# Patient Record
Sex: FEMALE | Race: WHITE | NOT HISPANIC OR LATINO | Employment: OTHER | URBAN - NONMETROPOLITAN AREA
[De-identification: names, ages, dates, MRNs, and addresses within clinical notes are randomized per-mention and may not be internally consistent; named-entity substitution may affect disease eponyms.]

---

## 2018-09-17 ENCOUNTER — APPOINTMENT (OUTPATIENT)
Dept: OCCUPATIONAL MEDICINE | Facility: OTHER | Age: 33
End: 2018-09-17

## 2018-09-17 PROCEDURE — 92552 PURE TONE AUDIOMETRY AIR: CPT

## 2018-09-17 PROCEDURE — 92499 UNLISTED OPH SVC/PROCEDURE: CPT

## 2018-09-17 PROCEDURE — 99499 UNLISTED E&M SERVICE: CPT

## 2018-09-17 PROCEDURE — 99000 SPECIMEN HANDLING OFFICE-LAB: CPT

## 2018-10-29 ENCOUNTER — APPOINTMENT (OUTPATIENT)
Dept: CT IMAGING | Facility: HOSPITAL | Age: 33
End: 2018-10-29
Attending: PHYSICIAN ASSISTANT

## 2018-10-29 ENCOUNTER — HOSPITAL ENCOUNTER (EMERGENCY)
Facility: HOSPITAL | Age: 33
Discharge: HOME | End: 2018-10-29
Attending: PHYSICIAN ASSISTANT | Admitting: PHYSICIAN ASSISTANT

## 2018-10-29 VITALS
SYSTOLIC BLOOD PRESSURE: 135 MMHG | HEART RATE: 92 BPM | WEIGHT: 113 LBS | TEMPERATURE: 97.6 F | DIASTOLIC BLOOD PRESSURE: 101 MMHG | RESPIRATION RATE: 16 BRPM | OXYGEN SATURATION: 100 %

## 2018-10-29 DIAGNOSIS — M54.50 ACUTE BILATERAL LOW BACK PAIN WITHOUT SCIATICA: ICD-10-CM

## 2018-10-29 DIAGNOSIS — G44.209 ACUTE NON INTRACTABLE TENSION-TYPE HEADACHE: ICD-10-CM

## 2018-10-29 DIAGNOSIS — R31.29 MICROSCOPIC HEMATURIA: ICD-10-CM

## 2018-10-29 LAB
ALBUMIN SERPL-MCNC: 4.3 G/DL (ref 3.4–5)
ALBUMIN UR-MCNC: NEGATIVE MG/DL
ALP SERPL-CCNC: 69 U/L (ref 40–150)
ALT SERPL W P-5'-P-CCNC: 25 U/L (ref 0–50)
ANION GAP SERPL CALCULATED.3IONS-SCNC: 7 MMOL/L (ref 3–14)
APPEARANCE UR: ABNORMAL
AST SERPL W P-5'-P-CCNC: 17 U/L (ref 0–45)
BACTERIA #/AREA URNS HPF: ABNORMAL /HPF
BASOPHILS # BLD AUTO: 0.1 10E9/L (ref 0–0.2)
BASOPHILS NFR BLD AUTO: 0.7 %
BILIRUB SERPL-MCNC: 0.2 MG/DL (ref 0.2–1.3)
BILIRUB UR QL STRIP: NEGATIVE
BUN SERPL-MCNC: 13 MG/DL (ref 7–30)
CALCIUM SERPL-MCNC: 8.7 MG/DL (ref 8.5–10.1)
CAOX CRY #/AREA URNS HPF: ABNORMAL /HPF
CHLORIDE SERPL-SCNC: 113 MMOL/L (ref 94–109)
CO2 SERPL-SCNC: 21 MMOL/L (ref 20–32)
COLOR UR AUTO: ABNORMAL
CREAT SERPL-MCNC: 1.01 MG/DL (ref 0.52–1.04)
CRP SERPL-MCNC: <2.9 MG/L (ref 0–8)
DIFFERENTIAL METHOD BLD: NORMAL
EOSINOPHIL # BLD AUTO: 0.3 10E9/L (ref 0–0.7)
EOSINOPHIL NFR BLD AUTO: 3.4 %
ERYTHROCYTE [DISTWIDTH] IN BLOOD BY AUTOMATED COUNT: 12.6 % (ref 10–15)
GFR SERPL CREATININE-BSD FRML MDRD: 63 ML/MIN/1.7M2
GLUCOSE SERPL-MCNC: 85 MG/DL (ref 70–99)
GLUCOSE UR STRIP-MCNC: NEGATIVE MG/DL
HCG UR QL: NEGATIVE
HCT VFR BLD AUTO: 42.5 % (ref 35–47)
HGB BLD-MCNC: 14.5 G/DL (ref 11.7–15.7)
HGB UR QL STRIP: ABNORMAL
IMM GRANULOCYTES # BLD: 0 10E9/L (ref 0–0.4)
IMM GRANULOCYTES NFR BLD: 0.2 %
KETONES UR STRIP-MCNC: NEGATIVE MG/DL
LEUKOCYTE ESTERASE UR QL STRIP: NEGATIVE
LYMPHOCYTES # BLD AUTO: 2 10E9/L (ref 0.8–5.3)
LYMPHOCYTES NFR BLD AUTO: 22.2 %
MCH RBC QN AUTO: 32.8 PG (ref 26.5–33)
MCHC RBC AUTO-ENTMCNC: 34.1 G/DL (ref 31.5–36.5)
MCV RBC AUTO: 96 FL (ref 78–100)
MONOCYTES # BLD AUTO: 0.5 10E9/L (ref 0–1.3)
MONOCYTES NFR BLD AUTO: 5.7 %
MUCOUS THREADS #/AREA URNS LPF: PRESENT /LPF
NEUTROPHILS # BLD AUTO: 6 10E9/L (ref 1.6–8.3)
NEUTROPHILS NFR BLD AUTO: 67.8 %
NITRATE UR QL: NEGATIVE
NRBC # BLD AUTO: 0 10*3/UL
NRBC BLD AUTO-RTO: 0 /100
PH UR STRIP: 5 PH (ref 4.7–8)
PLATELET # BLD AUTO: 362 10E9/L (ref 150–450)
POTASSIUM SERPL-SCNC: 3.9 MMOL/L (ref 3.4–5.3)
PROT SERPL-MCNC: 8.1 G/DL (ref 6.8–8.8)
RBC # BLD AUTO: 4.42 10E12/L (ref 3.8–5.2)
RBC #/AREA URNS AUTO: >182 /HPF (ref 0–2)
SODIUM SERPL-SCNC: 141 MMOL/L (ref 133–144)
SOURCE: ABNORMAL
SP GR UR STRIP: 1.01 (ref 1–1.03)
SQUAMOUS #/AREA URNS AUTO: 2 /HPF (ref 0–1)
URATE CRY #/AREA URNS HPF: ABNORMAL /HPF
UROBILINOGEN UR STRIP-MCNC: NORMAL MG/DL (ref 0–2)
WBC # BLD AUTO: 8.8 10E9/L (ref 4–11)
WBC #/AREA URNS AUTO: 2 /HPF (ref 0–5)
WBC CLUMPS #/AREA URNS HPF: PRESENT /HPF

## 2018-10-29 PROCEDURE — 99284 EMERGENCY DEPT VISIT MOD MDM: CPT | Mod: 25

## 2018-10-29 PROCEDURE — 81001 URINALYSIS AUTO W/SCOPE: CPT | Performed by: PHYSICIAN ASSISTANT

## 2018-10-29 PROCEDURE — 80053 COMPREHEN METABOLIC PANEL: CPT | Performed by: PHYSICIAN ASSISTANT

## 2018-10-29 PROCEDURE — 99284 EMERGENCY DEPT VISIT MOD MDM: CPT | Performed by: PHYSICIAN ASSISTANT

## 2018-10-29 PROCEDURE — 36415 COLL VENOUS BLD VENIPUNCTURE: CPT | Performed by: PHYSICIAN ASSISTANT

## 2018-10-29 PROCEDURE — 86140 C-REACTIVE PROTEIN: CPT | Performed by: PHYSICIAN ASSISTANT

## 2018-10-29 PROCEDURE — 81025 URINE PREGNANCY TEST: CPT | Performed by: PHYSICIAN ASSISTANT

## 2018-10-29 PROCEDURE — 85025 COMPLETE CBC W/AUTO DIFF WBC: CPT | Performed by: PHYSICIAN ASSISTANT

## 2018-10-29 PROCEDURE — 70450 CT HEAD/BRAIN W/O DYE: CPT | Mod: TC

## 2018-10-29 RX ORDER — METOCLOPRAMIDE HYDROCHLORIDE 5 MG/ML
5 INJECTION INTRAMUSCULAR; INTRAVENOUS ONCE
Status: DISCONTINUED | OUTPATIENT
Start: 2018-10-29 | End: 2018-10-29 | Stop reason: HOSPADM

## 2018-10-29 RX ORDER — DIPHENHYDRAMINE HYDROCHLORIDE 50 MG/ML
25 INJECTION INTRAMUSCULAR; INTRAVENOUS ONCE
Status: DISCONTINUED | OUTPATIENT
Start: 2018-10-29 | End: 2018-10-29 | Stop reason: HOSPADM

## 2018-10-29 RX ORDER — KETOROLAC TROMETHAMINE 30 MG/ML
30 INJECTION, SOLUTION INTRAMUSCULAR; INTRAVENOUS ONCE
Status: DISCONTINUED | OUTPATIENT
Start: 2018-10-29 | End: 2018-10-29 | Stop reason: HOSPADM

## 2018-10-29 NOTE — ED PROVIDER NOTES
"  History     Chief Complaint   Patient presents with     Back Pain     bilateral low back pain x 1 radiating to low abdomen, nausea and vomiting     Dizziness     lightheaded x 1 week     HPI  Sarah Ugalde is a 33 year old female who presents with bilateral low back pain with radiation to her lower abdomen, nausea, dizziness and headaches x 1 week. She has h/o TTP with plasmapheresis performed 2 years ago. Denies dysuria, hematuria, or vaginal symptoms. No fevers/chills. States family h/o brain tumors so wants imaging to rule this out. Also, concerned maybe her creatinine is elevated due to the TTP as she had back pain with this in the past.  LMP was 2 weeks ago.      Problem List:    There are no active problems to display for this patient.       Past Medical History:    History reviewed. No pertinent past medical history.    Past Surgical History:    History reviewed. No pertinent surgical history.    Family History:    Family History   Problem Relation Age of Onset     Cancer - colorectal Paternal Grandmother      Breast Cancer Other      3 great aunts on moms side     Allergies Maternal Grandmother      dust pollen     Allergies Maternal Aunt      \"       Social History:  Marital Status:  Single [1]  Social History   Substance Use Topics     Smoking status: Never Smoker     Smokeless tobacco: Never Used     Alcohol use No        Medications:      No current outpatient prescriptions on file.      Review of Systems   All other systems reviewed and are negative.      Physical Exam   BP: (!) 135/101  Pulse: 92  Temp: 97.6  F (36.4  C)  Resp: 16  Weight: 51.3 kg (113 lb)  SpO2: 100 %      Physical Exam   Constitutional: She is oriented to person, place, and time. Vital signs are normal. She appears well-developed and well-nourished.  Non-toxic appearance. She does not have a sickly appearance. She does not appear ill. No distress.   HENT:   Head: Normocephalic and atraumatic.   Right Ear: Hearing, tympanic " membrane, external ear and ear canal normal.   Left Ear: Hearing, tympanic membrane, external ear and ear canal normal.   Nose: Nose normal. Right sinus exhibits no maxillary sinus tenderness and no frontal sinus tenderness. Left sinus exhibits no maxillary sinus tenderness and no frontal sinus tenderness.   Mouth/Throat: Uvula is midline, oropharynx is clear and moist and mucous membranes are normal. No oropharyngeal exudate.   Eyes: Conjunctivae and EOM are normal. Pupils are equal, round, and reactive to light. Right eye exhibits no discharge. Left eye exhibits no discharge. No scleral icterus.   Fundoscopic exam:       The right eye shows no papilledema.        The left eye shows no papilledema.   Neck: Trachea normal, normal range of motion and full passive range of motion without pain. Neck supple. No Brudzinski's sign and no Kernig's sign noted. No thyroid mass and no thyromegaly present.   Cardiovascular: Normal rate, regular rhythm, normal heart sounds and intact distal pulses.  Exam reveals no gallop and no friction rub.    No murmur heard.  Pulmonary/Chest: Effort normal and breath sounds normal. No respiratory distress. She has no wheezes. She has no rales. She exhibits no tenderness.   Abdominal: Soft. Bowel sounds are normal. She exhibits no distension and no mass. There is no tenderness. There is CVA tenderness. There is no rebound and no guarding.   Musculoskeletal: Normal range of motion. She exhibits no edema.   Lymphadenopathy:     She has no cervical adenopathy.   Neurological: She is alert and oriented to person, place, and time. She has normal strength and normal reflexes. She displays no atrophy and no tremor. No cranial nerve deficit or sensory deficit. She exhibits normal muscle tone. She displays a negative Romberg sign. She displays no seizure activity. Coordination and gait normal. GCS eye subscore is 4. GCS verbal subscore is 5. GCS motor subscore is 6.   Skin: Skin is warm and dry. No  rash noted. She is not diaphoretic. No erythema. No pallor.   Psychiatric: She has a normal mood and affect. Her behavior is normal. Judgment and thought content normal.   Nursing note and vitals reviewed.      ED Course     ED Course     Procedures              Results for orders placed or performed during the hospital encounter of 10/29/18 (from the past 24 hour(s))   UA reflex to Microscopic and Culture   Result Value Ref Range    Color Urine Light Yellow     Appearance Urine Slightly Cloudy     Glucose Urine Negative NEG^Negative mg/dL    Bilirubin Urine Negative NEG^Negative    Ketones Urine Negative NEG^Negative mg/dL    Specific Gravity Urine 1.010 1.003 - 1.035    Blood Urine Large (A) NEG^Negative    pH Urine 5.0 4.7 - 8.0 pH    Protein Albumin Urine Negative NEG^Negative mg/dL    Urobilinogen mg/dL Normal 0.0 - 2.0 mg/dL    Nitrite Urine Negative NEG^Negative    Leukocyte Esterase Urine Negative NEG^Negative    Source Midstream Urine     RBC Urine >182 (H) 0 - 2 /HPF    WBC Urine 2 0 - 5 /HPF    WBC Clumps Present (A) NEG^Negative /HPF    Bacteria Urine None (A) NEG^Negative /HPF    Squamous Epithelial /HPF Urine 2 (H) 0 - 1 /HPF    Mucous Urine Present (A) NEG^Negative /LPF    Calcium Oxalate Many (A) NEG^Negative /HPF    Uric Acid Crystals Moderate (A) NEG^Negative /HPF   HCG qualitative urine   Result Value Ref Range    HCG Qual Urine Negative NEG^Negative   CBC with platelets differential   Result Value Ref Range    WBC 8.8 4.0 - 11.0 10e9/L    RBC Count 4.42 3.8 - 5.2 10e12/L    Hemoglobin 14.5 11.7 - 15.7 g/dL    Hematocrit 42.5 35.0 - 47.0 %    MCV 96 78 - 100 fl    MCH 32.8 26.5 - 33.0 pg    MCHC 34.1 31.5 - 36.5 g/dL    RDW 12.6 10.0 - 15.0 %    Platelet Count 362 150 - 450 10e9/L    Diff Method Automated Method     % Neutrophils 67.8 %    % Lymphocytes 22.2 %    % Monocytes 5.7 %    % Eosinophils 3.4 %    % Basophils 0.7 %    % Immature Granulocytes 0.2 %    Nucleated RBCs 0 0 /100    Absolute  Neutrophil 6.0 1.6 - 8.3 10e9/L    Absolute Lymphocytes 2.0 0.8 - 5.3 10e9/L    Absolute Monocytes 0.5 0.0 - 1.3 10e9/L    Absolute Eosinophils 0.3 0.0 - 0.7 10e9/L    Absolute Basophils 0.1 0.0 - 0.2 10e9/L    Abs Immature Granulocytes 0.0 0 - 0.4 10e9/L    Absolute Nucleated RBC 0.0    Comprehensive metabolic panel   Result Value Ref Range    Sodium 141 133 - 144 mmol/L    Potassium 3.9 3.4 - 5.3 mmol/L    Chloride 113 (H) 94 - 109 mmol/L    Carbon Dioxide 21 20 - 32 mmol/L    Anion Gap 7 3 - 14 mmol/L    Glucose 85 70 - 99 mg/dL    Urea Nitrogen 13 7 - 30 mg/dL    Creatinine 1.01 0.52 - 1.04 mg/dL    GFR Estimate 63 >60 mL/min/1.7m2    GFR Estimate If Black 76 >60 mL/min/1.7m2    Calcium 8.7 8.5 - 10.1 mg/dL    Bilirubin Total 0.2 0.2 - 1.3 mg/dL    Albumin 4.3 3.4 - 5.0 g/dL    Protein Total 8.1 6.8 - 8.8 g/dL    Alkaline Phosphatase 69 40 - 150 U/L    ALT 25 0 - 50 U/L    AST 17 0 - 45 U/L   CRP inflammation   Result Value Ref Range    CRP Inflammation <2.9 0.0 - 8.0 mg/L   CT Head w/o Contrast    Narrative    PROCEDURE: CT HEAD W/O CONTRAST     HISTORY: new onset migraine, dizziness, unsteady gait; .    COMPARISON: None.    TECHNIQUE:  Helical images of the head from the foramen magnum to the  vertex were obtained without contrast.    FINDINGS: The ventricles and sulci are normal in volume. No acute  intracranial hemorrhage, mass effect, midline shift, hydrocephalus or  basilar cystern effacement are present.    The grey-white matter interface is preserved.    The calvarium is intact. The mastoid air cells are clear.  The  visualized paranasal sinuses are clear.      Impression    IMPRESSION: Normal brain      ELSA VELÁZQUEZ MD       Medications   0.9% sodium chloride BOLUS (not administered)   ketorolac (TORADOL) injection 30 mg (not administered)   diphenhydrAMINE (BENADRYL) injection 25 mg (not administered)   metoclopramide (REGLAN) injection 5 mg (not administered)       Assessments & Plan (with Medical  Decision Making)   Sarah is in NAD and VS are WNL. On exam, she has mild right sided CVA tenderness. Normal neuro exam. Head CT was normal which reassured pt. Her CBC, CMP and CRP were WNL. UA is positive for > 182 RBC's, no gross hematuria, no protein. HCG negative. This was a clean catch and she is not due for her menstrual cycle for another 2 weeks. She denies any severe flank/abdominal/groin pain and therefore she doesn't believe she has a ureteral stone. She has refused an IV, fluids, and headache treatment because she told the nurse she doesn't believe she has a migraine. At this point, etiology is unclear. I recommended she establish care with a local provider and also consult with her hematologist in [place] which she has the ability to perform tele-med visits with and I included a copy of her labs. She is to return here sooner with any new or worsening symptoms or if she changes her mind about accepting further treatment.     Plan: Follow up with your hematologist in regards to your symptoms. Your labs were normal, see below, though your urine had microscopic hematuria. Return here if you develop any new or worsening symptoms. Establish care with a local primary care provider ASAP for close follow up.     I have reviewed the nursing notes.    I have reviewed the findings, diagnosis, plan and need for follow up with the patient.    There are no discharge medications for this patient.      Final diagnoses:   Acute bilateral low back pain without sciatica   Microscopic hematuria   Acute non intractable tension-type headache       10/29/2018   [facility]     Beth Don PA-C  10/29/18 [id number]

## 2018-10-29 NOTE — ED AVS SNAPSHOT
[facility]    [address] [place] [id number]    Phone:  [phone]                                       Sarah Ugalde   MRN: [id number]    Department:  [facility]   Date of Visit:  10/29/2018           Patient Information     Date Of Birth          1985        Your diagnoses for this visit were:     Acute bilateral low back pain without sciatica     Microscopic hematuria     Acute non intractable tension-type headache        You were seen by Beth Don PA-C.      Follow-up Information     Follow up with Tesha Babcock MD In 3 days.    Specialty:  Family Practice    Contact information:    [address] [facility] [address] [phone]          Follow up with [facility].    Specialty:  EMERGENCY MEDICINE    Why:  If symptoms worsen    Contact information:    [address] [address] [phone]    Additional information:    From [place]: Take [address]. Turn left at [address]. Turn left at the first stoplight on [address]. At the first stop sign, take a right onto [address]. Take a left into the parking lot and continue through until you reach the North enterance of the building.       From [place]: Take [address]. Take the [address] ramp towards [place]. Turn left onto [address]. Take a slight right onto [address]. Turn left at the first stoplight on [address]. At the first stop sign, take a right onto [address]. Take a left into the parking lot and continue through until you reach the North enterance of the building.       From [place]: Take [address]. Take a right at [address]. At the first stop sign, take a right onto [address]. Take a left into the parking lot and continue through until you reach the North enterance of the building.         Discharge Instructions       Follow up with your hematologist  in regards to your symptoms. Your labs were normal, see below, though your urine had microscopic hematuria. Return here if you develop any new or worsening symptoms. Establish care with a local primary care provider ASAP for close follow up.       Results for IBRAHIMA SILVER (MRN [id number]) as of 10/29/2018 16:48       Ref. Range 10/29/2018 14:44 10/29/2018 15:13   Sodium Latest Ref Range: 133 - 144 mmol/L  141   Potassium Latest Ref Range: 3.4 - 5.3 mmol/L  3.9   Chloride Latest Ref Range: 94 - 109 mmol/L  113 (H)   Carbon Dioxide Latest Ref Range: 20 - 32 mmol/L  21   Urea Nitrogen Latest Ref Range: 7 - 30 mg/dL  13   Creatinine Latest Ref Range: 0.52 - 1.04 mg/dL  1.01   GFR Estimate Latest Ref Range: >60 mL/min/1.7m2  63   GFR Estimate If Black Latest Ref Range: >60 mL/min/1.7m2  76   Calcium Latest Ref Range: 8.5 - 10.1 mg/dL  8.7   Anion Gap Latest Ref Range: 3 - 14 mmol/L  7   Albumin Latest Ref Range: 3.4 - 5.0 g/dL  4.3   Protein Total Latest Ref Range: 6.8 - 8.8 g/dL  8.1   Bilirubin Total Latest Ref Range: 0.2 - 1.3 mg/dL  0.2   Alkaline Phosphatase Latest Ref Range: 40 - 150 U/L  69   ALT Latest Ref Range: 0 - 50 U/L  25   AST Latest Ref Range: 0 - 45 U/L  17   CRP Inflammation Latest Ref Range: 0.0 - 8.0 mg/L  <2.9   HCG Qual Urine Latest Ref Range: NEG^Negative  Negative    Glucose Latest Ref Range: 70 - 99 mg/dL  85   WBC Latest Ref Range: 4.0 - 11.0 10e9/L  8.8   Hemoglobin Latest Ref Range: 11.7 - 15.7 g/dL  14.5   Hematocrit Latest Ref Range: 35.0 - 47.0 %  42.5   Platelet Count Latest Ref Range: 150 - 450 10e9/L  362   RBC Count Latest Ref Range: 3.8 - 5.2 10e12/L  4.42   MCV Latest Ref Range: 78 - 100 fl  96   MCH Latest Ref Range: 26.5 - 33.0 pg  32.8   MCHC Latest Ref Range: 31.5 - 36.5 g/dL  34.1   RDW Latest Ref Range: 10.0 - 15.0 %  12.6   Diff Method Unknown  Automated Method   % Neutrophils Latest Units: %  67.8   % Lymphocytes Latest Units: %  22.2   % Monocytes Latest Units: %   5.7   % Eosinophils Latest Units: %  3.4   % Basophils Latest Units: %  0.7   % Immature Granulocytes Latest Units: %  0.2   Nucleated RBCs Latest Ref Range: 0 /100  0   Absolute Neutrophil Latest Ref Range: 1.6 - 8.3 10e9/L  6.0   Absolute Lymphocytes Latest Ref Range: 0.8 - 5.3 10e9/L  2.0   Absolute Monocytes Latest Ref Range: 0.0 - 1.3 10e9/L  0.5   Absolute Eosinophils Latest Ref Range: 0.0 - 0.7 10e9/L  0.3   Absolute Basophils Latest Ref Range: 0.0 - 0.2 10e9/L  0.1   Abs Immature Granulocytes Latest Ref Range: 0 - 0.4 10e9/L  0.0   Absolute Nucleated RBC Unknown  0.0   Color Urine Unknown Light Yellow    Appearance Urine Unknown Slightly Cloudy    Glucose Urine Latest Ref Range: NEG^Negative mg/dL Negative    Bilirubin Urine Latest Ref Range: NEG^Negative  Negative    Ketones Urine Latest Ref Range: NEG^Negative mg/dL Negative    Specific Gravity Urine Latest Ref Range: 1.003 - 1.035  1.010    pH Urine Latest Ref Range: 4.7 - 8.0 pH 5.0    Protein Albumin Urine Latest Ref Range: NEG^Negative mg/dL Negative    Urobilinogen mg/dL Latest Ref Range: 0.0 - 2.0 mg/dL Normal    Nitrite Urine Latest Ref Range: NEG^Negative  Negative    Blood Urine Latest Ref Range: NEG^Negative  Large (A)    Leukocyte Esterase Urine Latest Ref Range: NEG^Negative  Negative    Source Unknown Midstream Urine    WBC Urine Latest Ref Range: 0 - 5 /HPF 2    RBC Urine Latest Ref Range: 0 - 2 /HPF >182 (H)    Bacteria Urine Latest Ref Range: NEG^Negative /HPF None (A)    WBC Clumps Latest Ref Range: NEG^Negative /HPF Present (A)    Squamous Epithelial /HPF Urine Latest Ref Range: 0 - 1 /HPF 2 (H)    Mucous Urine Latest Ref Range: NEG^Negative /LPF Present (A)    Uric Acid Crystals Latest Ref Range: NEG^Negative /HPF Moderate (A)    Calcium Oxalate Latest Ref Range: NEG^Negative /HPF Many (A)          General Neck and Back Pain    Both neck and back pain are usually caused by injury to the muscles or ligaments of the spine. Sometimes the  disks that separate each bone of the spine may cause pain by pressing on a nearby nerve. Back and neck pain may appear after a sudden twisting or bending force (such as in a car accident), or sometimes after a simple awkward movement. In either case, muscle spasm is often present and adds to the pain.  Acute neck and back pain usually gets better in 1 to 2 weeks. Pain related to disk disease, arthritis in the spinal joints or spinal stenosis (narrowing of the spinal canal) can become chronic and last for months or years.  Back and neck pain are common problems. Most people feel better in 1 or 2 weeks, and most of the rest in 1 to 2 months. Most people can remain active.  People have and describe pain differently.    Pain can be sharp, stabbing, shooting, aching, cramping, or burning    Movement, standing, bending, lifting, sitting, or walking may worsen the pain    Pain can be localized to one spot or area, or it can be more generalized    Pain can spread or radiate upwards, downwards, to the front, or go down your arms    Muscle spasm may occur.  Most of the time mechanical problems with the muscles or spine cause the pain. it is usually caused by an injury, whether known or not, to the muscles or ligaments. While illnesses can cause back pain, it is usually not caused by a serious illness. Pain is usually related to physical activity, whether sports, exercise, work, or normal activity. Sometimes it can occur without an identifiable cause. This can happen simply by stretching or moving wrong, without noting pain at the time. Other causes include:    Overexertion, lifting, pushing, pulling incorrectly or too aggressively.    Sudden twisting, bending or stretching from an accident (car or fall), or accidental movement.    Poor posture    Poor conditioning, lack of regular exercise    Spinal disc disease or arthritis    Stress    Pregnancy, or illness like appendicitis, bladder or kidney infection, pelvic  infections   Home care    For neck pain: Use a comfortable pillow that supports the head and keeps the spine in a neutral position. The position of the head should not be tilted forward or backward.    When in bed, try to find a position of comfort. A firm mattress is best. Try lying flat on your back with pillows under your knees. You can also try lying on your side with your knees bent up towards your chest and a pillow between your knees.    At first, do not try to stretch out the sore spots. If there is a strain, it is not like the good soreness you get after exercising without an injury. In this case, stretching may make it worse.    Don't sit for long periods, as in long car rides or other travel. This puts more stress on the lower back than standing or walking.    During the first 24 to 72 hours after an injury, apply an ice pack to the painful area for 20 minutes and then remove it for 20 minutes over a period of 60 to 90 minutes or several times a day.     You can alternate ice and heat therapies. Talk with your healthcare provider about the best treatment for your back or neck pain. As a safety precaution, do not use a heating pad at bedtime. Sleeping with a heating pad can lead to skin burns or tissue damage.    Therapeutic massage can help relax the back and neck muscles without stretching them.    Be aware of safe lifting methods and do not lift anything over 15 pounds until all the pain is gone.  Medicines  Talk to your healthcare provider before using medicine, especially if you have other medical problems or are taking other medicines.    You may use over-the-counter medicine to control pain, unless another pain medicine was prescribed. If you have chronic conditions like diabetes, liver or kidney disease, stomach ulcers,  gastrointestinal bleeding, or are taking blood thinner medicines.    Be careful if you are given pain medicines, narcotics, or medicine for muscle spasm. They can cause drowsiness,  and can affect your coordination, reflexes, and judgment. Do not drive or operate heavy machinery.  Follow-up care  Follow up with your healthcare provider, or as advised. Physical therapy or further tests may be needed.  If X-rays were taken, you will be notified of any new findings that may affect your care.  Call 911  Call 911 if any of the following occur:    Trouble breathing    Confusion    Very drowsy or trouble awakening    Fainting or loss of consciousness    Rapid or very slow heart rate    Loss of bowel or bladder control  When to seek medical advice  Call your healthcare provider right away if any of these occur:    Pain becomes worse or spreads into your arms or legs    Weakness, numbness or pain in one or both arms or legs    Numbness in the groin area    Difficulty walking    Fever of 100.4 F (38 C) or higher, or as directed by your healthcare provider  Date Last Reviewed: 7/1/2016 2000-2017 [employer]. [address] [place] [address]. All rights reserved. This information is not intended as a substitute for professional medical care. Always follow your healthcare professional's instructions.        Blood in the Urine    Blood in the urine (hematuria) has many possible causes. If it occurs after an injury (such as a car accident or fall), it is most often a sign of bruising to the kidney or bladder. Common causes of blood in the urine include urinary tract infections, kidney stones, inflammation, tumors, or certain other diseases of the kidney or bladder. Menstruation can cause blood to appear in the urine sample, although it is not coming from the urinary tract.  If only a trace amount of blood is present, it will show up on the urine test, even though the urine may be yellow and not pink or red. This may occur with any of the above conditions, as well as heavy exercise or high fever. In this case, your doctor may want to repeat the urine test on another day. This will  show if the blood is still present. If it is, then other tests can be done to find out the cause.  Home care  Follow these home care guidelines:    If your urine does not appear bloody (pink, brown or red) then you do not need to restrict your activity in any way.    If you can see blood in your urine, rest and avoid heavy exertion until your next exam. Do not use aspirin, blood thinners, or anti-platelet or anti-inflammatory medicines. These include ibuprofen and naproxen. These thin the blood and may increase bleeding.  Follow-up care  Follow up with your healthcare provider, or as advised. If you were injured and had blood in your urine, you should have a repeat urine test in 1 to 2 days. Contact your doctor for this test.  A radiologist will review any X-rays that were taken. You will be told of any new findings that may affect your care.  When to seek medical advice  Call your healthcare provider right away if any of these occur:    Bright red blood or blood clots in the urine (if you did not have this before)    Weakness, dizziness or fainting    New groin, abdominal, or back pain    Fever of 100.4 F (38 C) or higher, or as directed by your healthcare provider    Repeated vomiting    Bleeding from the nose or gums or easy bruising  Date Last Reviewed: 9/1/2016 2000-2017 [employer]. [address] [place] [address], [place] [address]. All rights reserved. This information is not intended as a substitute for professional medical care. Always follow your healthcare professional's instructions.               Review of your medicines      Notice     You have not been prescribed any medications.            Procedures and tests performed during your visit     CBC with platelets differential    CRP inflammation    CT Head w/o Contrast    Comprehensive metabolic panel    HCG qualitative urine    UA reflex to Microscopic and Culture      Orders Needing Specimen Collection     None      Pending Results     No  "orders found from 10/27/2018 to 10/30/2018.            Pending Culture Results     No orders found from 10/27/2018 to 10/30/2018.            Thank you for choosing [facility]       Thank you for choosing [facility] for your care. Our goal is always to provide you with excellent care. Hearing back from our patients is one way we can continue to improve our services. Please take a few minutes to complete the written survey that you may receive in the mail after you visit with us. Thank you!        [facility] Information     [employer] lets you send messages to your doctor, view your test results, renew your prescriptions, schedule appointments and more. To sign up, go to [email or web address] . Click on \"Log in\" on the left side of the screen, which will take you to the Welcome page. Then click on \"Sign up Now\" on the right side of the page.     You will be asked to enter the access code listed below, as well as some personal information. Please follow the directions to create your username and password.     Your access code is: [email or web address]  Expires: 2019  5:08 PM     Your access code will  in 90 days. If you need help or a new code, please call your [facility] clinic or [phone].        Care EveryWhere ID     This is your Care EveryWhere ID. This could be used by other organizations to access your [facility] medical records  [id number]        Equal Access to Services     KEN HARRIS AH: Hadii malia luz Sorussell, waaxda luqadaha, qaybta kaalmada sunny, deepa don . So [facility] [phone].    ATENCIÓN: Si habla español, tiene a cruz disposición servicios gratuitos de asistencia lingüística. Llame al [phone].    We comply with applicable federal civil rights laws and [place] laws. We do not discriminate on the basis of race, color, national origin, age, disability, sex, sexual orientation, or gender identity.            After Visit Summary       This is your record. Keep " this with you and show to your community pharmacist(s) and doctor(s) at your next visit.

## 2018-10-29 NOTE — DISCHARGE INSTRUCTIONS
Follow up with your hematologist in regards to your symptoms. Your labs were normal, see below, though your urine had microscopic hematuria. Return here if you develop any new or worsening symptoms. Establish care with a local primary care provider ASAP for close follow up.       Results for IBRAHIMA SILVER (MRN [id number]) as of 10/29/2018 16:48       Ref. Range 10/29/2018 14:44 10/29/2018 15:13   Sodium Latest Ref Range: 133 - 144 mmol/L  141   Potassium Latest Ref Range: 3.4 - 5.3 mmol/L  3.9   Chloride Latest Ref Range: 94 - 109 mmol/L  113 (H)   Carbon Dioxide Latest Ref Range: 20 - 32 mmol/L  21   Urea Nitrogen Latest Ref Range: 7 - 30 mg/dL  13   Creatinine Latest Ref Range: 0.52 - 1.04 mg/dL  1.01   GFR Estimate Latest Ref Range: >60 mL/min/1.7m2  63   GFR Estimate If Black Latest Ref Range: >60 mL/min/1.7m2  76   Calcium Latest Ref Range: 8.5 - 10.1 mg/dL  8.7   Anion Gap Latest Ref Range: 3 - 14 mmol/L  7   Albumin Latest Ref Range: 3.4 - 5.0 g/dL  4.3   Protein Total Latest Ref Range: 6.8 - 8.8 g/dL  8.1   Bilirubin Total Latest Ref Range: 0.2 - 1.3 mg/dL  0.2   Alkaline Phosphatase Latest Ref Range: 40 - 150 U/L  69   ALT Latest Ref Range: 0 - 50 U/L  25   AST Latest Ref Range: 0 - 45 U/L  17   CRP Inflammation Latest Ref Range: 0.0 - 8.0 mg/L  <2.9   HCG Qual Urine Latest Ref Range: NEG^Negative  Negative    Glucose Latest Ref Range: 70 - 99 mg/dL  85   WBC Latest Ref Range: 4.0 - 11.0 10e9/L  8.8   Hemoglobin Latest Ref Range: 11.7 - 15.7 g/dL  14.5   Hematocrit Latest Ref Range: 35.0 - 47.0 %  42.5   Platelet Count Latest Ref Range: 150 - 450 10e9/L  362   RBC Count Latest Ref Range: 3.8 - 5.2 10e12/L  4.42   MCV Latest Ref Range: 78 - 100 fl  96   MCH Latest Ref Range: 26.5 - 33.0 pg  32.8   MCHC Latest Ref Range: 31.5 - 36.5 g/dL  34.1   RDW Latest Ref Range: 10.0 - 15.0 %  12.6   Diff Method Unknown  Automated Method   % Neutrophils Latest Units: %  67.8   % Lymphocytes Latest Units: %  22.2    % Monocytes Latest Units: %  5.7   % Eosinophils Latest Units: %  3.4   % Basophils Latest Units: %  0.7   % Immature Granulocytes Latest Units: %  0.2   Nucleated RBCs Latest Ref Range: 0 /100  0   Absolute Neutrophil Latest Ref Range: 1.6 - 8.3 10e9/L  6.0   Absolute Lymphocytes Latest Ref Range: 0.8 - 5.3 10e9/L  2.0   Absolute Monocytes Latest Ref Range: 0.0 - 1.3 10e9/L  0.5   Absolute Eosinophils Latest Ref Range: 0.0 - 0.7 10e9/L  0.3   Absolute Basophils Latest Ref Range: 0.0 - 0.2 10e9/L  0.1   Abs Immature Granulocytes Latest Ref Range: 0 - 0.4 10e9/L  0.0   Absolute Nucleated RBC Unknown  0.0   Color Urine Unknown Light Yellow    Appearance Urine Unknown Slightly Cloudy    Glucose Urine Latest Ref Range: NEG^Negative mg/dL Negative    Bilirubin Urine Latest Ref Range: NEG^Negative  Negative    Ketones Urine Latest Ref Range: NEG^Negative mg/dL Negative    Specific Gravity Urine Latest Ref Range: 1.003 - 1.035  1.010    pH Urine Latest Ref Range: 4.7 - 8.0 pH 5.0    Protein Albumin Urine Latest Ref Range: NEG^Negative mg/dL Negative    Urobilinogen mg/dL Latest Ref Range: 0.0 - 2.0 mg/dL Normal    Nitrite Urine Latest Ref Range: NEG^Negative  Negative    Blood Urine Latest Ref Range: NEG^Negative  Large (A)    Leukocyte Esterase Urine Latest Ref Range: NEG^Negative  Negative    Source Unknown Midstream Urine    WBC Urine Latest Ref Range: 0 - 5 /HPF 2    RBC Urine Latest Ref Range: 0 - 2 /HPF >182 (H)    Bacteria Urine Latest Ref Range: NEG^Negative /HPF None (A)    WBC Clumps Latest Ref Range: NEG^Negative /HPF Present (A)    Squamous Epithelial /HPF Urine Latest Ref Range: 0 - 1 /HPF 2 (H)    Mucous Urine Latest Ref Range: NEG^Negative /LPF Present (A)    Uric Acid Crystals Latest Ref Range: NEG^Negative /HPF Moderate (A)    Calcium Oxalate Latest Ref Range: NEG^Negative /HPF Many (A)          General Neck and Back Pain    Both neck and back pain are usually caused by injury to the muscles or ligaments  of the spine. Sometimes the disks that separate each bone of the spine may cause pain by pressing on a nearby nerve. Back and neck pain may appear after a sudden twisting or bending force (such as in a car accident), or sometimes after a simple awkward movement. In either case, muscle spasm is often present and adds to the pain.  Acute neck and back pain usually gets better in 1 to 2 weeks. Pain related to disk disease, arthritis in the spinal joints or spinal stenosis (narrowing of the spinal canal) can become chronic and last for months or years.  Back and neck pain are common problems. Most people feel better in 1 or 2 weeks, and most of the rest in 1 to 2 months. Most people can remain active.  People have and describe pain differently.    Pain can be sharp, stabbing, shooting, aching, cramping, or burning    Movement, standing, bending, lifting, sitting, or walking may worsen the pain    Pain can be localized to one spot or area, or it can be more generalized    Pain can spread or radiate upwards, downwards, to the front, or go down your arms    Muscle spasm may occur.  Most of the time mechanical problems with the muscles or spine cause the pain. it is usually caused by an injury, whether known or not, to the muscles or ligaments. While illnesses can cause back pain, it is usually not caused by a serious illness. Pain is usually related to physical activity, whether sports, exercise, work, or normal activity. Sometimes it can occur without an identifiable cause. This can happen simply by stretching or moving wrong, without noting pain at the time. Other causes include:    Overexertion, lifting, pushing, pulling incorrectly or too aggressively.    Sudden twisting, bending or stretching from an accident (car or fall), or accidental movement.    Poor posture    Poor conditioning, lack of regular exercise    Spinal disc disease or arthritis    Stress    Pregnancy, or illness like appendicitis, bladder or kidney  infection, pelvic infections   Home care    For neck pain: Use a comfortable pillow that supports the head and keeps the spine in a neutral position. The position of the head should not be tilted forward or backward.    When in bed, try to find a position of comfort. A firm mattress is best. Try lying flat on your back with pillows under your knees. You can also try lying on your side with your knees bent up towards your chest and a pillow between your knees.    At first, do not try to stretch out the sore spots. If there is a strain, it is not like the good soreness you get after exercising without an injury. In this case, stretching may make it worse.    Don't sit for long periods, as in long car rides or other travel. This puts more stress on the lower back than standing or walking.    During the first 24 to 72 hours after an injury, apply an ice pack to the painful area for 20 minutes and then remove it for 20 minutes over a period of 60 to 90 minutes or several times a day.     You can alternate ice and heat therapies. Talk with your healthcare provider about the best treatment for your back or neck pain. As a safety precaution, do not use a heating pad at bedtime. Sleeping with a heating pad can lead to skin burns or tissue damage.    Therapeutic massage can help relax the back and neck muscles without stretching them.    Be aware of safe lifting methods and do not lift anything over 15 pounds until all the pain is gone.  Medicines  Talk to your healthcare provider before using medicine, especially if you have other medical problems or are taking other medicines.    You may use over-the-counter medicine to control pain, unless another pain medicine was prescribed. If you have chronic conditions like diabetes, liver or kidney disease, stomach ulcers,  gastrointestinal bleeding, or are taking blood thinner medicines.    Be careful if you are given pain medicines, narcotics, or medicine for muscle spasm. They can  cause drowsiness, and can affect your coordination, reflexes, and judgment. Do not drive or operate heavy machinery.  Follow-up care  Follow up with your healthcare provider, or as advised. Physical therapy or further tests may be needed.  If X-rays were taken, you will be notified of any new findings that may affect your care.  Call 911  Call 911 if any of the following occur:    Trouble breathing    Confusion    Very drowsy or trouble awakening    Fainting or loss of consciousness    Rapid or very slow heart rate    Loss of bowel or bladder control  When to seek medical advice  Call your healthcare provider right away if any of these occur:    Pain becomes worse or spreads into your arms or legs    Weakness, numbness or pain in one or both arms or legs    Numbness in the groin area    Difficulty walking    Fever of 100.4 F (38 C) or higher, or as directed by your healthcare provider  Date Last Reviewed: 7/1/2016 2000-2017 [email or web address]. [address] [place] [address]. All rights reserved. This information is not intended as a substitute for professional medical care. Always follow your healthcare professional's instructions.        Blood in the Urine    Blood in the urine (hematuria) has many possible causes. If it occurs after an injury (such as a car accident or fall), it is most often a sign of bruising to the kidney or bladder. Common causes of blood in the urine include urinary tract infections, kidney stones, inflammation, tumors, or certain other diseases of the kidney or bladder. Menstruation can cause blood to appear in the urine sample, although it is not coming from the urinary tract.  If only a trace amount of blood is present, it will show up on the urine test, even though the urine may be yellow and not pink or red. This may occur with any of the above conditions, as well as heavy exercise or high fever. In this case, your doctor may want to repeat the urine test on another  day. This will show if the blood is still present. If it is, then other tests can be done to find out the cause.  Home care  Follow these home care guidelines:    If your urine does not appear bloody (pink, brown or red) then you do not need to restrict your activity in any way.    If you can see blood in your urine, rest and avoid heavy exertion until your next exam. Do not use aspirin, blood thinners, or anti-platelet or anti-inflammatory medicines. These include ibuprofen and naproxen. These thin the blood and may increase bleeding.  Follow-up care  Follow up with your healthcare provider, or as advised. If you were injured and had blood in your urine, you should have a repeat urine test in 1 to 2 days. Contact your doctor for this test.  A radiologist will review any X-rays that were taken. You will be told of any new findings that may affect your care.  When to seek medical advice  Call your healthcare provider right away if any of these occur:    Bright red blood or blood clots in the urine (if you did not have this before)    Weakness, dizziness or fainting    New groin, abdominal, or back pain    Fever of 100.4 F (38 C) or higher, or as directed by your healthcare provider    Repeated vomiting    Bleeding from the nose or gums or easy bruising  Date Last Reviewed: 9/1/2016 2000-2017 [employer]. [address] [place] [address], [place] [address]. All rights reserved. This information is not intended as a substitute for professional medical care. Always follow your healthcare professional's instructions.

## 2018-10-29 NOTE — ED NOTES
"Pt refuses iv meds and iv fluids, declines getting iv placed, states \" I have to drive and I just want to get my scan because I think something else is going on besides a migraine and I have to  [place]\" Provider updated.  "

## 2018-10-29 NOTE — ED NOTES
Pt left before final vital sign assessment.  Pt states she needs to leave to  her son.  Pt refused medication treatments also.

## 2018-10-29 NOTE — LETTER
October 29, 2018      To Whom It May Concern:      Sarah Ugalde was seen in our Emergency Department today, 10/29/18. No work today or tomorrow. '    Sincerely,        Beth Don PA-C

## 2018-10-29 NOTE — ED AVS SNAPSHOT
[facility]    [address]    [place] [address]    Phone:  [phone]                                       Sarah Ugalde   MRN: [id number]    Department:  [facility]   Date of Visit:  10/29/2018           After Visit Summary Signature Page     I have received my discharge instructions, and my questions have been answered. I have discussed any challenges I see with this plan with the nurse or doctor.    ..........................................................................................................................................  Patient/Patient Representative Signature      ..........................................................................................................................................  Patient Representative Print Name and Relationship to Patient    ..................................................               ................................................  Date                                   Time    ..........................................................................................................................................  Reviewed by Signature/Title    ...................................................              ..............................................  Date                                               Time          22EPIC Rev 08/18

## 2018-10-29 NOTE — ED NOTES
SERGIO GARCIA notified that patient is refusing IV and medications at the current time and about her pain status.

## 2021-07-25 ENCOUNTER — HEALTH MAINTENANCE LETTER (OUTPATIENT)
Age: 36
End: 2021-07-25

## 2021-09-19 ENCOUNTER — HEALTH MAINTENANCE LETTER (OUTPATIENT)
Age: 36
End: 2021-09-19

## 2022-08-13 ENCOUNTER — APPOINTMENT (OUTPATIENT)
Dept: GENERAL RADIOLOGY | Facility: HOSPITAL | Age: 37
End: 2022-08-13
Attending: STUDENT IN AN ORGANIZED HEALTH CARE EDUCATION/TRAINING PROGRAM

## 2022-08-13 ENCOUNTER — APPOINTMENT (OUTPATIENT)
Dept: CT IMAGING | Facility: HOSPITAL | Age: 37
End: 2022-08-13
Attending: STUDENT IN AN ORGANIZED HEALTH CARE EDUCATION/TRAINING PROGRAM

## 2022-08-13 ENCOUNTER — HOSPITAL ENCOUNTER (EMERGENCY)
Facility: HOSPITAL | Age: 37
Discharge: HOME | End: 2022-08-13
Attending: STUDENT IN AN ORGANIZED HEALTH CARE EDUCATION/TRAINING PROGRAM | Admitting: STUDENT IN AN ORGANIZED HEALTH CARE EDUCATION/TRAINING PROGRAM

## 2022-08-13 VITALS
HEART RATE: 104 BPM | SYSTOLIC BLOOD PRESSURE: 160 MMHG | RESPIRATION RATE: 16 BRPM | WEIGHT: 125.22 LBS | OXYGEN SATURATION: 99 % | DIASTOLIC BLOOD PRESSURE: 103 MMHG | TEMPERATURE: 98.6 F
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
## 2022-08-13 PROCEDURE — 250N000013 HC RX MED GY IP 250 OP 250 PS 637: Performed by: STUDENT IN AN ORGANIZED HEALTH CARE EDUCATION/TRAINING PROGRAM

## 2022-08-13 PROCEDURE — 99284 EMERGENCY DEPT VISIT MOD MDM: CPT | Performed by: STUDENT IN AN ORGANIZED HEALTH CARE EDUCATION/TRAINING PROGRAM

## 2022-08-13 PROCEDURE — 99284 EMERGENCY DEPT VISIT MOD MDM: CPT | Mod: 25

## 2022-08-13 PROCEDURE — 73130 X-RAY EXAM OF HAND: CPT | Mod: RT

## 2022-08-13 PROCEDURE — 70450 CT HEAD/BRAIN W/O DYE: CPT

## 2022-08-13 PROCEDURE — 73110 X-RAY EXAM OF WRIST: CPT | Mod: RT

## 2022-08-13 RX ORDER — ACETAMINOPHEN 325 MG/1
975 TABLET ORAL ONCE
Status: COMPLETED | OUTPATIENT
Start: 2022-08-13 | End: 2022-08-13

## 2022-08-13 RX ADMIN — ACETAMINOPHEN 325MG 975 MG: 325 TABLET ORAL at 15:09

## 2022-08-13 ASSESSMENT — ACTIVITIES OF DAILY LIVING (ADL): ADLS_ACUITY_SCORE: 33

## 2022-08-13 NOTE — DISCHARGE INSTRUCTIONS
Return to the ER if you have worsening symptoms or new concerning symptoms use ice ibuprofen Tylenol as needed for ongoing discomfort from the bruising.  If you need any further help from the emergency department we are always here and available for you
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
## 2022-08-13 NOTE — ED NOTES
Patient discharged home at this time. Patient given written and verbal discharge instructions regarding home care, f/u and medications. Patient verbalized understanding of all discharge instructions.  Pt declined discharge vitals        
Pt presents with a headache and a small hematoma on the posterior left aspect of her head.  Pt also has bruising under the left eye.  She has a couple of bruises to the left upper arm.  Pt complains of pain in the right hand and wrist and left shoulder.  Patient has full ROM.  
Endocrinology
Pulmonology
Palliative Care
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
## 2022-08-21 ENCOUNTER — HEALTH MAINTENANCE LETTER (OUTPATIENT)
Age: 37
End: 2022-08-21

## 2022-11-21 ENCOUNTER — HEALTH MAINTENANCE LETTER (OUTPATIENT)
Age: 37
End: 2022-11-21

## 2022-12-05 ENCOUNTER — TELEPHONE (OUTPATIENT)
Dept: OBGYN | Facility: OTHER | Age: 37
End: 2022-12-05

## 2022-12-05 ENCOUNTER — LAB (OUTPATIENT)
Dept: LAB | Facility: OTHER | Age: 37
End: 2022-12-05

## 2022-12-05 DIAGNOSIS — Z32.01 PREGNANCY TEST POSITIVE: Primary | ICD-10-CM

## 2022-12-05 DIAGNOSIS — Z32.01 PREGNANCY TEST POSITIVE: ICD-10-CM

## 2022-12-05 LAB — HCG UR QL: NEGATIVE

## 2022-12-05 PROCEDURE — 81025 URINE PREGNANCY TEST: CPT

## 2022-12-05 NOTE — TELEPHONE ENCOUNTER
Patient called this morning wanting a urine pregnancy test due to having two positive tests and one negative. Set up for lab only and that one was negative. She states that she is not due to get her period for another two days. Instructed her that if she did miss her period to take another test. She then went and bought two different brand tests and states that both of those are positive. She is wondering what she should do from here? She does know that I won't be able to give her a response until tomorrow.

## 2022-12-06 ENCOUNTER — LAB (OUTPATIENT)
Dept: LAB | Facility: OTHER | Age: 37
End: 2022-12-06

## 2022-12-06 DIAGNOSIS — Z32.01 PREGNANCY TEST POSITIVE: Primary | ICD-10-CM

## 2022-12-06 DIAGNOSIS — Z32.01 PREGNANCY TEST POSITIVE: ICD-10-CM

## 2022-12-06 LAB — HCG INTACT+B SERPL-ACNC: 206 MIU/ML

## 2022-12-06 PROCEDURE — 84702 CHORIONIC GONADOTROPIN TEST: CPT

## 2022-12-06 PROCEDURE — 36415 COLL VENOUS BLD VENIPUNCTURE: CPT

## 2022-12-19 LAB
ABO/RH(D): NORMAL
ANTIBODY SCREEN: NEGATIVE
SPECIMEN EXPIRATION DATE: NORMAL

## 2022-12-20 ENCOUNTER — TELEPHONE (OUTPATIENT)
Dept: OBGYN | Facility: OTHER | Age: 37
End: 2022-12-20

## 2022-12-20 ENCOUNTER — PRENATAL OFFICE VISIT (OUTPATIENT)
Dept: OBGYN | Facility: OTHER | Age: 37
End: 2022-12-20
Attending: OBSTETRICS & GYNECOLOGY

## 2022-12-20 VITALS
SYSTOLIC BLOOD PRESSURE: 117 MMHG | OXYGEN SATURATION: 98 % | WEIGHT: 121.8 LBS | HEART RATE: 70 BPM | HEIGHT: 59 IN | DIASTOLIC BLOOD PRESSURE: 64 MMHG | BODY MASS INDEX: 24.56 KG/M2

## 2022-12-20 DIAGNOSIS — I45.6 WPW (WOLFF-PARKINSON-WHITE SYNDROME): ICD-10-CM

## 2022-12-20 DIAGNOSIS — G35 MS (MULTIPLE SCLEROSIS) (H): ICD-10-CM

## 2022-12-20 DIAGNOSIS — Z34.90 PREGNANCY WITH UNCERTAIN DATES, ANTEPARTUM: ICD-10-CM

## 2022-12-20 DIAGNOSIS — O09.521 MULTIGRAVIDA OF ADVANCED MATERNAL AGE IN FIRST TRIMESTER: Primary | ICD-10-CM

## 2022-12-20 LAB
ALBUMIN MFR UR ELPH: <6 MG/DL
ALBUMIN UR-MCNC: NEGATIVE MG/DL
AMPHETAMINES UR QL: NOT DETECTED
ANION GAP SERPL CALCULATED.3IONS-SCNC: 13 MMOL/L (ref 7–15)
APPEARANCE UR: CLEAR
BARBITURATES UR QL SCN: NOT DETECTED
BENZODIAZ UR QL SCN: NOT DETECTED
BILIRUB UR QL STRIP: NEGATIVE
BUN SERPL-MCNC: 11.9 MG/DL (ref 6–20)
BUPRENORPHINE UR QL: NOT DETECTED
CALCIUM SERPL-MCNC: 9.3 MG/DL (ref 8.6–10)
CANNABINOIDS UR QL: NOT DETECTED
CHLORIDE SERPL-SCNC: 101 MMOL/L (ref 98–107)
COCAINE UR QL SCN: NOT DETECTED
COLOR UR AUTO: ABNORMAL
CREAT SERPL-MCNC: 0.9 MG/DL (ref 0.51–0.95)
CREAT UR-MCNC: 75.4 MG/DL
D-METHAMPHET UR QL: NOT DETECTED
DEPRECATED HCO3 PLAS-SCNC: 22 MMOL/L (ref 22–29)
ERYTHROCYTE [DISTWIDTH] IN BLOOD BY AUTOMATED COUNT: 13.2 % (ref 10–15)
GFR SERPL CREATININE-BSD FRML MDRD: 84 ML/MIN/1.73M2
GLUCOSE SERPL-MCNC: 97 MG/DL (ref 70–99)
GLUCOSE UR STRIP-MCNC: NEGATIVE MG/DL
HCT VFR BLD AUTO: 42.5 % (ref 35–47)
HGB BLD-MCNC: 14.4 G/DL (ref 11.7–15.7)
HGB UR QL STRIP: NEGATIVE
KETONES UR STRIP-MCNC: ABNORMAL MG/DL
LEUKOCYTE ESTERASE UR QL STRIP: NEGATIVE
MCH RBC QN AUTO: 32.9 PG (ref 26.5–33)
MCHC RBC AUTO-ENTMCNC: 33.9 G/DL (ref 31.5–36.5)
MCV RBC AUTO: 97 FL (ref 78–100)
METHADONE UR QL SCN: NOT DETECTED
NITRATE UR QL: NEGATIVE
OPIATES UR QL SCN: NOT DETECTED
OXYCODONE UR QL SCN: NOT DETECTED
PCP UR QL SCN: NOT DETECTED
PH UR STRIP: 7 [PH] (ref 4.7–8)
PLATELET # BLD AUTO: 310 10E3/UL (ref 150–450)
POTASSIUM SERPL-SCNC: 4 MMOL/L (ref 3.4–5.3)
PROPOXYPH UR QL: NOT DETECTED
PROT/CREAT 24H UR: NORMAL MG/G{CREAT}
RBC # BLD AUTO: 4.38 10E6/UL (ref 3.8–5.2)
SODIUM SERPL-SCNC: 136 MMOL/L (ref 136–145)
SP GR UR STRIP: 1.02 (ref 1–1.03)
TRICYCLICS UR QL SCN: NOT DETECTED
UROBILINOGEN UR STRIP-MCNC: NORMAL MG/DL
WBC # BLD AUTO: 5.9 10E3/UL (ref 4–11)

## 2022-12-20 PROCEDURE — 86762 RUBELLA ANTIBODY: CPT | Performed by: OBSTETRICS & GYNECOLOGY

## 2022-12-20 PROCEDURE — 99207 PR FIRST OB VISIT: CPT | Performed by: OBSTETRICS & GYNECOLOGY

## 2022-12-20 PROCEDURE — 86900 BLOOD TYPING SEROLOGIC ABO: CPT | Performed by: OBSTETRICS & GYNECOLOGY

## 2022-12-20 PROCEDURE — 85027 COMPLETE CBC AUTOMATED: CPT | Performed by: OBSTETRICS & GYNECOLOGY

## 2022-12-20 PROCEDURE — 81003 URINALYSIS AUTO W/O SCOPE: CPT | Mod: 59 | Performed by: OBSTETRICS & GYNECOLOGY

## 2022-12-20 PROCEDURE — 86901 BLOOD TYPING SEROLOGIC RH(D): CPT | Performed by: OBSTETRICS & GYNECOLOGY

## 2022-12-20 PROCEDURE — 80306 DRUG TEST PRSMV INSTRMNT: CPT | Performed by: OBSTETRICS & GYNECOLOGY

## 2022-12-20 PROCEDURE — 87389 HIV-1 AG W/HIV-1&-2 AB AG IA: CPT | Performed by: OBSTETRICS & GYNECOLOGY

## 2022-12-20 PROCEDURE — 76817 TRANSVAGINAL US OBSTETRIC: CPT | Performed by: OBSTETRICS & GYNECOLOGY

## 2022-12-20 PROCEDURE — 86803 HEPATITIS C AB TEST: CPT | Performed by: OBSTETRICS & GYNECOLOGY

## 2022-12-20 PROCEDURE — 87340 HEPATITIS B SURFACE AG IA: CPT | Performed by: OBSTETRICS & GYNECOLOGY

## 2022-12-20 PROCEDURE — 36415 COLL VENOUS BLD VENIPUNCTURE: CPT | Performed by: OBSTETRICS & GYNECOLOGY

## 2022-12-20 PROCEDURE — 84156 ASSAY OF PROTEIN URINE: CPT | Performed by: OBSTETRICS & GYNECOLOGY

## 2022-12-20 PROCEDURE — 87086 URINE CULTURE/COLONY COUNT: CPT | Performed by: OBSTETRICS & GYNECOLOGY

## 2022-12-20 PROCEDURE — 86850 RBC ANTIBODY SCREEN: CPT | Performed by: OBSTETRICS & GYNECOLOGY

## 2022-12-20 PROCEDURE — 86780 TREPONEMA PALLIDUM: CPT | Performed by: OBSTETRICS & GYNECOLOGY

## 2022-12-20 PROCEDURE — 80048 BASIC METABOLIC PNL TOTAL CA: CPT | Performed by: OBSTETRICS & GYNECOLOGY

## 2022-12-20 ASSESSMENT — PAIN SCALES - GENERAL: PAINLEVEL: NO PAIN (0)

## 2022-12-20 NOTE — TELEPHONE ENCOUNTER
Spoke to [facility] in [place], [place]. They need the Dr name to give the fax number for us to send ORTEGA.  I left Sarah a message to get the Dr name.

## 2022-12-21 PROBLEM — G35 MS (MULTIPLE SCLEROSIS) (H): Status: ACTIVE | Noted: 2022-12-21

## 2022-12-21 PROBLEM — I45.6 WPW (WOLFF-PARKINSON-WHITE SYNDROME): Status: ACTIVE | Noted: 2022-12-21

## 2022-12-21 LAB
BACTERIA UR CULT: NORMAL
HBV SURFACE AG SERPL QL IA: NONREACTIVE
HCV AB SERPL QL IA: NONREACTIVE
HIV 1+2 AB+HIV1 P24 AG SERPL QL IA: NONREACTIVE
RUBV IGG SERPL QL IA: 1.36 INDEX
RUBV IGG SERPL QL IA: POSITIVE
T PALLIDUM AB SER QL: NONREACTIVE

## 2022-12-21 NOTE — PROGRESS NOTES
"  HPI:  Sarah Ugalde is a 37 year old female  No LMP recorded. Patient is pregnant. at Unknown, Estimated Date of Delivery: Data Unavailable.  She denies vaginal bleeding, nausea , vomiting and abdominal pain.   No other c/o.    No past medical history on file.  MS (no meds).  WPW (no meds or ablation)    No past surgical history on file.  CS    Allergies: Patient has no known allergies.   Obstetric Hx.  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     ROS:   Denies fever, wt loss   Neg /GI other than per HPI      EXAM:  Blood pressure 117/64, pulse 70, height 1.499 m (4' 11\"), weight 55.2 kg (121 lb 12.8 oz), SpO2 98 %.   BMI= Body mass index is 24.6 kg/m .  General - pleasant female in no acute distress.  Abdomen - soft, nontender, nondistended, no hepatosplenomegaly.  Pelvic - EG: normal adult female, BUS: within normal limits,Rectovaginal - deferred.    US:    Transvaginal:Yes  Yolk sac present:Yes  CRL:  2.4mm  FCA present:No  EGA 5w 5d  CLARITA:23          ASSESSMENT/PLAN:  (O09.521) Multigravida of advanced maternal age in first trimester  (primary encounter diagnosis)  Comment:Pregnancy with uncertain dates  Plan: UA reflex to Microscopic and Culture, Urine         Culture Aerobic Bacterial, Urine Drugs of Abuse        Screen (Tox13), CBC with platelets, ABO/Rh type        and screen, HIV Antigen Antibody Combo, Rubella        Antibody IgG, Hepatitis C antibody, Hepatitis B        surface antigen, Treponema Abs w Reflex to RPR         and Titer, Basic metabolic panel  (Ca, Cl, CO2,        Creat, Gluc, K, Na, BUN), Protein  random         urine, EKG 12-lead complete w/read - (Clinic         Performed)    Plan repeat US in 2-3 weeks for viability/dates.          Prior CD  H/o WPW.  Baseline EKG  H/o TTP with HUS/ARF.  BMP, baseline renal function and urine protein.   Records from [place] requested.  MFM " consultation  1st Trimester precautions and testing discussed.  New OB Labs ordered and exam scheduled.  Aneuploidy testing options discussed.  Patient has my card and phone number to call prn if problems in interim.      Scout Grimaldo MD

## 2022-12-22 NOTE — PROGRESS NOTES
Have you had or do you currently have:    - Diabetes? n  - Hypertension? n  - Heart disease, mitral valve prolapse, or rheumatic fever?  n  - An autoimmune disease such as lupus or rheumatoid arthritis?  Yes MS  - Kidney disease, urinary tract infection?  n  - Epilepsy, seizures, or spells?  n  - Migraine headaches?  y  - Any other neurological problems?  n  - Have you ever been treated for depression?  n  - Are you having problems with crying spells or loss of self-esteem?  n  - Have you ever required psychiatric care?  n  - Have you ever had hepatitis, liver disease, or jaundice?  n  - Have you ever been treated for blood clots in your veins, deep vein thrombosis, inflammation in the veins, thrombosis, phelbitis, pulmonary embolism or varicosities?  n  - Have you had excessive bleeding after surgery or dental work?  n  - Do you bleed more than other women after a cut or scratch?  n  - Do you have a history or anemia?  n  - Have you ever had thyroid problems or take thyroid medication?  n  - Do you have any endocrine problems?  n  - Have you every been in a major accident or suffered serious trauma?  y  - Within the last year, has anyone hit, slapped, kicked, or otherwise hurt you?  y  - In the last year, has anyone forced you to have sex when you didn't want to?  n  - Have you every received a blood transfusion?  y after C/S  - Would you refuse a blood transfusion if a doctor judged it to be medically necessary?  n  - Does anyone in your home smoke? y  - Do you use tobacco products?  n  - Do you drink beer, wine, or hard liquor?  y prior tO appt  - Do you use any of the following: marijuana, speed, cocaine, heroin, hallucinogens, or other drugs?  n  - Is your blood type RH negative?  y  - Have you ever had asthma?  Y, no inhaler   - Have you ever had tuberculosis?  n  - Do you have any allergies to drugs or over-the-counter medications?  n  - Allergies: dust mites, aspartame, ethanol, venlafaxine hydrochloride,  sertraline?  n   - Have you had any breast problems?  N  - Have you ever breast-fed?  N  - Have you had any gynecological surgical procedures such as cervical conization, LEEP, laser treatment, cryosurgery of the cervix, or a dilatation and curettage, etc?  N  - Have you ever had any other surgical procedures?  N  - Have you ever had any anesthetic complications?  N  - Have you ever had an abnormal pap smear?  N  - Do you have a history of abnormalities of the uterus? N  - Did your mother take RACHEL or any other hormones when she was pregnant with you?  N  - Did it take you more than one year to become pregnant?  N  - Have you ever been evaluated or treated for infertility?  N  - Is there a history of medical problems in your family, which you feel might adversely affect your health or pregnancy?  N  - Do you have any other problems we have not asked about which you feel may be important to this pregnancy?  N    Symptoms since last menstrual period  - Do you currently have any of the following symptoms: abdominal pain, blood in the stool or urine, chest pain, shortness of breath, coughing or vomiting up blood, you heart is racing or skipping beats, nausea and vomiting, pain on urination, or vaginal discharge or bleeding?  Not answered    Genetic screening  Has the patient, baby's father, or anyone in either family had:  - Thalassemia (Italian, Greek, Mediterranean, or  background only) and an MCV result less than 80?  n  - Neural tube defect such as meningomyelocele, spina bifida, or anencephaly?  n  - Congential heart defect?  n  - Down's syndrome?  n  - Tristan-Sachs disease ( Orthodox, Cajun, Italian-Norfolk)?  n  - Sickle cell disease or trait () ?  n  - Hemophilia or other inherited problems of blood?  n  - Muscular dystrophy?  N  - Cystic fibrosis?  N  - Naz's chorea?  n  - Mental retardation/autism?  n   If yes, was the person tested for Fragile X?  N/A  - Any other inherited genetic or chromosomal  disorder?  N  - Maternal metabolic disorder (e.g. insulin- dependent diabetes, PKU)?  N  - A child with birth defects not listed above?  n  - Recurrent pregnancy loss, or a stillbirth?  N  - Has the patient had any medications/street drugs/alcohol since her last menstrual period?  n  - Does the patient or baby's father have any other genetic risk?  n    Infection history  - Have you ever been treated for tuberculosis?  n  - Have you every had a positive skin test for tuberculosis?  n  - Do you live with someone who has tuberculosis?  n  - Have you ever been exposed to tuberculosis?  n  - Do you have genital herpes?  n  - Does your partner have genital herpes?  n  - Have you had a rash or viral illness since your last period?  n  - Have you ever had gonorrhea, chlamydia, syphilis, venereal warts, trichomoniasis, pelvic inflammatory disease, or any other sexually transmitted disease?  n  - Have you had chicken pox?  n  - Have you been vaccinated against chicken pox?  y  - Have you had any other infectious diseases?  n

## 2023-01-03 ENCOUNTER — TELEPHONE (OUTPATIENT)
Dept: OBGYN | Facility: OTHER | Age: 38
End: 2023-01-03

## 2023-01-03 NOTE — TELEPHONE ENCOUNTER
OK don't schedule I guess if she declines appt.  I will see her at f/u appt and determine if still needed.

## 2023-01-10 ENCOUNTER — PRENATAL OFFICE VISIT (OUTPATIENT)
Dept: OBGYN | Facility: OTHER | Age: 38
End: 2023-01-10
Attending: OBSTETRICS & GYNECOLOGY

## 2023-01-10 VITALS
HEIGHT: 59 IN | HEART RATE: 86 BPM | SYSTOLIC BLOOD PRESSURE: 108 MMHG | BODY MASS INDEX: 24.19 KG/M2 | WEIGHT: 120 LBS | DIASTOLIC BLOOD PRESSURE: 66 MMHG | OXYGEN SATURATION: 100 % | RESPIRATION RATE: 14 BRPM

## 2023-01-10 DIAGNOSIS — O09.521 MULTIGRAVIDA OF ADVANCED MATERNAL AGE IN FIRST TRIMESTER: Primary | ICD-10-CM

## 2023-01-10 DIAGNOSIS — Z34.91 PREGNANCY WITH UNCERTAIN DATES IN FIRST TRIMESTER: ICD-10-CM

## 2023-01-10 PROCEDURE — 76817 TRANSVAGINAL US OBSTETRIC: CPT | Performed by: OBSTETRICS & GYNECOLOGY

## 2023-01-10 PROCEDURE — 99207 PR PRENATAL VISIT: CPT | Performed by: OBSTETRICS & GYNECOLOGY

## 2023-01-10 ASSESSMENT — PAIN SCALES - GENERAL: PAINLEVEL: NO PAIN (0)

## 2023-01-10 NOTE — PROGRESS NOTES
"  HPI:  Sarah Ugalde is a 37 year old female  No LMP recorded. Patient is pregnant. at Unknown, Estimated Date of Delivery: Data Unavailable.  She denies vaginal bleeding, nausea , vomiting and abdominal pain.   No other c/o.     Past Medical History   .     MS (no meds).  WPW (no meds or ablation)     Past Surgical History        CS     Allergies: Patient has no known allergies.   Obstetric Hx.  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     Allergies: Magnesium     ROS:   Denies fever, wt loss   Neg /GI other than per HPI      EXAM:  Blood pressure 108/66, pulse 86, resp. rate 14, height 1.499 m (4' 11\"), weight 54.4 kg (120 lb), SpO2 100 %.   BMI= Body mass index is 24.24 kg/m .  General - pleasant female in no acute distress.  Abdomen - soft, nontender, nondistended, no hepatosplenomegaly.  Pelvic - EG: normal adult female, BUS: within normal limits,Rectovaginal - deferred.    US:    Transvaginal:Yes  Yolk sac present:Yes  CRL:  25mm  FCA present:Yes  EGA 9w 2d  CLARITA:23        ASSESSMENT/PLAN:    Viable IUP with uncertain dates.  Use US CLARITA  AMA:  Aneuploidy risk/testing options discussed.  Handouts on NIPT given to pt.  Discussed baby asa 16 WKS    1st Trimester precautions and testing discussed.  New OB Labs ordered and exam scheduled.   Patient has my card and phone number to call prn if problems in interim.    Scout Grimaldo MD  "

## 2023-03-09 ENCOUNTER — PRENATAL OFFICE VISIT (OUTPATIENT)
Dept: OBGYN | Facility: OTHER | Age: 38
End: 2023-03-09
Attending: NURSE PRACTITIONER

## 2023-03-09 VITALS
WEIGHT: 119 LBS | OXYGEN SATURATION: 98 % | BODY MASS INDEX: 23.99 KG/M2 | DIASTOLIC BLOOD PRESSURE: 64 MMHG | HEART RATE: 76 BPM | SYSTOLIC BLOOD PRESSURE: 112 MMHG | HEIGHT: 59 IN

## 2023-03-09 DIAGNOSIS — O09.522 MULTIGRAVIDA OF ADVANCED MATERNAL AGE IN SECOND TRIMESTER: ICD-10-CM

## 2023-03-09 DIAGNOSIS — G35 MS (MULTIPLE SCLEROSIS) (H): ICD-10-CM

## 2023-03-09 DIAGNOSIS — O09.521 MULTIGRAVIDA OF ADVANCED MATERNAL AGE IN FIRST TRIMESTER: ICD-10-CM

## 2023-03-09 DIAGNOSIS — Z12.4 SCREENING FOR CERVICAL CANCER: ICD-10-CM

## 2023-03-09 DIAGNOSIS — I45.6 WPW (WOLFF-PARKINSON-WHITE SYNDROME): Primary | ICD-10-CM

## 2023-03-09 DIAGNOSIS — Z11.3 SCREEN FOR STD (SEXUALLY TRANSMITTED DISEASE): ICD-10-CM

## 2023-03-09 LAB
C TRACH DNA SPEC QL PROBE+SIG AMP: NEGATIVE
N GONORRHOEA DNA SPEC QL NAA+PROBE: NEGATIVE

## 2023-03-09 PROCEDURE — 87491 CHLMYD TRACH DNA AMP PROBE: CPT | Performed by: NURSE PRACTITIONER

## 2023-03-09 PROCEDURE — 99207 PR PRENATAL VISIT: CPT | Performed by: NURSE PRACTITIONER

## 2023-03-09 PROCEDURE — G0123 SCREEN CERV/VAG THIN LAYER: HCPCS | Performed by: NURSE PRACTITIONER

## 2023-03-09 PROCEDURE — 93000 ELECTROCARDIOGRAM COMPLETE: CPT | Mod: 77 | Performed by: INTERNAL MEDICINE

## 2023-03-09 PROCEDURE — 87624 HPV HI-RISK TYP POOLED RSLT: CPT | Performed by: NURSE PRACTITIONER

## 2023-03-09 PROCEDURE — 87591 N.GONORRHOEAE DNA AMP PROB: CPT | Performed by: NURSE PRACTITIONER

## 2023-03-09 ASSESSMENT — PAIN SCALES - GENERAL: PAINLEVEL: NO PAIN (0)

## 2023-03-13 PROBLEM — O09.521 MULTIGRAVIDA OF ADVANCED MATERNAL AGE IN FIRST TRIMESTER: Status: ACTIVE | Noted: 2022-12-21

## 2023-03-13 NOTE — PROGRESS NOTES
"  HPI:  Sarah Ugalde is a 37 year old female No LMP recorded. Patient is pregnant. at 18w1d, Estimated Date of Delivery: Aug 13, 2023.  She denies vaginal bleeding, vomiting and abdominal pain. Declines NIPT today. Discussed Level II US today with MFM.  Refuses travel farther than [place] due to work.  No other c/o.    No past medical history on file.    No past surgical history on file.    Allergies: Magnesium     EXAM:  Blood pressure 112/64, pulse 76, height 1.499 m (4' 11\"), weight 54 kg (119 lb), SpO2 98 %.   BMI= Body mass index is 24.04 kg/m .  General - pleasant female in no acute distress.  Neck - supple without lymphadenopathy or thyromegaly.  Lungs - clear to auscultation bilaterally.  Heart - regular rate and rhythm without murmur.  Abdomen - soft, nontender, nondistended, no hepatosplenomegaly.  Pelvic - EG: normal adult female, BUS: within normal limits, Vagina: well rugated, no discharge, Cervix: no lesions or CMT, closed/long Uterus: gravid, consistant with dates, mobile, Adnexae: no masses or tenderness.  Rectovaginal - deferred.  Musculoskeletal - no gross deformities.  Neurological - normal strength, sensation, and mental status.    Doptones were +    ASSESSMENT/PLAN:  (I45.6) WPW (Delaney-Parkinson-White syndrome)  (primary encounter diagnosis)  Comment:   Plan: EKG 12-lead complete w/read - (Clinic         Performed), [facility] US Comprehensive         Single            (G35) MS (multiple sclerosis) (H)  Comment:   Plan: EKG 12-lead complete w/read - (Clinic         Performed), [facility]         Single            (O09.522) Multigravida of advanced maternal age in second trimester  Comment:   Plan: EKG 12-lead complete w/read - (Clinic         Performed), [facility]         Single            (Z12.4) Screening for cervical cancer  Comment:   Plan: A pap thin layer screen with  HPV - recommended        age 30 - 65 years        "     (Z11.3) Screen for STD (sexually transmitted disease)  Comment:   Plan: GC/Chlamydia by PCR - [facility]            (O09.521) Multigravida of advanced maternal age in first trimester  Comment: new ob physical and teaching completed  Plan: return in 3 weeks.       Weight gain and exercise during pregnancy was discussed at today's visit.  The patient will return to clinic in 4 weeks for continued prenatal care.

## 2023-03-14 ASSESSMENT — ANXIETY QUESTIONNAIRES
6. BECOMING EASILY ANNOYED OR IRRITABLE: SEVERAL DAYS
7. FEELING AFRAID AS IF SOMETHING AWFUL MIGHT HAPPEN: NOT AT ALL
3. WORRYING TOO MUCH ABOUT DIFFERENT THINGS: NOT AT ALL
5. BEING SO RESTLESS THAT IT IS HARD TO SIT STILL: NOT AT ALL
2. NOT BEING ABLE TO STOP OR CONTROL WORRYING: NOT AT ALL
GAD7 TOTAL SCORE: 2
1. FEELING NERVOUS, ANXIOUS, OR ON EDGE: NOT AT ALL
GAD7 TOTAL SCORE: 2
IF YOU CHECKED OFF ANY PROBLEMS ON THIS QUESTIONNAIRE, HOW DIFFICULT HAVE THESE PROBLEMS MADE IT FOR YOU TO DO YOUR WORK, TAKE CARE OF THINGS AT HOME, OR GET ALONG WITH OTHER PEOPLE: NOT DIFFICULT AT ALL

## 2023-03-14 ASSESSMENT — PATIENT HEALTH QUESTIONNAIRE - PHQ9
5. POOR APPETITE OR OVEREATING: SEVERAL DAYS
SUM OF ALL RESPONSES TO PHQ QUESTIONS 1-9: 7

## 2023-03-15 LAB
BKR LAB AP GYN ADEQUACY: NORMAL
BKR LAB AP GYN INTERPRETATION: NORMAL
BKR LAB AP HPV REFLEX: NORMAL
BKR LAB AP PREVIOUS ABNORMAL: NORMAL
PATH REPORT.COMMENTS IMP SPEC: NORMAL
PATH REPORT.COMMENTS IMP SPEC: NORMAL
PATH REPORT.RELEVANT HX SPEC: NORMAL

## 2023-03-17 LAB
HUMAN PAPILLOMA VIRUS 16 DNA: NEGATIVE
HUMAN PAPILLOMA VIRUS 18 DNA: NEGATIVE
HUMAN PAPILLOMA VIRUS FINAL DIAGNOSIS: NORMAL
HUMAN PAPILLOMA VIRUS OTHER HR: NEGATIVE

## 2023-03-22 ENCOUNTER — HOSPITAL ENCOUNTER (OUTPATIENT)
Dept: ULTRASOUND IMAGING | Facility: CLINIC | Age: 38
Discharge: HOME | End: 2023-03-22
Attending: NURSE PRACTITIONER

## 2023-03-22 ENCOUNTER — HOSPITAL ENCOUNTER (OUTPATIENT)
Dept: ULTRASOUND IMAGING | Facility: HOSPITAL | Age: 38
Discharge: HOME | End: 2023-03-22
Attending: NURSE PRACTITIONER | Admitting: NURSE PRACTITIONER

## 2023-03-22 ENCOUNTER — OFFICE VISIT (OUTPATIENT)
Dept: MATERNAL FETAL MEDICINE | Facility: CLINIC | Age: 38
End: 2023-03-22
Attending: NURSE PRACTITIONER

## 2023-03-22 DIAGNOSIS — O09.522 MULTIGRAVIDA OF ADVANCED MATERNAL AGE IN SECOND TRIMESTER: ICD-10-CM

## 2023-03-22 DIAGNOSIS — I45.6 WPW (WOLFF-PARKINSON-WHITE SYNDROME): ICD-10-CM

## 2023-03-22 DIAGNOSIS — Z36.89 ENCOUNTER FOR FETAL ANATOMIC SURVEY: ICD-10-CM

## 2023-03-22 DIAGNOSIS — O09.522 MULTIGRAVIDA OF ADVANCED MATERNAL AGE IN SECOND TRIMESTER: Primary | ICD-10-CM

## 2023-03-22 DIAGNOSIS — G35 MS (MULTIPLE SCLEROSIS) (H): ICD-10-CM

## 2023-03-22 PROCEDURE — 76811 OB US DETAILED SNGL FETUS: CPT | Mod: 26 | Performed by: STUDENT IN AN ORGANIZED HEALTH CARE EDUCATION/TRAINING PROGRAM

## 2023-03-22 NOTE — PROGRESS NOTES
Type of service:    Telemedicine Office Visit for fetal ultrasound    Date of service:     Date: 03/22/23     Time service began:    3:00 PM  Time service ended:    4:00 PM    Reason:      .tel: Lack of available service in patient area    Description of basis or telemedicine appropriateness:     Consultation provided at the request of Dr. Rider for advice regarding the diagnosis and treatment of this patient's pregnancy.  The patient's condition can be safely assessed via telemedicine.    The Mode of Transmission:    Secure interactive audio and visual telecommunication system (Video Guidance)    Location of originating and distant sites:      Originating site:   [place], [place]    Distant site:    [place], [place]    For a detailed report of the ultrasound examination, please see the ultrasound report which can be found under the imaging tab.      Kia Harris MD

## 2023-03-30 ENCOUNTER — PRENATAL OFFICE VISIT (OUTPATIENT)
Dept: OBGYN | Facility: OTHER | Age: 38
End: 2023-03-30
Attending: NURSE PRACTITIONER

## 2023-03-30 VITALS
HEIGHT: 59 IN | SYSTOLIC BLOOD PRESSURE: 120 MMHG | WEIGHT: 121.6 LBS | RESPIRATION RATE: 14 BRPM | HEART RATE: 80 BPM | BODY MASS INDEX: 24.52 KG/M2 | OXYGEN SATURATION: 100 % | DIASTOLIC BLOOD PRESSURE: 70 MMHG

## 2023-03-30 DIAGNOSIS — R00.2 PALPITATIONS: ICD-10-CM

## 2023-03-30 DIAGNOSIS — I45.6 WPW (WOLFF-PARKINSON-WHITE SYNDROME): Primary | ICD-10-CM

## 2023-03-30 DIAGNOSIS — R55 SYNCOPE, UNSPECIFIED SYNCOPE TYPE: ICD-10-CM

## 2023-03-30 DIAGNOSIS — O34.219 PREVIOUS CESAREAN DELIVERY, ANTEPARTUM CONDITION OR COMPLICATION: ICD-10-CM

## 2023-03-30 DIAGNOSIS — O09.522 MULTIGRAVIDA OF ADVANCED MATERNAL AGE IN SECOND TRIMESTER: ICD-10-CM

## 2023-03-30 LAB
ANION GAP SERPL CALCULATED.3IONS-SCNC: 11 MMOL/L (ref 7–15)
BUN SERPL-MCNC: 6.1 MG/DL (ref 6–20)
CALCIUM SERPL-MCNC: 9.4 MG/DL (ref 8.6–10)
CHLORIDE SERPL-SCNC: 104 MMOL/L (ref 98–107)
CREAT SERPL-MCNC: 0.68 MG/DL (ref 0.51–0.95)
DEPRECATED HCO3 PLAS-SCNC: 22 MMOL/L (ref 22–29)
ERYTHROCYTE [DISTWIDTH] IN BLOOD BY AUTOMATED COUNT: 13.1 % (ref 10–15)
GFR SERPL CREATININE-BSD FRML MDRD: >90 ML/MIN/1.73M2
GLUCOSE SERPL-MCNC: 79 MG/DL (ref 70–99)
HCT VFR BLD AUTO: 36.2 % (ref 35–47)
HGB BLD-MCNC: 12.4 G/DL (ref 11.7–15.7)
MAGNESIUM SERPL-MCNC: 2 MG/DL (ref 1.7–2.3)
MCH RBC QN AUTO: 32.6 PG (ref 26.5–33)
MCHC RBC AUTO-ENTMCNC: 34.3 G/DL (ref 31.5–36.5)
MCV RBC AUTO: 95 FL (ref 78–100)
PLATELET # BLD AUTO: 272 10E3/UL (ref 150–450)
POTASSIUM SERPL-SCNC: 4.5 MMOL/L (ref 3.4–5.3)
RBC # BLD AUTO: 3.8 10E6/UL (ref 3.8–5.2)
SODIUM SERPL-SCNC: 137 MMOL/L (ref 136–145)
WBC # BLD AUTO: 7 10E3/UL (ref 4–11)

## 2023-03-30 PROCEDURE — 83735 ASSAY OF MAGNESIUM: CPT | Performed by: OBSTETRICS & GYNECOLOGY

## 2023-03-30 PROCEDURE — 99207 PR COMPLICATED OB VISIT: CPT | Performed by: OBSTETRICS & GYNECOLOGY

## 2023-03-30 PROCEDURE — 80048 BASIC METABOLIC PNL TOTAL CA: CPT | Performed by: OBSTETRICS & GYNECOLOGY

## 2023-03-30 PROCEDURE — 36415 COLL VENOUS BLD VENIPUNCTURE: CPT | Performed by: OBSTETRICS & GYNECOLOGY

## 2023-03-30 PROCEDURE — 82239 BILE ACIDS TOTAL: CPT | Mod: 90 | Performed by: OBSTETRICS & GYNECOLOGY

## 2023-03-30 PROCEDURE — 85027 COMPLETE CBC AUTOMATED: CPT | Performed by: OBSTETRICS & GYNECOLOGY

## 2023-03-30 RX ORDER — ASPIRIN 81 MG/1
81 TABLET, CHEWABLE ORAL DAILY
Status: ON HOLD | COMMUNITY
End: 2023-07-29

## 2023-03-30 ASSESSMENT — PAIN SCALES - GENERAL: PAINLEVEL: NO PAIN (0)

## 2023-03-30 NOTE — PROGRESS NOTES
C/o presyncopal episodes/dizzines and intermittent heart racing or pounding.  Currently no CP/SOB/N/V/ fever/palpitations.   Chest CTA>  CV RRR without murmer's.  H/o WPW so recommend Zio patch Holter monitor/Cardiology referral and baseline HG/electrolytes. Denies PTL sx, vb, lof  Reviewed recent US-no concerns with anatomical survey.  Return to clinic in 4 weeks    Scout Grimaldo MD  3/30/2023

## 2023-04-02 LAB — BILE AC SERPL-SCNC: 3 UMOL/L

## 2023-04-03 ENCOUNTER — HOSPITAL ENCOUNTER (OUTPATIENT)
Dept: CARDIOLOGY | Facility: HOSPITAL | Age: 38
Discharge: HOME | End: 2023-04-03
Attending: OBSTETRICS & GYNECOLOGY | Admitting: INTERNAL MEDICINE

## 2023-04-03 DIAGNOSIS — R00.2 PALPITATIONS: ICD-10-CM

## 2023-04-03 DIAGNOSIS — R55 SYNCOPE, UNSPECIFIED SYNCOPE TYPE: ICD-10-CM

## 2023-04-03 DIAGNOSIS — I45.6 WPW (WOLFF-PARKINSON-WHITE SYNDROME): ICD-10-CM

## 2023-04-03 PROCEDURE — 93246 EXT ECG>7D<15D RECORDING: CPT

## 2023-04-03 PROCEDURE — 93248 EXT ECG>7D<15D REV&INTERPJ: CPT | Performed by: INTERNAL MEDICINE

## 2023-04-03 NOTE — PROGRESS NOTES
Zio patch placed on patient in outpatient appointment today. Patient was instructed to wear patch for 14 days. Skin was prepped and patch was placed following [employer] guidelines .     Patient was instructed to push button when symptomatic and fill out diary. Patient was instructed not to submerse in water and no swimming, saunas, or hot tubs. Showers may be taken keeping back towards the water. If the area DOES become wet pat it dry with a cloth. If skin irritation occurs patient was instructed to remove patch and contact [employer].    Patient understands we do not receive results until they mail patch back inside the box. Staff reminded patient of outside billing notice which was explained to patient during the scheduling process of this monitor. Patient also instructed to contact [employer] with any questions [phone].    Patient had no further questions and agreed with plan. Patient was sent home with the box, information pamphlet and booklet to yanni any incidents in.

## 2023-04-27 ENCOUNTER — PRENATAL OFFICE VISIT (OUTPATIENT)
Dept: OBGYN | Facility: OTHER | Age: 38
End: 2023-04-27
Attending: OBSTETRICS & GYNECOLOGY

## 2023-04-27 VITALS
WEIGHT: 124 LBS | DIASTOLIC BLOOD PRESSURE: 64 MMHG | BODY MASS INDEX: 25 KG/M2 | HEIGHT: 59 IN | SYSTOLIC BLOOD PRESSURE: 110 MMHG

## 2023-04-27 DIAGNOSIS — O09.522 MULTIGRAVIDA OF ADVANCED MATERNAL AGE IN SECOND TRIMESTER: ICD-10-CM

## 2023-04-27 DIAGNOSIS — I45.6 WPW (WOLFF-PARKINSON-WHITE SYNDROME): Primary | ICD-10-CM

## 2023-04-27 DIAGNOSIS — O46.92 VAGINAL BLEEDING IN PREGNANCY, SECOND TRIMESTER: ICD-10-CM

## 2023-04-27 DIAGNOSIS — O34.219 PREVIOUS CESAREAN DELIVERY, ANTEPARTUM CONDITION OR COMPLICATION: ICD-10-CM

## 2023-04-27 DIAGNOSIS — Z30.2 ENCOUNTER FOR STERILIZATION: ICD-10-CM

## 2023-04-27 PROCEDURE — 99207 PR COMPLICATED OB VISIT: CPT | Performed by: OBSTETRICS & GYNECOLOGY

## 2023-04-27 ASSESSMENT — PAIN SCALES - GENERAL: PAINLEVEL: NO PAIN (0)

## 2023-04-27 NOTE — PROGRESS NOTES
Doing well.  No concerns today.  Denies PTL sx, vb, lof  She did have an episode of light bleeding, supapubic pain/RLQ 1 week ago which resolved.  F/u US ordered. Given [facility] number to call and bleeding precautions if recurs.   Recent Zio patch showed intermittent short episodes of tachycardia.  Cards consult scheduled.  Reminded of upcoming labs including 1 hour GTT, RHIG next visit  Reviewed recent US-no concerns with anatomical survey.  Return to clinic in 4 weeks  We reviewed the risks and benefits of tubal ligation/salpingectomy procedures including risks of bleeding, infection, damage to other organs. Risks of tubal failure, and possibility of ectopic pregnancy were also explained. We discussed alternative forms of birth control.  I  Specifically, we discussed abilateral salpingectomy at time of CS and pt wishes to proceed.  Consent forms reviewed and signed.        Scout Grimaldo MD  4/27/2023

## 2023-05-11 ENCOUNTER — OFFICE VISIT (OUTPATIENT)
Dept: CARDIOLOGY | Facility: OTHER | Age: 38
End: 2023-05-11
Attending: OBSTETRICS & GYNECOLOGY

## 2023-05-11 VITALS
DIASTOLIC BLOOD PRESSURE: 78 MMHG | WEIGHT: 129 LBS | HEIGHT: 59 IN | RESPIRATION RATE: 16 BRPM | OXYGEN SATURATION: 97 % | BODY MASS INDEX: 26 KG/M2 | HEART RATE: 68 BPM | SYSTOLIC BLOOD PRESSURE: 118 MMHG

## 2023-05-11 DIAGNOSIS — R55 SYNCOPE, UNSPECIFIED SYNCOPE TYPE: ICD-10-CM

## 2023-05-11 DIAGNOSIS — I45.6 WPW (WOLFF-PARKINSON-WHITE SYNDROME): ICD-10-CM

## 2023-05-11 DIAGNOSIS — R00.2 PALPITATIONS: ICD-10-CM

## 2023-05-11 PROCEDURE — 99204 OFFICE O/P NEW MOD 45 MIN: CPT | Performed by: NURSE PRACTITIONER

## 2023-05-11 ASSESSMENT — PAIN SCALES - GENERAL: PAINLEVEL: NO PAIN (0)

## 2023-05-11 NOTE — PROGRESS NOTES
[facility]     Sarah Ugalde   [id number]   [place] [address]      No Ref-Primary, Physician     Chief Complaint   Patient presents with     Consult     Heart Problem        HPI:   Sarah Ugalde is a pleasant 38-year old female who presents for cardiology consult regarding Delaney-Parkinson-White syndrome. She has no other significant cardiac history.  She has a non-cardiac history including prior pregnancy complicated by pre-eclampsia.     Ms Ugalde endorses symptoms of SVT since she was younger but symptoms always attributed to something else-- low blood sugar, heat stroke, etc. In her twenties, she was told she was having panic attacks. She had an episode of fainting while sitting one day at work. She was sent to the ED and arrhythmia noted so she was instructed to follow-up with cardiology. She was told she had Delaney-Parkinson-White Syndrome. She tells me she had a cardiac MR, EP study over 8 years ago in [place]. She was told that her WPW was not in an ideal location during EP study. She continues to get symptoms of racing, hard beating, pounding, sensation of not being able to catch her breath. Prior to pregnancy she did not pay attention to symptoms as it became normal. Since pregnancy she is getting symptoms multiple times per day. Symptoms last seconds usually but up to 2-5 minutes. Symptoms can present at rest or during activity. She has had three syncopal episodes while being pregnant and had subsequent zio patch. One episode she was standing and baking bread in the kitchen- she started to feel lightheaded and nauseated so went to the bathroom thinking she had to throw up. She took a few steps and went down. She estimates being out for only a few seconds. When she came to she felt \"out of sorts.\" Another episode occurred while standing. The third episode she was sitting in the recliner and felt a little lightheaded but no nausea and passed out.       She " tells me that her blood pressure has always been normal until she had her son who is soon to be 8 years old. She developed preeclampsia, TTP and was hospitalized for about 2 months post-partum. She thinks she was on Bystolic and another blood pressure medication. She has not been on anti-hypertensive for about 6 years.          Smoking History: Lifelong nonsmoker    Alcohol History: None    Substance Abuse History: None    Sleep History: History of insomnia. No snoring. No witnessed apnea.     Family History: Maternal aunt- pacemaker uncertain reason for pacemaker; mom- similar symptoms of racing, skipping, fluttering;           RELEVANT TESTING:  Leadless EKG monitor 4/2023  Conclusion  Zio XT patch report on [facility].  Ordered secondary to WPW.   Worn for 9 days and 6 hr's.  After removing artifact, total time was 9 days and 5 hr's. Placed on 4/3/23 at 12:28 pm and completed on 4/12/23 at 6:10 pm.   Underlying rhythm was sinus.   Hrt rate ranged from 52 bpm, maximum heart rate of 190 bmp, averaging 75 bmp.   No significant bradycardia, pauses, Mobitz type II or 3rd degree heart block.   No atrial fibrillation on this study.   x21 triggered events and x0 diary entries.  These corresponded to SVT, NSR, sinus tachycardia, SVEs and VEs.   x0 runs of VT.     x48 runs of SVT longest lasting 12.3 sec's with a maximum heart rate of 190 bmp.   Rare, <1% of PAC's, atrial couplets, atrial triplets.   Occasional PVCs at 1.3% (01139)   + episodes of ventricular bigeminy lasting up to 4.8 sec's.   + episodes of ventricular trigeminy lasting up to 1 m 56 sec's.      Anoop Kim,           PAST MEDICAL HISTORY:   No past medical history on file.       FAMILY HISTORY:   Family History   Problem Relation Age of Onset     Cancer - colorectal Paternal Grandmother      Breast Cancer Other         3 great aunts on moms side     Allergies Maternal Grandmother         dust pollen     Allergies Maternal Aunt          "\"          PAST SURGICAL HISTORY:   No past surgical history on file.       SOCIAL HISTORY:   Social History     Socioeconomic History     Marital status: Single     Number of children: 0     Years of education: 16   Occupational History     Occupation:      Occupation: retail sales     Employer: [employer]   Tobacco Use     Smoking status: Never     Smokeless tobacco: Never   Substance and Sexual Activity     Alcohol use: Yes     Comment: antonio     Drug use: No          CURRENT MEDICATIONS:   Current Outpatient Medications   Medication     aspirin (ASA) 81 MG chewable tablet     PRENATAL VIT-DOCUSATE-IRON-FA PO     No current facility-administered medications for this visit.            ALLERGIES:   Allergies   Allergen Reactions     Magnesium Other (See Comments)     Altered mental status after IV dosing          ROS:   CONSTITUTIONAL: No reported fever or chills. No changes in weight.  ENT: No visual disturbance, ear ache, epistaxis or sore throat.   CARDIOVASCULAR: + Palpitations (see HPI ).  No chest pain, chest pressure or chest discomfort. No lower extremity edema.   RESPIRATORY: No shortness of breath, dyspnea upon exertion, cough, wheezing or hemoptysis.   GI: No abdominal pain. No nausea, vomiting or diarrhea. No melena or hematochezia. No changes in bowel habits.   : No hematuria or dysuria. No hesitancy or incontinence.   NEUROLOGICAL: + Syncope (see HPI)  HEMATOLOGIC:  No bleeding or excessive bruising. No history of blood clots.   MUSCULOSKELETAL: No joint pain or swelling, no muscle pain.  ENDOCRINOLOGIC: No temperature intolerance. No hair or skin changes.  SKIN: No abnormal rashes or sores, no unusual itching.  PSYCHIATRIC: No changes in mood, feeling down or anxious. No changes in sleep.      PHYSICAL EXAM:     Vitals: /78 (BP Location: Right arm, Patient Position: Sitting, Cuff Size: Adult Regular)   Pulse 68   Resp 16   Ht 1.499 m (4' 11\")   Wt 58.5 kg (129 lb)   SpO2 97%   BMI " 26.05 kg/m    BMI= Body mass index is 26.05 kg/m .    GENERAL: The patient is a well-developed, well-nourished, in no apparent distress.  HEENT: Head is normocephalic and atraumatic. Eyes are symmetrical with normal visual tracking. No icterus, no xanthelasmas  NECK: Supple. No adenopathy, asymmetry or masses. Carotid upstroke are normal bilaterally, no cervical bruits, JVP not visible.   CHEST/ LUNGS: Lungs clear to auscultation, no rales, rhonchi or wheezes, no use of accessory muscles, no retractions, respirations unlabored and normal respiratory rate.   CARDIO: Regular rate and rhythm normal with S1 and S2, no S3 or S4 and no murmur, click or rub. Precordium quiet with normal PMI.    ABD: Abdomen is soft and nontender, nondistended.   EXTREMITIES: No clubbing, cyanosis or edema present. Peripheral pulses normal and equal bilaterally.  VASC: Radial, dorsalis pedis and posterior tibialis pulses normal and symmetric  MUSCULOSKELETAL: No joint swelling.   NEUROLOGIC: Alert and oriented X3. Normal speech, gait and affect. No focal neurologic deficits.   SKIN: No jaundice. No rashes or visible skin lesions present. No ecchymosis.           LAB RESULTS:   Orders placed or performed in visit on 03/30/23     aspirin (ASA) 81 MG chewable tablet          ASSESSMENT:   Sarah Ugalde is a pleasant 38-year old female who presents for cardiology consult regarding Delaney-Parkinson-White syndrome. She has no other significant cardiac history.  She has a non-cardiac history including prior pregnancy complicated by pre-eclampsia.     Ms. Ugalde has had longstanding history of syncopal episodes.  These would happen maybe a couple times a year prior to pregnancy.  In the last 3 months she has had 3 visits.  Given her history of Delaney-Parkinson-White and prior episodes of syncope with this she wore a cardiac monitor.  This showed 48 runs of SVT with the longest lasting only 12.3 seconds.  She did not have any syncopal  episodes while wearing the heart monitor.  She does have several near syncopal episodes per day.  This is increased since she became pregnant.      PLAN:   1. Delaney-Parkinson-White syndrome    She signed release of medical records today and will let us know which facility she had a cardiac MRI and EP study done.    Plan for her to see Dr. Vargas for telemedicine consult regarding Delaney-Parkinson-White syndrome and recent syncopal episodes    With recent increase in syncopal episodes while pregnant and her history she reports regarding complications with prior pregnancy we will obtain transthoracic echocardiogram.      Thank you for allowing me to participate in the care of your patient. Please do not hesitate to contact me if you have any questions.     Nadine Plaza, CNP

## 2023-05-11 NOTE — PATIENT INSTRUCTIONS
Thank you for allowing Nadine Plaza CNP and our  team to participate in your care. Please call our office at [phone] with scheduling questions or if you need to cancel or change your appointment. With any other questions or concerns you may call cardiology nurse at [phone] or [phone].       If you experience chest pain, chest pressure, chest tightness, shortness of breath, fainting, lightheadedness, nausea, vomiting, or other concerning symptoms, please report to the Emergency Department or call 911. These symptoms may be emergent, and best treated in the Emergency Department.

## 2023-05-12 ENCOUNTER — HOSPITAL ENCOUNTER (OUTPATIENT)
Dept: ULTRASOUND IMAGING | Facility: HOSPITAL | Age: 38
Discharge: HOME | End: 2023-05-12
Attending: OBSTETRICS & GYNECOLOGY | Admitting: OBSTETRICS & GYNECOLOGY

## 2023-05-12 DIAGNOSIS — O46.92 VAGINAL BLEEDING IN PREGNANCY, SECOND TRIMESTER: ICD-10-CM

## 2023-05-12 DIAGNOSIS — O09.522 MULTIGRAVIDA OF ADVANCED MATERNAL AGE IN SECOND TRIMESTER: ICD-10-CM

## 2023-05-12 PROCEDURE — 76816 OB US FOLLOW-UP PER FETUS: CPT

## 2023-05-17 ENCOUNTER — HOSPITAL ENCOUNTER (OUTPATIENT)
Facility: HOSPITAL | Age: 38
Discharge: HOME | End: 2023-05-17
Attending: OBSTETRICS & GYNECOLOGY | Admitting: OBSTETRICS & GYNECOLOGY

## 2023-05-17 ENCOUNTER — NURSE TRIAGE (OUTPATIENT)
Dept: OBGYN | Facility: OTHER | Age: 38
End: 2023-05-17

## 2023-05-17 VITALS
DIASTOLIC BLOOD PRESSURE: 88 MMHG | HEIGHT: 59 IN | SYSTOLIC BLOOD PRESSURE: 134 MMHG | BODY MASS INDEX: 26 KG/M2 | TEMPERATURE: 97.9 F | OXYGEN SATURATION: 100 % | RESPIRATION RATE: 16 BRPM | WEIGHT: 129 LBS | HEART RATE: 72 BPM

## 2023-05-17 PROBLEM — Z36.89 ENCOUNTER FOR TRIAGE IN PREGNANT PATIENT: Status: ACTIVE | Noted: 2023-05-17

## 2023-05-17 LAB
ALBUMIN SERPL BCG-MCNC: 3.6 G/DL (ref 3.5–5.2)
ALBUMIN UR-MCNC: NEGATIVE MG/DL
ALP SERPL-CCNC: 88 U/L (ref 35–104)
ALT SERPL W P-5'-P-CCNC: 36 U/L (ref 10–35)
AMORPH CRY #/AREA URNS HPF: ABNORMAL /HPF
ANION GAP SERPL CALCULATED.3IONS-SCNC: 10 MMOL/L (ref 7–15)
APPEARANCE UR: CLEAR
AST SERPL W P-5'-P-CCNC: 24 U/L (ref 10–35)
BACTERIA #/AREA URNS HPF: ABNORMAL /HPF
BASOPHILS # BLD AUTO: 0.1 10E3/UL (ref 0–0.2)
BASOPHILS NFR BLD AUTO: 1 %
BILIRUB SERPL-MCNC: <0.2 MG/DL
BILIRUB UR QL STRIP: NEGATIVE
BUN SERPL-MCNC: 7.3 MG/DL (ref 6–20)
CALCIUM SERPL-MCNC: 9.1 MG/DL (ref 8.6–10)
CHLORIDE SERPL-SCNC: 105 MMOL/L (ref 98–107)
COLOR UR AUTO: ABNORMAL
CREAT SERPL-MCNC: 0.6 MG/DL (ref 0.51–0.95)
DEPRECATED HCO3 PLAS-SCNC: 23 MMOL/L (ref 22–29)
EOSINOPHIL # BLD AUTO: 0.2 10E3/UL (ref 0–0.7)
EOSINOPHIL NFR BLD AUTO: 3 %
ERYTHROCYTE [DISTWIDTH] IN BLOOD BY AUTOMATED COUNT: 13.4 % (ref 10–15)
GFR SERPL CREATININE-BSD FRML MDRD: >90 ML/MIN/1.73M2
GLUCOSE SERPL-MCNC: 82 MG/DL (ref 70–99)
GLUCOSE UR STRIP-MCNC: NEGATIVE MG/DL
HCT VFR BLD AUTO: 33 % (ref 35–47)
HGB BLD-MCNC: 11.3 G/DL (ref 11.7–15.7)
HGB UR QL STRIP: NEGATIVE
HOLD SPECIMEN: NORMAL
IMM GRANULOCYTES # BLD: 0.1 10E3/UL
IMM GRANULOCYTES NFR BLD: 1 %
KETONES UR STRIP-MCNC: NEGATIVE MG/DL
LEUKOCYTE ESTERASE UR QL STRIP: NEGATIVE
LYMPHOCYTES # BLD AUTO: 1.5 10E3/UL (ref 0.8–5.3)
LYMPHOCYTES NFR BLD AUTO: 19 %
MCH RBC QN AUTO: 32.8 PG (ref 26.5–33)
MCHC RBC AUTO-ENTMCNC: 34.2 G/DL (ref 31.5–36.5)
MCV RBC AUTO: 96 FL (ref 78–100)
MONOCYTES # BLD AUTO: 0.5 10E3/UL (ref 0–1.3)
MONOCYTES NFR BLD AUTO: 7 %
NEUTROPHILS # BLD AUTO: 5.6 10E3/UL (ref 1.6–8.3)
NEUTROPHILS NFR BLD AUTO: 69 %
NITRATE UR QL: NEGATIVE
NRBC # BLD AUTO: 0 10E3/UL
NRBC BLD AUTO-RTO: 0 /100
PH UR STRIP: 7 [PH] (ref 4.7–8)
PLATELET # BLD AUTO: 254 10E3/UL (ref 150–450)
POTASSIUM SERPL-SCNC: 4 MMOL/L (ref 3.4–5.3)
PROT SERPL-MCNC: 6.5 G/DL (ref 6.4–8.3)
RBC # BLD AUTO: 3.45 10E6/UL (ref 3.8–5.2)
RBC URINE: 2 /HPF
SODIUM SERPL-SCNC: 138 MMOL/L (ref 136–145)
SP GR UR STRIP: 1.01 (ref 1–1.03)
SQUAMOUS EPITHELIAL: 10 /HPF
UROBILINOGEN UR STRIP-MCNC: NORMAL MG/DL
WBC # BLD AUTO: 8 10E3/UL (ref 4–11)
WBC CLUMPS #/AREA URNS HPF: PRESENT /HPF
WBC URINE: 3 /HPF

## 2023-05-17 PROCEDURE — 80053 COMPREHEN METABOLIC PANEL: CPT | Performed by: OBSTETRICS & GYNECOLOGY

## 2023-05-17 PROCEDURE — 36415 COLL VENOUS BLD VENIPUNCTURE: CPT | Performed by: OBSTETRICS & GYNECOLOGY

## 2023-05-17 PROCEDURE — 85025 COMPLETE CBC W/AUTO DIFF WBC: CPT | Performed by: OBSTETRICS & GYNECOLOGY

## 2023-05-17 PROCEDURE — 81003 URINALYSIS AUTO W/O SCOPE: CPT | Performed by: OBSTETRICS & GYNECOLOGY

## 2023-05-17 PROCEDURE — G0463 HOSPITAL OUTPT CLINIC VISIT: HCPCS

## 2023-05-17 RX ORDER — LIDOCAINE 40 MG/G
CREAM TOPICAL
Status: DISCONTINUED | OUTPATIENT
Start: 2023-05-17 | End: 2023-05-17 | Stop reason: HOSPADM

## 2023-05-17 NOTE — TELEPHONE ENCOUNTER
5/17/2023 10:58 AM  HX of preeclampsia. Pt reports moderate BLE edema knee down, swelling started last night. Pt reports elevating legs. Pitting edema present. No changes or improvement with edema.  Pt reports mild edema in face/belowcheeks. Per protocol send to L&D now. Pt agrees to plan. Report given to Zita.     Reason for Disposition    [1] Pregnant 23 or more weeks AND [2] baby is moving less today (e.g., kick count < 5 in 1 hour or < 10 in 2 hours)    Additional Information    Negative: SEVERE leg swelling (e.g., swelling extends above knee, entire leg is swollen)    Negative: Chest pain    Negative: [1] Difficulty breathing AND [2] new-onset or worsening    Negative: [1] Pregnant 20 or more weeks AND [2] new blurred vision or vision changes    Negative: [1] Pregnant 20 or more weeks AND [2] severe headache AND [3] not relieved with acetaminophen (e.g., Tylenol)    Negative: [1] Pregnant 20 or more weeks AND [2] upper abdominal pain lasts > 1 hour    Protocols used: PREGNANCY - LEG SWELLING AND EDEMA-A-AH

## 2023-05-17 NOTE — DISCHARGE INSTRUCTIONS
Discharge Instruction for Undelivered Patients      You were seen for:  rule out preeclampsia  We Consulted: Dr. Cruz    Diet:   Regular Diet     Activity:  Call your doctor or nurse midwife if your baby is moving less than usual.     Call your provider if you notice:  Swelling in your face or increased swelling in your hands or legs.  Headaches that are not relieved by Tylenol (acetaminophen).  Changes in your vision (blurring: seeing spots or stars.)  Nausea (sick to your stomach) and vomiting (throwing up).   Weight gain of 5 pounds or more per week.  Heartburn that doesn't go away.  Signs of bladder infection: pain when you urinate (use the toilet), need to go more often and more urgently.  The bag of donis (rupture of membranes) breaks, or you notice leaking in your underwear.  Bright red blood in your underwear.  Abdominal (lower belly) or stomach pain.  For first baby: Contractions (tightening) less than 5 minutes apart for one hour or more.  Second (plus) baby: Contractions (tightening) less than 10 minutes apart and getting stronger.  *If less than 34 weeks: Contractions (tightening) more than 6 times in one hour.  Increase or change in vaginal discharge (note the color and amount)  Other:     Follow-up:  As scheduled in the clinic

## 2023-05-17 NOTE — PLAN OF CARE
Data: Patient assessed in the [facility] for epigastric pain, visual changes and swelling. Cervical exam deferred. Membranes intact. Contractions are not present. See flowsheets for fetal assessment documentation.     Action: Presumed adequate fetal oxygenation documented. Discharge instructions reviewed. Patient instructed to report change in fetal movement, vaginal leaking of fluid or bleeding, abdominal pain, or any concerns related to the pregnancy to provider/clinic.      Response: Orders to discharge home per Dr. Cruz. Patient verbalized understanding of education and agreement with plan. Discharged to home at 1253.

## 2023-05-17 NOTE — PLAN OF CARE
Data: Patient presented to [facility]: 2023 11:26 AM.  Reason for maternal/fetal assessment is epigstric pain and visual changes yesterday with swelling of feet and cheeks this am. Patient denies uterine contractions, leaking of vaginal fluid/rupture of membranes, vaginal bleeding, nausea, vomiting, headache, visual disturbances, epigastric or RUQ pain. Patient reports fetal movement is normal. Patient is a 27w3d .  Prenatal record reviewed. Pregnancy has been complicated by MS, WPW syndorme, and hx of HELLP/preeclampsia/TTP with HUS/ARF.    Vital signs wnl. Support person is not present.     Action: Verbal consent for EFM. Triage assessment completed.     Response: Patient verbalized agreement with plan. Will contact Dr. Cruz with update and further orders.

## 2023-05-17 NOTE — PROVIDER NOTIFICATION
05/17/23 1208   Provider Notification   Provider Name/Title Dr. Cruz   Method of Notification Phone   Request Evaluate - Remote   Notification Reason Patient Arrived;Other (Comment)  (VSS, UA, complaints and Hx)     MD updated on pt arrival,, VS, history, complaints and UA results. Reported to MD that pt denies leaking of fluid, contractions or vaginal bleeding. Also informed of reassuring fetal strip. Order for CBC and CMP given. Will call MD with results.

## 2023-05-18 DIAGNOSIS — Z87.59 HISTORY OF PRE-ECLAMPSIA: Primary | ICD-10-CM

## 2023-05-18 DIAGNOSIS — O09.522 MULTIGRAVIDA OF ADVANCED MATERNAL AGE IN SECOND TRIMESTER: ICD-10-CM

## 2023-05-21 ENCOUNTER — HOSPITAL ENCOUNTER (OUTPATIENT)
Facility: HOSPITAL | Age: 38
Discharge: HOME | End: 2023-05-21
Attending: OBSTETRICS & GYNECOLOGY | Admitting: OBSTETRICS & GYNECOLOGY

## 2023-05-21 ENCOUNTER — HOSPITAL ENCOUNTER (OUTPATIENT)
Facility: HOSPITAL | Age: 38
End: 2023-05-21
Admitting: OBSTETRICS & GYNECOLOGY

## 2023-05-21 VITALS
SYSTOLIC BLOOD PRESSURE: 135 MMHG | OXYGEN SATURATION: 98 % | HEART RATE: 80 BPM | DIASTOLIC BLOOD PRESSURE: 74 MMHG | TEMPERATURE: 97.9 F | RESPIRATION RATE: 16 BRPM

## 2023-05-21 PROBLEM — Z98.891 HISTORY OF CESAREAN DELIVERY: Status: ACTIVE | Noted: 2023-05-21

## 2023-05-21 LAB
ALBUMIN MFR UR ELPH: <4 MG/DL (ref 1–14)
ALBUMIN SERPL BCG-MCNC: 3.7 G/DL (ref 3.5–5.2)
ALBUMIN UR-MCNC: NEGATIVE MG/DL
ALP SERPL-CCNC: 92 U/L (ref 35–104)
ALT SERPL W P-5'-P-CCNC: 33 U/L (ref 10–35)
ANION GAP SERPL CALCULATED.3IONS-SCNC: 13 MMOL/L (ref 7–15)
APPEARANCE UR: CLEAR
AST SERPL W P-5'-P-CCNC: 27 U/L (ref 10–35)
BILIRUB SERPL-MCNC: 0.2 MG/DL
BILIRUB UR QL STRIP: NEGATIVE
BUN SERPL-MCNC: 7.4 MG/DL (ref 6–20)
CALCIUM SERPL-MCNC: 9.3 MG/DL (ref 8.6–10)
CHLORIDE SERPL-SCNC: 102 MMOL/L (ref 98–107)
COLOR UR AUTO: ABNORMAL
CREAT SERPL-MCNC: 0.64 MG/DL (ref 0.51–0.95)
CREAT UR-MCNC: 25.7 MG/DL
DEPRECATED HCO3 PLAS-SCNC: 19 MMOL/L (ref 22–29)
ERYTHROCYTE [DISTWIDTH] IN BLOOD BY AUTOMATED COUNT: 13.2 % (ref 10–15)
GFR SERPL CREATININE-BSD FRML MDRD: >90 ML/MIN/1.73M2
GLUCOSE SERPL-MCNC: 76 MG/DL (ref 70–99)
GLUCOSE UR STRIP-MCNC: NEGATIVE MG/DL
HCT VFR BLD AUTO: 33.8 % (ref 35–47)
HGB BLD-MCNC: 11.6 G/DL (ref 11.7–15.7)
HGB UR QL STRIP: NEGATIVE
KETONES UR STRIP-MCNC: 20 MG/DL
LEUKOCYTE ESTERASE UR QL STRIP: NEGATIVE
MCH RBC QN AUTO: 33 PG (ref 26.5–33)
MCHC RBC AUTO-ENTMCNC: 34.3 G/DL (ref 31.5–36.5)
MCV RBC AUTO: 96 FL (ref 78–100)
NITRATE UR QL: NEGATIVE
PH UR STRIP: 5.5 [PH] (ref 4.7–8)
PLATELET # BLD AUTO: 244 10E3/UL (ref 150–450)
POTASSIUM SERPL-SCNC: 4 MMOL/L (ref 3.4–5.3)
PROT SERPL-MCNC: 6.7 G/DL (ref 6.4–8.3)
PROT/CREAT 24H UR: NORMAL MG/G{CREAT}
RBC # BLD AUTO: 3.51 10E6/UL (ref 3.8–5.2)
RBC URINE: <1 /HPF
SODIUM SERPL-SCNC: 134 MMOL/L (ref 136–145)
SP GR UR STRIP: 1 (ref 1–1.03)
SQUAMOUS EPITHELIAL: 2 /HPF
UROBILINOGEN UR STRIP-MCNC: NORMAL MG/DL
WBC # BLD AUTO: 10.4 10E3/UL (ref 4–11)
WBC URINE: <1 /HPF

## 2023-05-21 PROCEDURE — G0463 HOSPITAL OUTPT CLINIC VISIT: HCPCS | Mod: 25

## 2023-05-21 PROCEDURE — 36415 COLL VENOUS BLD VENIPUNCTURE: CPT | Performed by: OBSTETRICS & GYNECOLOGY

## 2023-05-21 PROCEDURE — 81001 URINALYSIS AUTO W/SCOPE: CPT | Performed by: OBSTETRICS & GYNECOLOGY

## 2023-05-21 PROCEDURE — 80053 COMPREHEN METABOLIC PANEL: CPT | Performed by: OBSTETRICS & GYNECOLOGY

## 2023-05-21 PROCEDURE — 99214 OFFICE O/P EST MOD 30 MIN: CPT | Performed by: OBSTETRICS & GYNECOLOGY

## 2023-05-21 PROCEDURE — 85027 COMPLETE CBC AUTOMATED: CPT | Performed by: OBSTETRICS & GYNECOLOGY

## 2023-05-21 PROCEDURE — 84156 ASSAY OF PROTEIN URINE: CPT | Performed by: OBSTETRICS & GYNECOLOGY

## 2023-05-21 PROCEDURE — 59025 FETAL NON-STRESS TEST: CPT

## 2023-05-21 PROCEDURE — 59025 FETAL NON-STRESS TEST: CPT | Mod: 26 | Performed by: OBSTETRICS & GYNECOLOGY

## 2023-05-21 RX ORDER — LIDOCAINE 40 MG/G
CREAM TOPICAL
Status: DISCONTINUED | OUTPATIENT
Start: 2023-05-21 | End: 2023-05-21 | Stop reason: HOSPADM

## 2023-05-21 ASSESSMENT — ACTIVITIES OF DAILY LIVING (ADL): ADLS_ACUITY_SCORE: 31

## 2023-05-21 NOTE — DISCHARGE INSTRUCTIONS

## 2023-05-21 NOTE — PLAN OF CARE
OB Triage Note  Sarah Ugalde  MRN: [id number]  Gestational Age: 28w0d      Sarah Ugalde presents for reports of vomiting that started today between  every 15 minutes. History of Pr-eclampsia with 1st child, upper gastric pain, change in vision, and a head ache that has been on and off for many days.     No reports of contraction, LOF, or bleeding. Denies pain. Reflexes within normal range, clonus 2 beats on left foot.    Dr. Cruz notified of arrival and condition.  Oriented patient to surroundings. Call light within reach.     FHT: 130  NST Start Time: 1541  NST Stop Time:1601  NST: Reactive Moderate variability, appropriate for gestational age.      Plan:  -Initial NST, then fetal/uterine monitoring per MD/patient plan.  -Monitoring blood pressures every 15 minutes  -Nursing education on Pre-eclampsia symptoms provided.      Verified with second RN: Nicolasa HALL RN     Comments:  Discussed case with Dr. Cruz. Monitored Blood pressure and Fetal monitoring for 2 hours. Patient symptoms improved, Blood pressure maintained in normal range, and Dr. Cruz gave permission for discharge. Patient discharged to home in stable condition.       Sharla Perea RN      
denies pain/discomfort

## 2023-05-21 NOTE — PROGRESS NOTES
Labor and Delivery OB Triage Progress Note    Name: Sarah Ugalde  Age: 38 year old  YOB: 1985   Medical Record #: [id number]     Date of Admission:  2023  Admitting Service:  Obstetrics and Gynecology  Primary Provider:   Joanne Ref-Primary, Physician    DATE: 2023         Identification:   Sarah Ugalde is a 38 year old  female at 28w0d with Estimated Date of Delivery: Aug 13, 2023         Chief Complaint:   She presents to Labor and Delivery unit for evaluation of concern for preeclampsia.         History of Present Illness:   The OB prenatal record was reviewed with the patient. 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    She complains of feeling worse with visual disturbances, light headedness, headaches, epigstric pain and heartburn. She also noticed worsening swelling of feet and cheeks.    Fetal Movement: Present and active.  Leakage of Fluid: Absent.  Vaginal Bleeding: Absent.  Contractions: Absent.  Preeclampsia Signs / Symptoms: As above.    She denies abnormal vaginal discharge, itching or irritation. No difficulty urinating, dysuria, hematuria, or flank pain. Denies nausea or vomiting. No fever or chills. She denies chest pain, or shortness of breath.  She denies depression symptoms.         Review of Systems:   As per the HPI. Other systems are reviewed and negative.         Allergies:     Allergies   Allergen Reactions     Magnesium Other (See Comments)     Altered mental status after IV dosing            Medication Prior to Admission:     Medications Prior to Admission    Medication Sig Dispense Refill Last Dose     aspirin (ASA) 81 MG chewable tablet Take 81 mg by mouth daily   2023     PRENATAL VIT-DOCUSATE-IRON-FA PO    2023            Active Problem List:     Patient Active Problem List   Diagnosis     WPW (Delaney-Parkinson-White syndrome)     MS (multiple sclerosis) (H)     Multigravida of advanced maternal age in first trimester     Encounter for triage in pregnant patient     TTP (thrombotic thrombocytopenic purpura) (H)     HUS (hemolytic uremic syndrome) (H)     HELLP (hemolytic anemia/elev liver enzymes/low platelets in pregnancy)     History of  delivery     Detached retina, bilateral          Past Medical History:     Past Medical History:   Diagnosis Date     Detached retina, bilateral 2015    secondary to severe preeclampsia with HELLP syndroma and acute renal failure     HELLP (hemolytic anemia/elev liver enzymes/low platelets in pregnancy) 2015    Severe preeclampsia with HELLP syndrome and acute renal failure     HUS (hemolytic uremic syndrome) (H) 2015    Postpartum Thrombotic thrombocytopenic purpura (TTP) and 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     MS (multiple sclerosis) (H) 2015     TTP (thrombotic thrombocytopenic purpura) (H) 2015    Postpartum Thrombotic thrombocytopenic purpura (TTP) and 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     WPW (Delaney-Parkinson-White syndrome) 2015    She has been evaluated for ablation, however, based on location this was not deemed safe by cardiology at the time          Past Surgical History:     Past Surgical History:   Procedure Laterality Date      SECTION  2015    Severe preeclampsia with HELLP syndrome and acute renal failure then developed postpartum Thrombotic thrombocytopenic purpura (TTP) and Hemolytic uremic syndrome (HUS)            Obstetric History:   EDC: Estimated Date of Delivery: Aug 13, 2023  OB History     Para Term  AB Living   3 1 0 1 1 1   SAB IAB Ectopic Multiple Live Births   1 0 0 0 1      # Outcome Date GA Lbr Brad/2nd Weight Sex Delivery Anes PTL Lv   3 Current            2 SAB            1  06/23/15 36w0d  2.325 kg (5 lb 2 oz) M CS-LTranv  Y EVE      Birth Comments: Severe preeclampsia with HELLP synbdrome and acute renal failure then developed postpartum Thrombotic thrombocytopenic purpura and Hemolytic uremic syndrome      Complications: Preeclampsia, severe, HELLP syndrome      Obstetric Comments   2015 36 wk- Severe preeclampsia with HELLP synbdrome and acute renal failure then developed postpartum Thrombotic thrombocytopenic purpura (TTP) and Hemolytic uremic syndrome (HUS). Patient also developed bilateral detached retinas.           Family History:   Unchanged from prenatal record.         Social History:     Social History     Tobacco Use     Smoking status: Never     Smokeless tobacco: Never   Vaping Use     Vaping status: Not on file   Substance Use Topics     Alcohol use: Yes     Comment: occa     History   Sexual Activity     Sexual activity: Not on file          Physical Exam:   Vital signs:  /74   Pulse 80   Temp 97.9  F (36.6  C) (Oral)   Resp 16   SpO2 98%    130/82, 142/98, 134/88, 138/71, 128/82, 137/79, 135/74  General: Alert and oriented x 3. Well developed and well nourished gravid female.  Psychiatric: mentation appears normal and affect normal.  Cardiovascular: Regular rate and rhythm. No significant murmurs, rubs, or gallops detected. Peripheral pulses normal bilaterally in upper and lower extremities.  Lungs: Clear to auscultation bilaterally, no adventitious sounds, good air movement throughout.  Abdomen gravid, soft, non tender, positive bowel sounds.  Cervix: Deferred   Back: No CVA tenderness bilaterally.  Extremities: Non-tender, no erythema; trace edema, DTR 2+.    Fetal Heart Rate Tracing: reactive and reassuring  Tocometer: no  contractions        Diagnostics:   PRENATAL LABS:  Lab Results   Component Value Date    AS Negative 2022    HEPBANG Nonreactive 2022    TREPT Nonreactive 2022    RUQIGG Positive 2022    HGB 11.6 (L) 2023    HCT 33.8 (L) 2023     2023     Pre-eclampsia labs  Recent Labs   Lab Test 23  1629 23  1219 23  1232 22  1006 10/29/18  1513 01/10/16  2315   WBC 10.4 8.0 7.0 5.9 8.8 10.5   HGB 11.6* 11.3* 12.4 14.4 14.5 13.9   HCT 33.8* 33.0* 36.2 42.5 42.5 41.8    254 272 310 362 392   AST 27 24  --   --  17 19   ALT 33 36*  --   --  25 21   CR 0.64 0.60 0.68 0.90 1.01 1.19*   MAG  --   --  2.0  --   --   --      2023 Urine protein: 0  2023 Urine protein/ creatine ratio: pending          Assessment:   1. 38 year old  female at 28w0d with reassuring fetal assessment  2. Advanced maternal age  3. Prior  section   4. History of severe preeclampsia/ HELLP syndrome/ acute renal failure 2016  5. 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 History of Leslie Parkinson White syndrome  7. History of multiple sclerosis  8. Desire sterilization with repeat  section.        Plan:   1. Her hospital course was uneventful and she was evaluated for complications of pregnancy.   2. The fetal well being is reassuring as the NST is reactive and reassuring. Category  I.  3. Her Tocometer shows no contractions.  4. Her labs show normal PIH labs and no proteinurea  5. Her vital signs are stable and her serial BP are normal.  6. Her symptoms improved while on our unit. She is now asymptomatic.  7. I recommend the patient keep her scheduled cardiac echo and cardiology consult.  8. Discharge home undelivered in stable condition with follow up with her primary OB provider.  9. I reviewed routine obstetrical precautions, recommendations and  instructions with the patient including fetal movement and kick count instructions.  10. I reviewed pre-eclampsia precautions with the patient with instructions for the patient to come in for persistent headache unrelieved with Tylenol, blurry vision, right upper quadrant pain, shortness of breath, or significant persistent edema.  11. Follow up in OB Clinic on 05/25/2023 as scheduled.      Naseem Cruz MD  Obstetrics and Gynecology

## 2023-05-25 ENCOUNTER — LAB (OUTPATIENT)
Dept: LAB | Facility: OTHER | Age: 38
End: 2023-05-25
Attending: OBSTETRICS & GYNECOLOGY

## 2023-05-25 ENCOUNTER — PRENATAL OFFICE VISIT (OUTPATIENT)
Dept: OBGYN | Facility: OTHER | Age: 38
End: 2023-05-25
Attending: OBSTETRICS & GYNECOLOGY

## 2023-05-25 VITALS — BODY MASS INDEX: 26.05 KG/M2 | DIASTOLIC BLOOD PRESSURE: 72 MMHG | SYSTOLIC BLOOD PRESSURE: 102 MMHG | WEIGHT: 129 LBS

## 2023-05-25 DIAGNOSIS — Z67.91 RH NEGATIVE STATE IN ANTEPARTUM PERIOD: Primary | ICD-10-CM

## 2023-05-25 DIAGNOSIS — R00.2 PALPITATIONS: ICD-10-CM

## 2023-05-25 DIAGNOSIS — I45.6 WPW (WOLFF-PARKINSON-WHITE SYNDROME): ICD-10-CM

## 2023-05-25 DIAGNOSIS — O09.523 MULTIGRAVIDA OF ADVANCED MATERNAL AGE IN THIRD TRIMESTER: ICD-10-CM

## 2023-05-25 DIAGNOSIS — R55 SYNCOPE, UNSPECIFIED SYNCOPE TYPE: ICD-10-CM

## 2023-05-25 DIAGNOSIS — O26.899 RH NEGATIVE STATE IN ANTEPARTUM PERIOD: Primary | ICD-10-CM

## 2023-05-25 DIAGNOSIS — O09.522 MULTIGRAVIDA OF ADVANCED MATERNAL AGE IN SECOND TRIMESTER: ICD-10-CM

## 2023-05-25 DIAGNOSIS — Z87.59 HISTORY OF PRE-ECLAMPSIA: ICD-10-CM

## 2023-05-25 LAB
ALT SERPL W P-5'-P-CCNC: 22 U/L (ref 10–35)
ANTIBODY SCREEN: NEGATIVE
AST SERPL W P-5'-P-CCNC: 21 U/L (ref 10–35)
CREAT UR-MCNC: 31.8 MG/DL
ERYTHROCYTE [DISTWIDTH] IN BLOOD BY AUTOMATED COUNT: 13.2 % (ref 10–15)
GLUCOSE 1H P 50 G GLC PO SERPL-MCNC: 132 MG/DL (ref 70–129)
HCT VFR BLD AUTO: 36.5 % (ref 35–47)
HGB BLD-MCNC: 12.1 G/DL (ref 11.7–15.7)
MCH RBC QN AUTO: 32.5 PG (ref 26.5–33)
MCHC RBC AUTO-ENTMCNC: 33.2 G/DL (ref 31.5–36.5)
MCV RBC AUTO: 98 FL (ref 78–100)
MICROALBUMIN UR-MCNC: <12 MG/L
MICROALBUMIN/CREAT UR: NORMAL MG/G{CREAT}
PLATELET # BLD AUTO: 254 10E3/UL (ref 150–450)
RBC # BLD AUTO: 3.72 10E6/UL (ref 3.8–5.2)
SPECIMEN EXPIRATION DATE: NORMAL
WBC # BLD AUTO: 7.9 10E3/UL (ref 4–11)

## 2023-05-25 PROCEDURE — 82950 GLUCOSE TEST: CPT

## 2023-05-25 PROCEDURE — 99207 PR PRENATAL VISIT: CPT | Performed by: OBSTETRICS & GYNECOLOGY

## 2023-05-25 PROCEDURE — 85027 COMPLETE CBC AUTOMATED: CPT

## 2023-05-25 PROCEDURE — 86850 RBC ANTIBODY SCREEN: CPT

## 2023-05-25 PROCEDURE — 84460 ALANINE AMINO (ALT) (SGPT): CPT

## 2023-05-25 PROCEDURE — 36415 COLL VENOUS BLD VENIPUNCTURE: CPT

## 2023-05-25 PROCEDURE — 82043 UR ALBUMIN QUANTITATIVE: CPT

## 2023-05-25 PROCEDURE — 86780 TREPONEMA PALLIDUM: CPT

## 2023-05-25 PROCEDURE — 82570 ASSAY OF URINE CREATININE: CPT

## 2023-05-25 PROCEDURE — 84450 TRANSFERASE (AST) (SGOT): CPT

## 2023-05-25 PROCEDURE — 96372 THER/PROPH/DIAG INJ SC/IM: CPT | Performed by: OBSTETRICS & GYNECOLOGY

## 2023-05-25 ASSESSMENT — PAIN SCALES - GENERAL: PAINLEVEL: NO PAIN (0)

## 2023-05-25 NOTE — PROGRESS NOTES
Doing well.  No concerns today.  See prior eval on L and D.  N/v, edema, blurred vision all resolved.  Echo/cards eval scheduled next week.   1 hour GTT done today along with other necessary labs.  Prenatal flowsheet information is reviewed.  Discussed kick counts and fetal movement.  RTC in 2 weeks    Denies PTL sx, vb, lof  Scout Grimaldo MD  5/25/2023

## 2023-05-26 LAB — T PALLIDUM AB SER QL: NONREACTIVE

## 2023-05-30 NOTE — PROGRESS NOTES
Name: Sarah Ugalde  Age: 38 year old  YOB: 1985   Medical Record #: [id number]     Fetal Non-Stress Test Results    NST Ordered By: Naseem Cruz MD  NST Medical Indication: rule out pre-eclampsia  Gestational Age: 28w0d       NST Start & Stop Times  NST Start Time: 1541  NST Stop Time: 1601    NST Results  Fetus A   Baseline Rate: 130  Variability: Present  Accelerations: Present  Decelerations: None  Interpretation: reactive and reassuring   Category:1            Naseem Cruz MD  Obstetrics and Gynecology

## 2023-05-31 ENCOUNTER — HOSPITAL ENCOUNTER (OUTPATIENT)
Dept: CARDIOLOGY | Facility: HOSPITAL | Age: 38
Discharge: HOME | End: 2023-05-31
Attending: NURSE PRACTITIONER | Admitting: INTERNAL MEDICINE

## 2023-05-31 LAB — LVEF ECHO: NORMAL

## 2023-05-31 PROCEDURE — 93306 TTE W/DOPPLER COMPLETE: CPT

## 2023-05-31 PROCEDURE — 93306 TTE W/DOPPLER COMPLETE: CPT | Mod: 26 | Performed by: INTERNAL MEDICINE

## 2023-05-31 NOTE — PROGRESS NOTES
ELECTROPHYSIOLOGY TELEMEDICINE CLINIC VISIT    Assessment/Recommendations   Assessment/Plan:    Ms. Ugaled is a 38 year old female who has a medical history significant for WPW s/p EPS and preeclampsia.     She reports a history of WPW and had an EPS done about 8 years ago in [place]. We will work to obtain these records as they are not immediately available to us. Unclear if any ablation was performed. ECG today shows short SD but is without preexcitation. She has some symptoms of palpitations and has short runs of PSVT on zio patch monitor. However, most symptoms were in SR/ST. She also had a syncopal event that sounded vasovagal/orthostatic in nature. We encouraged her to increase hydration, liberalize salt/electrolyte intake, change positions carefully, and use of counter pressure maneuvers to help abort episodes. We will defer beta blockers at this time for the palpitations given she is pregnant and having some orthostatism. We provided reassurance that her echo showed structurally normal heart and her zio patch was benign.     Follow up with EP CHELE in 6 months.        History of Present Illness/Subjective    Ms. Sarah Ugalde is a 38 year old female who comes in today for EP consultation of WPW.    Ms. Ugalde is a 38 year old female who has a medical history significant for WPW s/p EPS and preeclampsia.     She reports having SVT like symptoms since she was young, but these were always attributed to something else (blood sugar, heat stroke, etc). Then in her 20s, she was told she was likely having panic attacks. She had an episode of syncope while sitting at work and was sent to ER. ECG there showed WPW. She had a CMR and EPS in [place] about 8 years ago for this. She continues to have symptoms of racing, hard heart beats, pounding, and inability to catch her breath. Since pregnancy she is getting symptoms multiple times per day. Symptoms last seconds usually but up to 2-5 minutes.  "Symptoms can present at rest or during activity. She has had three syncopal episodes while being pregnant and had subsequent zio patch. One episode she was standing and baking bread in the kitchen- she started to feel lightheaded and nauseated so went to the bathroom thinking she had to throw up. She took a few steps and went down. She estimates being out for only a few seconds. When she came to she felt \"out of sorts.\" Another episode occurred while standing. The third episode she was sitting in the recliner and felt a little lightheaded but no nausea and passed out. A zio patch monitor from 4/2023 showed 48 PSVT up to 12.3 seconds, 1.3% PVCs, and 21 symptom activations showed SR/ST and sometimes PSVT or isolated ectopy. She was referred to EP for further evaluations/management.     She reports feeling at baseline. She notes symptoms continued after her EPS with increase in symptoms post partum. She is currently 30 weeks pregnant. She reports having a syncopal event at 22 weeks when she was standing in her kitchen, felt funny feeling, sat down, then stood up again and fainted. She had LOC for a couple seconds. She denies chest discomfort, palpitations, abdominal fullness/bloating or peripheral edema, shortness of breath, paroxysmal nocturnal dyspnea, orthopnea, lightheadedness, dizziness, pre-syncope, or syncope. A recent echo showed normal cardiac structure and function. She reports no family history of SCD. Her maternal aunt did have MI at age 25, now is age 60 with PPM also. Presenting 12 lead ECG shows SR with short VA Vent Rate 69 bpm,  ms, QRS 76 ms, QTc 417 ms. Current cardiac medications include: ASA.         I have reviewed and updated the patient's Past Medical History, Social History, Family History and Medication List.     Cardiographics (Personally Reviewed) :   5/31/23 Echo:   Interpretation Summary  Global and regional left ventricular function is normal with an EF of 60-65%.  Right ventricular " function, chamber size, wall motion, and thickness are  normal.  Pulmonary artery systolic pressure is normal.  The inferior vena cava is normal.  No pericardial effusion is present.  There is no prior study for direct comparison.         Physical Examination    /72   Pulse 73   SpO2 100%  Wt Readings from Last 3 Encounters:   05/25/23 58.5 kg (129 lb)   05/17/23 58.5 kg (129 lb)   05/11/23 58.5 kg (129 lb)     The rest of a comprehensive physical examination is deferred due to nature of video visit restrictions.  CONSITUTIONAL: no acute distress  HEENT: no icterus, no redness or discharge, neck supple  CV: no visible edema of visualized extremities. No JVD.   RESPIRATORY: respirations nonlabored, no cough  NEURO: AA&Ox3, speech fluent/appropriate, motor grossly nonfocal  PSYCH: cooperative, affect appropriate  DERM: no rashes on visualized face/neck/upper extremities         Medications  Allergies   Current Outpatient Medications   Medication Sig Dispense Refill     aspirin (ASA) 81 MG chewable tablet Take 81 mg by mouth daily       PRENATAL VIT-DOCUSATE-IRON-FA PO       Allergies   Allergen Reactions     Magnesium Other (See Comments)     Altered mental status after IV dosing         Lab Results (Personally Reviewed)    Chemistry/lipid CBC Cardiac Enzymes/BNP/TSH/INR   Lab Results   Component Value Date    BUN 7.4 05/21/2023     (L) 05/21/2023    CO2 19 (L) 05/21/2023     Creatinine   Date Value Ref Range Status   05/21/2023 0.64 0.51 - 0.95 mg/dL Final   10/29/2018 1.01 0.52 - 1.04 mg/dL Final       No results found for: CHOL, HDL, LDL, CHOLHDL   Lab Results   Component Value Date    WBC 7.9 05/25/2023    HGB 12.1 05/25/2023    HCT 36.5 05/25/2023    MCV 98 05/25/2023     05/25/2023    Lab Results   Component Value Date    INR 0.97 01/10/2016        The patient states understanding and is agreeable with the plan.   Nav Leigh MD [facility]  Cardiology - Electrophysiology    Total time spent  on patient visit, reviewing notes, imaging, labs, orders, and completing necessary documentation: 45 minutes.

## 2023-06-01 ENCOUNTER — APPOINTMENT (OUTPATIENT)
Dept: LAB | Facility: OTHER | Age: 38
End: 2023-06-01

## 2023-06-01 DIAGNOSIS — O99.810 PREGNANCY-INDUCED GLUCOSE INTOLERANCE: ICD-10-CM

## 2023-06-01 DIAGNOSIS — O24.419 GDM (GESTATIONAL DIABETES MELLITUS): Primary | ICD-10-CM

## 2023-06-01 DIAGNOSIS — O09.523 MULTIGRAVIDA OF ADVANCED MATERNAL AGE IN THIRD TRIMESTER: Primary | ICD-10-CM

## 2023-06-01 LAB
GESTATIONAL GTT 1 HR POST DOSE: 145 MG/DL (ref 60–179)
GESTATIONAL GTT 2 HR POST DOSE: 125 MG/DL (ref 60–154)
GESTATIONAL GTT 3 HR POST DOSE: 118 MG/DL (ref 60–139)
GLUCOSE P FAST SERPL-MCNC: 124 MG/DL (ref 60–94)

## 2023-06-01 PROCEDURE — 36415 COLL VENOUS BLD VENIPUNCTURE: CPT | Performed by: OBSTETRICS & GYNECOLOGY

## 2023-06-01 PROCEDURE — 82952 GTT-ADDED SAMPLES: CPT | Performed by: OBSTETRICS & GYNECOLOGY

## 2023-06-01 PROCEDURE — 82951 GLUCOSE TOLERANCE TEST (GTT): CPT | Performed by: OBSTETRICS & GYNECOLOGY

## 2023-06-02 ENCOUNTER — VIRTUAL VISIT (OUTPATIENT)
Dept: CARDIOLOGY | Facility: CLINIC | Age: 38
End: 2023-06-02
Attending: INTERNAL MEDICINE

## 2023-06-02 ENCOUNTER — VIRTUAL VISIT (OUTPATIENT)
Dept: CARDIOLOGY | Facility: OTHER | Age: 38
End: 2023-06-02
Attending: INTERNAL MEDICINE

## 2023-06-02 VITALS — DIASTOLIC BLOOD PRESSURE: 72 MMHG | HEART RATE: 73 BPM | OXYGEN SATURATION: 100 % | SYSTOLIC BLOOD PRESSURE: 124 MMHG

## 2023-06-02 DIAGNOSIS — R00.2 PALPITATIONS: Primary | ICD-10-CM

## 2023-06-02 DIAGNOSIS — Z33.1 PREGNANT STATE, INCIDENTAL: ICD-10-CM

## 2023-06-02 DIAGNOSIS — R55 SYNCOPE, UNSPECIFIED SYNCOPE TYPE: Primary | ICD-10-CM

## 2023-06-02 DIAGNOSIS — I45.6 WPW (WOLFF-PARKINSON-WHITE SYNDROME): ICD-10-CM

## 2023-06-02 DIAGNOSIS — R55 SYNCOPE, UNSPECIFIED SYNCOPE TYPE: ICD-10-CM

## 2023-06-02 PROCEDURE — 99204 OFFICE O/P NEW MOD 45 MIN: CPT | Mod: GT | Performed by: INTERNAL MEDICINE

## 2023-06-02 NOTE — PATIENT INSTRUCTIONS
Thank you for allowing our team to participate in your care. Please call our office at [phone] with scheduling questions or if you need to cancel or change your appointment. With any other questions or concerns you may call [facility] cardiology nurse at [phone].       If you experience chest pain, chest pressure, chest tightness, shortness of breath, fainting, lightheadedness, nausea, vomiting, or other concerning symptoms, please report to the Emergency Department or call 911. These symptoms may be emergent, and best treated in the Emergency Department.    Increase hydration and salt intake. No medication therapy at this time.     Follow up with Samira Liriano NP in 6 months.

## 2023-06-02 NOTE — LETTER
6/2/2023      RE: Sarah Ugalde  [address]  [place] [address]       Dear Colleague,    Thank you for the opportunity to participate in the care of your patient, Sarah Ugalde, at the [facility] [place] [facility]. Please see a copy of my visit note below.        ELECTROPHYSIOLOGY TELEMEDICINE CLINIC VISIT    Assessment/Recommendations   Assessment/Plan:    Ms. Ugalde is a 38 year old female who has a medical history significant for WPW s/p EPS and preeclampsia.     She reports a history of WPW and had an EPS done about 8 years ago in [place]. We will work to obtain these records as they are not immediately available to us. Unclear if any ablation was performed. ECG today shows short DE but is without preexcitation. She has some symptoms of palpitations and has short runs of PSVT on zio patch monitor. However, most symptoms were in SR/ST. She also had a syncopal event that sounded vasovagal/orthostatic in nature. We encouraged her to increase hydration, liberalize salt/electrolyte intake, change positions carefully, and use of counter pressure maneuvers to help abort episodes. We will defer beta blockers at this time for the palpitations given she is pregnant and having some orthostatism. We provided reassurance that her echo showed structurally normal heart and her zio patch was benign.     Follow up with EP CHELE in 6 months.        History of Present Illness/Subjective    Ms. Sarah Ugalde is a 38 year old female who comes in today for EP consultation of WPW.    Ms. Ugalde is a 38 year old female who has a medical history significant for WPW s/p EPS and preeclampsia.     She reports having SVT like symptoms since she was young, but these were always attributed to something else (blood sugar, heat stroke, etc). Then in her 20s, she was told she was likely having panic attacks. She had an episode of syncope while  "sitting at work and was sent to ER. ECG there showed WPW. She had a CMR and EPS in [place] about 8 years ago for this. She continues to have symptoms of racing, hard heart beats, pounding, and inability to catch her breath. Since pregnancy she is getting symptoms multiple times per day. Symptoms last seconds usually but up to 2-5 minutes. Symptoms can present at rest or during activity. She has had three syncopal episodes while being pregnant and had subsequent zio patch. One episode she was standing and baking bread in the kitchen- she started to feel lightheaded and nauseated so went to the bathroom thinking she had to throw up. She took a few steps and went down. She estimates being out for only a few seconds. When she came to she felt \"out of sorts.\" Another episode occurred while standing. The third episode she was sitting in the recliner and felt a little lightheaded but no nausea and passed out. A zio patch monitor from 4/2023 showed 48 PSVT up to 12.3 seconds, 1.3% PVCs, and 21 symptom activations showed SR/ST and sometimes PSVT or isolated ectopy. She was referred to EP for further evaluations/management.     She reports feeling at baseline. She notes symptoms continued after her EPS with increase in symptoms post partum. She is currently 30 weeks pregnant. She reports having a syncopal event at 22 weeks when she was standing in her kitchen, felt funny feeling, sat down, then stood up again and fainted. She had LOC for a couple seconds. She denies chest discomfort, palpitations, abdominal fullness/bloating or peripheral edema, shortness of breath, paroxysmal nocturnal dyspnea, orthopnea, lightheadedness, dizziness, pre-syncope, or syncope. A recent echo showed normal cardiac structure and function. She reports no family history of SCD. Her maternal aunt did have MI at age 25, now is age 60 with PPM also. Presenting 12 lead ECG shows SR with short DC Vent Rate 69 bpm,  ms, QRS 76 ms, QTc 417 ms. " Current cardiac medications include: ASA.         I have reviewed and updated the patient's Past Medical History, Social History, Family History and Medication List.     Cardiographics (Personally Reviewed) :   5/31/23 Echo:   Interpretation Summary  Global and regional left ventricular function is normal with an EF of 60-65%.  Right ventricular function, chamber size, wall motion, and thickness are  normal.  Pulmonary artery systolic pressure is normal.  The inferior vena cava is normal.  No pericardial effusion is present.  There is no prior study for direct comparison.         Physical Examination    /72   Pulse 73   SpO2 100%  Wt Readings from Last 3 Encounters:   05/25/23 58.5 kg (129 lb)   05/17/23 58.5 kg (129 lb)   05/11/23 58.5 kg (129 lb)     The rest of a comprehensive physical examination is deferred due to nature of video visit restrictions.  CONSITUTIONAL: no acute distress  HEENT: no icterus, no redness or discharge, neck supple  CV: no visible edema of visualized extremities. No JVD.   RESPIRATORY: respirations nonlabored, no cough  NEURO: AA&Ox3, speech fluent/appropriate, motor grossly nonfocal  PSYCH: cooperative, affect appropriate  DERM: no rashes on visualized face/neck/upper extremities         Medications  Allergies   Current Outpatient Medications   Medication Sig Dispense Refill     aspirin (ASA) 81 MG chewable tablet Take 81 mg by mouth daily       PRENATAL VIT-DOCUSATE-IRON-FA PO       Allergies   Allergen Reactions     Magnesium Other (See Comments)     Altered mental status after IV dosing         Lab Results (Personally Reviewed)    Chemistry/lipid CBC Cardiac Enzymes/BNP/TSH/INR   Lab Results   Component Value Date    BUN 7.4 05/21/2023     (L) 05/21/2023    CO2 19 (L) 05/21/2023     Creatinine   Date Value Ref Range Status   05/21/2023 0.64 0.51 - 0.95 mg/dL Final   10/29/2018 1.01 0.52 - 1.04 mg/dL Final       No results found for: CHOL, HDL, LDL, CHOLHDL   Lab Results    Component Value Date    WBC 7.9 05/25/2023    HGB 12.1 05/25/2023    HCT 36.5 05/25/2023    MCV 98 05/25/2023     05/25/2023    Lab Results   Component Value Date    INR 0.97 01/10/2016        The patient states understanding and is agreeable with the plan.   Nav Leigh MD [facility]  Cardiology - Electrophysiology    Total time spent on patient visit, reviewing notes, imaging, labs, orders, and completing necessary documentation: 45 minutes.                      Please do not hesitate to contact me if you have any questions/concerns.     Sincerely,     Nav Leigh MD

## 2023-06-06 ENCOUNTER — PRENATAL OFFICE VISIT (OUTPATIENT)
Dept: OBGYN | Facility: OTHER | Age: 38
End: 2023-06-06
Attending: OBSTETRICS & GYNECOLOGY

## 2023-06-06 VITALS
DIASTOLIC BLOOD PRESSURE: 80 MMHG | HEART RATE: 74 BPM | SYSTOLIC BLOOD PRESSURE: 100 MMHG | OXYGEN SATURATION: 99 % | BODY MASS INDEX: 26.42 KG/M2 | WEIGHT: 130.8 LBS

## 2023-06-06 DIAGNOSIS — O09.523 MULTIGRAVIDA OF ADVANCED MATERNAL AGE IN THIRD TRIMESTER: Primary | ICD-10-CM

## 2023-06-06 PROCEDURE — 99207 PR PRENATAL VISIT: CPT | Performed by: OBSTETRICS & GYNECOLOGY

## 2023-06-06 ASSESSMENT — PAIN SCALES - GENERAL: PAINLEVEL: NO PAIN (0)

## 2023-06-06 NOTE — PROGRESS NOTES
Doing well.  No concerns today.  Had Echo/Cards eval.  Reassuring. See notes.   Neg JULI roldan.  Diabetes center referal/evalpending  US scheduled 1 week for growth.   Discussed kick counts and fetal movement.  RTC in 2 weeks  Denies PTL jamar, michael, lomaksim Grimaldo MD  6/6/2023

## 2023-06-07 ENCOUNTER — TELEPHONE (OUTPATIENT)
Dept: EDUCATION SERVICES | Facility: HOSPITAL | Age: 38
End: 2023-06-07

## 2023-06-07 NOTE — TELEPHONE ENCOUNTER
This patient has a referral to see you for GDM, she just called back stating she does not want to schedule his appt if she does not absolutely have to.    She states she is already 30 weeks along, and is scheduled to deliver in 8 weeks. She states that if this is just going to be her needing to change her diet she would like if someone could just call her over the phone to discuss this. She says starting tomorrow she is going to have 2 young kids with her at all times and already has a lot of appointments that she needs to attend so she doesn't want to add another one if she doesn't have to.     Please advise what you would like to do, or if you would like to call her, she can be reached at [phone]

## 2023-06-08 ENCOUNTER — HOSPITAL ENCOUNTER (OUTPATIENT)
Dept: EDUCATION SERVICES | Facility: HOSPITAL | Age: 38
Discharge: HOME | End: 2023-06-08
Attending: DIETITIAN, REGISTERED | Admitting: OBSTETRICS & GYNECOLOGY

## 2023-06-08 VITALS
DIASTOLIC BLOOD PRESSURE: 80 MMHG | SYSTOLIC BLOOD PRESSURE: 126 MMHG | BODY MASS INDEX: 26 KG/M2 | OXYGEN SATURATION: 99 % | WEIGHT: 129 LBS | HEIGHT: 59 IN | HEART RATE: 86 BPM

## 2023-06-08 DIAGNOSIS — O24.410 DIET CONTROLLED GESTATIONAL DIABETES MELLITUS (GDM) IN THIRD TRIMESTER: Primary | ICD-10-CM

## 2023-06-08 PROCEDURE — 97802 MEDICAL NUTRITION INDIV IN: CPT | Performed by: DIETITIAN, REGISTERED

## 2023-06-08 ASSESSMENT — PAIN SCALES - GENERAL: PAINLEVEL: NO PAIN (0)

## 2023-06-08 NOTE — PATIENT INSTRUCTIONS
-Follow healthy, low carbohydrate meal plan provided - 45 grams/meal, 15-30 grams/snack.  -Be as active as able/allowed as long as you have no restrictions.   -Test glucose 4x/day - fasting and 1 hour after meals.  -Target levels are fasting <95 and 1 hour after meals < 140  -Record all glucose levels on log sheet.    -Call if any three levels in one column are above target.   -SONIA Herr, [facility] [phone].

## 2023-06-08 NOTE — PROGRESS NOTES
"Diabetes Self-Management Education & Support    Type of Service: In Person Visit    SUBJECTIVE/OBJECTIVE:  Presents for education related to gestational diabetes.  Pt is 30w 4d gestation with her second pregnancy.  She did not have GDM with first pregnancy but baby born at 36 weeks via emergency .  Pt's mom had GDM but does not have DM.      /80   Pulse 86   Ht 1.499 m (4' 11\")   Wt 58.5 kg (129 lb)   SpO2 99%   BMI 26.05 kg/m     Pt reports prepregnancy weight of 120#.     Cultural Influences/Ethnic Background:  Not  or     Estimated Date of Delivery: Aug 13, 2023    1 hour OGTT  Lab Results   Component Value Date    GLU1 132 (H) 2023         3 hour OGTT    Fasting  Lab Results   Component Value Date    GTTGF 124 (H) 2023       1 hour  Lab Results   Component Value Date    GTTG1 145 2023       2 hour  Lab Results   Component Value Date    GTTG2 125 2023       3 hour  Lab Results   Component Value Date    GTTG3 118 2023       Lifestyle and Health Behaviors:  Days per week of moderate to strenuous exercise (like a brisk walk): (P) 6  On average, minutes per day of exercise at this level: (P) 30  How intense was your typical exercise? : (P) Moderate (like brisk walking)  Exercise Minutes per Week: (P) 180  Barrier to exercise: Time, Physical limitation  Cultural/Alevism diet restrictions?: No  Meal planning/habits: Low carb, Heart healthy, Smaller portions, Frequent snacking  How many times a week on average do you eat food made away from home (restaurant/take-out)?: 0  Meals include: Breakfast, Lunch, Dinner, Afternoon Snack  Beverages: Water, Tea, Diet soda, Other  Verbal diet recall:  Breakfast-fruit or greek yogurt or mini Eyad Bar  Lunch-salad with fruit or sandwich/veggies  Supper-spaghetti or tuna steak tacos or chicken or bison burgers  Snacks-string cheese, triscuits, small amount ice cream    Healthy Coping:  Informal Support system:: " Family     ASSESSMENT:  Pt listened and participated well during session.      INTERVENTION:  Patient is self pay currently so used sample meter to instruct patient on glucose monitoring.  Pt will purchase over the counter meter and supplies at [facility] or alternate as will be more cost effective.      Educational topics covered today:  GDM diagnosis, pathophysiology, Risks and Complications of GDM, Means of controlling GDM, Using a Blood Glucose Monitor, Blood Glucose Goals, Logging and Interpreting Glucose Results, When to Call a Diabetes Educator or OB Provider, Healthy Eating During Pregnancy, Counting Carbohydrates, Meal Planning for GDM, and Physical Activity    Educational materials provided today:   Taking Care of Gestational Diabetes  My Food Plan for Gestational Diabetes  Glucose log sheet     Pt verbalized understanding of concepts discussed and recommendations provided today.     PLAN:  Check glucose 4 times daily, before breakfast and 1 hour after each meal.     Physical activity recommended: As able as long as no restrictions.    Meal plan: 45 carbs at breakfast, 45 carbs at lunch, 45 carbs at supper, 15-30 carbs at 3 snacks a day.  Follow consistent CHO meal plan, eat CHO and protein/fat at all meals/snacks.    Call/e-mail/[employer] message diabetes educator or provider if 3 or more blood sugars are above the goal.    Time Spent: 20 minutes  Encounter Type: Individual    Any diabetes medication dose changes were made via the CDE Protocol and Collaborative Practice Agreement with the patient's OB/GYN provider. A copy of this encounter was shared with the provider.

## 2023-06-08 NOTE — LETTER
"    2023        RE: Sarah ATKINSON [facility]  [address] [place] [facility] [address]        Diabetes Self-Management Education & Support    Type of Service: In Person Visit    SUBJECTIVE/OBJECTIVE:  Presents for education related to gestational diabetes.  Pt is 30w 4d gestation with her second pregnancy.  She did not have GDM with first pregnancy but baby born at 36 weeks via emergency .  Pt's mom had GDM but does not have DM.      /80   Pulse 86   Ht 1.499 m (4' 11\")   Wt 58.5 kg (129 lb)   SpO2 99%   BMI 26.05 kg/m     Pt reports prepregnancy weight of 120#.     Cultural Influences/Ethnic Background:  Not  or     Estimated Date of Delivery: Aug 13, 2023    1 hour OGTT  Lab Results   Component Value Date    GLU1 132 (H) 2023         3 hour OGTT    Fasting  Lab Results   Component Value Date    GTTGF 124 (H) 2023       1 hour  Lab Results   Component Value Date    GTTG1 145 2023       2 hour  Lab Results   Component Value Date    GTTG2 125 2023       3 hour  Lab Results   Component Value Date    GTTG3 118 2023       Lifestyle and Health Behaviors:  Days per week of moderate to strenuous exercise (like a brisk walk): (P) 6  On average, minutes per day of exercise at this level: (P) 30  How intense was your typical exercise? : (P) Moderate (like brisk walking)  Exercise Minutes per Week: (P) 180  Barrier to exercise: Time, Physical limitation  Cultural/Jainism diet restrictions?: No  Meal planning/habits: Low carb, Heart healthy, Smaller portions, Frequent snacking  How many times a week on average do you eat food made away from home (restaurant/take-out)?: 0  Meals include: Breakfast, Lunch, Dinner, Afternoon Snack  Beverages: Water, Tea, Diet soda, Other  Verbal diet recall:  Breakfast-fruit or greek yogurt or mini Eyad Bar  Lunch-salad with fruit or sandwich/veggies  Supper-spaghetti or tuna steak tacos or chicken or bison burgers  Snacks-string " cheese, triscuits, small amount ice cream    Healthy Coping:  Informal Support system:: Family     ASSESSMENT:  Pt listened and participated well during session.      INTERVENTION:  Patient is self pay currently so used sample meter to instruct patient on glucose monitoring.  Pt will purchase over the counter meter and supplies at [facility] or alternate as will be more cost effective.      Educational topics covered today:  GDM diagnosis, pathophysiology, Risks and Complications of GDM, Means of controlling GDM, Using a Blood Glucose Monitor, Blood Glucose Goals, Logging and Interpreting Glucose Results, When to Call a Diabetes Educator or OB Provider, Healthy Eating During Pregnancy, Counting Carbohydrates, Meal Planning for GDM, and Physical Activity    Educational materials provided today:   Taking Care of Gestational Diabetes  My Food Plan for Gestational Diabetes  Glucose log sheet     Pt verbalized understanding of concepts discussed and recommendations provided today.     PLAN:  Check glucose 4 times daily, before breakfast and 1 hour after each meal.     Physical activity recommended: As able as long as no restrictions.    Meal plan: 45 carbs at breakfast, 45 carbs at lunch, 45 carbs at supper, 15-30 carbs at 3 snacks a day.  Follow consistent CHO meal plan, eat CHO and protein/fat at all meals/snacks.    Call/e-mail/MyChart message diabetes educator or provider if 3 or more blood sugars are above the goal.    Time Spent: 20 minutes  Encounter Type: Individual    Any diabetes medication dose changes were made via the CDE Protocol and Collaborative Practice Agreement with the patient's OB/GYN provider. A copy of this encounter was shared with the provider.      Sincerely,        Sol Garza RD

## 2023-06-13 ENCOUNTER — HOSPITAL ENCOUNTER (OUTPATIENT)
Dept: ULTRASOUND IMAGING | Facility: HOSPITAL | Age: 38
Discharge: HOME | End: 2023-06-13
Attending: OBSTETRICS & GYNECOLOGY | Admitting: OBSTETRICS & GYNECOLOGY

## 2023-06-13 DIAGNOSIS — O09.523 MULTIGRAVIDA OF ADVANCED MATERNAL AGE IN THIRD TRIMESTER: ICD-10-CM

## 2023-06-13 PROCEDURE — 76816 OB US FOLLOW-UP PER FETUS: CPT

## 2023-06-14 DIAGNOSIS — O09.523 MULTIGRAVIDA OF ADVANCED MATERNAL AGE IN THIRD TRIMESTER: Primary | ICD-10-CM

## 2023-06-20 ENCOUNTER — PRENATAL OFFICE VISIT (OUTPATIENT)
Dept: OBGYN | Facility: OTHER | Age: 38
End: 2023-06-20
Attending: OBSTETRICS & GYNECOLOGY

## 2023-06-20 VITALS
DIASTOLIC BLOOD PRESSURE: 81 MMHG | OXYGEN SATURATION: 99 % | SYSTOLIC BLOOD PRESSURE: 131 MMHG | HEART RATE: 90 BPM | BODY MASS INDEX: 26.81 KG/M2 | WEIGHT: 133 LBS | HEIGHT: 59 IN

## 2023-06-20 DIAGNOSIS — O09.523 MULTIGRAVIDA OF ADVANCED MATERNAL AGE IN THIRD TRIMESTER: ICD-10-CM

## 2023-06-20 DIAGNOSIS — Z87.59 HISTORY OF PRE-ECLAMPSIA: Primary | ICD-10-CM

## 2023-06-20 LAB
ALBUMIN UR-MCNC: NEGATIVE MG/DL
APPEARANCE UR: CLEAR
BILIRUB UR QL STRIP: NEGATIVE
COLOR UR AUTO: NORMAL
GLUCOSE UR STRIP-MCNC: NEGATIVE MG/DL
HGB UR QL STRIP: NEGATIVE
KETONES UR STRIP-MCNC: NEGATIVE MG/DL
LEUKOCYTE ESTERASE UR QL STRIP: NEGATIVE
NITRATE UR QL: NEGATIVE
PH UR STRIP: 7 [PH] (ref 4.7–8)
SP GR UR STRIP: 1.01 (ref 1–1.03)
UROBILINOGEN UR STRIP-MCNC: NORMAL MG/DL

## 2023-06-20 PROCEDURE — 99207 PR PRENATAL VISIT: CPT | Performed by: OBSTETRICS & GYNECOLOGY

## 2023-06-20 PROCEDURE — 81003 URINALYSIS AUTO W/O SCOPE: CPT | Performed by: OBSTETRICS & GYNECOLOGY

## 2023-06-20 ASSESSMENT — PAIN SCALES - GENERAL: PAINLEVEL: NO PAIN (0)

## 2023-06-20 NOTE — PROGRESS NOTES
Doing well.  No concerns today.  Some BH ctx with activity.  Discussed kick counts and fetal movement.  UA neg protein  F/up US growth scheduled  RTC in 2 weeks  Denies PTL sx, vb, lof  Scout Grimaldo MD  6/20/2023

## 2023-07-05 ENCOUNTER — PRENATAL OFFICE VISIT (OUTPATIENT)
Dept: OBGYN | Facility: OTHER | Age: 38
End: 2023-07-05
Attending: OBSTETRICS & GYNECOLOGY

## 2023-07-05 ENCOUNTER — HOSPITAL ENCOUNTER (OUTPATIENT)
Facility: HOSPITAL | Age: 38
Discharge: HOME | End: 2023-07-05
Attending: OBSTETRICS & GYNECOLOGY | Admitting: OBSTETRICS & GYNECOLOGY

## 2023-07-05 ENCOUNTER — HOSPITAL ENCOUNTER (OUTPATIENT)
Dept: ULTRASOUND IMAGING | Facility: HOSPITAL | Age: 38
Discharge: HOME | End: 2023-07-05
Attending: OBSTETRICS & GYNECOLOGY

## 2023-07-05 ENCOUNTER — LAB (OUTPATIENT)
Dept: LAB | Facility: OTHER | Age: 38
End: 2023-07-05
Attending: OBSTETRICS & GYNECOLOGY

## 2023-07-05 ENCOUNTER — APPOINTMENT (OUTPATIENT)
Dept: ULTRASOUND IMAGING | Facility: HOSPITAL | Age: 38
End: 2023-07-05
Attending: OBSTETRICS & GYNECOLOGY

## 2023-07-05 VITALS
SYSTOLIC BLOOD PRESSURE: 138 MMHG | RESPIRATION RATE: 16 BRPM | HEIGHT: 59 IN | DIASTOLIC BLOOD PRESSURE: 81 MMHG | OXYGEN SATURATION: 100 % | TEMPERATURE: 98.2 F | BODY MASS INDEX: 27.01 KG/M2 | WEIGHT: 134 LBS | HEART RATE: 91 BPM

## 2023-07-05 VITALS
HEART RATE: 70 BPM | OXYGEN SATURATION: 99 % | WEIGHT: 134.9 LBS | BODY MASS INDEX: 27.25 KG/M2 | DIASTOLIC BLOOD PRESSURE: 60 MMHG | SYSTOLIC BLOOD PRESSURE: 100 MMHG

## 2023-07-05 DIAGNOSIS — O09.523 MULTIGRAVIDA OF ADVANCED MATERNAL AGE IN THIRD TRIMESTER: ICD-10-CM

## 2023-07-05 DIAGNOSIS — Z98.891 HISTORY OF CESAREAN DELIVERY: Primary | ICD-10-CM

## 2023-07-05 DIAGNOSIS — O36.5990 IUGR (INTRAUTERINE GROWTH RESTRICTION) AFFECTING CARE OF MOTHER: Primary | ICD-10-CM

## 2023-07-05 DIAGNOSIS — Z87.59 HISTORY OF PRE-ECLAMPSIA: ICD-10-CM

## 2023-07-05 DIAGNOSIS — O13.3 GESTATIONAL HYPERTENSION, THIRD TRIMESTER: Primary | ICD-10-CM

## 2023-07-05 LAB
ALBUMIN MFR UR ELPH: 5.5 MG/DL
ALBUMIN SERPL BCG-MCNC: 3.4 G/DL (ref 3.5–5.2)
ALBUMIN UR-MCNC: NEGATIVE MG/DL
ALP SERPL-CCNC: 140 U/L (ref 35–104)
ALT SERPL W P-5'-P-CCNC: 17 U/L (ref 0–50)
ANION GAP SERPL CALCULATED.3IONS-SCNC: 13 MMOL/L (ref 7–15)
APPEARANCE UR: CLEAR
AST SERPL W P-5'-P-CCNC: 25 U/L (ref 0–45)
BILIRUB SERPL-MCNC: <0.2 MG/DL
BILIRUB UR QL STRIP: NEGATIVE
BUN SERPL-MCNC: 7.9 MG/DL (ref 6–20)
CALCIUM SERPL-MCNC: 9.5 MG/DL (ref 8.6–10)
CHLORIDE SERPL-SCNC: 102 MMOL/L (ref 98–107)
COLOR UR AUTO: NORMAL
CREAT SERPL-MCNC: 0.65 MG/DL (ref 0.51–0.95)
CREAT UR-MCNC: 43.4 MG/DL
DEPRECATED HCO3 PLAS-SCNC: 21 MMOL/L (ref 22–29)
ERYTHROCYTE [DISTWIDTH] IN BLOOD BY AUTOMATED COUNT: 12.7 % (ref 10–15)
GFR SERPL CREATININE-BSD FRML MDRD: >90 ML/MIN/1.73M2
GLUCOSE SERPL-MCNC: 129 MG/DL (ref 70–99)
GLUCOSE UR STRIP-MCNC: NEGATIVE MG/DL
HCT VFR BLD AUTO: 35.6 % (ref 35–47)
HGB BLD-MCNC: 11.8 G/DL (ref 11.7–15.7)
HGB UR QL STRIP: NEGATIVE
HOLD SPECIMEN: NORMAL
HOLD SPECIMEN: NORMAL
KETONES UR STRIP-MCNC: NEGATIVE MG/DL
LEUKOCYTE ESTERASE UR QL STRIP: NEGATIVE
MCH RBC QN AUTO: 31.6 PG (ref 26.5–33)
MCHC RBC AUTO-ENTMCNC: 33.1 G/DL (ref 31.5–36.5)
MCV RBC AUTO: 95 FL (ref 78–100)
NITRATE UR QL: NEGATIVE
PH UR STRIP: 7 [PH] (ref 4.7–8)
PLATELET # BLD AUTO: 212 10E3/UL (ref 150–450)
POTASSIUM SERPL-SCNC: 3.8 MMOL/L (ref 3.4–5.3)
PROT SERPL-MCNC: 6.5 G/DL (ref 6.4–8.3)
PROT/CREAT 24H UR: 0.13 MG/MG CR (ref 0–0.2)
RBC # BLD AUTO: 3.74 10E6/UL (ref 3.8–5.2)
SODIUM SERPL-SCNC: 136 MMOL/L (ref 136–145)
SP GR UR STRIP: 1.01 (ref 1–1.03)
URATE SERPL-MCNC: 3.9 MG/DL (ref 2.4–5.7)
UROBILINOGEN UR STRIP-MCNC: NORMAL MG/DL
WBC # BLD AUTO: 11 10E3/UL (ref 4–11)

## 2023-07-05 PROCEDURE — 81003 URINALYSIS AUTO W/O SCOPE: CPT

## 2023-07-05 PROCEDURE — 82374 ASSAY BLOOD CARBON DIOXIDE: CPT | Performed by: OBSTETRICS & GYNECOLOGY

## 2023-07-05 PROCEDURE — 99214 OFFICE O/P EST MOD 30 MIN: CPT | Performed by: OBSTETRICS & GYNECOLOGY

## 2023-07-05 PROCEDURE — 76820 UMBILICAL ARTERY ECHO: CPT

## 2023-07-05 PROCEDURE — 36415 COLL VENOUS BLD VENIPUNCTURE: CPT | Performed by: OBSTETRICS & GYNECOLOGY

## 2023-07-05 PROCEDURE — 59025 FETAL NON-STRESS TEST: CPT

## 2023-07-05 PROCEDURE — 99207 PR COMPLICATED OB VISIT: CPT | Performed by: OBSTETRICS & GYNECOLOGY

## 2023-07-05 PROCEDURE — 76816 OB US FOLLOW-UP PER FETUS: CPT

## 2023-07-05 PROCEDURE — 59025 FETAL NON-STRESS TEST: CPT | Mod: 26 | Performed by: OBSTETRICS & GYNECOLOGY

## 2023-07-05 PROCEDURE — 84550 ASSAY OF BLOOD/URIC ACID: CPT | Performed by: OBSTETRICS & GYNECOLOGY

## 2023-07-05 PROCEDURE — 84156 ASSAY OF PROTEIN URINE: CPT | Performed by: OBSTETRICS & GYNECOLOGY

## 2023-07-05 PROCEDURE — 76819 FETAL BIOPHYS PROFIL W/O NST: CPT

## 2023-07-05 PROCEDURE — 85018 HEMOGLOBIN: CPT | Performed by: OBSTETRICS & GYNECOLOGY

## 2023-07-05 ASSESSMENT — ACTIVITIES OF DAILY LIVING (ADL)
ADLS_ACUITY_SCORE: 31

## 2023-07-05 ASSESSMENT — PAIN SCALES - GENERAL: PAINLEVEL: NO PAIN (0)

## 2023-07-05 NOTE — PROGRESS NOTES
SUBJECTIVE  Sarah Ugalde is a 38 year old  with No LMP recorded. Patient is pregnant.. Estimated Date of Delivery: Aug 13, 2023. Gestational age is 34w3d. She presents for follow up OB visit.    She is doing well. She feels good fetal movement. She denies leaking of fluid or vaginal bleeding. No cramping, contractions, or pelvic pressure. She denies abnormal vaginal discharge, difficulty urinating, fever or chills. No headache, vision changes, lower extremity swelling, upper abdominal pain, chest pain, or shortness of breath.  She denies depression symptoms.    OBJECTIVE  /60   Pulse 70   Wt 61.2 kg (134 lb 14.4 oz)   SpO2 99%   BMI 27.25 kg/m      General:  Well-developed well-nourished gravid female in no apparent distress. Alert and oriented x3.  Abdomen: Soft, gravid, non tender, positive bowel sounds. Fundal height at 34 cm. Fetal heart tones auscultated at 134.  Presentation noted to be cephalic by Leopold. Pfannenstiel incision is intact and well healed.  Cervix: deferred  Extremities:  No clubbing, cyanosis, or edema. Nontender bilaterally.    DIAGNOSTICS  2023 OB ultrasound pending    ASSESSMENT / PLAN  1 Intrauterine pregnancy at 34w3d.  2 Advanced maternal age  3 History of prior  section  4 Desires elective sterilization  5 History of Delaney-Parkinson-White syndrome  6 History of severe preeclampsia, help syndrome, TTP, acute renal failure     - Problem list reviewed and updated.  - Pregnancy weight gain has been 5.942 kg (13 lb 1.6 oz) to date, which is adequate.  - Prenatal labs reviewed.  - OB ultrasound for fetal growth and umbilical artery Doppler pending today.  - I recommend weekly BPP starting at 36 weeks. Ordered  -The patient scheduled for repeat  section with salpingectomy on 2023.  - Continue aspirin 81 mg daily.  - Normal baseline labs.  - I reviewed routine obstetrical precautions, recommendations and instructions with the patient  including fetal movement and kick count instructions.  - I reviewed depression precautions.  - I reviewed pre-eclampsia precautions with the patient with instructions for the patient to come in for persistent headache unrelieved with Tylenol, blurry vision, right upper quadrant pain, shortness of breath, or significant persistent edema.  - I reviewed  labor precautions with the patient with instructions for the patient to come in for decreased fetal movement, suspected rupture of membranes, regular contraction pattern, vaginal bleeding or any other concerning symptoms.  - Follow up in 1 weeks.    Naseem Cruz MD  Obstetrics and Gynecology

## 2023-07-05 NOTE — PROGRESS NOTES
[facility] Labor and Delivery History and Physical    Sarah Ugalde MRN# [id number]   Age: 38 year old YOB: 1985     Date of Admission:  2023    Primary care provider: No Ref-Primary, Physician           Chief Complaint:   Sarah Ugalde is a 38 year old female who is 34w3d pregnant and being evaluated after fining IUGR (4%) on US today.  + FM.  Neg PIH sx.  BP's noted to be elevated on admit.   BS nml.  See prior clinic notes of Dr. Cruz today.    OB/Prenatal records otherwise reviewed and unchanged.        Pregnancy history:     OBSTETRIC HISTORY:    OB History    Para Term  AB Living   3 1 0 1 1 1   SAB IAB Ectopic Multiple Live Births   1 0 0 0 1      # Outcome Date GA Lbr Brad/2nd Weight Sex Delivery Anes PTL Lv   3 Current            2 2020           1  06/23/15 36w0d  2.325 kg (5 lb 2 oz) M CS-LTranv  Y EVE      Birth Comments: Severe preeclampsia with HELLP synbdrome and acute renal failure then developed postpartum Thrombotic thrombocytopenic purpura and Hemolytic uremic syndrome      Complications: Preeclampsia, severe, HELLP syndrome      Obstetric Comments   2015 36 wk- Severe preeclampsia with HELLP synbdrome and acute renal failure then developed postpartum Thrombotic thrombocytopenic purpura (TTP) and Hemolytic uremic syndrome (HUS). Patient also developed bilateral detached retinas.       EDC: Estimated Date of Delivery: Aug 13, 2023    Prenatal Labs:   Lab Results   Component Value Date    AS Negative 2023    HEPBANG Nonreactive 2022    HGB 11.8 2023       GBS Status:   No results found for: GBS    Active Problem List  Patient Active Problem List   Diagnosis     WPW (Delaney-Parkinson-White syndrome)     MS (multiple sclerosis) (H)     Multigravida of advanced maternal age in third trimester     Encounter for triage in pregnant patient     TTP (thrombotic thrombocytopenic purpura) (H)     HUS (hemolytic uremic  syndrome) (H)     HELLP (hemolytic anemia/elev liver enzymes/low platelets in pregnancy)     History of  delivery     Detached retina, bilateral       Medication Prior to Admission  Medications Prior to Admission   Medication Sig Dispense Refill Last Dose     aspirin (ASA) 81 MG chewable tablet Take 81 mg by mouth daily   2023 at 0700     PRENATAL VIT-DOCUSATE-IRON-FA PO    2023 at 0700   .        Maternal Past Medical History:     Past Medical History:   Diagnosis Date     Detached retina, bilateral 2015    secondary to severe preeclampsia with HELLP syndroma and acute renal failure     HELLP (hemolytic anemia/elev liver enzymes/low platelets in pregnancy) 2015    Severe preeclampsia with HELLP syndrome and acute renal failure     HUS (hemolytic uremic syndrome) (H) 2015    Postpartum Thrombotic thrombocytopenic purpura (TTP) and 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     MS (multiple sclerosis) (H) 2015     TTP (thrombotic thrombocytopenic purpura) (H) 2015    Postpartum Thrombotic thrombocytopenic purpura (TTP) and 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     WPW (Delaney-Parkinson-White syndrome) 2015    She has been evaluated for ablation, however, based on location this was not deemed safe by cardiology at the time                       Family History:   Unchanged from prenatal record            Social History:   Unchanged from prenatal record         Review of Systems:   Per HPI.  Other systems reviewed and negative         Physical Exam:     Vitals:    23 1145 23 1200 23 1215 23 1231   BP: 140/89 133/89 134/90 138/81   BP Location:       Patient Position:       Cuff Size:       Pulse:       Resp:       Temp:       TempSrc:       SpO2:       Weight:       Height:           General:  Alert and oriented  Abdomen soft, non-tender, gravid  Ext: neg    Presentation:Cephalic  Fetal Heart Rate Tracing:  reactive and reassuring, Tier 1 (normal)  Tocometer: external monitor and irregular mild ctx not felt by pt.    PIH labs Nml  BPP 8/8                    Assessment:   Sarah Ugalde is a 34w3d pregnant female with suspected IUGR, gestational HTN, GDM (Diet).  Reassuring fetal assessment.        Plan:   Discussed with CEM Haynes.  Recommendations:  Weekly appt/labs.   Home BP monitoring.  Weekly umbilical artery dopplers. F/u growth US 2-3 weeks.  Transfer/deliver if non-reassuring fetal testing, absent or reverse diastolic flow, evidence of PIH with severe features. Recommends not initiated steroids at this time.  Discharged to home with f/u scheduled, home BP monitoring/parameters.  FKC discussed.   Return Friday for NST, BP check, UA.  Has appt and BPP scheduled next Tuesday. PIH precautions discussed.     Scout Grimaldo MD

## 2023-07-05 NOTE — PLAN OF CARE
Order obtained for at home blood pressure machine. Appropriate paper completed and pt given blood pressure machine. Staff placed batteries in machine and patient educated on how to correctly take blood pressure at home. Pt able to perform return demonstration of obtaining blood pressure for staff. Required education attached to patients's AVS and reviewed with pt. Pt educated on  symptoms and blood pressure ranges for at home. Reviewed when to call provider for appointment and when it is a medical emergency and pt should call 9-1-1. Pt will [facility] on Friday, July 7th for an NST and serial BPs. Pt states understanding of education and denies any further questions or concerns.          
Sarah ATKINSON [facility]        NST:  reactive  Start: 1053  Stop: 1113    Physician: Dr. Cruz  Reason For Test: Confirmed Interuterine Growth Retardation  Estimated Date of Delivery: Aug 13, 2023   Gestational Age: 34w3d     Verified with second RN: MICHELLE Good and MICHELLE Baldwin    Pt BP is elevated, MD notified, lab orders received.      Sharla Jacobs RN        
yes

## 2023-07-05 NOTE — DISCHARGE INSTRUCTIONS
Please return to the [facility] unit on Friday, July 7th for a fetal non-stress test and BP monitoring.    Discharge Instructions for Undelivered Patients    Diet:  * Drink 8 to 12 glasses of liquids (milk, juice, water) every day  * You may eat meals and snacks.    Activity:  * Count fetal kicks every day.  * Call your doctor if your baby is moving less than usual.    Call your provider if you notice:  * Swelling in your face or increased swelling in your hands or legs.  * Headaches that are not relieved by Tylenol (acetaminophen).  * Changes in your vision (blurring; seeing spots or stars).  * Nausea (sick to your stomach) and vomiting (throwing up).  * Weight gain of 5 pounds per week.  * Heartburn that doesn't go away.  * Signs of bladder infection: Pain when you urinate (use the toilet), needing to go more often or more urgently.  * The bag of donis (membrane) breaks, or you notice leaking in your underwear.  * Bright red blood in your underwear.  * Abdominal (lower belly) or stomach pain.  * For first baby: Contractions (tightenings) less than 5 minutes apart for one hour or more.  * Second (plus) baby: Contractions (tightenings) less than 10 minutes apart and getting stronger.  * Increase or change in vaginal discharge (note the color and amount).      [facility]: [phone]

## 2023-07-07 ENCOUNTER — ALLIED HEALTH/NURSE VISIT (OUTPATIENT)
Dept: OBGYN | Facility: OTHER | Age: 38
End: 2023-07-07
Attending: OBSTETRICS & GYNECOLOGY

## 2023-07-07 VITALS — DIASTOLIC BLOOD PRESSURE: 95 MMHG | SYSTOLIC BLOOD PRESSURE: 130 MMHG | OXYGEN SATURATION: 99 % | HEART RATE: 93 BPM

## 2023-07-07 DIAGNOSIS — O36.5931 POOR FETAL GROWTH AFFECTING MANAGEMENT OF MOTHER IN THIRD TRIMESTER, FETUS 1 OF MULTIPLE GESTATION: Primary | ICD-10-CM

## 2023-07-07 LAB
ALBUMIN UR-MCNC: 10 MG/DL
APPEARANCE UR: CLEAR
BILIRUB UR QL STRIP: NEGATIVE
COLOR UR AUTO: ABNORMAL
GLUCOSE UR STRIP-MCNC: 200 MG/DL
HGB UR QL STRIP: NEGATIVE
KETONES UR STRIP-MCNC: NEGATIVE MG/DL
LEUKOCYTE ESTERASE UR QL STRIP: NEGATIVE
MUCOUS THREADS #/AREA URNS LPF: PRESENT /LPF
NITRATE UR QL: NEGATIVE
PH UR STRIP: 7 [PH] (ref 4.7–8)
RBC URINE: 1 /HPF
SP GR UR STRIP: 1.03 (ref 1–1.03)
SQUAMOUS EPITHELIAL: 3 /HPF
UROBILINOGEN UR STRIP-MCNC: NORMAL MG/DL
WBC URINE: 1 /HPF

## 2023-07-07 PROCEDURE — 81001 URINALYSIS AUTO W/SCOPE: CPT

## 2023-07-07 ASSESSMENT — PAIN SCALES - GENERAL: PAINLEVEL: NO PAIN (0)

## 2023-07-07 NOTE — PROGRESS NOTES
Sarah ATKINSON [facility]        NST:  reactive  Start: 1326  Stop: 1435    Physician: Kristin Rider  Reason For Test: IUGR  Estimated Date of Delivery: Aug 13, 2023   Gestational Age: 34w5d     Verified with Dr. Cruz     Comments:  Patient here for NST, B/P check, UA. Patient does report a slight headache but no swelling, vision changes, epigastric pain.  Patient first B/P was 149/85.  Second B/P 160/90, third B/P 130/95.      Per Dr. Cruz patient is okay to discharge home.      Molly Ovalle RN

## 2023-07-11 ENCOUNTER — HOSPITAL ENCOUNTER (OUTPATIENT)
Dept: ULTRASOUND IMAGING | Facility: HOSPITAL | Age: 38
Discharge: HOME | End: 2023-07-11
Attending: OBSTETRICS & GYNECOLOGY

## 2023-07-11 ENCOUNTER — LAB (OUTPATIENT)
Dept: LAB | Facility: OTHER | Age: 38
End: 2023-07-11

## 2023-07-11 ENCOUNTER — PRENATAL OFFICE VISIT (OUTPATIENT)
Dept: OBGYN | Facility: OTHER | Age: 38
End: 2023-07-11
Attending: OBSTETRICS & GYNECOLOGY

## 2023-07-11 VITALS
SYSTOLIC BLOOD PRESSURE: 142 MMHG | OXYGEN SATURATION: 97 % | HEART RATE: 92 BPM | HEIGHT: 59 IN | DIASTOLIC BLOOD PRESSURE: 98 MMHG | BODY MASS INDEX: 27.42 KG/M2 | WEIGHT: 136 LBS

## 2023-07-11 DIAGNOSIS — O36.5990 IUGR (INTRAUTERINE GROWTH RESTRICTION) AFFECTING CARE OF MOTHER: ICD-10-CM

## 2023-07-11 DIAGNOSIS — O09.523 MULTIGRAVIDA OF ADVANCED MATERNAL AGE IN THIRD TRIMESTER: ICD-10-CM

## 2023-07-11 DIAGNOSIS — Z87.59 HISTORY OF PRE-ECLAMPSIA: ICD-10-CM

## 2023-07-11 DIAGNOSIS — O13.3 GESTATIONAL HYPERTENSION, THIRD TRIMESTER: ICD-10-CM

## 2023-07-11 DIAGNOSIS — O36.5931 POOR FETAL GROWTH AFFECTING MANAGEMENT OF MOTHER IN THIRD TRIMESTER, FETUS 1 OF MULTIPLE GESTATION: Primary | ICD-10-CM

## 2023-07-11 LAB
ALBUMIN SERPL BCG-MCNC: 3.6 G/DL (ref 3.5–5.2)
ALBUMIN UR-MCNC: NEGATIVE MG/DL
ALP SERPL-CCNC: 154 U/L (ref 35–104)
ALT SERPL W P-5'-P-CCNC: 22 U/L (ref 0–50)
ANION GAP SERPL CALCULATED.3IONS-SCNC: 12 MMOL/L (ref 7–15)
APPEARANCE UR: CLEAR
AST SERPL W P-5'-P-CCNC: 31 U/L (ref 0–45)
BILIRUB SERPL-MCNC: <0.2 MG/DL
BILIRUB UR QL STRIP: NEGATIVE
BUN SERPL-MCNC: 7.7 MG/DL (ref 6–20)
CALCIUM SERPL-MCNC: 9.3 MG/DL (ref 8.6–10)
CHLORIDE SERPL-SCNC: 105 MMOL/L (ref 98–107)
COLOR UR AUTO: ABNORMAL
CREAT SERPL-MCNC: 0.67 MG/DL (ref 0.51–0.95)
DEPRECATED HCO3 PLAS-SCNC: 21 MMOL/L (ref 22–29)
ERYTHROCYTE [DISTWIDTH] IN BLOOD BY AUTOMATED COUNT: 12.7 % (ref 10–15)
GFR SERPL CREATININE-BSD FRML MDRD: >90 ML/MIN/1.73M2
GLUCOSE SERPL-MCNC: 90 MG/DL (ref 70–99)
GLUCOSE UR STRIP-MCNC: 70 MG/DL
HCT VFR BLD AUTO: 34.9 % (ref 35–47)
HGB BLD-MCNC: 11.4 G/DL (ref 11.7–15.7)
HGB UR QL STRIP: NEGATIVE
KETONES UR STRIP-MCNC: NEGATIVE MG/DL
LEUKOCYTE ESTERASE UR QL STRIP: NEGATIVE
MCH RBC QN AUTO: 31.3 PG (ref 26.5–33)
MCHC RBC AUTO-ENTMCNC: 32.7 G/DL (ref 31.5–36.5)
MCV RBC AUTO: 96 FL (ref 78–100)
NITRATE UR QL: NEGATIVE
PH UR STRIP: 7 [PH] (ref 4.7–8)
PLATELET # BLD AUTO: 218 10E3/UL (ref 150–450)
POTASSIUM SERPL-SCNC: 3.5 MMOL/L (ref 3.4–5.3)
PROT SERPL-MCNC: 6.8 G/DL (ref 6.4–8.3)
RBC # BLD AUTO: 3.64 10E6/UL (ref 3.8–5.2)
SODIUM SERPL-SCNC: 138 MMOL/L (ref 136–145)
SP GR UR STRIP: 1.02 (ref 1–1.03)
UROBILINOGEN UR STRIP-MCNC: NORMAL MG/DL
WBC # BLD AUTO: 10.6 10E3/UL (ref 4–11)

## 2023-07-11 PROCEDURE — 76820 UMBILICAL ARTERY ECHO: CPT

## 2023-07-11 PROCEDURE — 76819 FETAL BIOPHYS PROFIL W/O NST: CPT

## 2023-07-11 PROCEDURE — 99207 PR COMPLICATED OB VISIT: CPT | Performed by: OBSTETRICS & GYNECOLOGY

## 2023-07-11 PROCEDURE — 85027 COMPLETE CBC AUTOMATED: CPT | Performed by: OBSTETRICS & GYNECOLOGY

## 2023-07-11 PROCEDURE — 36415 COLL VENOUS BLD VENIPUNCTURE: CPT | Performed by: OBSTETRICS & GYNECOLOGY

## 2023-07-11 PROCEDURE — 81003 URINALYSIS AUTO W/O SCOPE: CPT

## 2023-07-11 PROCEDURE — 80053 COMPREHEN METABOLIC PANEL: CPT | Performed by: OBSTETRICS & GYNECOLOGY

## 2023-07-11 RX ORDER — ACETAMINOPHEN 325 MG/1
650 TABLET ORAL EVERY 4 HOURS PRN
Status: CANCELLED | OUTPATIENT
Start: 2023-07-11

## 2023-07-11 ASSESSMENT — PAIN SCALES - GENERAL: PAINLEVEL: MILD PAIN (2)

## 2023-07-11 NOTE — PROGRESS NOTES
Sarah Ugalde is a 38 year old  with No LMP recorded. Patient is pregnant.. Estimated Date of Delivery: Aug 13, 2023. Gestational age is 35w2d. She presents for follow up OB visit.     She is doing well. She feels good fetal movement. She denies leaking of fluid or vaginal bleeding. No cramping, contractions, or pelvic pressure.  Currently no headache, vision changes, minimal  lower extremity swelling,  chest pain, or shortness of breath.  + upper abdominal pain (right)  -150's/'s at home.  BPP 8/8 today with nml umbilical artery blood flow and fluid.   Reflexes. + 2, no clonus.  Labs:  UA neg protein.  CBC, CMP pending.  A/P) Gestational HTN, IUGR.  Continue with biweekly fetal monitoring, home BP monitoring, weekly PIH labs.  If severe features or non reassuring fetal testing move towards delivery.  Otherwise plan CS delivery at 37 + weeks.

## 2023-07-13 NOTE — PROGRESS NOTES
Name: Sarah Ugalde  Age: 38 year old  YOB: 1985   Medical Record #: [id number]     Fetal Non-Stress Test Results    NST Ordered By: Naseem Cruz MD  NST Medical Indication: Intrauterine growth restriction  Gestational Age: 34w3d       NST Start & Stop Times  NST Start Time: 1053  NST Stop Time: 1113    NST Results  Fetus A   Baseline Rate: 140  Variability: Present  Accelerations: Present  Decelerations: None  Interpretation: reactive and reassuring   Category:1            Naseem Cruz MD  Obstetrics and Gynecology

## 2023-07-14 ENCOUNTER — HOSPITAL ENCOUNTER (OUTPATIENT)
Facility: HOSPITAL | Age: 38
Discharge: HOME | End: 2023-07-14
Attending: OBSTETRICS & GYNECOLOGY | Admitting: OBSTETRICS & GYNECOLOGY

## 2023-07-14 DIAGNOSIS — O36.5931 POOR FETAL GROWTH AFFECTING MANAGEMENT OF MOTHER IN THIRD TRIMESTER, FETUS 1 OF MULTIPLE GESTATION: ICD-10-CM

## 2023-07-14 DIAGNOSIS — O13.3 GESTATIONAL HYPERTENSION, THIRD TRIMESTER: ICD-10-CM

## 2023-07-14 LAB
ALBUMIN MFR UR ELPH: 12.2 MG/DL
CREAT UR-MCNC: 76.1 MG/DL
PROT/CREAT 24H UR: 0.16 MG/MG CR (ref 0–0.2)

## 2023-07-14 PROCEDURE — 59025 FETAL NON-STRESS TEST: CPT

## 2023-07-14 PROCEDURE — 99213 OFFICE O/P EST LOW 20 MIN: CPT | Performed by: OBSTETRICS & GYNECOLOGY

## 2023-07-14 PROCEDURE — 84156 ASSAY OF PROTEIN URINE: CPT | Performed by: OBSTETRICS & GYNECOLOGY

## 2023-07-14 PROCEDURE — 59025 FETAL NON-STRESS TEST: CPT | Mod: 26 | Performed by: OBSTETRICS & GYNECOLOGY

## 2023-07-14 RX ORDER — ACETAMINOPHEN 325 MG/1
650 TABLET ORAL EVERY 4 HOURS PRN
Status: DISCONTINUED | OUTPATIENT
Start: 2023-07-14 | End: 2023-07-14 | Stop reason: HOSPADM

## 2023-07-14 NOTE — PROGRESS NOTES
Labor and Delivery OB Triage Progress Note    Name: Sarah Ugalde    Age: 38 year old    YOB: 1985   Medical Record #: [id number]     Date of Admission: 2023  Admitting Service: Obstetrics and Gynecology  Primary Provider: Joanne Ref-Primary, Physician    DATE: 2023    Sarah Ugalde is a 38 year old  female at 35w5d with Estimated Date of Delivery: Aug 13, 2023         Chief Complaint:   Gestational hypertension  IUGR  History of TTP/preeclampsia/HELLP/acute renal failure           History of Present Illness:   Sarah Ugalde is a 38 year old  female at 35w5d with Estimated Date of Delivery: Aug 13, 2023.     The OB prenatal record was reviewed with the patient. Briefly, this patient's pregnancy is complicated by:      h/o WPW    H/o TTP/preeclampsia/HELLP/acute renal failure    H/o HUS     H/o MS     She complains of nothing. Blood pressures have continued to vary with the established range.         Review of Systems:   As per the HPI. Other systems are reviewed and negative.         Allergies:     Allergies   Allergen Reactions     Magnesium Other (See Comments)     Altered mental status after IV dosing            Medication Prior to Admission:     No medications prior to admission.            Active Problem List:     Patient Active Problem List   Diagnosis     WPW (Delaney-Parkinson-White syndrome)     MS (multiple sclerosis) (H)     Multigravida of advanced maternal age in third trimester     Encounter for triage in pregnant patient     TTP (thrombotic thrombocytopenic purpura) (H)     HUS (hemolytic uremic syndrome) (H)     HELLP (hemolytic anemia/elev liver enzymes/low platelets in pregnancy)     History of  delivery     Detached retina, bilateral     Poor fetal growth affecting management of mother in third trimester, fetus 1 of multiple gestation          Past Medical History:     Past Medical History:   Diagnosis Date     Detached retina,  bilateral 2015    secondary to severe preeclampsia with HELLP syndroma and acute renal failure     HELLP (hemolytic anemia/elev liver enzymes/low platelets in pregnancy) 2015    Severe preeclampsia with HELLP syndrome and acute renal failure     HUS (hemolytic uremic syndrome) (H) 2015    Postpartum Thrombotic thrombocytopenic purpura (TTP) and 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     MS (multiple sclerosis) (H) 2015     TTP (thrombotic thrombocytopenic purpura) (H) 2015    Postpartum Thrombotic thrombocytopenic purpura (TTP) and 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     WPW (Delaney-Parkinson-White syndrome) 2015    She has been evaluated for ablation, however, based on location this was not deemed safe by cardiology at the time          Past Surgical History:     Past Surgical History:   Procedure Laterality Date      SECTION  2015    Severe preeclampsia with HELLP syndrome and acute renal failure then developed postpartum Thrombotic thrombocytopenic purpura (TTP) and Hemolytic uremic syndrome (HUS)            Obstetric History:   EDC: Estimated Date of Delivery: Aug 13, 2023  OB History    Para Term  AB Living   3 1 0 1 1 1   SAB IAB Ectopic Multiple Live Births   1 0 0 0 1      # Outcome Date GA Lbr Brad/2nd Weight Sex Delivery Anes PTL Lv   3 Current            2 SAB            1  06/23/15 36w0d  2.325 kg (5 lb 2 oz) M CS-LTranv  Y EVE      Birth Comments: Severe preeclampsia with HELLP synbdrome and acute renal failure then developed postpartum Thrombotic thrombocytopenic purpura and Hemolytic uremic syndrome      Complications: Preeclampsia, severe, HELLP syndrome      Obstetric Comments    36 wk- Severe preeclampsia with HELLP synbdrome and acute renal failure then developed postpartum Thrombotic thrombocytopenic purpura (TTP) and Hemolytic uremic syndrome (HUS). Patient also developed  bilateral detached retinas.           Family History:   Unchanged from prenatal record.         Social History:     Social History     Tobacco Use     Smoking status: Never     Smokeless tobacco: Never   Substance Use Topics     Alcohol use: Yes     Comment: occa     History   Sexual Activity     Sexual activity: Not on file            Physical Exam:   Vital signs:  /94   Pulse 101   Temp 98.1  F (36.7  C) (Oral)   Resp 16   SpO2 99%     General: Alert and oriented. Well developed and well nourished gravid female.    Fetal Heart Rate Tracing: baseline 120, moderate variability, + accels, no decels  Tocometer: quiet        Diagnostics:   LABS:  Prenatal labs reviewed    Recent Results (from the past 24 hour(s))   Protein  random urine    Collection Time: 23  1:12 PM   Result Value Ref Range    Total Protein Urine mg/dL 12.2   mg/dL    Total Protein UR MG/MG CR 0.16 0.00 - 0.20 mg/mg Cr    Creatinine Urine mg/dL 76.1 mg/dL             Assessment:   1. 38 year old  female at 35w5d with gestational hypertension, IUGR, history of HELLP/TTP/ARF with last pregnancy        Plan:   1. Discharge to home  2. She is coping well as she approaches the end of her pregnancy. She has a good understanding of her prior experience and her current risk status, and will continue to keep a close eye on her blood pressure and symptoms. Expressed support and encouraged her to present for evaluation rapidly and as needed.        Valencia Arguello MD  Obstetrics and Gynecology

## 2023-07-14 NOTE — PLAN OF CARE
Sarah ATKINSON [facility]        NST:  reactive  Start: 1320  Stop: 1355    Physician: Dr. Arguello for Dr. Grimaldo  Reason For Test: rule out preclampsia  Estimated Date of Delivery: Aug 13, 2023   Gestational Age: 35w5d     Verified with second RN: Margarita Duran    Comments:  Pt here for bi-weekly NST, serial BPs and urine Protein Creatinine Ratio. VSS. Urine obtained and sent to lab. Educated on test and placed on monitor. Denies HA, visual disturbance, epigastric pain or swelling. Reflexes +2 bilaterally, clonus absent and generalized trace edema noted. NST reactive. MD on unit - update about pt arrival, hx, VS, urine labs and reactive NST. MD in to see pt - discussed when to return to hospital. Order to recheck BP and then may discharge home given. BP rechecked. AVS reviewed and paperwork completed. States understanding and denies any question or concerns. Pt discharge home ind.      Zita Carlos RN

## 2023-07-16 ENCOUNTER — HOSPITAL ENCOUNTER (OUTPATIENT)
Facility: HOSPITAL | Age: 38
Discharge: HOME | End: 2023-07-16
Attending: OBSTETRICS & GYNECOLOGY | Admitting: OBSTETRICS & GYNECOLOGY

## 2023-07-16 ENCOUNTER — NURSE TRIAGE (OUTPATIENT)
Dept: NURSING | Facility: CLINIC | Age: 38
End: 2023-07-16

## 2023-07-16 VITALS
RESPIRATION RATE: 16 BRPM | TEMPERATURE: 98 F | BODY MASS INDEX: 27.42 KG/M2 | WEIGHT: 136 LBS | SYSTOLIC BLOOD PRESSURE: 135 MMHG | HEIGHT: 59 IN | OXYGEN SATURATION: 99 % | DIASTOLIC BLOOD PRESSURE: 96 MMHG

## 2023-07-16 VITALS
RESPIRATION RATE: 16 BRPM | TEMPERATURE: 98.1 F | HEART RATE: 101 BPM | SYSTOLIC BLOOD PRESSURE: 149 MMHG | DIASTOLIC BLOOD PRESSURE: 98 MMHG | OXYGEN SATURATION: 99 %

## 2023-07-16 LAB
ALBUMIN MFR UR ELPH: <4 MG/DL
ALBUMIN UR-MCNC: NEGATIVE MG/DL
ALT SERPL W P-5'-P-CCNC: 20 U/L (ref 0–50)
APPEARANCE UR: CLEAR
AST SERPL W P-5'-P-CCNC: 28 U/L (ref 0–45)
BASOPHILS # BLD AUTO: 0 10E3/UL (ref 0–0.2)
BASOPHILS NFR BLD AUTO: 0 %
BILIRUB UR QL STRIP: NEGATIVE
COLOR UR AUTO: NORMAL
CREAT SERPL-MCNC: 0.66 MG/DL (ref 0.51–0.95)
CREAT UR-MCNC: 34.6 MG/DL
EOSINOPHIL # BLD AUTO: 0.2 10E3/UL (ref 0–0.7)
EOSINOPHIL NFR BLD AUTO: 2 %
ERYTHROCYTE [DISTWIDTH] IN BLOOD BY AUTOMATED COUNT: 12.5 % (ref 10–15)
GFR SERPL CREATININE-BSD FRML MDRD: >90 ML/MIN/1.73M2
GLUCOSE UR STRIP-MCNC: NEGATIVE MG/DL
HCT VFR BLD AUTO: 34 % (ref 35–47)
HGB BLD-MCNC: 11.5 G/DL (ref 11.7–15.7)
HGB UR QL STRIP: NEGATIVE
IMM GRANULOCYTES # BLD: 0 10E3/UL
IMM GRANULOCYTES NFR BLD: 0 %
KETONES UR STRIP-MCNC: NEGATIVE MG/DL
LDH SERPL L TO P-CCNC: 199 U/L (ref 0–250)
LEUKOCYTE ESTERASE UR QL STRIP: NEGATIVE
LYMPHOCYTES # BLD AUTO: 2.1 10E3/UL (ref 0.8–5.3)
LYMPHOCYTES NFR BLD AUTO: 21 %
MCH RBC QN AUTO: 31.4 PG (ref 26.5–33)
MCHC RBC AUTO-ENTMCNC: 33.8 G/DL (ref 31.5–36.5)
MCV RBC AUTO: 93 FL (ref 78–100)
MONOCYTES # BLD AUTO: 0.7 10E3/UL (ref 0–1.3)
MONOCYTES NFR BLD AUTO: 7 %
NEUTROPHILS # BLD AUTO: 6.9 10E3/UL (ref 1.6–8.3)
NEUTROPHILS NFR BLD AUTO: 70 %
NITRATE UR QL: NEGATIVE
NRBC # BLD AUTO: 0 10E3/UL
NRBC BLD AUTO-RTO: 0 /100
PH UR STRIP: 6.5 [PH] (ref 4.7–8)
PLATELET # BLD AUTO: 217 10E3/UL (ref 150–450)
PROT/CREAT 24H UR: NORMAL MG/G{CREAT}
RBC # BLD AUTO: 3.66 10E6/UL (ref 3.8–5.2)
SP GR UR STRIP: 1.01 (ref 1–1.03)
UROBILINOGEN UR STRIP-MCNC: NORMAL MG/DL
WBC # BLD AUTO: 10 10E3/UL (ref 4–11)

## 2023-07-16 PROCEDURE — 59025 FETAL NON-STRESS TEST: CPT | Mod: 59

## 2023-07-16 PROCEDURE — 82565 ASSAY OF CREATININE: CPT | Performed by: OBSTETRICS & GYNECOLOGY

## 2023-07-16 PROCEDURE — 84450 TRANSFERASE (AST) (SGOT): CPT | Performed by: OBSTETRICS & GYNECOLOGY

## 2023-07-16 PROCEDURE — 59025 FETAL NON-STRESS TEST: CPT

## 2023-07-16 PROCEDURE — 83615 LACTATE (LD) (LDH) ENZYME: CPT | Performed by: OBSTETRICS & GYNECOLOGY

## 2023-07-16 PROCEDURE — 36415 COLL VENOUS BLD VENIPUNCTURE: CPT | Performed by: OBSTETRICS & GYNECOLOGY

## 2023-07-16 PROCEDURE — 81003 URINALYSIS AUTO W/O SCOPE: CPT | Performed by: OBSTETRICS & GYNECOLOGY

## 2023-07-16 PROCEDURE — 99214 OFFICE O/P EST MOD 30 MIN: CPT | Performed by: OBSTETRICS & GYNECOLOGY

## 2023-07-16 PROCEDURE — 84156 ASSAY OF PROTEIN URINE: CPT | Performed by: OBSTETRICS & GYNECOLOGY

## 2023-07-16 PROCEDURE — 84460 ALANINE AMINO (ALT) (SGPT): CPT | Performed by: OBSTETRICS & GYNECOLOGY

## 2023-07-16 PROCEDURE — 85025 COMPLETE CBC W/AUTO DIFF WBC: CPT | Performed by: OBSTETRICS & GYNECOLOGY

## 2023-07-16 RX ORDER — LIDOCAINE 40 MG/G
CREAM TOPICAL
Status: DISCONTINUED | OUTPATIENT
Start: 2023-07-16 | End: 2023-07-16 | Stop reason: HOSPADM

## 2023-07-16 ASSESSMENT — ACTIVITIES OF DAILY LIVING (ADL): ADLS_ACUITY_SCORE: 35

## 2023-07-17 NOTE — PROGRESS NOTES
"Sarah ATKINSON [facility] is a 38 year old  and 36w0d patient came in complaining of Rule Out Pre-eclampsia    Patient Active Problem List   Diagnosis     WPW (Delaney-Parkinson-White syndrome)     MS (multiple sclerosis) (H)     Multigravida of advanced maternal age in third trimester     Encounter for triage in pregnant patient     TTP (thrombotic thrombocytopenic purpura) (H)     HUS (hemolytic uremic syndrome) (H)     HELLP (hemolytic anemia/elev liver enzymes/low platelets in pregnancy)     History of  delivery     Detached retina, bilateral     Poor fetal growth affecting management of mother in third trimester, fetus 1 of multiple gestation       Pt discharged to home at 2138 and encouraged to rest and drink plenty of fluids.  Pt told to call/return if bleeding more than spotting, water breaks, contractions 5 minutes apart that she has to breath through.     Nursing education on discharge instructions provided. Self-management instructions reviewed. AVS given and signed. All questions answered and patient verbalizes understanding.    /93   Temp 98  F (36.7  C) (Oral)   Resp 16   Ht 1.499 m (4' 11\")   Wt 61.7 kg (136 lb)   SpO2 99%   BMI 27.47 kg/m      Cervical status: not examined  Fetal Assessment: Reactive    Discharge support:  Independent-Alone    OB History    Para Term  AB Living   3 1 0 1 1 1   SAB IAB Ectopic Multiple Live Births   1 0 0 0 1      # Outcome Date GA Lbr Brad/2nd Weight Sex Delivery Anes PTL Lv   3 Current            2 SAB            1  06/23/15 36w0d  2.325 kg (5 lb 2 oz) M CS-LTranv  Y EVE      Birth Comments: Severe preeclampsia with HELLP synbdrome and acute renal failure then developed postpartum Thrombotic thrombocytopenic purpura and Hemolytic uremic syndrome      Complications: Preeclampsia, severe, HELLP syndrome      Obstetric Comments   2015 36 wk- Severe preeclampsia with HELLP synbdrome and acute renal failure then developed " postpartum Thrombotic thrombocytopenic purpura (TTP) and Hemolytic uremic syndrome (HUS). Patient also developed bilateral detached retinas.       Osiris Ricardo RN

## 2023-07-17 NOTE — PROGRESS NOTES
OB Triage Note  Sarah Ugalde  MRN: [id number]  Gestational Age: 36w0d      Sarah Ugalde presents for elevated BPs.    Denies contractions.  Denies bleeding or LOF.    Dr. Arguello notified of arrival and condition.  Oriented patient to surroundings. Call light within reach.     FHT: 130  NST Start Time:   NST Stop Time:   NST: Reactive.  Uterine Assessment:Contractions: none    Plan:  -Initial NST, then fetal/uterine monitoring per MD/patient plan.  -Serial BPs, urine, and labs  -Nursing education on plan provided.      Verified with second RN: Yanira Ricardo RN

## 2023-07-17 NOTE — PROVIDER NOTIFICATION
Provider notified of patient status with elevated BPs, visual disturbance in right eye (floater present beginning today), trace edema to right foot with +3 reflex on right patellar, no edema anywhere else and reflex to left patellar +2.  Reactive NST/no contractions.    Telephone orders for protein/creatinine ratio in urine, CBC with diff, ALT, AST, LDH, and Creatinine.

## 2023-07-17 NOTE — PROGRESS NOTES
Labor and Delivery OB Triage Progress Note    Name: Sarah Ugalde    Age: 38 year old    YOB: 1985   Medical Record #: [id number]     Date of Admission: 2023  Admitting Service: Obstetrics and Gynecology  Primary Provider: No Ref-Primary, Physician    DATE: 2023    Sarah Ugalde is a 38 year old  female at 36w0d with Estimated Date of Delivery: Aug 13, 2023         Chief Complaint:   Elevated blood pressure at home, floater in her right eye (resolved), edema         History of Present Illness:   Sarah Ugalde is a 38 year old  female at 36w0d with Estimated Date of Delivery: Aug 13, 2023.     Sarah is known to me from a prior visit.         Review of Systems:   As per the HPI. Other systems are reviewed and negative.         Allergies:     Allergies   Allergen Reactions     Magnesium Other (See Comments)     Altered mental status after IV dosing            Medication Prior to Admission:     No medications prior to admission.            Active Problem List:     Patient Active Problem List   Diagnosis     WPW (Delaney-Parkinson-White syndrome)     MS (multiple sclerosis) (H)     Multigravida of advanced maternal age in third trimester     Encounter for triage in pregnant patient     TTP (thrombotic thrombocytopenic purpura) (H)     HUS (hemolytic uremic syndrome) (H)     HELLP (hemolytic anemia/elev liver enzymes/low platelets in pregnancy)     History of  delivery     Detached retina, bilateral     Poor fetal growth affecting management of mother in third trimester, fetus 1 of multiple gestation          Past Medical History:     Past Medical History:   Diagnosis Date     Detached retina, bilateral 2015    secondary to severe preeclampsia with HELLP syndroma and acute renal failure     HELLP (hemolytic anemia/elev liver enzymes/low platelets in pregnancy) 2015    Severe preeclampsia with HELLP syndrome and acute renal failure     HUS  (hemolytic uremic syndrome) (H) 2015    Postpartum Thrombotic thrombocytopenic purpura (TTP) and 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     MS (multiple sclerosis) (H) 2015     TTP (thrombotic thrombocytopenic purpura) (H) 2015    Postpartum Thrombotic thrombocytopenic purpura (TTP) and 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     WPW (Delaney-Parkinson-White syndrome) 2015    She has been evaluated for ablation, however, based on location this was not deemed safe by cardiology at the time          Past Surgical History:     Past Surgical History:   Procedure Laterality Date      SECTION  2015    Severe preeclampsia with HELLP syndrome and acute renal failure then developed postpartum Thrombotic thrombocytopenic purpura (TTP) and Hemolytic uremic syndrome (HUS)            Obstetric History:   EDC: Estimated Date of Delivery: Aug 13, 2023  OB History    Para Term  AB Living   3 1 0 1 1 1   SAB IAB Ectopic Multiple Live Births   1 0 0 0 1      # Outcome Date GA Lbr Brad/2nd Weight Sex Delivery Anes PTL Lv   3 Current            2 SAB            1  06/23/15 36w0d  2.325 kg (5 lb 2 oz) M CS-LTranv  Y EVE      Birth Comments: Severe preeclampsia with HELLP synbdrome and acute renal failure then developed postpartum Thrombotic thrombocytopenic purpura and Hemolytic uremic syndrome      Complications: Preeclampsia, severe, HELLP syndrome      Obstetric Comments   2015 36 wk- Severe preeclampsia with HELLP synbdrome and acute renal failure then developed postpartum Thrombotic thrombocytopenic purpura (TTP) and Hemolytic uremic syndrome (HUS). Patient also developed bilateral detached retinas.           Family History:   Unchanged from prenatal record.         Social History:     Social History     Tobacco Use     Smoking status: Never     Smokeless tobacco: Never   Substance Use Topics     Alcohol use: Yes     Comment: occa  "    History   Sexual Activity     Sexual activity: Not on file            Physical Exam:   Vital signs:  /96   Temp 98  F (36.7  C) (Oral)   Resp 16   Ht 1.499 m (4' 11\")   Wt 61.7 kg (136 lb)   SpO2 99%   BMI 27.47 kg/m      Fetal Heart Rate Tracing: baseline 130, moderate variability, + accels, no decels  Tocometer: quiet        Diagnostics:   LABS:  Prenatal labs reviewed    Recent Results (from the past 24 hour(s))   Urine Macroscopic with reflex to Microscopic    Collection Time: 07/16/23  7:46 PM   Result Value Ref Range    Color Urine Straw Colorless, Straw, Light Yellow, Yellow    Appearance Urine Clear Clear    Glucose Urine Negative Negative mg/dL    Bilirubin Urine Negative Negative    Ketones Urine Negative Negative mg/dL    Specific Gravity Urine 1.007 1.003 - 1.035    Blood Urine Negative Negative    pH Urine 6.5 4.7 - 8.0    Protein Albumin Urine Negative Negative mg/dL    Urobilinogen Urine Normal Normal, 2.0 mg/dL    Nitrite Urine Negative Negative    Leukocyte Esterase Urine Negative Negative   Protein  random urine    Collection Time: 07/16/23  7:46 PM   Result Value Ref Range    Total Protein Urine mg/dL <4.0   mg/dL    Total Protein UR MG/MG CR      Creatinine Urine mg/dL 34.6 mg/dL   AST    Collection Time: 07/16/23  8:29 PM   Result Value Ref Range    AST 28 0 - 45 U/L   ALT    Collection Time: 07/16/23  8:29 PM   Result Value Ref Range    ALT 20 0 - 50 U/L   Creatinine    Collection Time: 07/16/23  8:29 PM   Result Value Ref Range    Creatinine 0.66 0.51 - 0.95 mg/dL    GFR Estimate >90 >60 mL/min/1.73m2   Lactate Dehydrogenase    Collection Time: 07/16/23  8:29 PM   Result Value Ref Range    Lactate Dehydrogenase 199 0 - 250 U/L   CBC with platelets and differential    Collection Time: 07/16/23  8:29 PM   Result Value Ref Range    WBC Count 10.0 4.0 - 11.0 10e3/uL    RBC Count 3.66 (L) 3.80 - 5.20 10e6/uL    Hemoglobin 11.5 (L) 11.7 - 15.7 g/dL    Hematocrit 34.0 (L) 35.0 - 47.0 " %    MCV 93 78 - 100 fL    MCH 31.4 26.5 - 33.0 pg    MCHC 33.8 31.5 - 36.5 g/dL    RDW 12.5 10.0 - 15.0 %    Platelet Count 217 150 - 450 10e3/uL    % Neutrophils 70 %    % Lymphocytes 21 %    % Monocytes 7 %    % Eosinophils 2 %    % Basophils 0 %    % Immature Granulocytes 0 %    NRBCs per 100 WBC 0 <1 /100    Absolute Neutrophils 6.9 1.6 - 8.3 10e3/uL    Absolute Lymphocytes 2.1 0.8 - 5.3 10e3/uL    Absolute Monocytes 0.7 0.0 - 1.3 10e3/uL    Absolute Eosinophils 0.2 0.0 - 0.7 10e3/uL    Absolute Basophils 0.0 0.0 - 0.2 10e3/uL    Absolute Immature Granulocytes 0.0 <=0.4 10e3/uL    Absolute NRBCs 0.0 10e3/uL     Urine protein/creatinine unable to be calculated due to low protein per lab.          Assessment:   1. 38 year old  female at 36w0d with elevated blood pressures  2. Concerning history of TTP/preeclampsia/HELLP/acute renal failure        Plan:   1. Discharge to home  2. Blood pressures, labs, symptoms, fetal monitoring all reassuring.   3. She is aware of precautions, risks. Encouraged to present with any concerning symptoms or problems. CS scheduled in 1 week.        Valencia Arguello MD  Obstetrics and Gynecology

## 2023-07-17 NOTE — TELEPHONE ENCOUNTER
"OB Triage Call      Is patient's OB/Midwife with the [facility] or [facility]? [facility]- Proceed with triage     Reason for call: 35 w 6 d pregnant. Not feeling well. Nauseated the second half of the day.     Assessment: Swelling more than usual in her hands. BP \"irregular\" (up and down). 155/98, 130/101, 130-102 --last reading 10 min ago.     Plan: Go to L&D    Patient plans to deliver at [place]    Patient's primary OB Provider is DR Scout Grimaldo.      Per protocol recommendations Patient to be evaluated in L&D. Patient's primary OB is [facility].  Labor and delivery at [place] ([phone]) notified of patient's pending arrival.  Report given to Paula MARTINEZ .      Is patient's delivering hospital on divert? No      36w0d    Estimated Date of Delivery: Aug 13, 2023        OB History    Para Term  AB Living   3 1 0 1 1 1   SAB IAB Ectopic Multiple Live Births   1 0 0 0 1      # Outcome Date GA Lbr Brad/2nd Weight Sex Delivery Anes PTL Lv   3 Current            2 SAB            1  06/23/15 36w0d  2.325 kg (5 lb 2 oz) M CS-LTranv  Y EVE      Birth Comments: Severe preeclampsia with HELLP synbdrome and acute renal failure then developed postpartum Thrombotic thrombocytopenic purpura and Hemolytic uremic syndrome      Complications: Preeclampsia, severe, HELLP syndrome      Obstetric Comments    36 wk- Severe preeclampsia with HELLP synbdrome and acute renal failure then developed postpartum Thrombotic thrombocytopenic purpura (TTP) and Hemolytic uremic syndrome (HUS). Patient also developed bilateral detached retinas.       No results found for: GBS       Estrellita Zuniga RN 23 7:14 PM  [facility] Nurse Advisor  Reason for Disposition   [1] Pregnant > 20 weeks (or postpartum < 6 weeks) AND [2] new hand or face swelling   [1] Pregnant > 20 weeks AND [2] BP Systolic BP  >= 140 OR Diastolic >= 90    Additional Information   Negative: Difficult to awaken or acting " confused (e.g., disoriented, slurred speech)   Negative: Severe difficulty breathing (e.g., struggling for each breath, speaks in single words)   Negative: [1] Weakness of the face, arm or leg on one side of the body AND [2] new onset   Negative: [1] Numbness (i.e., loss of sensation) of the face, arm or leg on one side of the body AND [2] new onset   Negative: [1] Chest pain lasts > 5 minutes AND [2] history of heart disease  (i.e., heart attack, bypass surgery, angina, angioplasty, CHF)   Negative: [1] Chest pain AND [2] took nitrogylcerin AND [3] pain was not relieved   Negative: Sounds like a life-threatening emergency to the triager   Negative: Symptom is main concern  (e.g., headache, chest pain)   Negative: Low blood pressure is main concern   Negative: [1] Systolic BP  >= 160 OR Diastolic >= 100 AND [2] cardiac or neurologic symptoms (e.g., chest pain, difficulty breathing, unsteady gait, blurred vision)    Protocols used: High Blood Pressure-A-AH

## 2023-07-17 NOTE — DISCHARGE INSTRUCTIONS
Discharge Instructions for Undelivered Patients    Diet:  * Drink 8 to 12 glasses of liquids (milk, juice, water) every day  * You may eat meals and snacks.    Activity:  * Count fetal kicks every day.  * Call your doctor if your baby is moving less than usual.    Call your provider if you notice:  * Swelling in your face or increased swelling in your hands or legs.  * Headaches that are not relieved by Tylenol (acetaminophen).  * Changes in your vision (blurring; seeing spots or stars).  * Nausea (sick to your stomach) and vomiting (throwing up).  * Weight gain of 5 pounds per week.  * Heartburn that doesn't go away.  * Signs of bladder infection: Pain when you urinate (use the toilet), needing to go more often or more urgently.  * The bag of donis (membrane) breaks, or you notice leaking in your underwear.  * Bright red blood in your underwear.  * Abdominal (lower belly) or stomach pain.  * Second (plus) baby: Contractions (tightenings) less than 10 minutes apart and getting stronger.  * Increase or change in vaginal discharge (note the color and amount).      [facility]: [phone]

## 2023-07-18 ENCOUNTER — PRENATAL OFFICE VISIT (OUTPATIENT)
Dept: OBGYN | Facility: OTHER | Age: 38
End: 2023-07-18
Attending: OBSTETRICS & GYNECOLOGY

## 2023-07-18 ENCOUNTER — HOSPITAL ENCOUNTER (OUTPATIENT)
Dept: ULTRASOUND IMAGING | Facility: HOSPITAL | Age: 38
Discharge: HOME | End: 2023-07-18
Attending: OBSTETRICS & GYNECOLOGY

## 2023-07-18 ENCOUNTER — PREP FOR PROCEDURE (OUTPATIENT)
Dept: OBGYN | Facility: OTHER | Age: 38
End: 2023-07-18

## 2023-07-18 ENCOUNTER — LAB (OUTPATIENT)
Dept: LAB | Facility: OTHER | Age: 38
End: 2023-07-18

## 2023-07-18 VITALS
BODY MASS INDEX: 27.46 KG/M2 | HEIGHT: 59 IN | DIASTOLIC BLOOD PRESSURE: 70 MMHG | RESPIRATION RATE: 14 BRPM | OXYGEN SATURATION: 100 % | WEIGHT: 136.2 LBS | HEART RATE: 86 BPM | SYSTOLIC BLOOD PRESSURE: 122 MMHG

## 2023-07-18 DIAGNOSIS — O36.5910 POOR FETAL GROWTH AFFECTING MANAGEMENT OF MOTHER IN FIRST TRIMESTER, SINGLE OR UNSPECIFIED FETUS: ICD-10-CM

## 2023-07-18 DIAGNOSIS — O09.529 AMA (ADVANCED MATERNAL AGE) MULTIGRAVIDA 35+: Primary | ICD-10-CM

## 2023-07-18 DIAGNOSIS — Z98.891 HX OF CESAREAN SECTION: ICD-10-CM

## 2023-07-18 DIAGNOSIS — O36.5990 IUGR (INTRAUTERINE GROWTH RESTRICTION) AFFECTING CARE OF MOTHER: ICD-10-CM

## 2023-07-18 DIAGNOSIS — Z01.818 PREOP GENERAL PHYSICAL EXAM: ICD-10-CM

## 2023-07-18 DIAGNOSIS — O09.523 MULTIGRAVIDA OF ADVANCED MATERNAL AGE IN THIRD TRIMESTER: ICD-10-CM

## 2023-07-18 DIAGNOSIS — O09.523 MULTIGRAVIDA OF ADVANCED MATERNAL AGE IN THIRD TRIMESTER: Primary | ICD-10-CM

## 2023-07-18 DIAGNOSIS — O09.299 HX OF PRE-ECLAMPSIA IN PRIOR PREGNANCY, CURRENTLY PREGNANT: ICD-10-CM

## 2023-07-18 DIAGNOSIS — Z87.59 HISTORY OF PRE-ECLAMPSIA: ICD-10-CM

## 2023-07-18 DIAGNOSIS — O14.93 PREECLAMPSIA, THIRD TRIMESTER: ICD-10-CM

## 2023-07-18 DIAGNOSIS — Z30.2 ENCOUNTER FOR STERILIZATION: ICD-10-CM

## 2023-07-18 DIAGNOSIS — O14.23 HEMOLYSIS, ELEVATED LIVER ENZYMES, AND LOW PLATELET (HELLP) SYNDROME DURING PREGNANCY IN THIRD TRIMESTER: ICD-10-CM

## 2023-07-18 LAB
ALBUMIN MFR UR ELPH: 8 MG/DL
ALBUMIN SERPL BCG-MCNC: 3.3 G/DL (ref 3.5–5.2)
ALBUMIN UR-MCNC: NEGATIVE MG/DL
ALP SERPL-CCNC: 162 U/L (ref 35–104)
ALT SERPL W P-5'-P-CCNC: 18 U/L (ref 0–50)
ANION GAP SERPL CALCULATED.3IONS-SCNC: 12 MMOL/L (ref 7–15)
APPEARANCE UR: CLEAR
AST SERPL W P-5'-P-CCNC: 24 U/L (ref 0–45)
BILIRUB SERPL-MCNC: <0.2 MG/DL
BILIRUB UR QL STRIP: NEGATIVE
BUN SERPL-MCNC: 11.5 MG/DL (ref 6–20)
CALCIUM SERPL-MCNC: 9.2 MG/DL (ref 8.6–10)
CHLORIDE SERPL-SCNC: 103 MMOL/L (ref 98–107)
COLOR UR AUTO: NORMAL
CREAT SERPL-MCNC: 0.72 MG/DL (ref 0.51–0.95)
CREAT UR-MCNC: 54.5 MG/DL
DEPRECATED HCO3 PLAS-SCNC: 20 MMOL/L (ref 22–29)
ERYTHROCYTE [DISTWIDTH] IN BLOOD BY AUTOMATED COUNT: 12.8 % (ref 10–15)
GFR SERPL CREATININE-BSD FRML MDRD: >90 ML/MIN/1.73M2
GLUCOSE SERPL-MCNC: 92 MG/DL (ref 70–99)
GLUCOSE UR STRIP-MCNC: NEGATIVE MG/DL
HCT VFR BLD AUTO: 36.6 % (ref 35–47)
HGB BLD-MCNC: 11.9 G/DL (ref 11.7–15.7)
HGB UR QL STRIP: NEGATIVE
HOLD SPECIMEN: NORMAL
HOLD SPECIMEN: NORMAL
KETONES UR STRIP-MCNC: NEGATIVE MG/DL
LEUKOCYTE ESTERASE UR QL STRIP: NEGATIVE
MCH RBC QN AUTO: 30.6 PG (ref 26.5–33)
MCHC RBC AUTO-ENTMCNC: 32.5 G/DL (ref 31.5–36.5)
MCV RBC AUTO: 94 FL (ref 78–100)
NITRATE UR QL: NEGATIVE
PH UR STRIP: 6.5 [PH] (ref 4.7–8)
PLATELET # BLD AUTO: 240 10E3/UL (ref 150–450)
POTASSIUM SERPL-SCNC: 3.8 MMOL/L (ref 3.4–5.3)
PROT SERPL-MCNC: 6.4 G/DL (ref 6.4–8.3)
PROT/CREAT 24H UR: 0.15 MG/MG CR (ref 0–0.2)
RBC # BLD AUTO: 3.89 10E6/UL (ref 3.8–5.2)
SODIUM SERPL-SCNC: 135 MMOL/L (ref 136–145)
SP GR UR STRIP: 1.02 (ref 1–1.03)
UROBILINOGEN UR STRIP-MCNC: NORMAL MG/DL
WBC # BLD AUTO: 9.5 10E3/UL (ref 4–11)

## 2023-07-18 PROCEDURE — 76819 FETAL BIOPHYS PROFIL W/O NST: CPT

## 2023-07-18 PROCEDURE — 36415 COLL VENOUS BLD VENIPUNCTURE: CPT

## 2023-07-18 PROCEDURE — 84156 ASSAY OF PROTEIN URINE: CPT

## 2023-07-18 PROCEDURE — 81003 URINALYSIS AUTO W/O SCOPE: CPT

## 2023-07-18 PROCEDURE — 99207 PR COMPLICATED OB VISIT: CPT | Performed by: OBSTETRICS & GYNECOLOGY

## 2023-07-18 PROCEDURE — 76820 UMBILICAL ARTERY ECHO: CPT

## 2023-07-18 PROCEDURE — 80053 COMPREHEN METABOLIC PANEL: CPT

## 2023-07-18 PROCEDURE — 85027 COMPLETE CBC AUTOMATED: CPT

## 2023-07-18 RX ORDER — SODIUM CHLORIDE, SODIUM LACTATE, POTASSIUM CHLORIDE, CALCIUM CHLORIDE 600; 310; 30; 20 MG/100ML; MG/100ML; MG/100ML; MG/100ML
INJECTION, SOLUTION INTRAVENOUS CONTINUOUS
Status: CANCELLED | OUTPATIENT
Start: 2023-07-18

## 2023-07-18 RX ORDER — NALOXONE HYDROCHLORIDE 0.4 MG/ML
0.2 INJECTION, SOLUTION INTRAMUSCULAR; INTRAVENOUS; SUBCUTANEOUS
Status: CANCELLED | OUTPATIENT
Start: 2023-07-18

## 2023-07-18 RX ORDER — ONDANSETRON 2 MG/ML
4 INJECTION INTRAMUSCULAR; INTRAVENOUS EVERY 6 HOURS PRN
Status: CANCELLED | OUTPATIENT
Start: 2023-07-18

## 2023-07-18 RX ORDER — METHYLERGONOVINE MALEATE 0.2 MG/ML
200 INJECTION INTRAVENOUS
Status: CANCELLED | OUTPATIENT
Start: 2023-07-18

## 2023-07-18 RX ORDER — CARBOPROST TROMETHAMINE 250 UG/ML
250 INJECTION, SOLUTION INTRAMUSCULAR
Status: CANCELLED | OUTPATIENT
Start: 2023-07-18

## 2023-07-18 RX ORDER — PROCHLORPERAZINE MALEATE 10 MG
10 TABLET ORAL EVERY 6 HOURS PRN
Status: CANCELLED | OUTPATIENT
Start: 2023-07-18

## 2023-07-18 RX ORDER — LABETALOL 20 MG/4 ML (5 MG/ML) INTRAVENOUS SYRINGE
20-80 EVERY 10 MIN PRN
Status: CANCELLED | OUTPATIENT
Start: 2023-07-18

## 2023-07-18 RX ORDER — NALOXONE HYDROCHLORIDE 0.4 MG/ML
0.4 INJECTION, SOLUTION INTRAMUSCULAR; INTRAVENOUS; SUBCUTANEOUS
Status: CANCELLED | OUTPATIENT
Start: 2023-07-18

## 2023-07-18 RX ORDER — CEFAZOLIN SODIUM/WATER 2 G/20 ML
2 SYRINGE (ML) INTRAVENOUS SEE ADMIN INSTRUCTIONS
Status: CANCELLED | OUTPATIENT
Start: 2023-07-18

## 2023-07-18 RX ORDER — OXYTOCIN 10 [USP'U]/ML
10 INJECTION, SOLUTION INTRAMUSCULAR; INTRAVENOUS
Status: CANCELLED | OUTPATIENT
Start: 2023-07-18

## 2023-07-18 RX ORDER — OXYTOCIN/0.9 % SODIUM CHLORIDE 30/500 ML
100-340 PLASTIC BAG, INJECTION (ML) INTRAVENOUS CONTINUOUS PRN
Status: CANCELLED | OUTPATIENT
Start: 2023-07-18

## 2023-07-18 RX ORDER — CEFAZOLIN SODIUM/WATER 2 G/20 ML
2 SYRINGE (ML) INTRAVENOUS
Status: CANCELLED | OUTPATIENT
Start: 2023-07-18

## 2023-07-18 RX ORDER — METOCLOPRAMIDE HYDROCHLORIDE 5 MG/ML
10 INJECTION INTRAMUSCULAR; INTRAVENOUS EVERY 6 HOURS PRN
Status: CANCELLED | OUTPATIENT
Start: 2023-07-18

## 2023-07-18 RX ORDER — CITRIC ACID/SODIUM CITRATE 334-500MG
30 SOLUTION, ORAL ORAL
Status: CANCELLED | OUTPATIENT
Start: 2023-07-18

## 2023-07-18 RX ORDER — KETOROLAC TROMETHAMINE 30 MG/ML
30 INJECTION, SOLUTION INTRAMUSCULAR; INTRAVENOUS
Status: CANCELLED | OUTPATIENT
Start: 2023-07-18 | End: 2023-07-23

## 2023-07-18 RX ORDER — MISOPROSTOL 200 UG/1
400 TABLET ORAL
Status: CANCELLED | OUTPATIENT
Start: 2023-07-18

## 2023-07-18 RX ORDER — METOCLOPRAMIDE 10 MG/1
10 TABLET ORAL EVERY 6 HOURS PRN
Status: CANCELLED | OUTPATIENT
Start: 2023-07-18

## 2023-07-18 RX ORDER — IBUPROFEN 800 MG/1
800 TABLET, FILM COATED ORAL
Status: CANCELLED | OUTPATIENT
Start: 2023-07-18 | End: 2023-07-23

## 2023-07-18 RX ORDER — OXYTOCIN/0.9 % SODIUM CHLORIDE 30/500 ML
340 PLASTIC BAG, INJECTION (ML) INTRAVENOUS CONTINUOUS PRN
Status: CANCELLED | OUTPATIENT
Start: 2023-07-18

## 2023-07-18 RX ORDER — ONDANSETRON 4 MG/1
4 TABLET, ORALLY DISINTEGRATING ORAL EVERY 6 HOURS PRN
Status: CANCELLED | OUTPATIENT
Start: 2023-07-18

## 2023-07-18 RX ORDER — PROCHLORPERAZINE 25 MG
25 SUPPOSITORY, RECTAL RECTAL EVERY 12 HOURS PRN
Status: CANCELLED | OUTPATIENT
Start: 2023-07-18

## 2023-07-18 RX ORDER — TRANEXAMIC ACID 10 MG/ML
1 INJECTION, SOLUTION INTRAVENOUS EVERY 30 MIN PRN
Status: CANCELLED | OUTPATIENT
Start: 2023-07-18

## 2023-07-18 RX ORDER — LIDOCAINE 40 MG/G
CREAM TOPICAL
Status: CANCELLED | OUTPATIENT
Start: 2023-07-18

## 2023-07-18 RX ORDER — HYDRALAZINE HYDROCHLORIDE 20 MG/ML
10 INJECTION INTRAMUSCULAR; INTRAVENOUS
Status: CANCELLED | OUTPATIENT
Start: 2023-07-18

## 2023-07-18 RX ORDER — TRANEXAMIC ACID 10 MG/ML
1 INJECTION, SOLUTION INTRAVENOUS ONCE
Status: CANCELLED | OUTPATIENT
Start: 2023-07-18 | End: 2023-07-18

## 2023-07-18 RX ORDER — ACETAMINOPHEN 325 MG/1
975 TABLET ORAL ONCE
Status: CANCELLED | OUTPATIENT
Start: 2023-07-18 | End: 2023-07-18

## 2023-07-18 ASSESSMENT — PAIN SCALES - GENERAL: PAINLEVEL: NO PAIN (0)

## 2023-07-18 NOTE — PROGRESS NOTES
Doing well.  Came in to L and D  for elevated BP at home.  See eval.  BP's 130-150's/  PIH labs today done/nml  Please call if persistent HA, blurred vision, or swelling  She will report to OB if contractions every 5-10 minutes or if rupture of membranes.  Denies regular contractions, vaginal bleeding, LOF  US later today for BPP and UAD  Recommend delivery 37 weeks for IUGR/mild preeclampasia/getstational HTN.  Pt agrees with POC and scheduled for next Monday with Dr. Cruz.   Will return Friday for NST/BP check/UA.  Continue serial home BP.   Preop done.   H&P reviewed and unchanged from prenatal record.  Chest clear to auscultation.  CV regular rate and rhythm without murmurs.  Abdomen soft, nontender, gravid.  Extremities negative.  Reveiwed goals, risks, alternatives of a  section/salpingectomy procedures including bleeding, infection, and potential damage to other organs including bowel, bladder, baby, blood vessels and nerves. Hosptial stay and recovery period discussed.  All questions were answered.  Consent form was reviewed and signed.  Scout Grimaldo MD

## 2023-07-19 ENCOUNTER — ANESTHESIA EVENT (OUTPATIENT)
Dept: SURGERY | Facility: HOSPITAL | Age: 38
End: 2023-07-19

## 2023-07-19 RX ORDER — HYDRALAZINE HYDROCHLORIDE 20 MG/ML
2.5-5 INJECTION INTRAMUSCULAR; INTRAVENOUS EVERY 10 MIN PRN
Status: CANCELLED | OUTPATIENT
Start: 2023-07-19

## 2023-07-19 RX ORDER — SODIUM CHLORIDE, SODIUM LACTATE, POTASSIUM CHLORIDE, CALCIUM CHLORIDE 600; 310; 30; 20 MG/100ML; MG/100ML; MG/100ML; MG/100ML
INJECTION, SOLUTION INTRAVENOUS CONTINUOUS
Status: CANCELLED | OUTPATIENT
Start: 2023-07-19

## 2023-07-19 RX ORDER — ONDANSETRON 4 MG/1
4 TABLET, ORALLY DISINTEGRATING ORAL EVERY 30 MIN PRN
Status: CANCELLED | OUTPATIENT
Start: 2023-07-19

## 2023-07-19 RX ORDER — ALBUTEROL SULFATE 0.83 MG/ML
2.5 SOLUTION RESPIRATORY (INHALATION) EVERY 4 HOURS PRN
Status: CANCELLED | OUTPATIENT
Start: 2023-07-19

## 2023-07-19 RX ORDER — LIDOCAINE 40 MG/G
CREAM TOPICAL
Status: CANCELLED | OUTPATIENT
Start: 2023-07-19

## 2023-07-19 RX ORDER — FENTANYL CITRATE 50 UG/ML
25 INJECTION, SOLUTION INTRAMUSCULAR; INTRAVENOUS EVERY 5 MIN PRN
Status: CANCELLED | OUTPATIENT
Start: 2023-07-19

## 2023-07-19 RX ORDER — HYDROMORPHONE HCL IN WATER/PF 6 MG/30 ML
0.4 PATIENT CONTROLLED ANALGESIA SYRINGE INTRAVENOUS EVERY 5 MIN PRN
Status: CANCELLED | OUTPATIENT
Start: 2023-07-19

## 2023-07-19 RX ORDER — HYDROMORPHONE HCL IN WATER/PF 6 MG/30 ML
0.2 PATIENT CONTROLLED ANALGESIA SYRINGE INTRAVENOUS EVERY 5 MIN PRN
Status: CANCELLED | OUTPATIENT
Start: 2023-07-19

## 2023-07-19 RX ORDER — FENTANYL CITRATE 50 UG/ML
50 INJECTION, SOLUTION INTRAMUSCULAR; INTRAVENOUS EVERY 5 MIN PRN
Status: CANCELLED | OUTPATIENT
Start: 2023-07-19

## 2023-07-19 RX ORDER — MEPERIDINE HYDROCHLORIDE 25 MG/ML
12.5 INJECTION INTRAMUSCULAR; INTRAVENOUS; SUBCUTANEOUS EVERY 5 MIN PRN
Status: CANCELLED | OUTPATIENT
Start: 2023-07-19

## 2023-07-19 RX ORDER — ONDANSETRON 2 MG/ML
4 INJECTION INTRAMUSCULAR; INTRAVENOUS EVERY 30 MIN PRN
Status: CANCELLED | OUTPATIENT
Start: 2023-07-19

## 2023-07-19 RX ORDER — LABETALOL 20 MG/4 ML (5 MG/ML) INTRAVENOUS SYRINGE
10
Status: CANCELLED | OUTPATIENT
Start: 2023-07-19

## 2023-07-19 ASSESSMENT — ENCOUNTER SYMPTOMS: DYSRHYTHMIAS: 1

## 2023-07-19 NOTE — ANESTHESIA PREPROCEDURE EVALUATION
Anesthesia Pre-Procedure Evaluation    Patient: Sarah ATKINSON [facility]   MRN: [id number] : 1985        Procedure : Procedure(s):   SECTION, WITH BILATERAL SALPINGECTOMY          Past Medical History:   Diagnosis Date     Detached retina, bilateral 2015    secondary to severe preeclampsia with HELLP syndroma and acute renal failure     HELLP (hemolytic anemia/elev liver enzymes/low platelets in pregnancy) 2015    Severe preeclampsia with HELLP syndrome and acute renal failure     HUS (hemolytic uremic syndrome) (H) 2015    Postpartum Thrombotic thrombocytopenic purpura (TTP) and 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     MS (multiple sclerosis) (H) 2015     TTP (thrombotic thrombocytopenic purpura) (H) 2015    Postpartum Thrombotic thrombocytopenic purpura (TTP) and 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     WPW (Delaney-Parkinson-White syndrome) 2015    She has been evaluated for ablation, however, based on location this was not deemed safe by cardiology at the time      Past Surgical History:   Procedure Laterality Date      SECTION  2015    Severe preeclampsia with HELLP syndrome and acute renal failure then developed postpartum Thrombotic thrombocytopenic purpura (TTP) and Hemolytic uremic syndrome (HUS)      Allergies   Allergen Reactions     Magnesium Other (See Comments)     Altered mental status after IV dosing      Social History     Tobacco Use     Smoking status: Never     Smokeless tobacco: Never   Substance Use Topics     Alcohol use: Yes     Comment: occa      Wt Readings from Last 1 Encounters:   23 61.8 kg (136 lb 3.2 oz)        Anesthesia Evaluation   Pt has had prior anesthetic. Type: General (general for emergency  section in the past).        ROS/MED HX  ENT/Pulmonary:     (+) allergic rhinitis, Intermittent, asthma (exercise induced)     Neurologic:     (+) seizures, last seizure:  last over 1 year ago, not related to previous pregnancy , Multiple Sclerosis, limitations: last flare 11months ago .     Cardiovascular: Comment: HELLP during last pregnancy; gestational HTN for this one    WPW, electrophysiology study in [place], but no ablation completed    (+) hypertension--PIH and HELLP ---dysrhythmias, WPW, Irregular Heartbeat/Palpitations, Previous cardiac testing   Echo: Date: 5/11/23 Results:  Interpretation Summary  Global and regional left ventricular function is normal with an EF of 60-65%.  Right ventricular function, chamber size, wall motion, and thickness are  normal.  Pulmonary artery systolic pressure is normal.  The inferior vena cava is normal.  No pericardial effusion is present.  There is no prior study for direct comparison.  _______________________________________________  Left Ventricle  Global and regional left ventricular function is normal with an EF of 60-65%.  Left ventricular wall thickness is normal. Left ventricular size is normal.  Left ventricular diastolic function is normal. No regional wall motion  abnormalities are seen.  Stress Test: Date: Results:    ECG Reviewed: Date: Results:    Cath: Date: Results:      METS/Exercise Tolerance: >4 METS    Hematologic: Comments: Thrombotic thrombocytopenia purpura    (+) anemia,     Musculoskeletal:       GI/Hepatic:  - neg GI/hepatic ROS     Renal/Genitourinary: Comment: Hemolytic uremic syndrome    (+) renal disease,     Endo:  - neg endo ROS     Psychiatric/Substance Use:     (+) psychiatric history anxiety     Infectious Disease:  - neg infectious disease ROS     Malignancy:  - neg malignancy ROS     Other:      (+) Possibly pregnant, ,         Physical Exam    Airway        Mallampati: II   TM distance: > 3 FB   Neck ROM: full   Mouth opening: > 3 cm    Respiratory Devices and Support         Dental       (+) Minor Abnormalities - some fillings, tiny chips      Cardiovascular          Rhythm and rate: regular and  normal     Pulmonary           breath sounds clear to auscultation           OUTSIDE LABS:  CBC:   Lab Results   Component Value Date    WBC 9.5 07/18/2023    WBC 10.0 07/16/2023    HGB 11.9 07/18/2023    HGB 11.5 (L) 07/16/2023    HCT 36.6 07/18/2023    HCT 34.0 (L) 07/16/2023     07/18/2023     07/16/2023     BMP:   Lab Results   Component Value Date     (L) 07/18/2023     07/11/2023    POTASSIUM 3.8 07/18/2023    POTASSIUM 3.5 07/11/2023    CHLORIDE 103 07/18/2023    CHLORIDE 105 07/11/2023    CO2 20 (L) 07/18/2023    CO2 21 (L) 07/11/2023    BUN 11.5 07/18/2023    BUN 7.7 07/11/2023    CR 0.72 07/18/2023    CR 0.66 07/16/2023    GLC 92 07/18/2023    GLC 90 07/11/2023     COAGS:   Lab Results   Component Value Date    PTT 30 01/10/2016    INR 0.97 01/10/2016     POC:   Lab Results   Component Value Date    HCG Negative 12/05/2022     HEPATIC:   Lab Results   Component Value Date    ALBUMIN 3.3 (L) 07/18/2023    PROTTOTAL 6.4 07/18/2023    ALT 18 07/18/2023    AST 24 07/18/2023    ALKPHOS 162 (H) 07/18/2023    BILITOTAL <0.2 07/18/2023     OTHER:   Lab Results   Component Value Date    LACT 4.5 (H) 01/10/2016    MARVIN 9.2 07/18/2023    MAG 2.0 03/30/2023    CRP <2.9 10/29/2018       Anesthesia Plan    ASA Status:  3, emergent    NPO Status:  ELEVATED Aspiration Risk/Unknown (1000 am cereal with milk)    Anesthesia Type: Spinal.              Consents    Anesthesia Plan(s) and associated risks, benefits, and realistic alternatives discussed. Questions answered and patient/representative(s) expressed understanding.    - Discussed:     - Discussed with:  Patient      - Extended Intubation/Ventilatory Support Discussed: No.      - Patient is DNR/DNI Status: No    Use of blood products discussed: No .     Postoperative Care            Comments:    Other Comments: Heart and lungs updated by cristel on 7/18/23 unchanged per prenatal record    Discussed risks and benefits of spinal anesthetic with  patient including itching, sore back, infection, hematoma, spinal headache, CV complications, nerve damage, inability to place, conversion to general anesthesia, loss of airway, and death.  Spoke with patient regarding general vs spinal anesthetic in relation to her specific health conditions.  Increased risk of exacerbation of MS. Pt wishes to proceed with spinal and wishes to have anti-anxiety medicine.  Back-up plan of general anesthesia.              EVERARDO Jang CRNA

## 2023-07-20 NOTE — PROGRESS NOTES
Fetal Non-Stress Test Results    NST Ordered By: Dr. Grimaldo  NST Medical Indication: rule out pre-eclampsia    NST Start & Stop Times  NST Start Time: 1320  NST Stop Time: 1355                            NST Results  Fetus A   Baseline Rate: Normal  Accelerations: Present  Decelerations: None  Interpretation: reactive

## 2023-07-21 ENCOUNTER — HOSPITAL ENCOUNTER (OUTPATIENT)
Facility: HOSPITAL | Age: 38
Discharge: HOME | End: 2023-07-21
Attending: OBSTETRICS & GYNECOLOGY | Admitting: OBSTETRICS & GYNECOLOGY

## 2023-07-21 VITALS — SYSTOLIC BLOOD PRESSURE: 140 MMHG | TEMPERATURE: 97.9 F | DIASTOLIC BLOOD PRESSURE: 100 MMHG | RESPIRATION RATE: 18 BRPM

## 2023-07-21 DIAGNOSIS — Z98.891 HISTORY OF CESAREAN DELIVERY: ICD-10-CM

## 2023-07-21 DIAGNOSIS — O09.523 MULTIGRAVIDA OF ADVANCED MATERNAL AGE IN THIRD TRIMESTER: ICD-10-CM

## 2023-07-21 DIAGNOSIS — O36.5931 POOR FETAL GROWTH AFFECTING MANAGEMENT OF MOTHER IN THIRD TRIMESTER, FETUS 1 OF MULTIPLE GESTATION: Primary | ICD-10-CM

## 2023-07-21 LAB
ALBUMIN UR-MCNC: NEGATIVE MG/DL
APPEARANCE UR: CLEAR
BILIRUB UR QL STRIP: NEGATIVE
COLOR UR AUTO: NORMAL
GLUCOSE UR STRIP-MCNC: NEGATIVE MG/DL
HGB UR QL STRIP: NEGATIVE
KETONES UR STRIP-MCNC: NEGATIVE MG/DL
LEUKOCYTE ESTERASE UR QL STRIP: NEGATIVE
NITRATE UR QL: NEGATIVE
PH UR STRIP: 7 [PH] (ref 4.7–8)
SP GR UR STRIP: 1 (ref 1–1.03)
UROBILINOGEN UR STRIP-MCNC: NORMAL MG/DL

## 2023-07-21 PROCEDURE — 59025 FETAL NON-STRESS TEST: CPT

## 2023-07-21 PROCEDURE — G0463 HOSPITAL OUTPT CLINIC VISIT: HCPCS | Mod: 25

## 2023-07-21 PROCEDURE — 81003 URINALYSIS AUTO W/O SCOPE: CPT | Performed by: OBSTETRICS & GYNECOLOGY

## 2023-07-21 PROCEDURE — 59025 FETAL NON-STRESS TEST: CPT | Mod: 26 | Performed by: OBSTETRICS & GYNECOLOGY

## 2023-07-21 ASSESSMENT — ACTIVITIES OF DAILY LIVING (ADL): ADLS_ACUITY_SCORE: 24

## 2023-07-21 NOTE — PLAN OF CARE
Sarah ATKINSON [facility]        NST:  reactive  Start: 1245  Stop: 1305    Physician: Dr. Grimaldo  Reason For Test: Chronic Hypertension   Estimated Date of Delivery: Aug 13, 2023   Gestational Age: 36w5d     Verified with second RN: MICHELLE Good    Comments:  Patient discharged ambulatory to home.       Rupinder Heredia RN

## 2023-07-21 NOTE — DISCHARGE INSTRUCTIONS
Discharge Instructions for Undelivered Patients    Diet:  * Drink 8 to 12 glasses of liquids (milk, juice, water) every day  * You may eat meals and snacks.    Activity:  * Count fetal kicks every day.  * Call your doctor if your baby is moving less than usual.    Call your provider if you notice:  * Swelling in your face or increased swelling in your hands or legs.  * Headaches that are not relieved by Tylenol (acetaminophen).  * Changes in your vision (blurring; seeing spots or stars).  * Nausea (sick to your stomach) and vomiting (throwing up).  * Weight gain of 5 pounds per week.  * Heartburn that doesn't go away.  * Signs of bladder infection: Pain when you urinate (use the toilet), needing to go more often or more urgently.  * The bag of donis (membrane) breaks, or you notice leaking in your underwear.  * Bright red blood in your underwear.  * Abdominal (lower belly) or stomach pain.  * Second (plus) baby: Contractions (tightenings) less than 10 minutes apart and getting stronger.    [facility]: [phone]

## 2023-07-23 ENCOUNTER — HOSPITAL ENCOUNTER (INPATIENT)
Facility: HOSPITAL | Age: 38
LOS: 3 days | Discharge: HOME | End: 2023-07-26
Attending: OBSTETRICS & GYNECOLOGY | Admitting: OBSTETRICS & GYNECOLOGY

## 2023-07-23 ENCOUNTER — APPOINTMENT (OUTPATIENT)
Dept: ULTRASOUND IMAGING | Facility: HOSPITAL | Age: 38
End: 2023-07-23
Attending: NURSE ANESTHETIST, CERTIFIED REGISTERED

## 2023-07-23 DIAGNOSIS — Z98.891 HISTORY OF CESAREAN DELIVERY: ICD-10-CM

## 2023-07-23 DIAGNOSIS — O09.523 MULTIGRAVIDA OF ADVANCED MATERNAL AGE IN THIRD TRIMESTER: ICD-10-CM

## 2023-07-23 DIAGNOSIS — O36.5931 POOR FETAL GROWTH AFFECTING MANAGEMENT OF MOTHER IN THIRD TRIMESTER, FETUS 1 OF MULTIPLE GESTATION: ICD-10-CM

## 2023-07-23 PROBLEM — O09.529 AMA (ADVANCED MATERNAL AGE) MULTIGRAVIDA 35+: Status: ACTIVE | Noted: 2023-07-23

## 2023-07-23 PROBLEM — O09.93 PREGNANCY, SUPERVISION FOR, HIGH-RISK, THIRD TRIMESTER: Status: ACTIVE | Noted: 2023-07-23

## 2023-07-23 PROBLEM — O26.899 RH NEGATIVE STATE IN ANTEPARTUM PERIOD: Status: ACTIVE | Noted: 2023-07-23

## 2023-07-23 PROBLEM — O09.299 HX OF PRE-ECLAMPSIA IN PRIOR PREGNANCY, CURRENTLY PREGNANT: Status: ACTIVE | Noted: 2023-07-23

## 2023-07-23 PROBLEM — O36.5990 IUGR (INTRAUTERINE GROWTH RESTRICTION) AFFECTING CARE OF MOTHER: Status: ACTIVE | Noted: 2023-07-23

## 2023-07-23 PROBLEM — Z67.91 RH NEGATIVE STATE IN ANTEPARTUM PERIOD: Status: ACTIVE | Noted: 2023-07-23

## 2023-07-23 PROBLEM — Z30.2 ENCOUNTER FOR STERILIZATION: Status: ACTIVE | Noted: 2023-07-23

## 2023-07-23 LAB
ABO/RH(D): ABNORMAL
ALBUMIN MFR UR ELPH: <4 MG/DL
ALBUMIN SERPL BCG-MCNC: 3.6 G/DL (ref 3.5–5.2)
ALP SERPL-CCNC: 190 U/L (ref 35–104)
ALT SERPL W P-5'-P-CCNC: 20 U/L (ref 0–50)
ANION GAP SERPL CALCULATED.3IONS-SCNC: 17 MMOL/L (ref 7–15)
ANTIBODY ID: NORMAL
ANTIBODY SCREEN: POSITIVE
APTT PPP: 28 SECONDS (ref 22–38)
AST SERPL W P-5'-P-CCNC: 31 U/L (ref 0–45)
BILIRUB SERPL-MCNC: 0.3 MG/DL
BUN SERPL-MCNC: 6.7 MG/DL (ref 6–20)
CALCIUM SERPL-MCNC: 9.7 MG/DL (ref 8.6–10)
CHLORIDE SERPL-SCNC: 101 MMOL/L (ref 98–107)
CREAT SERPL-MCNC: 0.71 MG/DL (ref 0.51–0.95)
CREAT UR-MCNC: 19.5 MG/DL
DEPRECATED HCO3 PLAS-SCNC: 17 MMOL/L (ref 22–29)
ERYTHROCYTE [DISTWIDTH] IN BLOOD BY AUTOMATED COUNT: 12.8 % (ref 10–15)
GFR SERPL CREATININE-BSD FRML MDRD: >90 ML/MIN/1.73M2
GLUCOSE SERPL-MCNC: 77 MG/DL (ref 70–99)
HCT VFR BLD AUTO: 37 % (ref 35–47)
HGB BLD-MCNC: 12.3 G/DL (ref 11.7–15.7)
INR PPP: 0.89 (ref 0.85–1.15)
MCH RBC QN AUTO: 30.7 PG (ref 26.5–33)
MCHC RBC AUTO-ENTMCNC: 33.2 G/DL (ref 31.5–36.5)
MCV RBC AUTO: 92 FL (ref 78–100)
PLATELET # BLD AUTO: 230 10E3/UL (ref 150–450)
POTASSIUM SERPL-SCNC: 3.8 MMOL/L (ref 3.4–5.3)
PROT SERPL-MCNC: 6.9 G/DL (ref 6.4–8.3)
PROT/CREAT 24H UR: NORMAL MG/G{CREAT}
RBC # BLD AUTO: 4.01 10E6/UL (ref 3.8–5.2)
SODIUM SERPL-SCNC: 135 MMOL/L (ref 136–145)
SPECIMEN EXPIRATION DATE: ABNORMAL
SPECIMEN EXPIRATION DATE: NORMAL
URATE SERPL-MCNC: 4.8 MG/DL (ref 2.4–5.7)
WBC # BLD AUTO: 11.8 10E3/UL (ref 4–11)

## 2023-07-23 PROCEDURE — 86850 RBC ANTIBODY SCREEN: CPT | Performed by: OBSTETRICS & GYNECOLOGY

## 2023-07-23 PROCEDURE — 250N000011 HC RX IP 250 OP 636: Performed by: OBSTETRICS & GYNECOLOGY

## 2023-07-23 PROCEDURE — 84156 ASSAY OF PROTEIN URINE: CPT | Performed by: OBSTETRICS & GYNECOLOGY

## 2023-07-23 PROCEDURE — 99140 ANES COMP EMERGENCY COND: CPT | Performed by: NURSE ANESTHETIST, CERTIFIED REGISTERED

## 2023-07-23 PROCEDURE — 250N000011 HC RX IP 250 OP 636: Performed by: NURSE ANESTHETIST, CERTIFIED REGISTERED

## 2023-07-23 PROCEDURE — 710N000010 HC RECOVERY PHASE 1, LEVEL 2, PER MIN: Performed by: OBSTETRICS & GYNECOLOGY

## 2023-07-23 PROCEDURE — 360N000076 HC SURGERY LEVEL 3, PER MIN: Performed by: OBSTETRICS & GYNECOLOGY

## 2023-07-23 PROCEDURE — 80053 COMPREHEN METABOLIC PANEL: CPT | Performed by: OBSTETRICS & GYNECOLOGY

## 2023-07-23 PROCEDURE — 86870 RBC ANTIBODY IDENTIFICATION: CPT | Performed by: OBSTETRICS & GYNECOLOGY

## 2023-07-23 PROCEDURE — 58611 LIGATE OVIDUCT(S) ADD-ON: CPT | Performed by: OBSTETRICS & GYNECOLOGY

## 2023-07-23 PROCEDURE — 85610 PROTHROMBIN TIME: CPT | Performed by: OBSTETRICS & GYNECOLOGY

## 2023-07-23 PROCEDURE — 88302 TISSUE EXAM BY PATHOLOGIST: CPT | Mod: TC | Performed by: OBSTETRICS & GYNECOLOGY

## 2023-07-23 PROCEDURE — 120N000001 HC R&B MED SURG/OB

## 2023-07-23 PROCEDURE — 64488 TAP BLOCK BI INJECTION: CPT | Mod: XU | Performed by: NURSE ANESTHETIST, CERTIFIED REGISTERED

## 2023-07-23 PROCEDURE — 0UT70ZZ RESECTION OF BILATERAL FALLOPIAN TUBES, OPEN APPROACH: ICD-10-PCS | Performed by: OBSTETRICS & GYNECOLOGY

## 2023-07-23 PROCEDURE — 250N000013 HC RX MED GY IP 250 OP 250 PS 637: Performed by: OBSTETRICS & GYNECOLOGY

## 2023-07-23 PROCEDURE — 86780 TREPONEMA PALLIDUM: CPT | Performed by: OBSTETRICS & GYNECOLOGY

## 2023-07-23 PROCEDURE — 370N000017 HC ANESTHESIA TECHNICAL FEE, PER MIN: Performed by: OBSTETRICS & GYNECOLOGY

## 2023-07-23 PROCEDURE — 258N000003 HC RX IP 258 OP 636: Performed by: OBSTETRICS & GYNECOLOGY

## 2023-07-23 PROCEDURE — 36415 COLL VENOUS BLD VENIPUNCTURE: CPT | Performed by: OBSTETRICS & GYNECOLOGY

## 2023-07-23 PROCEDURE — 272N000001 HC OR GENERAL SUPPLY STERILE: Performed by: OBSTETRICS & GYNECOLOGY

## 2023-07-23 PROCEDURE — 85027 COMPLETE CBC AUTOMATED: CPT | Performed by: OBSTETRICS & GYNECOLOGY

## 2023-07-23 PROCEDURE — 250N000009 HC RX 250: Performed by: OBSTETRICS & GYNECOLOGY

## 2023-07-23 PROCEDURE — 59510 CESAREAN DELIVERY: CPT | Performed by: NURSE ANESTHETIST, CERTIFIED REGISTERED

## 2023-07-23 PROCEDURE — 85730 THROMBOPLASTIN TIME PARTIAL: CPT | Performed by: OBSTETRICS & GYNECOLOGY

## 2023-07-23 PROCEDURE — 84550 ASSAY OF BLOOD/URIC ACID: CPT | Performed by: OBSTETRICS & GYNECOLOGY

## 2023-07-23 PROCEDURE — 250N000009 HC RX 250: Performed by: NURSE ANESTHETIST, CERTIFIED REGISTERED

## 2023-07-23 PROCEDURE — 88302 TISSUE EXAM BY PATHOLOGIST: CPT | Mod: 26 | Performed by: PATHOLOGY

## 2023-07-23 PROCEDURE — 258N000003 HC RX IP 258 OP 636: Performed by: NURSE ANESTHETIST, CERTIFIED REGISTERED

## 2023-07-23 PROCEDURE — 59510 CESAREAN DELIVERY: CPT | Performed by: OBSTETRICS & GYNECOLOGY

## 2023-07-23 RX ORDER — OXYTOCIN 10 [USP'U]/ML
10 INJECTION, SOLUTION INTRAMUSCULAR; INTRAVENOUS
Status: DISCONTINUED | OUTPATIENT
Start: 2023-07-23 | End: 2023-07-23

## 2023-07-23 RX ORDER — NALOXONE HYDROCHLORIDE 0.4 MG/ML
0.4 INJECTION, SOLUTION INTRAMUSCULAR; INTRAVENOUS; SUBCUTANEOUS
Status: DISCONTINUED | OUTPATIENT
Start: 2023-07-23 | End: 2023-07-23

## 2023-07-23 RX ORDER — FENTANYL CITRATE 50 UG/ML
25 INJECTION, SOLUTION INTRAMUSCULAR; INTRAVENOUS EVERY 5 MIN PRN
Status: DISCONTINUED | OUTPATIENT
Start: 2023-07-23 | End: 2023-07-23 | Stop reason: HOSPADM

## 2023-07-23 RX ORDER — OXYTOCIN/0.9 % SODIUM CHLORIDE 30/500 ML
340 PLASTIC BAG, INJECTION (ML) INTRAVENOUS CONTINUOUS PRN
Status: DISCONTINUED | OUTPATIENT
Start: 2023-07-23 | End: 2023-07-26 | Stop reason: HOSPADM

## 2023-07-23 RX ORDER — OXYCODONE HYDROCHLORIDE 5 MG/1
5 TABLET ORAL EVERY 4 HOURS PRN
Status: DISCONTINUED | OUTPATIENT
Start: 2023-07-23 | End: 2023-07-25

## 2023-07-23 RX ORDER — NALOXONE HYDROCHLORIDE 0.4 MG/ML
0.4 INJECTION, SOLUTION INTRAMUSCULAR; INTRAVENOUS; SUBCUTANEOUS
Status: DISCONTINUED | OUTPATIENT
Start: 2023-07-23 | End: 2023-07-26 | Stop reason: HOSPADM

## 2023-07-23 RX ORDER — BUPIVACAINE HYDROCHLORIDE 2.5 MG/ML
INJECTION, SOLUTION EPIDURAL; INFILTRATION; INTRACAUDAL
Status: COMPLETED | OUTPATIENT
Start: 2023-07-23 | End: 2023-07-23

## 2023-07-23 RX ORDER — LIDOCAINE 40 MG/G
CREAM TOPICAL
Status: DISCONTINUED | OUTPATIENT
Start: 2023-07-23 | End: 2023-07-23 | Stop reason: HOSPADM

## 2023-07-23 RX ORDER — NALOXONE HYDROCHLORIDE 0.4 MG/ML
0.2 INJECTION, SOLUTION INTRAMUSCULAR; INTRAVENOUS; SUBCUTANEOUS
Status: DISCONTINUED | OUTPATIENT
Start: 2023-07-23 | End: 2023-07-23

## 2023-07-23 RX ORDER — ACETAMINOPHEN 325 MG/1
975 TABLET ORAL ONCE
Status: COMPLETED | OUTPATIENT
Start: 2023-07-23 | End: 2023-07-23

## 2023-07-23 RX ORDER — SODIUM CHLORIDE, SODIUM LACTATE, POTASSIUM CHLORIDE, CALCIUM CHLORIDE 600; 310; 30; 20 MG/100ML; MG/100ML; MG/100ML; MG/100ML
10-125 INJECTION, SOLUTION INTRAVENOUS CONTINUOUS
Status: DISCONTINUED | OUTPATIENT
Start: 2023-07-23 | End: 2023-07-26 | Stop reason: HOSPADM

## 2023-07-23 RX ORDER — ONDANSETRON 4 MG/1
4 TABLET, ORALLY DISINTEGRATING ORAL EVERY 30 MIN PRN
Status: DISCONTINUED | OUTPATIENT
Start: 2023-07-23 | End: 2023-07-26 | Stop reason: HOSPADM

## 2023-07-23 RX ORDER — METOCLOPRAMIDE HYDROCHLORIDE 5 MG/ML
10 INJECTION INTRAMUSCULAR; INTRAVENOUS EVERY 6 HOURS PRN
Status: DISCONTINUED | OUTPATIENT
Start: 2023-07-23 | End: 2023-07-23

## 2023-07-23 RX ORDER — OXYTOCIN/0.9 % SODIUM CHLORIDE 30/500 ML
340 PLASTIC BAG, INJECTION (ML) INTRAVENOUS CONTINUOUS PRN
Status: DISCONTINUED | OUTPATIENT
Start: 2023-07-23 | End: 2023-07-23

## 2023-07-23 RX ORDER — DEXAMETHASONE SODIUM PHOSPHATE 4 MG/ML
INJECTION, SOLUTION INTRA-ARTICULAR; INTRALESIONAL; INTRAMUSCULAR; INTRAVENOUS; SOFT TISSUE PRN
Status: DISCONTINUED | OUTPATIENT
Start: 2023-07-23 | End: 2023-07-23

## 2023-07-23 RX ORDER — ALBUTEROL SULFATE 0.83 MG/ML
2.5 SOLUTION RESPIRATORY (INHALATION) EVERY 4 HOURS PRN
Status: DISCONTINUED | OUTPATIENT
Start: 2023-07-23 | End: 2023-07-23 | Stop reason: HOSPADM

## 2023-07-23 RX ORDER — PROCHLORPERAZINE MALEATE 10 MG
10 TABLET ORAL EVERY 6 HOURS PRN
Status: DISCONTINUED | OUTPATIENT
Start: 2023-07-23 | End: 2023-07-23

## 2023-07-23 RX ORDER — LABETALOL 20 MG/4 ML (5 MG/ML) INTRAVENOUS SYRINGE
20-80 EVERY 10 MIN PRN
Status: DISCONTINUED | OUTPATIENT
Start: 2023-07-23 | End: 2023-07-23

## 2023-07-23 RX ORDER — ONDANSETRON 4 MG/1
4 TABLET, ORALLY DISINTEGRATING ORAL EVERY 6 HOURS PRN
Status: DISCONTINUED | OUTPATIENT
Start: 2023-07-23 | End: 2023-07-26 | Stop reason: HOSPADM

## 2023-07-23 RX ORDER — DIPHENHYDRAMINE HYDROCHLORIDE 50 MG/ML
25 INJECTION INTRAMUSCULAR; INTRAVENOUS EVERY 6 HOURS PRN
Status: DISCONTINUED | OUTPATIENT
Start: 2023-07-23 | End: 2023-07-26 | Stop reason: HOSPADM

## 2023-07-23 RX ORDER — TRANEXAMIC ACID 10 MG/ML
1 INJECTION, SOLUTION INTRAVENOUS EVERY 30 MIN PRN
Status: DISCONTINUED | OUTPATIENT
Start: 2023-07-23 | End: 2023-07-23

## 2023-07-23 RX ORDER — CARBOPROST TROMETHAMINE 250 UG/ML
250 INJECTION, SOLUTION INTRAMUSCULAR
Status: DISCONTINUED | OUTPATIENT
Start: 2023-07-23 | End: 2023-07-23

## 2023-07-23 RX ORDER — HYDRALAZINE HYDROCHLORIDE 20 MG/ML
10 INJECTION INTRAMUSCULAR; INTRAVENOUS
Status: DISCONTINUED | OUTPATIENT
Start: 2023-07-23 | End: 2023-07-26 | Stop reason: HOSPADM

## 2023-07-23 RX ORDER — SODIUM CHLORIDE, SODIUM LACTATE, POTASSIUM CHLORIDE, CALCIUM CHLORIDE 600; 310; 30; 20 MG/100ML; MG/100ML; MG/100ML; MG/100ML
INJECTION, SOLUTION INTRAVENOUS CONTINUOUS
Status: DISCONTINUED | OUTPATIENT
Start: 2023-07-23 | End: 2023-07-23

## 2023-07-23 RX ORDER — TRANEXAMIC ACID 10 MG/ML
1 INJECTION, SOLUTION INTRAVENOUS ONCE
Status: DISCONTINUED | OUTPATIENT
Start: 2023-07-23 | End: 2023-07-23

## 2023-07-23 RX ORDER — METOCLOPRAMIDE 10 MG/1
10 TABLET ORAL EVERY 6 HOURS PRN
Status: DISCONTINUED | OUTPATIENT
Start: 2023-07-23 | End: 2023-07-26 | Stop reason: HOSPADM

## 2023-07-23 RX ORDER — METHYLERGONOVINE MALEATE 0.2 MG/ML
200 INJECTION INTRAVENOUS
Status: DISCONTINUED | OUTPATIENT
Start: 2023-07-23 | End: 2023-07-26 | Stop reason: HOSPADM

## 2023-07-23 RX ORDER — CEFAZOLIN SODIUM/WATER 2 G/20 ML
2 SYRINGE (ML) INTRAVENOUS SEE ADMIN INSTRUCTIONS
Status: DISCONTINUED | OUTPATIENT
Start: 2023-07-23 | End: 2023-07-23

## 2023-07-23 RX ORDER — ONDANSETRON 2 MG/ML
4 INJECTION INTRAMUSCULAR; INTRAVENOUS EVERY 30 MIN PRN
Status: DISCONTINUED | OUTPATIENT
Start: 2023-07-23 | End: 2023-07-23 | Stop reason: HOSPADM

## 2023-07-23 RX ORDER — MISOPROSTOL 200 UG/1
400 TABLET ORAL
Status: DISCONTINUED | OUTPATIENT
Start: 2023-07-23 | End: 2023-07-23

## 2023-07-23 RX ORDER — ONDANSETRON 2 MG/ML
4 INJECTION INTRAMUSCULAR; INTRAVENOUS EVERY 6 HOURS PRN
Status: DISCONTINUED | OUTPATIENT
Start: 2023-07-23 | End: 2023-07-23

## 2023-07-23 RX ORDER — KETOROLAC TROMETHAMINE 30 MG/ML
INJECTION, SOLUTION INTRAMUSCULAR; INTRAVENOUS PRN
Status: DISCONTINUED | OUTPATIENT
Start: 2023-07-23 | End: 2023-07-23

## 2023-07-23 RX ORDER — KETOROLAC TROMETHAMINE 30 MG/ML
30 INJECTION, SOLUTION INTRAMUSCULAR; INTRAVENOUS EVERY 6 HOURS
Status: COMPLETED | OUTPATIENT
Start: 2023-07-24 | End: 2023-07-24

## 2023-07-23 RX ORDER — HYDROCORTISONE 25 MG/G
CREAM TOPICAL 3 TIMES DAILY PRN
Status: DISCONTINUED | OUTPATIENT
Start: 2023-07-23 | End: 2023-07-26 | Stop reason: HOSPADM

## 2023-07-23 RX ORDER — KETOROLAC TROMETHAMINE 30 MG/ML
30 INJECTION, SOLUTION INTRAMUSCULAR; INTRAVENOUS
Status: DISCONTINUED | OUTPATIENT
Start: 2023-07-23 | End: 2023-07-26 | Stop reason: HOSPADM

## 2023-07-23 RX ORDER — IBUPROFEN 800 MG/1
800 TABLET, FILM COATED ORAL
Status: DISCONTINUED | OUTPATIENT
Start: 2023-07-23 | End: 2023-07-26 | Stop reason: HOSPADM

## 2023-07-23 RX ORDER — HYDROXYZINE HYDROCHLORIDE 25 MG/1
50 TABLET, FILM COATED ORAL EVERY 6 HOURS PRN
Status: DISCONTINUED | OUTPATIENT
Start: 2023-07-23 | End: 2023-07-26 | Stop reason: HOSPADM

## 2023-07-23 RX ORDER — METHYLERGONOVINE MALEATE 0.2 MG/ML
INJECTION INTRAVENOUS PRN
Status: DISCONTINUED | OUTPATIENT
Start: 2023-07-23 | End: 2023-07-23

## 2023-07-23 RX ORDER — TRANEXAMIC ACID 10 MG/ML
1 INJECTION, SOLUTION INTRAVENOUS EVERY 30 MIN PRN
Status: DISCONTINUED | OUTPATIENT
Start: 2023-07-23 | End: 2023-07-26 | Stop reason: HOSPADM

## 2023-07-23 RX ORDER — METOCLOPRAMIDE 10 MG/1
10 TABLET ORAL EVERY 6 HOURS PRN
Status: DISCONTINUED | OUTPATIENT
Start: 2023-07-23 | End: 2023-07-23

## 2023-07-23 RX ORDER — LIDOCAINE 40 MG/G
CREAM TOPICAL
Status: DISCONTINUED | OUTPATIENT
Start: 2023-07-23 | End: 2023-07-23

## 2023-07-23 RX ORDER — HYDROMORPHONE HYDROCHLORIDE 1 MG/ML
0.4 INJECTION, SOLUTION INTRAMUSCULAR; INTRAVENOUS; SUBCUTANEOUS EVERY 5 MIN PRN
Status: DISCONTINUED | OUTPATIENT
Start: 2023-07-23 | End: 2023-07-23 | Stop reason: HOSPADM

## 2023-07-23 RX ORDER — CEFAZOLIN SODIUM/WATER 2 G/20 ML
2 SYRINGE (ML) INTRAVENOUS
Status: COMPLETED | OUTPATIENT
Start: 2023-07-23 | End: 2023-07-23

## 2023-07-23 RX ORDER — OXYTOCIN/0.9 % SODIUM CHLORIDE 30/500 ML
PLASTIC BAG, INJECTION (ML) INTRAVENOUS CONTINUOUS PRN
Status: DISCONTINUED | OUTPATIENT
Start: 2023-07-23 | End: 2023-07-23

## 2023-07-23 RX ORDER — OXYTOCIN/0.9 % SODIUM CHLORIDE 30/500 ML
100-340 PLASTIC BAG, INJECTION (ML) INTRAVENOUS CONTINUOUS PRN
Status: DISCONTINUED | OUTPATIENT
Start: 2023-07-23 | End: 2023-07-26 | Stop reason: HOSPADM

## 2023-07-23 RX ORDER — LIDOCAINE 40 MG/G
CREAM TOPICAL
Status: DISCONTINUED | OUTPATIENT
Start: 2023-07-23 | End: 2023-07-26 | Stop reason: HOSPADM

## 2023-07-23 RX ORDER — ONDANSETRON 4 MG/1
4 TABLET, ORALLY DISINTEGRATING ORAL EVERY 30 MIN PRN
Status: DISCONTINUED | OUTPATIENT
Start: 2023-07-23 | End: 2023-07-23 | Stop reason: HOSPADM

## 2023-07-23 RX ORDER — ONDANSETRON 2 MG/ML
4 INJECTION INTRAMUSCULAR; INTRAVENOUS EVERY 6 HOURS PRN
Status: DISCONTINUED | OUTPATIENT
Start: 2023-07-23 | End: 2023-07-26 | Stop reason: HOSPADM

## 2023-07-23 RX ORDER — CITRIC ACID/SODIUM CITRATE 334-500MG
30 SOLUTION, ORAL ORAL
Status: COMPLETED | OUTPATIENT
Start: 2023-07-23 | End: 2023-07-23

## 2023-07-23 RX ORDER — CITRIC ACID/SODIUM CITRATE 334-500MG
30 SOLUTION, ORAL ORAL
Status: DISCONTINUED | OUTPATIENT
Start: 2023-07-23 | End: 2023-07-23

## 2023-07-23 RX ORDER — HYDROMORPHONE HYDROCHLORIDE 1 MG/ML
0.2 INJECTION, SOLUTION INTRAMUSCULAR; INTRAVENOUS; SUBCUTANEOUS EVERY 5 MIN PRN
Status: DISCONTINUED | OUTPATIENT
Start: 2023-07-23 | End: 2023-07-23 | Stop reason: HOSPADM

## 2023-07-23 RX ORDER — AMOXICILLIN 250 MG
1 CAPSULE ORAL 2 TIMES DAILY
Status: DISCONTINUED | OUTPATIENT
Start: 2023-07-23 | End: 2023-07-26 | Stop reason: HOSPADM

## 2023-07-23 RX ORDER — ENOXAPARIN SODIUM 100 MG/ML
40 INJECTION SUBCUTANEOUS EVERY 24 HOURS
Status: DISCONTINUED | OUTPATIENT
Start: 2023-07-24 | End: 2023-07-26 | Stop reason: HOSPADM

## 2023-07-23 RX ORDER — SODIUM CHLORIDE, SODIUM LACTATE, POTASSIUM CHLORIDE, CALCIUM CHLORIDE 600; 310; 30; 20 MG/100ML; MG/100ML; MG/100ML; MG/100ML
INJECTION, SOLUTION INTRAVENOUS CONTINUOUS PRN
Status: DISCONTINUED | OUTPATIENT
Start: 2023-07-23 | End: 2023-07-23

## 2023-07-23 RX ORDER — NALOXONE HYDROCHLORIDE 0.4 MG/ML
0.2 INJECTION, SOLUTION INTRAMUSCULAR; INTRAVENOUS; SUBCUTANEOUS
Status: DISCONTINUED | OUTPATIENT
Start: 2023-07-23 | End: 2023-07-26 | Stop reason: HOSPADM

## 2023-07-23 RX ORDER — OXYTOCIN 10 [USP'U]/ML
10 INJECTION, SOLUTION INTRAMUSCULAR; INTRAVENOUS
Status: DISCONTINUED | OUTPATIENT
Start: 2023-07-23 | End: 2023-07-26 | Stop reason: HOSPADM

## 2023-07-23 RX ORDER — SODIUM CHLORIDE, SODIUM LACTATE, POTASSIUM CHLORIDE, CALCIUM CHLORIDE 600; 310; 30; 20 MG/100ML; MG/100ML; MG/100ML; MG/100ML
INJECTION, SOLUTION INTRAVENOUS CONTINUOUS
Status: DISCONTINUED | OUTPATIENT
Start: 2023-07-23 | End: 2023-07-23 | Stop reason: HOSPADM

## 2023-07-23 RX ORDER — HYDRALAZINE HYDROCHLORIDE 20 MG/ML
2.5-5 INJECTION INTRAMUSCULAR; INTRAVENOUS EVERY 10 MIN PRN
Status: DISCONTINUED | OUTPATIENT
Start: 2023-07-23 | End: 2023-07-23 | Stop reason: HOSPADM

## 2023-07-23 RX ORDER — EPHEDRINE SULFATE 50 MG/ML
INJECTION, SOLUTION INTRAMUSCULAR; INTRAVENOUS; SUBCUTANEOUS PRN
Status: DISCONTINUED | OUTPATIENT
Start: 2023-07-23 | End: 2023-07-23

## 2023-07-23 RX ORDER — FENTANYL CITRATE 50 UG/ML
50 INJECTION, SOLUTION INTRAMUSCULAR; INTRAVENOUS EVERY 5 MIN PRN
Status: DISCONTINUED | OUTPATIENT
Start: 2023-07-23 | End: 2023-07-23 | Stop reason: HOSPADM

## 2023-07-23 RX ORDER — MEPERIDINE HYDROCHLORIDE 25 MG/ML
12.5 INJECTION INTRAMUSCULAR; INTRAVENOUS; SUBCUTANEOUS EVERY 5 MIN PRN
Status: DISCONTINUED | OUTPATIENT
Start: 2023-07-23 | End: 2023-07-23 | Stop reason: HOSPADM

## 2023-07-23 RX ORDER — IBUPROFEN 800 MG/1
800 TABLET, FILM COATED ORAL EVERY 6 HOURS
Status: DISCONTINUED | OUTPATIENT
Start: 2023-07-24 | End: 2023-07-26 | Stop reason: HOSPADM

## 2023-07-23 RX ORDER — LABETALOL 20 MG/4 ML (5 MG/ML) INTRAVENOUS SYRINGE
20-80 EVERY 10 MIN PRN
Status: DISCONTINUED | OUTPATIENT
Start: 2023-07-23 | End: 2023-07-26 | Stop reason: HOSPADM

## 2023-07-23 RX ORDER — PROCHLORPERAZINE MALEATE 10 MG
10 TABLET ORAL EVERY 6 HOURS PRN
Status: DISCONTINUED | OUTPATIENT
Start: 2023-07-23 | End: 2023-07-26 | Stop reason: HOSPADM

## 2023-07-23 RX ORDER — DIPHENHYDRAMINE HCL 25 MG
25 CAPSULE ORAL EVERY 6 HOURS PRN
Status: DISCONTINUED | OUTPATIENT
Start: 2023-07-23 | End: 2023-07-26 | Stop reason: HOSPADM

## 2023-07-23 RX ORDER — CARBOPROST TROMETHAMINE 250 UG/ML
250 INJECTION, SOLUTION INTRAMUSCULAR
Status: DISCONTINUED | OUTPATIENT
Start: 2023-07-23 | End: 2023-07-26 | Stop reason: HOSPADM

## 2023-07-23 RX ORDER — PROCHLORPERAZINE 25 MG
25 SUPPOSITORY, RECTAL RECTAL EVERY 12 HOURS PRN
Status: DISCONTINUED | OUTPATIENT
Start: 2023-07-23 | End: 2023-07-23

## 2023-07-23 RX ORDER — METHYLERGONOVINE MALEATE 0.2 MG/ML
200 INJECTION INTRAVENOUS
Status: DISCONTINUED | OUTPATIENT
Start: 2023-07-23 | End: 2023-07-23

## 2023-07-23 RX ORDER — PROCHLORPERAZINE 25 MG
25 SUPPOSITORY, RECTAL RECTAL EVERY 12 HOURS PRN
Status: DISCONTINUED | OUTPATIENT
Start: 2023-07-23 | End: 2023-07-26 | Stop reason: HOSPADM

## 2023-07-23 RX ORDER — HYDRALAZINE HYDROCHLORIDE 20 MG/ML
10 INJECTION INTRAMUSCULAR; INTRAVENOUS
Status: DISCONTINUED | OUTPATIENT
Start: 2023-07-23 | End: 2023-07-23

## 2023-07-23 RX ORDER — MISOPROSTOL 200 UG/1
400 TABLET ORAL
Status: DISCONTINUED | OUTPATIENT
Start: 2023-07-23 | End: 2023-07-26 | Stop reason: HOSPADM

## 2023-07-23 RX ORDER — AMOXICILLIN 250 MG
2 CAPSULE ORAL 2 TIMES DAILY
Status: DISCONTINUED | OUTPATIENT
Start: 2023-07-23 | End: 2023-07-26 | Stop reason: HOSPADM

## 2023-07-23 RX ORDER — ONDANSETRON 4 MG/1
4 TABLET, ORALLY DISINTEGRATING ORAL EVERY 6 HOURS PRN
Status: DISCONTINUED | OUTPATIENT
Start: 2023-07-23 | End: 2023-07-23

## 2023-07-23 RX ORDER — BUPIVACAINE HYDROCHLORIDE 7.5 MG/ML
INJECTION, SOLUTION INTRASPINAL PRN
Status: DISCONTINUED | OUTPATIENT
Start: 2023-07-23 | End: 2023-07-23

## 2023-07-23 RX ORDER — SIMETHICONE 80 MG
80 TABLET,CHEWABLE ORAL 4 TIMES DAILY PRN
Status: DISCONTINUED | OUTPATIENT
Start: 2023-07-23 | End: 2023-07-26 | Stop reason: HOSPADM

## 2023-07-23 RX ORDER — METOCLOPRAMIDE HYDROCHLORIDE 5 MG/ML
10 INJECTION INTRAMUSCULAR; INTRAVENOUS EVERY 6 HOURS PRN
Status: DISCONTINUED | OUTPATIENT
Start: 2023-07-23 | End: 2023-07-26 | Stop reason: HOSPADM

## 2023-07-23 RX ORDER — BISACODYL 10 MG
10 SUPPOSITORY, RECTAL RECTAL DAILY PRN
Status: DISCONTINUED | OUTPATIENT
Start: 2023-07-25 | End: 2023-07-26 | Stop reason: HOSPADM

## 2023-07-23 RX ORDER — LABETALOL 20 MG/4 ML (5 MG/ML) INTRAVENOUS SYRINGE
10
Status: DISCONTINUED | OUTPATIENT
Start: 2023-07-23 | End: 2023-07-23 | Stop reason: HOSPADM

## 2023-07-23 RX ORDER — MODIFIED LANOLIN
OINTMENT (GRAM) TOPICAL
Status: DISCONTINUED | OUTPATIENT
Start: 2023-07-23 | End: 2023-07-26 | Stop reason: HOSPADM

## 2023-07-23 RX ORDER — ONDANSETRON 2 MG/ML
4 INJECTION INTRAMUSCULAR; INTRAVENOUS EVERY 30 MIN PRN
Status: DISCONTINUED | OUTPATIENT
Start: 2023-07-23 | End: 2023-07-26 | Stop reason: HOSPADM

## 2023-07-23 RX ORDER — ONDANSETRON 2 MG/ML
INJECTION INTRAMUSCULAR; INTRAVENOUS PRN
Status: DISCONTINUED | OUTPATIENT
Start: 2023-07-23 | End: 2023-07-23

## 2023-07-23 RX ADMIN — SODIUM CHLORIDE, POTASSIUM CHLORIDE, SODIUM LACTATE AND CALCIUM CHLORIDE: 600; 310; 30; 20 INJECTION, SOLUTION INTRAVENOUS at 18:20

## 2023-07-23 RX ADMIN — PHENYLEPHRINE HYDROCHLORIDE 100 MCG: 10 INJECTION INTRAVENOUS at 18:34

## 2023-07-23 RX ADMIN — TRANEXAMIC ACID 1 G: 1 INJECTION, SOLUTION INTRAVENOUS at 18:40

## 2023-07-23 RX ADMIN — BUPIVACAINE HYDROCHLORIDE 30 ML: 2.5 INJECTION, SOLUTION EPIDURAL; INFILTRATION; INTRACAUDAL at 19:32

## 2023-07-23 RX ADMIN — PHENYLEPHRINE HYDROCHLORIDE 100 MCG: 10 INJECTION INTRAVENOUS at 18:40

## 2023-07-23 RX ADMIN — METHYLERGONOVINE MALEATE 200 MCG: 0.2 INJECTION INTRAVENOUS at 18:54

## 2023-07-23 RX ADMIN — MIDAZOLAM 1 MG: 1 INJECTION INTRAMUSCULAR; INTRAVENOUS at 19:06

## 2023-07-23 RX ADMIN — SODIUM CHLORIDE, POTASSIUM CHLORIDE, SODIUM LACTATE AND CALCIUM CHLORIDE 500 ML: 600; 310; 30; 20 INJECTION, SOLUTION INTRAVENOUS at 17:44

## 2023-07-23 RX ADMIN — PHENYLEPHRINE HYDROCHLORIDE 50 MCG: 10 INJECTION INTRAVENOUS at 18:49

## 2023-07-23 RX ADMIN — Medication 5 MG: at 18:40

## 2023-07-23 RX ADMIN — SODIUM CHLORIDE, POTASSIUM CHLORIDE, SODIUM LACTATE AND CALCIUM CHLORIDE 50 ML/HR: 600; 310; 30; 20 INJECTION, SOLUTION INTRAVENOUS at 21:10

## 2023-07-23 RX ADMIN — SENNOSIDES AND DOCUSATE SODIUM 1 TABLET: 50; 8.6 TABLET ORAL at 22:25

## 2023-07-23 RX ADMIN — KETOROLAC TROMETHAMINE 30 MG: 30 INJECTION, SOLUTION INTRAMUSCULAR at 19:17

## 2023-07-23 RX ADMIN — DEXAMETHASONE SODIUM PHOSPHATE 10 MG: 4 INJECTION, SOLUTION INTRA-ARTICULAR; INTRALESIONAL; INTRAMUSCULAR; INTRAVENOUS; SOFT TISSUE at 18:39

## 2023-07-23 RX ADMIN — SODIUM CITRATE AND CITRIC ACID MONOHYDRATE 30 ML: 500; 334 SOLUTION ORAL at 17:46

## 2023-07-23 RX ADMIN — Medication 2 G: at 17:57

## 2023-07-23 RX ADMIN — Medication 600 ML/HR: at 18:52

## 2023-07-23 RX ADMIN — BUPIVACAINE HYDROCHLORIDE IN DEXTROSE 1.5 ML: 7.5 INJECTION, SOLUTION SUBARACHNOID at 18:33

## 2023-07-23 RX ADMIN — MIDAZOLAM 1 MG: 1 INJECTION INTRAMUSCULAR; INTRAVENOUS at 18:52

## 2023-07-23 RX ADMIN — ONDANSETRON 4 MG: 2 INJECTION INTRAMUSCULAR; INTRAVENOUS at 18:25

## 2023-07-23 RX ADMIN — Medication 10 MG: at 18:49

## 2023-07-23 RX ADMIN — Medication 10 MG: at 18:36

## 2023-07-23 RX ADMIN — OXYCODONE HYDROCHLORIDE 5 MG: 5 TABLET ORAL at 22:23

## 2023-07-23 RX ADMIN — Medication 100 ML/HR: at 21:09

## 2023-07-23 ASSESSMENT — ACTIVITIES OF DAILY LIVING (ADL)
CONCENTRATING,_REMEMBERING_OR_MAKING_DECISIONS_DIFFICULTY: NO
DIFFICULTY_EATING/SWALLOWING: NO
FALL_HISTORY_WITHIN_LAST_SIX_MONTHS: YES
ADLS_ACUITY_SCORE: 35
DRESSING/BATHING_DIFFICULTY: NO
DOING_ERRANDS_INDEPENDENTLY_DIFFICULTY: NO
ADLS_ACUITY_SCORE: 37
CHANGE_IN_FUNCTIONAL_STATUS_SINCE_ONSET_OF_CURRENT_ILLNESS/INJURY: NO
NUMBER_OF_TIMES_PATIENT_HAS_FALLEN_WITHIN_LAST_SIX_MONTHS: 1
TOILETING_ISSUES: NO
WEAR_GLASSES_OR_BLIND: NO
WALKING_OR_CLIMBING_STAIRS_DIFFICULTY: NO
ADLS_ACUITY_SCORE: 37
ADLS_ACUITY_SCORE: 35

## 2023-07-23 ASSESSMENT — ENCOUNTER SYMPTOMS: SEIZURES: 1

## 2023-07-23 NOTE — ANESTHESIA PROCEDURE NOTES
"Intrathecal injection Procedure Note    Pre-Procedure   Staff -        CRNA: Flavia Evans APRN CRNA       Performed By: CRNA       Location: OR       Pre-Anesthestic Checklist: patient identified, IV checked, risks and benefits discussed, informed consent, monitors and equipment checked, pre-op evaluation, at physician/surgeon's request and post-op pain management  Timeout:       Correct Patient: Yes        Correct Procedure: Yes        Correct Site: Yes        Correct Position: Yes   Procedure Documentation  Procedure: intrathecal injection       Diagnosis:  section       Patient Position: sitting       Patient Prep/Sterile Barriers: sterile gloves, mask, patient draped       Skin prep: Betadine and Chloraprep       Insertion Site: L3-4. (midline approach).       Needle Gauge: 25.        Needle Length (Inches): 3.5        Spinal Needle Type: Dinh tip       Introducer used       # of attempts: 1 and  # of redirects:  0    Assessment/Narrative         Paresthesias: No.       CSF fluid: clear.     Comments:  Spoke with patient at length regarding risks of exacerbation of MS with spinal.  Urgent case in nature.  Not ideal waiting for epidural time to setup so spinal anesthetic agreed to by patient, myself, and surgeon including risks.  Spoke to all alternatives and their relation to patient's condition and safety for baby. Patient states she would like the spinal      FOR [facility] ([facility] ONLY:   Pain Team Contact information: please page the Pain Team Via [employer]. Search \"Pain\". During daytime hours, please page the attending first. At night please page the resident first.      "

## 2023-07-23 NOTE — H&P
OB Labor and Delivery History and Physical    Name: Sarah Ugalde  Age: 38 year old  YOB: 1985   Medical Record #: [id number]     Date of Admission:  2023  Admitting Service:  Obstetrics and Gynecology  Primary Provider:   Joanne Ref-Primary, Physician    DATE: 2023         Identification:   Sarah Ugalde is a 38 year old  female at 37w0d with Estimated Date of Delivery: Aug 13, 2023         Chief Complaint:   She is being admitted for active labor management.         History of Present Illness:   The OB prenatal record was reviewed with the patient. Briefly, this patient's pregnancy was complicated by Advanced maternal age, Intrauterine growth restriction, History of prior  section, Desires elective sterilization, Rh Negative, History of Delaney-Parkinson-White syndrome, History of multiple sclerosis, History of severe preeclampsia, help syndrome, TTP, acute renal failure .    She is admitted today because of active labor management. She is scheduled for  section with bilateral salpingectomy on 2023.  She has regular contractions that started early this afternoon and are now every 2-4 minutes that are getting stronger. She denies pelvic pressure. She feels fetal movement She denies leaking of fluid.  She denies vaginal bleeding. She is very anxious.    She feels puffy and tired today with the heat and humidity. Denies  headache, vision changes, lower extremity swelling, upper abdominal pain, chest pain, or shortness of breath. She denies abnormal vaginal discharge, itching or irritation. No difficulty urinating, dysuria, hematuria, or flank pain. Denies nausea or vomiting. No fever or chills.  She denies depression symptoms.    She does not want any further children. She requests sterilization at time of  section.    Fetal Movement: Present and active.  Leakage of Fluid: Absent.  Vaginal Bleeding: Absent.  Contractions: As above.   Preeclampsia Signs / Symptoms: Absent.         Review of Systems:   As per the HPI. Other systems are reviewed and negative.         Allergies:     Allergies   Allergen Reactions     Magnesium Other (See Comments)     Altered mental status after IV dosing            Medication Prior to Admission:     Medications Prior to Admission   Medication Sig Dispense Refill Last Dose     aspirin (ASA) 81 MG chewable tablet Take 81 mg by mouth daily   2023 at AM     PRENATAL VIT-DOCUSATE-IRON-FA PO    2023 at AM            Active Problem List:     Patient Active Problem List   Diagnosis     WPW (Delaney-Parkinson-White syndrome)     MS (multiple sclerosis) (H)     Multigravida of advanced maternal age in third trimester     Encounter for triage in pregnant patient     TTP (thrombotic thrombocytopenic purpura) (H)     HUS (hemolytic uremic syndrome) (H)     HELLP (hemolytic anemia/elev liver enzymes/low platelets in pregnancy)     History of  delivery     Detached retina, bilateral     Poor fetal growth affecting management of mother in third trimester, fetus 1 of multiple gestation     Pregnancy, supervision for, high-risk, third trimester     AMA (advanced maternal age) multigravida 35+     IUGR (intrauterine growth restriction) affecting care of mother     Hx of pre-eclampsia in prior pregnancy, currently pregnant     Encounter for sterilization     Hx of  section     Rh negative state in antepartum period          Past Medical History:     Past Medical History:   Diagnosis Date     Detached retina, bilateral 2015    secondary to severe preeclampsia with HELLP syndroma and acute renal failure     HELLP (hemolytic anemia/elev liver enzymes/low platelets in pregnancy) 2015    Severe preeclampsia with HELLP syndrome and acute renal failure     HUS (hemolytic uremic syndrome) (H) 2015    Postpartum Thrombotic thrombocytopenic purpura (TTP) and Hemolytic uremic syndrome (HUS) treated with  plasmapheresis     MS (multiple sclerosis) (H) 2015     Rh negative state in antepartum period 2023     TTP (thrombotic thrombocytopenic purpura) (H) 2015    Postpartum Thrombotic thrombocytopenic purpura (TTP) and 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     WPW (Delaney-Parkinson-White syndrome) 2015    She has been evaluated for ablation, however, based on location this was not deemed safe by cardiology at the time          Past Surgical History:     Past Surgical History:   Procedure Laterality Date      SECTION  2015    Severe preeclampsia with HELLP syndrome and acute renal failure then developed postpartum Thrombotic thrombocytopenic purpura (TTP) and Hemolytic uremic syndrome (HUS)            Obstetric History:   EDC: Estimated Date of Delivery: Aug 13, 2023  OB History    Para Term  AB Living   3 1 0 1 1 1   SAB IAB Ectopic Multiple Live Births   1 0 0 0 1      # Outcome Date GA Lbr Brad/2nd Weight Sex Delivery Anes PTL Lv   3 Current            2 SAB            1  06/23/15 36w0d  2.325 kg (5 lb 2 oz) M CS-[id number]  Y EVE      Birth Comments: Severe preeclampsia with HELLP synbdrome and acute renal failure then developed postpartum Thrombotic thrombocytopenic purpura and Hemolytic uremic syndrome      Complications: Preeclampsia, severe, HELLP syndrome      Obstetric Comments   2015 36 wk- Severe preeclampsia with HELLP synbdrome and acute renal failure then developed postpartum Thrombotic thrombocytopenic purpura (TTP) and Hemolytic uremic syndrome (HUS). Patient also developed bilateral detached retinas.           Family History:   Unchanged from prenatal record.         Social History:     Social History     Tobacco Use     Smoking status: Never     Smokeless tobacco: Never   Substance Use Topics     Alcohol use: Yes     Comment: occa     History   Sexual Activity     Sexual activity: Yes     Partners: Male     Birth control/  "protection: None          Physical Exam:   Vital signs:  /93   Pulse (P) 80   Temp (P) 98.1  F (36.7  C) (Oral)   Resp (P) 16   Ht (P) 1.499 m (4' 11\")   Wt (P) 61.7 kg (136 lb)   SpO2 (P) 100%   BMI (P) 27.47 kg/m    General: Alert and oriented x 3. Well developed and well nourished gravid female.  Cardiovascular: Regular rate and rhythm. No significant murmurs, rubs, or gallops detected. Peripheral pulses normal bilaterally in upper and lower extremities.  Lungs: Clear to auscultation bilaterally, no adventitious sounds, good air movement throughout.  Abdomen gravid, soft, nontender.  Cervix:   Membranes: intact with bulging bag of membranes   Dilation: 2   Effacement: 70%   Station:-1   Consistency: average   Position: Mid  Presentation:Cephalic  Extremities: no clubbing, cyanosis, or edema.  Estimated fetal weight: 2500 gm.    Fetal Heart Rate Tracing: reactive and reassuring  Tocometer: frequency q 2-3 minutes        Diagnostics:   PRENATAL LABS:  Type and Rh: AB Negative  Antibody screen: Negative  Rubella: Positive  Treponema: Negative  Hepatitis B surface antigen: Negative  Hepatitis C: Negative  HIV: Negative  Diabetic Screen: 132  3 Hour GTT: 124, 145, 125, 118    07/23/2023 Treponema: pending  07/23/2023 Type and screen: pending    Pre-eclampsia labs  Recent Labs   Lab Test 07/23/23  1728 07/18/23  0929 07/16/23  2029 07/11/23  1632 07/05/23  1143 05/17/23  1219 03/30/23  1232   WBC 11.8* 9.5 10.0 10.6  --    < > 7.0   HGB 12.3 11.9 11.5* 11.4*  --    < > 12.4   HCT 37.0 36.6 34.0* 34.9*  --    < > 36.2    240 217 218  --    < > 272   AST 31 24 28 31 25   < >  --    ALT 20 18 20 22 17   < >  --    CR 0.71 0.72 0.66 0.67 0.65   < > 0.68   URIC  --   --   --   --  3.9  --   --    MAG  --   --   --   --   --   --  2.0    < > = values in this interval not displayed.     07/05/2023 Urine protein/ creatine ratio: 0.13  07/18/2023 Urine protein/ creatine ratio: 0.15  07/23/2023 Urine protein/ " creatine ratio: pending    Coagulation labs  Recent Labs   Lab Test 23  1728 01/10/16  2315   INR 0.89 0.97   PTT 28 30     RADIOLOGY:  2023 OB level 2 ultrasound: 19 3/7 wk, 285 gm, 37%, breech presentation, posterior placenta, no placenta previa, normal MAGALIE, appropriate interval fetal growth, normal level 2 fetal anatomy.  2023 OB ultrasound: 25 6/7 wk, 909 gm, 21%, cephalic presentation, posterior placenta, normal MAGALIE.  2023 OB ultrasound: 31 2/7 wk, 1577 gm, 16%, cephalic presentation, fundal placenta, normal MAGALIE.  2023 OB ultrasound: 31 5/7 wk, 1915 gm, 4%, cephalic presentation, posterior placenta, normal MAGALIE, umbilical cord S/D ratio 3.6, Biophysical profile (BPP) score 2023 OB ultrasound: 36 2/7 wk, 2335 gm, 7%, cephalic presentation, fundal placenta, heart rate 126 bpm, normal MAGALIE, Biophysical profile (BPP) score 2023 OB Ultrasound: Umbilical artery doppler S/D ratio 3.2 which is normal         Assessment:   1. 38 year old  female at 37w0d with active labor and cervical change  2. Advanced maternal age  3. Intrauterine growth restriction  4. History of prior  section  5. Desires elective sterilization  6. Rh Negative  7. History of Delaney-Parkinson-White syndrome  8. History of multiple sclerosis  9. History of severe preeclampsia, help syndrome, TTP, acute renal failure .        Plan:   1. Admit.  2. Routine intrapartum care and monitoring per protocol.  3. The patient is in early labor with regular contractions and cervical change. I recommend admission and  section delivery. The patient has Intrauterine growth restriction at 7% with EFW of 2500 gm. The patient has Advanced maternal age.  4.  I discussed the risk, benefits, alternatives, indications, and expected outcomes of  section and bilateral salpingectomy with the patient.  I reviewed the risk of bleeding, need for blood transfusion, risk of infection, injury to  organs with need for repair, injury to uterus, need for hysterectomy, injury to fetus, anesthesia risk, deep vein thrombosis risk, unexpected findings, need for future  section deliveries, increased risk of placenta accreta and placenta previa in future pregnancies, and rarely death. I discussed the rare possibility of a  hysterectomy. She is agreeable to a blood transfusion if medically indicated and she is aware of the risks of a blood transfusion including HIV, Hepatitis B and Hepatitis C. I discussed the use of bilateral sequential compression devices. I discussed the routine recovery process, post-operative pain medication, use of a cano catheter and the anticipated hospital stay. I reviewed the permanent nature of sterilization. I reviewed the sterilization failure rate of up to 1%. I reviewed the risk for ectopic pregnancy after sterilization.  I reviewed the increased risk for post-sterilization depression and regret, especially for women under the age of 30.  5. The [place] sterilization consent form was signed.  6. I reviewed the permanent nature of sterilization. I reviewed the sterilization failure rate of up to 1%. I reviewed the risk for ectopic pregnancy after sterilization.  I reviewed the increased risk for post-sterilization depression and regret, especially for women under the age of 30. I discussed the recovery period and the expected discomfort.  7. She verbalized understanding and desired to proceed.  8. The patient asked appropriate questions and these were answered for her.  9. The consent form was reviewed and signed.    The risks, benefits, and alternatives have been fully discussed with the patient. She desires to proceed.    Naseem Cruz MD  Obstetrics and Gynecology

## 2023-07-24 ENCOUNTER — ANESTHESIA (OUTPATIENT)
Dept: SURGERY | Facility: HOSPITAL | Age: 38
End: 2023-07-24

## 2023-07-24 LAB
ABO/RH(D): NORMAL
ALBUMIN SERPL BCG-MCNC: 3 G/DL (ref 3.5–5.2)
ALP SERPL-CCNC: 148 U/L (ref 35–104)
ALT SERPL W P-5'-P-CCNC: 16 U/L (ref 0–50)
ANION GAP SERPL CALCULATED.3IONS-SCNC: 12 MMOL/L (ref 7–15)
AST SERPL W P-5'-P-CCNC: 25 U/L (ref 0–45)
BILIRUB SERPL-MCNC: <0.2 MG/DL
BUN SERPL-MCNC: 8.7 MG/DL (ref 6–20)
CALCIUM SERPL-MCNC: 8.6 MG/DL (ref 8.6–10)
CHLORIDE SERPL-SCNC: 107 MMOL/L (ref 98–107)
CREAT SERPL-MCNC: 0.65 MG/DL (ref 0.51–0.95)
DEPRECATED HCO3 PLAS-SCNC: 18 MMOL/L (ref 22–29)
ERYTHROCYTE [DISTWIDTH] IN BLOOD BY AUTOMATED COUNT: 12.7 % (ref 10–15)
FETAL BLEED SCREEN: NORMAL
GFR SERPL CREATININE-BSD FRML MDRD: >90 ML/MIN/1.73M2
GLUCOSE SERPL-MCNC: 134 MG/DL (ref 70–99)
HCT VFR BLD AUTO: 33.1 % (ref 35–47)
HGB BLD-MCNC: 11.1 G/DL (ref 11.7–15.7)
MCH RBC QN AUTO: 30.9 PG (ref 26.5–33)
MCHC RBC AUTO-ENTMCNC: 33.5 G/DL (ref 31.5–36.5)
MCV RBC AUTO: 92 FL (ref 78–100)
PLATELET # BLD AUTO: 222 10E3/UL (ref 150–450)
POTASSIUM SERPL-SCNC: 4.2 MMOL/L (ref 3.4–5.3)
PROT SERPL-MCNC: 5.6 G/DL (ref 6.4–8.3)
RBC # BLD AUTO: 3.59 10E6/UL (ref 3.8–5.2)
SODIUM SERPL-SCNC: 137 MMOL/L (ref 136–145)
SPECIMEN EXPIRATION DATE: NORMAL
T PALLIDUM AB SER QL: NONREACTIVE
URATE SERPL-MCNC: 3.9 MG/DL (ref 2.4–5.7)
WBC # BLD AUTO: 15.2 10E3/UL (ref 4–11)

## 2023-07-24 PROCEDURE — 120N000001 HC R&B MED SURG/OB

## 2023-07-24 PROCEDURE — 86901 BLOOD TYPING SEROLOGIC RH(D): CPT | Performed by: OBSTETRICS & GYNECOLOGY

## 2023-07-24 PROCEDURE — 250N000011 HC RX IP 250 OP 636: Mod: JZ | Performed by: OBSTETRICS & GYNECOLOGY

## 2023-07-24 PROCEDURE — 84550 ASSAY OF BLOOD/URIC ACID: CPT | Performed by: OBSTETRICS & GYNECOLOGY

## 2023-07-24 PROCEDURE — 85027 COMPLETE CBC AUTOMATED: CPT | Performed by: OBSTETRICS & GYNECOLOGY

## 2023-07-24 PROCEDURE — 80053 COMPREHEN METABOLIC PANEL: CPT | Performed by: OBSTETRICS & GYNECOLOGY

## 2023-07-24 PROCEDURE — 250N000013 HC RX MED GY IP 250 OP 250 PS 637: Performed by: OBSTETRICS & GYNECOLOGY

## 2023-07-24 PROCEDURE — 36415 COLL VENOUS BLD VENIPUNCTURE: CPT | Performed by: OBSTETRICS & GYNECOLOGY

## 2023-07-24 RX ADMIN — KETOROLAC TROMETHAMINE 30 MG: 30 INJECTION, SOLUTION INTRAMUSCULAR; INTRAVENOUS at 01:27

## 2023-07-24 RX ADMIN — ENOXAPARIN SODIUM 40 MG: 40 INJECTION SUBCUTANEOUS at 09:58

## 2023-07-24 RX ADMIN — KETOROLAC TROMETHAMINE 30 MG: 30 INJECTION, SOLUTION INTRAMUSCULAR; INTRAVENOUS at 16:00

## 2023-07-24 RX ADMIN — OXYCODONE HYDROCHLORIDE 5 MG: 5 TABLET ORAL at 20:45

## 2023-07-24 RX ADMIN — OXYCODONE HYDROCHLORIDE 5 MG: 5 TABLET ORAL at 03:07

## 2023-07-24 RX ADMIN — SENNOSIDES AND DOCUSATE SODIUM 2 TABLET: 50; 8.6 TABLET ORAL at 09:57

## 2023-07-24 RX ADMIN — HYDROXYZINE HYDROCHLORIDE 50 MG: 25 TABLET, FILM COATED ORAL at 18:49

## 2023-07-24 RX ADMIN — OXYCODONE HYDROCHLORIDE 5 MG: 5 TABLET ORAL at 07:29

## 2023-07-24 RX ADMIN — IBUPROFEN 800 MG: 800 TABLET, FILM COATED ORAL at 22:04

## 2023-07-24 RX ADMIN — HUMAN RHO(D) IMMUNE GLOBULIN 300 MCG: 1500 SOLUTION INTRAMUSCULAR; INTRAVENOUS at 12:45

## 2023-07-24 RX ADMIN — OXYCODONE HYDROCHLORIDE 5 MG: 5 TABLET ORAL at 16:06

## 2023-07-24 RX ADMIN — SENNOSIDES AND DOCUSATE SODIUM 1 TABLET: 50; 8.6 TABLET ORAL at 22:04

## 2023-07-24 RX ADMIN — OXYCODONE HYDROCHLORIDE 5 MG: 5 TABLET ORAL at 12:06

## 2023-07-24 RX ADMIN — KETOROLAC TROMETHAMINE 30 MG: 30 INJECTION, SOLUTION INTRAMUSCULAR; INTRAVENOUS at 07:29

## 2023-07-24 ASSESSMENT — ACTIVITIES OF DAILY LIVING (ADL)
ADLS_ACUITY_SCORE: 24

## 2023-07-24 NOTE — L&D DELIVERY NOTE
Delivery Summary    Sarah Ugalde MRN# [id number]   Age: 38 year old YOB: 1985     ASSESSMENT & PLAN: Please see separate  Section Operation Note for details of delivery.    Naseem Cruz MD  Obstetrics and Gynecology       Janet Ugalde-Sarah [[id number]]      Labor Event Times      Latent labor onset date/time: 2023 1640           Labor Length      3rd Stage (hrs): 0 (min): 1          Labor Events     labor?: No   steroids: None  Labor Type: Spontaneous  Predominate monitoring during 1st stage: continuous electronic fetal monitoring     Antibiotics received during labor?: No     Rupture identifier: Sac 1  Rupture date/time: 23 185   Rupture type: Artificial Rupture of Membranes  Fluid color: Clear  Fluid odor: Normal     Augmentation: None       Delivery/Placenta Date and Time      Delivery Date: 23 Delivery Time:  6:51 PM   Placenta Date/Time: 2023  6:52 PM  Oxytocin given at the time of delivery: after delivery of baby  Delivering clinician: Naseem Cruz MD              Apgars    Living status: Living   1 Minute 5 Minute 10 Minute 15 Minute 20 Minute   Skin color: 1  1       Heart rate: 2  2       Reflex irritability: 2  2       Muscle tone: 2  2       Respiratory effort: 1  1       Total: 8  8       Apgars assigned by: DR. AMARO/AMERICO CAORN       Cord      Vessels: 3 Vessels    Cord Complications: Nuchal   Nuchal Intervention: reduced         Nuchal cord description: tight nuchal cord         Cord Blood Disposition: Lab    Gases Sent?: No    Delayed cord clamping?: Yes    Cord Clamping Delay (seconds): 1-30 seconds    Stem cell collection?: No            Resuscitation    Methods: NCPAP, Oximetry  [place] Care at Delivery: Transfer to OR & C/S Delivery Note  Patient to OR at 1815 via cart.   Delivered viable Female via repeat  section by Dr. Cruz. Babe with spontaneous cry.  To warmer, stimulated and dried by  "MD, nursery RN and RT. Babe with mild respiratory distress and grunting. O2 sat probe applied and CPAP started at approximately 2 min 30 sec of life for decreased O2 saturations (44-50%%). FiO2 increased to 50% at approximately 3 min 15 sec of life (O2 saturation increased to 67%). O2 saturation 76% at approximately 4 min 40 sec of life.  bpm and O2 sat 87% at 10 min of life with continued CPAP. FiO2 decreased to 30% at approximately 10 min 35 sec of life ( bpm; O2 sat 93%). Babe swaddled and handed to dad to bring over for mom to see. Babe placed in bassinet and brought to nursery.     190: Babe placed under head box  Dr. Jacobs present at delivery to care for baby.           Measurements      Weight: 5 lb 1.8 oz Length: 1' 6.5\"     Head circumference: 30.5 cm Chest circumference: 30.5 cm   Abdominal girth: 27.9 cm       Labor Events and Shoulder Dystocia    Fetal Tracing Prior to Delivery: Category 1       Delivery (Maternal) (Provider to Complete) ([id number])           Blood Loss  Mother: Sarah Ugalde R #[id number]     Start of Mother's Information      Delivery Blood Loss  23 1846 - 23 0758      Total Surgical QBL Blood Loss (mL) Hospital Encounter 1187 mL    Postpartum QBL (mL) Hospital Encounter 100 mL    Total  1287 mL               End of Mother's Information  Mother: Sarah Ugalde R #[id number]                Delivery - Provider to Complete ([id number])    Delivering clinician: Naseem Cruz MD  Delivery Type (Choose the 1 that will go to the Birth History): , Low Transverse                          Priority: Urgent      Specifics: Repeat                       Placenta    Date/Time: 2023  6:52 PM  Removal: Manual Removal  Disposition: Hospital disposal             Anesthesia    Method: Spinal                    Presentation and Position    Presentation: Vertex     Occiput Anterior                     Naseem Cruz MD  "

## 2023-07-24 NOTE — ANESTHESIA CARE TRANSFER NOTE
Patient: Sarah Ugalde    Procedure: Procedure(s):   SECTION, WITH BILATERAL SALPINGECTOMY, Viable baby girl born at 1851       Diagnosis: AMA (advanced maternal age) multigravida 35+ [O09.529]  IUGR (intrauterine growth restriction) affecting care of mother [O36.5990]  Encounter for sterilization [Z30.2]  Hx of pre-eclampsia in prior pregnancy, currently pregnant [O09.299]  Hx of  section [Z98.891]  Diagnosis Additional Information: No value filed.    Anesthesia Type:   Spinal     Note:    Oropharynx: oropharynx clear of all foreign objects and spontaneously breathing  Level of Consciousness: awake  Oxygen Supplementation: room air    Independent Airway: airway patency satisfactory and stable  Dentition: dentition unchanged  Vital Signs Stable: post-procedure vital signs reviewed and stable  Report to RN Given: handoff report given  Patient transferred to: PACU    Handoff Report: Identifed the Patient, Identified the Reponsible Provider, Reviewed the pertinent medical history, Discussed the surgical course, Reviewed Intra-OP anesthesia mangement and issues during anesthesia, Set expectations for post-procedure period and Allowed opportunity for questions and acknowledgement of understanding      Vitals:  Vitals Value Taken Time   /84 23   Temp     Pulse 87 23   Resp 34 23   SpO2 90 % 23   Vitals shown include unvalidated device data.    Electronically Signed By: EVERARDO PUTNAM CRNA  2023  7:59 PM

## 2023-07-24 NOTE — PROGRESS NOTES
"Name: Sarah Ugalde  Age: 38 year old  YOB: 1985   Medical Record #: [id number]     Postpartum Day 1:     DATE: 2023  SUBJECTIVE:  Patient is doing well. Her pain is well controlled with current medications. She reports normal lochia. She is tolerating a regular diet without nausea. She is not ambulating yet. She is not passing flatus. The patient denies depression symptoms. She denies headache or vision changes, shortness of breath, or chest pain. She denies fever or chills. The baby is well. She is bottle feeding.    OBJECTIVE:  /90   Pulse 90   Temp 98.4  F (36.9  C) (Oral)   Resp 18   Ht 1.499 m (4' 11\")   Wt 61.7 kg (136 lb)   SpO2 99%   Breastfeeding Unknown   BMI 27.47 kg/m      I/O last 3 completed shifts:  In: 1416.67 [I.V.:1416.67]  Out: 2337 [Urine:1050; Blood:1287]    Well developed and well nourished female in no apparent distress. Alert and oriented. Lungs are clear to auscultation bilaterally. Heart is regular rate and rhythm. Abdomen is nondistended with positive bowel sounds. Uterine fundus is firm and palpated below the umbilicus. Incision is clean, dry, and intact without erythema or exudate. Perineum is clean, dry and intact. Extremities no edema, non-tender.    LABS :  PRENATAL LABS:  Type and Rh: AB Negative  Antibody screen: Negative  Rubella: Positive  Treponema: Negative  Hepatitis B surface antigen: Negative  Hepatitis C: Negative  HIV: Negative  Diabetic Screen: 132  3 Hour GTT: 124, 145, 125, 118     2023 Treponema: pending    Pre-eclampsia labs  Recent Labs   Lab Test 23  0521 23  1728 23  0929 23  1632 23  1143 23  1219 23  1232   WBC 15.2* 11.8* 9.5 10.0   < >  --    < > 7.0   HGB 11.1* 12.3 11.9 11.5*   < >  --    < > 12.4   HCT 33.1* 37.0 36.6 34.0*   < >  --    < > 36.2    230 240 217   < >  --    < > 272   AST 25 31 24 28   < > 25   < >  --    ALT 16 20 18 20   < " > 17   < >  --    CR 0.65 0.71 0.72 0.66   < > 0.65   < > 0.68   URIC 3.9 4.8  --   --   --  3.9  --   --    MAG  --   --   --   --   --   --   --  2.0    < > = values in this interval not displayed.     2023 Urine protein/ creatine ratio: 0.13  2023 Urine protein/ creatine ratio: 0.15  2023 Urine protein/ creatine ratio: 0    Coagulation labs  Recent Labs   Lab Test 23  1728 01/10/16  2315   INR 0.89 0.97   PTT 28 30        IMPRESSION :  1. 38 year old  female at 37w0d, now delivered.  2. POD# 1 s/p Repeat  section and bilateral salpingectomy, for Intrauterine growth restriction in labor, doing well.  3. Rh Negative  4. History of Delaney-Parkinson-White syndrome  5. History of multiple sclerosis  6. History of severe preeclampsia, help syndrome, TTP, acute renal failure .     PLAN:  1. Continue routine post-partum and post-operative care.  The patient's vital signs are stable.  She is making good urine output.  Her labs are stable.  She has no signs or symptoms of preeclampsia or severe hypertension.  2. The patient is Rh negative and infant is Rh positive.  Plan to give RhoGAM this morning per protocol.  3. Advance diet as tolerated.  4. Discontinue cano catheter.  5. Change to oral medications.  6. Encourage nursing.  7. Encourage ambulation.  8. Continue pharmacologic venous thromboembolic (VTE) prophylaxis per protocol.    9. Repeat Serology / Treponema Pallidum Antibody Test obtained per [facility] protocol.  10. I discussed some routine post-operative recommendations, precautions and instructions with the patient. I discussed incision and wound care recommendations and bathing recommendations.  11. Anticipate discharge on POD# 3 in stable condition.    Naseem Cruz MD  Obstetrics and Gynecology

## 2023-07-24 NOTE — PROGRESS NOTES
Called patient financial in regards to no insurance listed.     Medical record and Mark Anthony Score reviewed. No apparent needs noted at this time. Care Transitions will remain available if needs arise.  DANIELLE Booth @[phone] July 24, 2023

## 2023-07-24 NOTE — OP NOTE
Name: Sarah Ugalde  Age: 38 year old  YOB: 1985   Medical Record #: [id number]     [facility] Operative Note    Date: 2023   Pre-operative Diagnosis: 1. 38 year old  female at 37w0d with active labor and cervical change  2. Advanced maternal age  3. Intrauterine growth restriction  4. History of prior  section  5. Desires elective sterilization  6. Rh Negative  7. History of Delaney-Parkinson-White syndrome  8. History of multiple sclerosis  9. History of severe preeclampsia, help syndrome, TTP, acute renal failure .   Post-operative Diagnosis: 1. Same.    2. Delivered a viable female infant.   Procedure: 1 Repeat low transverse  section    2. Bilateral salpingectomy.    Surgeon: Naseem Cruz MD   Assistant(s): None   Anesthesia: Spinal anesthesia and TAP block   Estimated Blood Loss: 1187 ml QBL   Total IV Fluids: (See anesthesia record)   Blood Transfusion: No transfusion was given during surgery   Total Urine Output: (See anesthesia record)   Drains: Friedman catheter   Specimens: 1 Cord blood     2. Right and left fallopian tubes (right tube tagged)   Findings: Viable  female infant in occiput anterior position. Apgars were 8 / 8. Weight was 2320 grams. Amniotic fluid was clear. Nuchal cord x 1 reduced on abdomen. There was good cry on the abdomen at cord clamp. Placenta delivered intact with a three-vessel cord. Normal appearing uterus, tubes, and ovaries bilaterally.   Complications: None   Disposition: Patient in stable condition, transferred to post-anesthesia recovery. Infant in stable condition to the nursery.   Times: Surgery Start:     Delivery of Infant:     Delivery of Placenta: 1852    Surgery Stop:      Procedure in Detail:  I reviewed the risks, benefits, alternatives, indications, and expected outcomes of  section and Bilateral tubal ligation with the patient. She verbalized understanding and desired to  proceed. She does not want to have any further children. Consent form was signed. Sarah DEMETRIO Aftab understands that a low rate of failure in the form of pregnancy can occur with this procedure. The site of surgery was properly noted and marked.     The patient was taken to the operating room and placed on the operating room table in supine position. A hard stop timeout was accomplished. Sarah Ugalde was verified and all members agreed on the procedure and the patient consent. After uneventful anesthesia placement, bilateral SCDs were placed on the lower extremities. Friedman catheter was placed. Fetal heart tones were obtained and were found to be 123 beats per minute. The patient was prepped and draped in a left lateral tilt position in the usual sterile fashion.     A Pfannenstiel low transverse skin incision was made 2 cm above the symphysis pubis and carried down sharply through the subcutaneous tissue to the rectus fascia. The fascia was incised in the midline and this incision was extended laterally with the Vega scissors. The superior aspect of the fascial incision was grasped with Kocher clamps, elevated and the rectus muscles were dissected off. The inferior aspect of the fascial incision was similarly grasped, elevated and the rectus muscles were dissected off. The rectus muscles were  in the midline. The peritoneum was identified, elevated, and entered sharply with Metzenbaum scissors. This incision was extended superiorly and inferiorly with good visualization of the bladder. A ring retractor was placed into the incision. The vesicouterine peritoneum was identified, elevated and entered sharply with Metzenbaum scissors. This incision was extended. The bladder flap was created digitally.    A low segment, transverse uterine incision was made with the scalpel and carried down sharply to the uterine cavity. This incision was extended laterally with the fingers. There was clear fluid noted  upon entry into the uterine cavity. The infant's head was delivered in an occiput anterior position in a flexed fashion. Nuchal cord x 1 reduced on abdomen. The nose and mouth were suctioned. This was followed by delivery of the anterior shoulder, posterior shoulder, body of the infant and remainder of the infant. There was good cry on the abdomen at cord clamp. The infant was handed to the waiting nursery attendant and Dr. Jacobs.     Cord blood was obtained for evaluation. The placenta was delivered intact with a three-vessel cord. The uterus was cleared of all clots and debris. Bimanual massage was performed. The patient received tranexamic acid. Pitocin was given in the IV to firm the uterus. The patient was given Methergine x 1 to firm the uterus. The uterine incision was repaired with 1-0 chromic suture in a running, locked fashion. A second layer of the same suture was used to obtain excellent hemostasis. The incision was inspected and found to be hemostatic. The fundus was palpated and found to be firm.    The ureters were identified bilaterally. The right fallopian tube was identified and grasped with a Barry clamp and elevated. The mesosalpinx of the right fallopian tube was serially grasped with the LigaSure bipolar cutting cautery device, fulgurated and sharply transected. The right fallopian tube was then fulgurated and sharply transected with the LigaSure device at the uterine cornua. This completely freed the right fallopian tube from the uterus and the right ovary. The right fallopian tube specimen was sent to pathology.  A similar procedure was performed with the left fallopian tube. The left fallopian tube was also completely freed from the uterus and the left ovary. The left fallopian tube was sent to pathology. Both ovaries appeared normal and viable. Hemostasis was visualized at both of the pedicles.     The area behind the uterus and the left and right paracolic gutters were copiously  irrigated with warm normal saline and cleared of all clots and debris. Irrigation was carried out until clear. The uterus was returned to the abdomen. The uterine incision was again inspected and found to be hemostatic. The ring retractor was removed. The rectus muscles were inspected and found to be hemostatic. The rectus muscles were re-approximated with 1-0 Vicryl suture. The fascia was reapproximated with loop 1-0 PDS suture in a running fashion. The subcutaneous tissues were copiously irrigated with warm normal saline and cleared of all clots and debris. This was found to be hemostatic. The skin was closed with a subcuticular stitch of 3-0 Monocryl suture and skin glue. Steri strips and bandages were placed over the incision in the usual fashion. The fundus was palpated and found to be firm. A vaginal exam was performed at the end of the procedure to evacuate the lower uterine segment and vagina of blood clots. All sponge, instrument and needle counts were correct prior to the abdominal closure and again at the conclusion of the case. There were no complications and the patient was transferred to the recovery room in stable condition. The infant is in stable condition to the nursery.    Naseem Cruz MD  Obstetrics and Gynecology

## 2023-07-24 NOTE — PLAN OF CARE
Transfer to OR & C/S Delivery Note  Patient to OR at 1815 via cart.   Delivered viable Female via repeat  section by Dr. Cruz. Babe with spontaneous cry.  To warmer, stimulated and dried by MD, nursery RN and RT. Babe with mild respiratory distress and grunting. O2 sat probe applied and CPAP started at approximately 2 min 30 sec of life for decreased O2 saturations (44-50%%). FiO2 increased to 50% at approximately 3 min 15 sec of life (O2 saturation increased to 67%). O2 saturation 76% at approximately 4 min 40 sec of life.  bpm and O2 sat 87% at 10 min of life with continued CPAP. FiO2 decreased to 30% at approximately 10 min 35 sec of life ( bpm; O2 sat 93%). Babe swaddled and handed to dad to bring over for mom to see. Babe placed in bassinet and brought to nursery.     : Buck placed under head box  Dr. Jacobs present at delivery to care for baby.

## 2023-07-24 NOTE — PLAN OF CARE
Assessments as charted. B/P: 148/80, T: 98.5, P: 90, R: 18. Rates pain: 3/10 Adequate relief with MD order, see MAR. Incision: Healing well, well approximated, without signs of infection, and no drainage. Voiding without difficulty. Fundus firm U/-1. Lochia: Light. Activity: moving with difficulty due to post op surgical pain. Infant feeding: Formula.           Postpartum breastfeeding assessment completed and education provided, see Patient Education Activity.  Items included in the education are:   proper positioning and latch  effectiveness of feeding  manual expression  handling and storing breastmilk  maintenance of breastfeeding for the first 6 months  sign/symptoms of infant feeding issues requiring referral to qualified health care provider  Postpartum care education provided, see Patient Education activity. Patient denies needs. Will monitor.  Kay Murillo as charted. B/P: 148/80, T: 98.5, P: 90, R: 18. Rates pain: 3/10 Adequate pain relief with prescribed meds, see MAR. Incision: Healing well, well approximated, without signs of infection, and no drainage. Voiding without difficulty. Fundus firm U/-1. Lochia: Light. Activity: moving with difficulty due to post op surgical pain. Infant feeding: Formula.   Friedman removed without difficulty. Pt dangled at bedside with SBA x2, up and marched in place. Reports a little light headedness. Will assist pt in 2 hours to ambulate.         Postpartum breastfeeding assessment completed and education provided, see Patient Education Activity.  Items included in the education are:   Formula feeding  Sleeping with sports bra  Increase water intake and ambulation to activate motility  Postpartum care education provided, see Patient Education activity. Patient denies needs. Will monitor.  Beatriz Rangel RN

## 2023-07-24 NOTE — ANESTHESIA PROCEDURE NOTES
"TAP Procedure Note    Pre-Procedure   Staff -        CRNA: Flavia Evans APRN CRNA       Performed By: CRNA       Location: OR       Procedure Start/Stop Times: 2023 7:22 PM and 2023 7:32 PM       Pre-Anesthestic Checklist: patient identified, IV checked, site marked, risks and benefits discussed, informed consent, monitors and equipment checked, pre-op evaluation, at physician/surgeon's request and post-op pain management  Timeout:       Correct Patient: Yes        Correct Procedure: Yes        Correct Site: Yes        Correct Position: Yes        Correct Laterality: Yes   Procedure Documentation  Procedure: TAP       Diagnosis:  SECTION       Laterality: bilateral       Patient Position: supine       Skin prep: Chloraprep       Needle Type: insulated and short bevel       Needle Gauge: 20.        Needle Length (Inches): 4        Ultrasound guided       1. Ultrasound was used to identify targeted nerve, plexus, vascular marker, or fascial plane and place a needle adjacent to it in real-time.       2. Ultrasound was used to visualize the spread of anesthetic in close proximity to the above referenced structure.       3. A permanent image is entered into the patient's record.       4. The visualized anatomic structures appeared normal.    Assessment/Narrative         The placement was negative for: blood aspirated, painful injection and site bleeding       Paresthesias: No.       Bolus given via needle..        Secured via.        Insertion/Infusion Method: Single Shot       Complications: none    Medication(s) Administered   Bupivacaine 0.25% PF (Infiltration) - Infiltration   30 mL - 2023 7:32:00 PM  Medication Administration Time: 2023 7:22 PM     Comments:  15ml each side TAP block       FOR [facility] ([facility] ONLY:   Pain Team Contact information: please page the Pain Team Via [employer]. Search \"Pain\". During daytime hours, please page the attending first. At night please page the "  first.

## 2023-07-24 NOTE — PLAN OF CARE
Assessments as charted. B/P: 139/85, T: 98.4, P: 90, R: 18. Rates pain: 5/10,  relief with prn oxycodone. Incision: Healing well, well approximated, and without signs of infection. Fundus firm, U/1. Lochia: Light. Friedman catheter in place with adequate output. Pt still has numbness and tingling from calf down, but was able to sit and dangle at bedside with assistance. Tolerating food and oral fluids without nausea.  Infant feeding: Formula. Postpartum care education provided, see Patient Education activity. Patient denies needs. Will monitor.  Bruna Borrero RN

## 2023-07-24 NOTE — PLAN OF CARE
"Data: Patient presented to the [facility] on 2023 at 4:35 PM.  Reason for maternal/fetal assessment is elevated blood pressures and \"increased puffiness\". Patient reports elevated blood pressure with home monitoring and feeling \"more stiff\" and \"swollen\" in her finger joints and feet. Patient denies uterine contractions, leaking of vaginal fluid/rupture of membranes, vaginal bleeding, abdominal pain, pelvic pressure, nausea, vomiting, headache, visual disturbances, epigastric or RUQ pain, significant edema. Patient reports fetal movement is normal. Patient is a 37w0d .  Prenatal record reviewed. Pregnancy has been complicated by hypertension, advanced maternal age (>=36yo) and fetal growth restriction.    Vital signs with elevated BP. Support person is present.     Action: Order from MD to obtain an NST and serial BPs. Verbal consent for EFM. Triage assessment completed.     1705: Dr. Cruz at bedside. SVE performed (2 cm/70%/-1) with a \"bulging bag.\"    1710: MD discussed with pt and decision made to proceed with  this evening.    Response: Patient verbalized agreement with plan.    1715: Dr. Cruz has been here to see patient and  has determined that [facility] should be readied for unscheduled  section. Pt admitted to inpatient status. Plan of care reviewed with patient and support person, Zan. Questions answered and concerns addressed by MD. Patient agrees with plan of care. Consent signed. Anesthesia notified. IV  fluids infusing well. Ted cloth applied to abdomen. Bicitra given. Patient last had something to eat at approximately 10 AM this morning. Ready for surgery. To OR per cart.    Patient ambulated to cart via self.. Patient is alert and oriented X 3. Pt  Denies pain.       "

## 2023-07-24 NOTE — OR NURSING
Patient awake and alert, denies pain. Scant amount rubra noted, no clots. Vitals stable. Report to Margarita MARTINEZ

## 2023-07-24 NOTE — ANESTHESIA POSTPROCEDURE EVALUATION
Patient: Sarah Ugalde    Procedure: Procedure(s):   SECTION, WITH BILATERAL SALPINGECTOMY, Viable baby girl born at 1851       Anesthesia Type:  Spinal    Note:  Disposition: Inpatient   Postop Pain Control: Uneventful            Sign Out: Well controlled pain   PONV: No   Neuro/Psych: Uneventful            Sign Out: Acceptable/Baseline neuro status   Airway/Respiratory: Uneventful            Sign Out: Acceptable/Baseline resp. status   CV/Hemodynamics: Uneventful            Sign Out: Acceptable CV status; No obvious hypovolemia; No obvious fluid overload   Other NRE: NONE   DID A NON-ROUTINE EVENT OCCUR? No    Event details/Postop Comments:  Spinal level receding.  Pt states she feels a slight amount of pain on the right side, but it is tolerable.  In OB room post section with PACU RN, OB RN, baby, and support person.             Last vitals:  Vitals Value Taken Time   /92 23   Temp 96.4  F (35.8  C) 23   Pulse 87 23   Resp 18 23   SpO2 99 % 23   Vitals shown include unvalidated device data.    Electronically Signed By: EVERARDO PUTNAM CRNA  2023  8:36 PM

## 2023-07-24 NOTE — PLAN OF CARE
Face to face report given with opportunity to observe patient.    Report given to Beatriz Borrero RN   7/24/2023  7:18 AM

## 2023-07-25 LAB
PATH REPORT.COMMENTS IMP SPEC: NORMAL
PATH REPORT.FINAL DX SPEC: NORMAL
PATH REPORT.GROSS SPEC: NORMAL
PATH REPORT.MICROSCOPIC SPEC OTHER STN: NORMAL
PATH REPORT.RELEVANT HX SPEC: NORMAL
PHOTO IMAGE: NORMAL

## 2023-07-25 PROCEDURE — 250N000013 HC RX MED GY IP 250 OP 250 PS 637: Performed by: OBSTETRICS & GYNECOLOGY

## 2023-07-25 PROCEDURE — 120N000001 HC R&B MED SURG/OB

## 2023-07-25 PROCEDURE — 250N000011 HC RX IP 250 OP 636: Mod: JZ | Performed by: OBSTETRICS & GYNECOLOGY

## 2023-07-25 RX ORDER — OXYCODONE HYDROCHLORIDE 5 MG/1
5-10 TABLET ORAL EVERY 4 HOURS PRN
Status: DISCONTINUED | OUTPATIENT
Start: 2023-07-25 | End: 2023-07-26 | Stop reason: HOSPADM

## 2023-07-25 RX ADMIN — IBUPROFEN 800 MG: 800 TABLET, FILM COATED ORAL at 10:56

## 2023-07-25 RX ADMIN — OXYCODONE HYDROCHLORIDE 10 MG: 5 TABLET ORAL at 21:35

## 2023-07-25 RX ADMIN — OXYCODONE HYDROCHLORIDE 5 MG: 5 TABLET ORAL at 08:20

## 2023-07-25 RX ADMIN — ENOXAPARIN SODIUM 40 MG: 40 INJECTION SUBCUTANEOUS at 12:58

## 2023-07-25 RX ADMIN — OXYCODONE HYDROCHLORIDE 10 MG: 5 TABLET ORAL at 17:29

## 2023-07-25 RX ADMIN — SENNOSIDES AND DOCUSATE SODIUM 1 TABLET: 50; 8.6 TABLET ORAL at 21:35

## 2023-07-25 RX ADMIN — OXYCODONE HYDROCHLORIDE 5 MG: 5 TABLET ORAL at 04:13

## 2023-07-25 RX ADMIN — IBUPROFEN 800 MG: 800 TABLET, FILM COATED ORAL at 16:59

## 2023-07-25 RX ADMIN — SENNOSIDES AND DOCUSATE SODIUM 2 TABLET: 50; 8.6 TABLET ORAL at 09:49

## 2023-07-25 RX ADMIN — IBUPROFEN 800 MG: 800 TABLET, FILM COATED ORAL at 04:11

## 2023-07-25 RX ADMIN — OXYCODONE HYDROCHLORIDE 5 MG: 5 TABLET ORAL at 13:30

## 2023-07-25 ASSESSMENT — ACTIVITIES OF DAILY LIVING (ADL)
ADLS_ACUITY_SCORE: 20
ADLS_ACUITY_SCORE: 19
ADLS_ACUITY_SCORE: 20
ADLS_ACUITY_SCORE: 19
ADLS_ACUITY_SCORE: 24
ADLS_ACUITY_SCORE: 19
ADLS_ACUITY_SCORE: 20

## 2023-07-25 NOTE — DISCHARGE INSTRUCTIONS
Post-op Discharge Instructions  section    Discharge Diet: Regular diet   Discharge Activity: Increase activity as tolerated. No vigorous activity or exercise for 6 weeks.    No swimming or bath for 7-10 days.    No driving or operating machinery for 2 weeks or while on narcotics.    Lifting restrictions up to 15 pounds for 6 weeks.    Pelvic rest for 6 weeks which includes intercourse, douching and tampons.   Discharge Nursing: Nursing is encouraged.    Schedule outpatient lactation follow up in 2-4 days.   Discharge Instructions: Seek immediate medical evaluation and assistance if you develop any warning signs of postpartum depression.    Avoid constipation and straining. Use stool softeners and fiber to avoid constipation.    Call the OB/GYN Clinic Nurse at [phone] for problems such as fever (temperature >100.4), chills, pain not controlled by usual oral pain medications, persistent nausea and vomiting, constipation, difficulty nursing, heavy odorous vaginal discharge, burning or pain associated with urination.  Call for excessive vaginal bleeding, saturating more than one pad an hour for more than three consecutive hours.  Call for any problems with the incision, drainage or redness from incision site or increasing pain.   Discharge Wound care: Keep your incisions clean and dry by running soapy water over them and dabbing them dry. It is best to shower for the first week while the healing process is underway; then after 7-10 days it is ok to take a bath.    The incision was closed with Steri-Strips which may fall off on their own. If they do not, then they may be removed after 10-14 days.   Discharge Follow-up: Follow up for outpatient lactation appointment in 2-4 days if desired.    Follow up for outpatient blood pressure check in 2-4 days with clinic RN.    Follow up with Primary Care Provider in 2 weeks for multiple sclerosis and anxiety management.    Follow up for postpartum exam and incision  check in 1-2 weeks with Dr. Grimaldo or Dr. Cruz.    Follow up for postpartum exam in 6 weeks with Dr. Grimlado.    Follow up with Primary Care Provider as scheduled for management of active medical problems and for adult preventive services.    Call Dr. Cruz at the OB/GYN Clinic at [phone] as necessary if you have problems in the interim.        Warning Signs after Having a Baby    Keep this paper on your fridge or somewhere else where you can see it.    Call your provider if you have any of these symptoms up to 12 weeks after having your baby.    Thoughts of hurting yourself or your baby  Pain in your chest or trouble breathing  Severe headache not helped by pain medicine  Eyesight concerns (blurry vision, seeing spots or flashes of light, other changes to eyesight)  Fainting, shaking or other signs of a seizure    Call 9-1-1 if you feel that it is an emergency.     The symptoms below can happen to anyone after giving birth. They can be very serious. Call your provider if you have any of these warning signs.    My provider s phone number: _______________________    Losing too much blood (hemorrhage)    Call your provider if you soak through a pad in less than an hour or pass blood clots bigger than a golf ball. These may be signs that you are bleeding too much.    Blood clots in the legs or lungs    After you give birth, your body naturally clots its blood to help prevent blood loss. Sometimes this increased clotting can happen in other areas of the body, like the legs or lungs. This can block your blood flow and be very dangerous.     Call your provider if you:  Have a red, swollen spot on the back of your leg that is warm or painful when you touch it.   Are coughing up blood.     Infection    Call your provider if you have any of these symptoms:  Fever of 100.4 F (38 C) or higher.  Pain or redness around your stitches if you had an incision.   Any yellow, white, or green fluid coming from places where you had  stitches or surgery.    Mood Problems (postpartum depression)    Many people feel sad or have mood changes after having a baby. But for some people, these mood swings are worse.     Call your provider right away if you feel so anxious or nervous that you can't care for yourself or your baby.    Preeclampsia (high blood pressure)    Even if you didn't have high blood pressure when you were pregnant, you are at risk for the high blood pressure disease called preeclampsia. This risk can last up to 12 weeks after giving birth.     Call your provider if you have:   Pain on your right side under your rib cage  Sudden swelling in the hands and face    Remember: You know your body. If something doesn't feel right, get medical help.     For informational purposes only. Not to replace the advice of your health care provider. Copyright 2020 [employer]. All rights reserved. Clinically reviewed by Antonia Mcqueen, RNC-OB, MSN. [employer] 373003 - Rev 02/23.

## 2023-07-25 NOTE — PLAN OF CARE
Data: Vital signs within normal limits. Postpartum checks within normal limits - see flow record. Patient eating and drinking normally. Patient able to empty bladder independently and is up ambulating. No apparent signs of infection. Incision healing well. Patient performing self cares and is able to care for infant.  Action: Patient medicated during the shift for  incisional pain . See MAR. Patient education done about pain management plan, plan of care and incentive spirometry use. See flow record.  Response: Positive attachment behaviors observed with infant. Support persons, kana Zuluaga.   Plan: Anticipate discharge on Wednesday, July 26th.

## 2023-07-25 NOTE — PLAN OF CARE
Dr. Cruz notified patient reported large clot when voiding this morning. MD acknowledged. MD notified of patient's 8/10 pain not relieved from medication, ambulation, or rest. Orders for 5-10mg oxycodone PRN.

## 2023-07-25 NOTE — PLAN OF CARE
Assessments as charted. B/P: 144/85, T: 98.1, P: 90, R: 18. Rates pain: 5/10 Prn oxycodone given. Incision: Healing well, well approximated, and without signs of infection. Voiding without difficulty. Fundus U/1. Lochia: Light. Activity: normal activity. Infant feeding: Formula.     Postpartum care education provided, see Patient Education activity. Patient denies needs. Will monitor.  Bruna Borrero RN

## 2023-07-25 NOTE — PROGRESS NOTES
Visit with patient this am.  Pt up ambulating without difficultly.  Motor and sensory back to baseline except for slight tingling in left toes.  Bilateral foot swelling.  Bilateral Plantar flexion +2.  Bilateral Dorsi flexion +1.  Increase in abdominal pain today as expected as TAP block wears off, but pain manageable.  Visit was in presence of Dr. DEE Cruz.  Will defer to OB to determine if Rx for MS appropriate. Symptoms minimal in left toes.

## 2023-07-25 NOTE — PLAN OF CARE
Face to face report given with opportunity to observe patient.    Report given to Margarita Rangel RN   7/24/2023  7:07 PM

## 2023-07-25 NOTE — PLAN OF CARE
Data: Vital signs within normal limits. Postpartum checks within normal limits - see flow record. Patient eating and drinking normally. Patient able to empty bladder independently and is up ambulating. No apparent signs of infection. Incision healing well. Patient performing self cares and is able to care for infant.  Action: Patient medicated during the shift for pain. See MAR. Patient reassessed within 1 hour after each medication and pain was improved. Patient education done. See flow record.  Response: Positive attachment behaviors observed with infant. Support person, kana Zuluaga.   Plan: Anticipate discharge on 07/26/23.

## 2023-07-25 NOTE — PLAN OF CARE
MD called and notified patient reported another large clot, blurred vision when looking at her phone and television, and intermittent epigastric pain. MD aware fundus firm, light bleeding, and blood pressure readings from today. MD acknowledged.

## 2023-07-25 NOTE — PROGRESS NOTES
"Name: Sarah Ugalde  Age: 38 year old  YOB: 1985   Medical Record #: [id number]     Postpartum Day 2:     DATE: 2023  SUBJECTIVE:  Patient is doing well. Her pain is managed and controlled with current medications. She reports normal lochia. She is tolerating a regular diet without nausea. She is ambulating and voiding spontaneously. She is passing flatus. She still has some slight tingling in left toes. The patient denies depression symptoms, headache, vision changes, fever or chills, shortness of breath, or chest pain. The baby is well. She is bottle feeding.    OBJECTIVE:  /85 (BP Location: Left arm, Patient Position: Sitting, Cuff Size: Adult Small)   Pulse 90   Temp 98.1  F (36.7  C) (Oral)   Resp 18   Ht 1.499 m (4' 11\")   Wt 61.7 kg (136 lb)   SpO2 99%   Breastfeeding Unknown   BMI 27.47 kg/m      I/O last 3 completed shifts:  In: -   Out: 1450 [Urine:1450]    Well developed and well nourished female in no apparent distress. Alert and oriented. Lungs are clear to auscultation bilaterally. Heart is regular rate and rhythm. Abdomen is nondistended with positive bowel sounds. Uterine fundus is firm and palpated below the umbilicus. Incision is clean, dry, and intact without erythema or exudate. Perineum is clean, dry and intact. Extremities trace edema, non-tender.    LABS :   2023 Treponema: Negative  2023 Pathology: pending    IMPRESSION :  1. 38 year old  female at 37w0d, now delivered.  2. POD# 2 s/p Repeat  section and Bilateral salpingectomy, for intrauterine growth restriction in labor, doing well.  3. Rh Negative  4. History of Delaney-Parkinson-White syndrome  5. History of Multiple sclerosis  6. History of Severe preeclampsia, HELLP syndrome, TTP, Acute renal failure     PLAN:  1. Continue routine post-partum and post-operative care.  2. The patient is Rh negative and infant is Rh positive.  Patient was given RhoGAM per " protocol   3. Encourage nursing.  4. Encourage ambulation.  5. Continue pharmacologic venous thromboembolic (VTE) prophylaxis per protocol.   6. Recommend patient see primary provider for Multiple sclerosis follow up.  7. Reviewed some of the home instructions in anticipation of discharge; these include being aware of warning signs of postpartum depression, symptoms of infection, avoiding constipation, iron replacement, and the need to seek medical evaluation for any questions or concerns.  8. I discussed incision and wound care recommendations and bathing recommendations.  9. Repeat Serology / Treponema Pallidum Antibody Test obtained per [facility] protocol.  10. Anticipate discharge on POD # 3 in stable condition.    Naseem Cruz MD  Obstetrics and Gynecology

## 2023-07-26 VITALS
HEART RATE: 75 BPM | SYSTOLIC BLOOD PRESSURE: 119 MMHG | DIASTOLIC BLOOD PRESSURE: 86 MMHG | HEIGHT: 59 IN | RESPIRATION RATE: 18 BRPM | TEMPERATURE: 98 F | BODY MASS INDEX: 26.89 KG/M2 | WEIGHT: 133.4 LBS | OXYGEN SATURATION: 99 %

## 2023-07-26 PROBLEM — F41.1 GAD (GENERALIZED ANXIETY DISORDER): Status: ACTIVE | Noted: 2023-07-23

## 2023-07-26 LAB
ALBUMIN SERPL BCG-MCNC: 2.4 G/DL (ref 3.5–5.2)
ALP SERPL-CCNC: 106 U/L (ref 35–104)
ALT SERPL W P-5'-P-CCNC: 16 U/L (ref 0–50)
ANION GAP SERPL CALCULATED.3IONS-SCNC: 8 MMOL/L (ref 7–15)
AST SERPL W P-5'-P-CCNC: 36 U/L (ref 0–45)
BILIRUB SERPL-MCNC: <0.2 MG/DL
BUN SERPL-MCNC: 12 MG/DL (ref 6–20)
CALCIUM SERPL-MCNC: 8.4 MG/DL (ref 8.6–10)
CHLORIDE SERPL-SCNC: 107 MMOL/L (ref 98–107)
CREAT SERPL-MCNC: 0.84 MG/DL (ref 0.51–0.95)
DEPRECATED HCO3 PLAS-SCNC: 23 MMOL/L (ref 22–29)
ERYTHROCYTE [DISTWIDTH] IN BLOOD BY AUTOMATED COUNT: 13.2 % (ref 10–15)
GFR SERPL CREATININE-BSD FRML MDRD: >90 ML/MIN/1.73M2
GLUCOSE SERPL-MCNC: 85 MG/DL (ref 70–99)
HCT VFR BLD AUTO: 28.8 % (ref 35–47)
HGB BLD-MCNC: 9.6 G/DL (ref 11.7–15.7)
MCH RBC QN AUTO: 31.5 PG (ref 26.5–33)
MCHC RBC AUTO-ENTMCNC: 33.3 G/DL (ref 31.5–36.5)
MCV RBC AUTO: 94 FL (ref 78–100)
PLATELET # BLD AUTO: 220 10E3/UL (ref 150–450)
POTASSIUM SERPL-SCNC: 4 MMOL/L (ref 3.4–5.3)
PROT SERPL-MCNC: 5.1 G/DL (ref 6.4–8.3)
RBC # BLD AUTO: 3.05 10E6/UL (ref 3.8–5.2)
SODIUM SERPL-SCNC: 138 MMOL/L (ref 136–145)
URATE SERPL-MCNC: 4.3 MG/DL (ref 2.4–5.7)
WBC # BLD AUTO: 9.4 10E3/UL (ref 4–11)

## 2023-07-26 PROCEDURE — 84550 ASSAY OF BLOOD/URIC ACID: CPT | Performed by: OBSTETRICS & GYNECOLOGY

## 2023-07-26 PROCEDURE — 36415 COLL VENOUS BLD VENIPUNCTURE: CPT | Performed by: OBSTETRICS & GYNECOLOGY

## 2023-07-26 PROCEDURE — 250N000013 HC RX MED GY IP 250 OP 250 PS 637: Performed by: OBSTETRICS & GYNECOLOGY

## 2023-07-26 PROCEDURE — 85027 COMPLETE CBC AUTOMATED: CPT | Performed by: OBSTETRICS & GYNECOLOGY

## 2023-07-26 PROCEDURE — 80053 COMPREHEN METABOLIC PANEL: CPT | Performed by: OBSTETRICS & GYNECOLOGY

## 2023-07-26 RX ORDER — IBUPROFEN 800 MG/1
800 TABLET, FILM COATED ORAL EVERY 6 HOURS PRN
Qty: 100 TABLET | Refills: 0 | Status: SHIPPED | OUTPATIENT
Start: 2023-07-26 | End: 2023-08-29

## 2023-07-26 RX ORDER — LABETALOL 100 MG/1
200 TABLET, FILM COATED ORAL ONCE
Status: COMPLETED | OUTPATIENT
Start: 2023-07-26 | End: 2023-07-26

## 2023-07-26 RX ORDER — OXYCODONE HYDROCHLORIDE 5 MG/1
5 TABLET ORAL EVERY 6 HOURS PRN
Qty: 20 TABLET | Refills: 0 | Status: SHIPPED | OUTPATIENT
Start: 2023-07-26 | End: 2023-08-17

## 2023-07-26 RX ORDER — HYDROXYZINE HYDROCHLORIDE 50 MG/1
50 TABLET, FILM COATED ORAL EVERY 6 HOURS PRN
Qty: 30 TABLET | Refills: 1 | Status: SHIPPED | OUTPATIENT
Start: 2023-07-26 | End: 2023-08-17

## 2023-07-26 RX ORDER — AMOXICILLIN 250 MG
1 CAPSULE ORAL 2 TIMES DAILY
Qty: 60 TABLET | Refills: 0 | Status: SHIPPED | OUTPATIENT
Start: 2023-07-26 | End: 2023-08-17

## 2023-07-26 RX ORDER — OXYCODONE HYDROCHLORIDE 5 MG/1
5-10 TABLET ORAL EVERY 4 HOURS PRN
Qty: 20 TABLET | Refills: 0 | Status: SHIPPED | OUTPATIENT
Start: 2023-07-26 | End: 2023-07-26

## 2023-07-26 RX ORDER — LORAZEPAM 0.5 MG/1
0.5 TABLET ORAL EVERY 4 HOURS PRN
Status: DISCONTINUED | OUTPATIENT
Start: 2023-07-26 | End: 2023-07-26 | Stop reason: HOSPADM

## 2023-07-26 RX ADMIN — LABETALOL HYDROCHLORIDE 200 MG: 100 TABLET, FILM COATED ORAL at 00:42

## 2023-07-26 RX ADMIN — HYDROXYZINE HYDROCHLORIDE 50 MG: 25 TABLET, FILM COATED ORAL at 00:13

## 2023-07-26 RX ADMIN — SENNOSIDES AND DOCUSATE SODIUM 2 TABLET: 50; 8.6 TABLET ORAL at 10:20

## 2023-07-26 RX ADMIN — IBUPROFEN 800 MG: 800 TABLET, FILM COATED ORAL at 06:29

## 2023-07-26 RX ADMIN — OXYCODONE HYDROCHLORIDE 10 MG: 5 TABLET ORAL at 03:06

## 2023-07-26 RX ADMIN — OXYCODONE HYDROCHLORIDE 10 MG: 5 TABLET ORAL at 10:20

## 2023-07-26 RX ADMIN — IBUPROFEN 800 MG: 800 TABLET, FILM COATED ORAL at 00:33

## 2023-07-26 ASSESSMENT — ACTIVITIES OF DAILY LIVING (ADL)
ADLS_ACUITY_SCORE: 19

## 2023-07-26 NOTE — PLAN OF CARE
Patient discharged at 11:52 AM via ambulation accompanied by spouse and staff. Prescriptions sent to patients preferred pharmacy. All belongings sent with patient.     Discharge instructions reviewed with Mireille. Patient verbalized understanding and no further questions. Listed belongings gathered and returned to patient.     Patient discharged to home.     Surgical Procedures during stay:  section  Did patient receive discharge instruction on wound care and recognition of infection symptoms? Yes    MISC  Follow up appointment made:  Yes  Home and hospital aquired medications returned to patient: N/A  Patient reports pain was well managed at discharge: Yes

## 2023-07-26 NOTE — PLAN OF CARE
Pt very anxious to take oral labetalol with concern that it will drop her BP too low.  Offered reassurance. Pt expresses that she is anxious because of traumatic events that had happened to her during the birth of her first child. Dr. Cruz gave orders for oral ativan 0.5 mg PRN Q4 for anxiety. Will continue to monitor BPs closely and offered reassurance and support. Orders for CBC, CMP and Uric Acid for AM draw.

## 2023-07-26 NOTE — PLAN OF CARE
Assessments as charted. B/P: 137/87, T: 98.3, P: 85, R: 18. Rates pain: 5/10. Incision: Healing well, well approximated, and without signs of infection. Voiding without difficulty. Fundus U/1. Lochia: Light, some small clots. Activity: normal activity. Infant feeding: Formula. Reflexes +3 and some blurry vision. Monitoring BP's closely.     Postpartum care education provided, see Patient Education activity. Patient denies needs. Will monitor.  Bruna Borrero RN

## 2023-07-26 NOTE — PLAN OF CARE
Data: Vital signs within normal limits. Postpartum checks within normal limits - see flow record. Patient eating and drinking normally. Patient able to empty bladder independently and is up ambulating. No apparent signs of infection. Incision healing well. Patient performing self cares and is able to care for infant.  Action: Patient medicated during the shift for pain. See MAR. Patient reassessed within 1 hour after each medication and pain was improved - patient stated she was comfortable. Patient education done. See flow record.  Response: Positive attachment behaviors observed with infant. Support person Zan present.   Plan: Anticipate discharge today. MD at bedside educating patient on home medications, checking blood pressures at home, when to call provider, and to follow up with MD on Friday.

## 2023-07-26 NOTE — DISCHARGE SUMMARY
"OB  BIRTH DISCHARGE SUMMARY    Name: Sarah Ugalde  Age: 38 year old  YOB: 1985   Medical Record #: [id number]     Date of Admission:  2023  Date of Discharge:  2023  Admitting Physician:  Naseem Cruz MD  Discharge Physician:  Naseem Cruz MD  Discharging Service:  Obstetrics and Gynecology     Primary Provider:   No Ref-Primary, Physician         Admission Diagnoses:     AMA (advanced maternal age) multigravida 35+ [O09.529]  IUGR (intrauterine growth restriction) affecting care of mother [O36.5990]  Encounter for sterilization [Z30.2]  Hx of pre-eclampsia in prior pregnancy, currently pregnant [O09.299]  Hx of  section [Z98.891]  Encounter for triage in pregnant patient [Z36.89]  Pregnancy, supervision for, high-risk, third trimester [O09.93]  Poor fetal growth affecting management of mother in third trimester, fetus 1 of multiple gestation [O36.5931]          Discharge Diagnosis:     1. 38 year old  female at 37w0d, now delivered.  2. POD# 3 s/p Repeat  section and Bilateral salpingectomy, for intrauterine growth restriction in labor, doing well.  3. Rh Negative  4. Anxiety  5. History of Delaney-Parkinson-White syndrome  6. History of Multiple sclerosis  7. History of Severe preeclampsia, HELLP syndrome, TTP, Acute renal failure           Discharge Disposition:     Discharged to home.           Condition on Discharge:     Discharge condition: Stable   Discharge vitals: /86 (BP Location: Left arm, Patient Position: Semi-Jorgensen's, Cuff Size: Adult Small)   Pulse 75   Temp 98  F (36.7  C) (Oral)   Resp 18   Ht 1.499 m (4' 11\")   Wt 61.7 kg (136 lb)   SpO2 99%   Breastfeeding Unknown   BMI 27.47 kg/m     Code status on discharge: Full Code          Brief History of Illness:     Sarah Ugalde is a 38 year old female who was admitted for labor. Please see her admit H&P for full details of her PMH, PSH, Meds, Allergies and exam on " admission.          Procedure Performed:     Repeat Low Segment Transverse  Section  Bilateral salpingectomy   Spinal analgesia         Infant Delivery Information:     37w0d at time of delivery.  Delivery Date: 2023  Sex: Female  Weight: 2320 gm  APGAR 1 min: 8  APGAR 5 min: 8         Hospital Course:     Sarah Ugalde is a 38 year old female who was admitted to the hospital for the above listed indications. She presented in labor and underwent a  section delivery. Please see her  Section Operative Note for full details regarding her delivery. The patient's hospital course was unremarkable except for some anxiety and labile blood pressures.  The patient had no signs or symptoms of severe disease.  The patient declined antihypertensive medications and antianxiety medications but did eventually take some hydroxyzine with improvement in her symptoms.  The Friedman catheter was removed and her diet was advanced. She recovered as anticipated and her postoperative course was uncomplicated. On POD# 3, she was meeting all of her postpartum goals and deemed stable for discharge. She was ambulating well, voiding without difficulty, tolerating a regular diet without nausea and vomiting, her pain was well controlled on oral pain medicines and her lochia was appropriate. No fever or significant wound drainage. Bottle feeding. Infant is stable. She will be discharged home in good condition on POD# 3. Her hemoglobin is 9.6. Her Blood type and Rh status is AB Negative, and Rhogam was given per protocol.          Post-Partum Complications:     None.         Contraception:     The patient had bilateral salpingectomy for sterilization. Pathology is pending.          Medications Prior to Admission:     Medications Prior to Admission   Medication Sig Dispense Refill Last Dose    aspirin (ASA) 81 MG chewable tablet Take 81 mg by mouth daily   2023 at AM    PRENATAL VIT-DOCUSATE-IRON-FA PO     7/23/2023 at AM             Discharge Medications:     Current Discharge Medication List        START taking these medications    Details   hydrOXYzine (ATARAX) 50 MG tablet Take 1 tablet (50 mg) by mouth every 6 hours as needed for other or anxiety (adjuvant pain)  Qty: 30 tablet, Refills: 1    Associated Diagnoses: Multigravida of advanced maternal age in third trimester      ibuprofen (ADVIL/MOTRIN) 800 MG tablet Take 1 tablet (800 mg) by mouth every 6 hours as needed for moderate pain or fever  Qty: 100 tablet, Refills: 0    Associated Diagnoses: Multigravida of advanced maternal age in third trimester      oxyCODONE (ROXICODONE) 5 MG tablet Take 1-2 tablets (5-10 mg) by mouth every 4 hours as needed for moderate to severe pain  Qty: 20 tablet, Refills: 0    Associated Diagnoses: Multigravida of advanced maternal age in third trimester      senna-docusate (SENOKOT-S/PERICOLACE) 8.6-50 MG tablet Take 1 tablet by mouth 2 times daily  Qty: 60 tablet, Refills: 0    Associated Diagnoses: Multigravida of advanced maternal age in third trimester           CONTINUE these medications which have NOT CHANGED    Details   aspirin (ASA) 81 MG chewable tablet Take 81 mg by mouth daily      PRENATAL VIT-DOCUSATE-IRON-FA PO                    Consultations:     No consultations were requested during this admission          Significant Results:     None.             Pending Results:      Pathology results are pending.           Discharge Instructions and Follow-Up:     Discharge Diet: Regular   Discharge Activity: Increase activity as tolerated. No vigorous activity or exercise for 6 weeks.    No swimming or bath for 7-10 days.    No driving or operating machinery for 2 weeks or while on narcotics.    Lifting restrictions up to 15 pounds for 6 weeks.    Pelvic rest for 6 weeks which includes intercourse, douching and tampons.   Discharge Nursing: Nursing is encouraged.    Schedule outpatient lactation follow up in 2-4 days.    Discharge Instructions: The warning signs of postpartum depression have been reviewed, and the patient is aware that this can be a common occurrence. She is encouraged to seek immediate medical evaluation and assistance for any questions or concerns.    Instructions on avoiding constipation and straining are discussed. The patient is advised in the appropriate use of stool softeners to avoid constipation.    The patient will call the Ob/Gyn Clinic Nurse at [phone] for problems such as fever (temperature >100.4), chills, pain not controlled by usual oral pain medications, persistent nausea and vomiting, constipation, difficulty nursing, heavy odorous vaginal discharge, burning or pain associated with urination.  Call for excessive vaginal bleeding, saturating more than one pad an hour for more than three consecutive hours.  Call for any problems with the incision, drainage or redness from incision site or increasing pain.   Discharge Wound care: Routine incision care is discussed with the patient.     The patient is instructed to keep her incisions clean and dry by running soapy water over them and dabbing them dry. It is best to shower for the first week while the healing process is underway; after 7-10 days it is safe to take a bath.     The incision was closed with Steri-Strips which may fall off on their own. If they do not, then they may be removed after 10-14 days.   Discharge Follow-up: Follow up for outpatient lactation appointment in 2-4 days if desired.    Follow up for outpatient blood pressure check in 2-4 days with clinic RN.    Follow up with Primary Care Provider in 2 weeks for multiple sclerosis and anxiety management.    Follow up for postpartum exam and incision check in 1-2 weeks with Dr. Grimaldo or Dr. Cruz.    Follow up for postpartum exam in 6 weeks with Dr. Grimaldo.    Follow up with Primary Care Provider as scheduled for management of active medical problems and for adult preventive  services.    Call Dr. Cruz at the OB/GYN Clinic at [phone] as necessary if you have problems in the interim.     Total time spent for discharge on date of discharge: 60 minutes  I saw the patient on the date of discharge.    Naseem Cruz MD  Obstetrics and Gynecology

## 2023-07-26 NOTE — PLAN OF CARE
Face to face report given with opportunity to observe patient.    Report given to Margarita Langley RN   7/25/2023  7:16 PM

## 2023-07-26 NOTE — PROGRESS NOTES
"Name: Sarah Ugalde  Age: 38 year old  YOB: 1985   Medical Record #: [id number]     Postpartum Day 3:     DATE: 2023  SUBJECTIVE:  Patient is doing well.  The patient has been very anxious over the last couple days because of traumatic events that happened during the birth of her first child.  She reports she has PTSD over that event which resulted in prolonged hospitalization, ICU care, acute renal failure and bilateral detached retinas.  The patient has declined antianxiety medication but did take hydroxyzine last night.  Last night the patient had anxiety and complained of blurry vision but denied headache, shortness of breath, chest pain, right upper quadrant tenderness.  She was found to have elevated blood pressure which improved with hydroxyzine x1 and oral labetalol x1.  The patient feels much better this morning.    Her pain is well controlled with current medications. She has normal lochia. She is tolerating a regular diet without nausea. She is ambulating and voiding spontaneously. She is passing flatus. The patient denies depression symptoms, headache, vision changes, shortness of breath, chest pain, fever or chills.  She continues to have some anxiety but denies it being as bad as last night. The baby is well. She is bottle feeding. She desires discharge.    OBJECTIVE:  /86 (BP Location: Left arm, Patient Position: Semi-Jorgensen's, Cuff Size: Adult Small)   Pulse 75   Temp 98  F (36.7  C) (Oral)   Resp 18   Ht 1.499 m (4' 11\")   Wt 61.7 kg (136 lb)   SpO2 99%   Breastfeeding Unknown   BMI 27.47 kg/m      No intake/output data recorded.    Well developed and well nourished female in no apparent distress. Alert and oriented. Lungs are clear to auscultation bilaterally. Heart is regular rate and rhythm. Abdomen is nondistended with positive bowel sounds. Uterine fundus is firm and palpated below the umbilicus. Incision is clean, dry, and intact without " erythema or exudate. Perineum is clean, dry and intact. Extremities trace edema, non-tender, DTR 2+.    LABS :  PRENATAL LABS:  Type and Rh: AB Negative  Antibody screen: Negative  Rubella: Positive  Treponema: Negative  Hepatitis B surface antigen: Negative  Hepatitis C: Negative  HIV: Negative  Diabetic Screen: 132  3 Hour GTT: 124, 145, 125, 118    2023 Treponema: Negative  2023 Pathology: pending    Pre-eclampsia labs  Recent Labs   Lab Test 23  0629 23  0521 23  1728 23  0929 23  1632 23  1143 23  1219 23  1232   WBC 9.4 15.2* 11.8* 9.5   < >  --    < > 7.0   HGB 9.6* 11.1* 12.3 11.9   < >  --    < > 12.4   HCT 28.8* 33.1* 37.0 36.6   < >  --    < > 36.2    222 230 240   < >  --    < > 272   AST 36 25 31 24   < > 25   < >  --    ALT 16 16 20 18   < > 17   < >  --    CR 0.84 0.65 0.71 0.72   < > 0.65   < > 0.68   URIC 4.3 3.9 4.8  --   --  3.9  --   --    MAG  --   --   --   --   --   --   --  2.0    < > = values in this interval not displayed.     2023 Urine protein/ creatine ratio: 0.13  2023 Urine protein/ creatine ratio: 0.15  2023 Urine protein/ creatine ratio: 0    Coagulation labs  Recent Labs   Lab Test 23  1728 01/10/16  2315   INR 0.89 0.97   PTT 28 30      IMPRESSION :  1. 38 year old  female at 37w0d, now delivered.  2. POD# 3 s/p Repeat  section and Bilateral salpingectomy, for intrauterine growth restriction in labor, doing well.  3. Rh Negative  4. Anxiety  5. History of Delaney-Parkinson-White syndrome  6. History of Multiple sclerosis  7. History of Severe preeclampsia, HELLP syndrome, TTP, Acute renal failure 2015    PLAN:  1. Continue routine post-partum and post-operative care.  2. The patient is Rh negative and infant is Rh positive.  Patient was given RhoGAM per protocol    3. The patient has anxiety and PTSD symptoms from her traumatic medical events associated with her last delivery  2015 where she had severe preeclampsia with HELLP syndrome and acute renal failure then she developed postpartum thrombotic thrombocytopenic purpura (TTP) and hemolytic uremic syndrome (HUS). Patient also developed bilateral detached retinas.  Reassured the patient that her [facility] labs and chemistries are normal.  Her blood pressure is normal this morning.  She is asymptomatic.  She has no signs or symptoms of severe disease.  The patient has very labile blood pressure which I believe is associated with her anxiety. I discussed having the patient take a low-dose of antihypertensive medication and the patient declines. I recommend the patient take antianxiety medications. I discussed expected outcome, risk, benefits, alternatives, indication of Hydroxyzine as well as Ativan with the patient. The patient declines Ativan and will consider taking Hydroxyzine.  4. I recommend the patient see her provider for anxiety follow-up.  5. I recommend the patient take home blood pressures.  Postpartum retention and preeclampsia precautions are reviewed with the patient.  6. I recommend the patient return to clinic in 2 to 3 days for nurse visit and BP check.  7. I recommend patient see primary provider for Multiple sclerosis follow up.  8. The patient had bilateral salpingectomy for sterilization.  Pathology is pending.  9. Encourage nursing.  10. Encourage ambulation.  11. Reviewed home instructions in anticipation of discharge; these include being aware of warning signs of postpartum depression, symptoms of infection, avoiding constipation, iron replacement, and the need to seek medical evaluation for any questions or concerns.  12. Reviewed incision and wound care recommendations and bathing recommendations.  13. Repeat Serology / Treponema Pallidum Antibody Test obtained per [facility] protocol.  14. Anticipate discharge today in stable condition.    Naseem Cruz MD  Obstetrics and Gynecology

## 2023-07-26 NOTE — PLAN OF CARE
Face to face report given with opportunity to observe patient.    Report given to Nicolasa Borrero RN   7/26/2023  7:09 AM

## 2023-07-26 NOTE — PLAN OF CARE
Patient states she has her own blood pressure cuff at home from pregnancy and does not want the hospital provided home blood pressure cuff. Education provided on use.

## 2023-07-26 NOTE — PLAN OF CARE
Dr. Cruz updated on elevated blood pressure, brisk reflexes and blurred vision. Infant currently out of room for car seat testing and patient appears anxious. Atarax given at 0013. No IV access at this time. New orders for 200mg oral labetalol.

## 2023-07-27 ENCOUNTER — HOSPITAL ENCOUNTER (INPATIENT)
Facility: HOSPITAL | Age: 38
LOS: 2 days | Discharge: HOME | DRG: 776 | End: 2023-07-29
Attending: STUDENT IN AN ORGANIZED HEALTH CARE EDUCATION/TRAINING PROGRAM | Admitting: OBSTETRICS & GYNECOLOGY

## 2023-07-27 LAB
ABO/RH(D): ABNORMAL
ALBUMIN SERPL BCG-MCNC: 3.5 G/DL (ref 3.5–5.2)
ALP SERPL-CCNC: 141 U/L (ref 35–104)
ALT SERPL W P-5'-P-CCNC: 42 U/L (ref 0–50)
ANION GAP SERPL CALCULATED.3IONS-SCNC: 14 MMOL/L (ref 7–15)
ANTIBODY SCREEN: POSITIVE
AST SERPL W P-5'-P-CCNC: 48 U/L (ref 0–45)
BASOPHILS # BLD AUTO: 0 10E3/UL (ref 0–0.2)
BASOPHILS NFR BLD AUTO: 1 %
BILIRUB SERPL-MCNC: <0.2 MG/DL
BUN SERPL-MCNC: 10.2 MG/DL (ref 6–20)
CALCIUM SERPL-MCNC: 9.3 MG/DL (ref 8.6–10)
CHLORIDE SERPL-SCNC: 105 MMOL/L (ref 98–107)
CREAT SERPL-MCNC: 0.78 MG/DL (ref 0.51–0.95)
DEPRECATED HCO3 PLAS-SCNC: 22 MMOL/L (ref 22–29)
EOSINOPHIL # BLD AUTO: 0.3 10E3/UL (ref 0–0.7)
EOSINOPHIL NFR BLD AUTO: 3 %
ERYTHROCYTE [DISTWIDTH] IN BLOOD BY AUTOMATED COUNT: 12.9 % (ref 10–15)
GFR SERPL CREATININE-BSD FRML MDRD: >90 ML/MIN/1.73M2
GLUCOSE SERPL-MCNC: 95 MG/DL (ref 70–99)
HCT VFR BLD AUTO: 33.3 % (ref 35–47)
HGB BLD-MCNC: 11 G/DL (ref 11.7–15.7)
IMM GRANULOCYTES # BLD: 0.1 10E3/UL
IMM GRANULOCYTES NFR BLD: 1 %
LYMPHOCYTES # BLD AUTO: 2.2 10E3/UL (ref 0.8–5.3)
LYMPHOCYTES NFR BLD AUTO: 25 %
MCH RBC QN AUTO: 30.8 PG (ref 26.5–33)
MCHC RBC AUTO-ENTMCNC: 33 G/DL (ref 31.5–36.5)
MCV RBC AUTO: 93 FL (ref 78–100)
MONOCYTES # BLD AUTO: 0.5 10E3/UL (ref 0–1.3)
MONOCYTES NFR BLD AUTO: 6 %
NEUTROPHILS # BLD AUTO: 5.7 10E3/UL (ref 1.6–8.3)
NEUTROPHILS NFR BLD AUTO: 64 %
NRBC # BLD AUTO: 0 10E3/UL
NRBC BLD AUTO-RTO: 0 /100
PLATELET # BLD AUTO: 322 10E3/UL (ref 150–450)
POTASSIUM SERPL-SCNC: 3.9 MMOL/L (ref 3.4–5.3)
PROT SERPL-MCNC: 6.7 G/DL (ref 6.4–8.3)
RBC # BLD AUTO: 3.57 10E6/UL (ref 3.8–5.2)
SODIUM SERPL-SCNC: 141 MMOL/L (ref 136–145)
SPECIMEN EXPIRATION DATE: ABNORMAL
WBC # BLD AUTO: 8.7 10E3/UL (ref 4–11)

## 2023-07-27 PROCEDURE — 86850 RBC ANTIBODY SCREEN: CPT | Performed by: STUDENT IN AN ORGANIZED HEALTH CARE EDUCATION/TRAINING PROGRAM

## 2023-07-27 PROCEDURE — 250N000011 HC RX IP 250 OP 636: Mod: JZ

## 2023-07-27 PROCEDURE — 85025 COMPLETE CBC W/AUTO DIFF WBC: CPT | Performed by: STUDENT IN AN ORGANIZED HEALTH CARE EDUCATION/TRAINING PROGRAM

## 2023-07-27 PROCEDURE — 80053 COMPREHEN METABOLIC PANEL: CPT | Performed by: STUDENT IN AN ORGANIZED HEALTH CARE EDUCATION/TRAINING PROGRAM

## 2023-07-27 PROCEDURE — 86901 BLOOD TYPING SEROLOGIC RH(D): CPT | Performed by: STUDENT IN AN ORGANIZED HEALTH CARE EDUCATION/TRAINING PROGRAM

## 2023-07-27 PROCEDURE — 99222 1ST HOSP IP/OBS MODERATE 55: CPT | Performed by: OBSTETRICS & GYNECOLOGY

## 2023-07-27 PROCEDURE — 36415 COLL VENOUS BLD VENIPUNCTURE: CPT | Performed by: STUDENT IN AN ORGANIZED HEALTH CARE EDUCATION/TRAINING PROGRAM

## 2023-07-27 PROCEDURE — 99285 EMERGENCY DEPT VISIT HI MDM: CPT | Performed by: STUDENT IN AN ORGANIZED HEALTH CARE EDUCATION/TRAINING PROGRAM

## 2023-07-27 PROCEDURE — 258N000003 HC RX IP 258 OP 636: Performed by: STUDENT IN AN ORGANIZED HEALTH CARE EDUCATION/TRAINING PROGRAM

## 2023-07-27 PROCEDURE — 99285 EMERGENCY DEPT VISIT HI MDM: CPT | Mod: 25

## 2023-07-27 PROCEDURE — 250N000011 HC RX IP 250 OP 636: Performed by: STUDENT IN AN ORGANIZED HEALTH CARE EDUCATION/TRAINING PROGRAM

## 2023-07-27 PROCEDURE — 93005 ELECTROCARDIOGRAM TRACING: CPT

## 2023-07-27 PROCEDURE — 120N000001 HC R&B MED SURG/OB

## 2023-07-27 RX ORDER — CALCIUM GLUCONATE 94 MG/ML
1 INJECTION, SOLUTION INTRAVENOUS
Status: DISCONTINUED | OUTPATIENT
Start: 2023-07-27 | End: 2023-07-28

## 2023-07-27 RX ORDER — SODIUM CHLORIDE, SODIUM LACTATE, POTASSIUM CHLORIDE, CALCIUM CHLORIDE 600; 310; 30; 20 MG/100ML; MG/100ML; MG/100ML; MG/100ML
INJECTION, SOLUTION INTRAVENOUS CONTINUOUS
Status: DISCONTINUED | OUTPATIENT
Start: 2023-07-27 | End: 2023-07-28

## 2023-07-27 RX ORDER — MAGNESIUM SULFATE HEPTAHYDRATE 40 MG/ML
INJECTION, SOLUTION INTRAVENOUS
Status: COMPLETED
Start: 2023-07-27 | End: 2023-07-27

## 2023-07-27 RX ORDER — MAGNESIUM SULFATE IN WATER 40 MG/ML
2 INJECTION, SOLUTION INTRAVENOUS CONTINUOUS
Status: DISCONTINUED | OUTPATIENT
Start: 2023-07-27 | End: 2023-07-28

## 2023-07-27 RX ORDER — MAGNESIUM SULFATE HEPTAHYDRATE 40 MG/ML
4 INJECTION, SOLUTION INTRAVENOUS ONCE
Status: DISCONTINUED | OUTPATIENT
Start: 2023-07-27 | End: 2023-07-28

## 2023-07-27 RX ORDER — LABETALOL 20 MG/4 ML (5 MG/ML) INTRAVENOUS SYRINGE
20-80 EVERY 10 MIN PRN
Status: DISCONTINUED | OUTPATIENT
Start: 2023-07-27 | End: 2023-07-28

## 2023-07-27 RX ORDER — HYDRALAZINE HYDROCHLORIDE 20 MG/ML
10 INJECTION INTRAMUSCULAR; INTRAVENOUS
Status: DISCONTINUED | OUTPATIENT
Start: 2023-07-27 | End: 2023-07-29 | Stop reason: HOSPADM

## 2023-07-27 RX ADMIN — LABETALOL HYDROCHLORIDE 20 MG: 5 INJECTION, SOLUTION INTRAVENOUS at 22:52

## 2023-07-27 RX ADMIN — SODIUM CHLORIDE, POTASSIUM CHLORIDE, SODIUM LACTATE AND CALCIUM CHLORIDE: 600; 310; 30; 20 INJECTION, SOLUTION INTRAVENOUS at 22:52

## 2023-07-27 RX ADMIN — MAGNESIUM SULFATE IN WATER 2 G: 40 INJECTION, SOLUTION INTRAVENOUS at 23:11

## 2023-07-27 RX ADMIN — MAGNESIUM SULFATE HEPTAHYDRATE 2 G: 40 INJECTION, SOLUTION INTRAVENOUS at 23:11

## 2023-07-27 RX ADMIN — LABETALOL HYDROCHLORIDE 40 MG: 5 INJECTION, SOLUTION INTRAVENOUS at 23:21

## 2023-07-27 ASSESSMENT — ACTIVITIES OF DAILY LIVING (ADL): ADLS_ACUITY_SCORE: 35

## 2023-07-27 NOTE — PROGRESS NOTES
Fetal Non-Stress Test Results    NST Ordered By: Dr. Grimaldo  NST Medical Indication: Hx preeclampsia    NST Start & Stop Times  NST Start Time: 1245  NST Stop Time: 1305                            NST Results  Fetus A   Baseline Rate: Normal  Accelerations: Present  Decelerations: None  Interpretation: reactive

## 2023-07-28 LAB
ALBUMIN MFR UR ELPH: <4 MG/DL
ALT SERPL W P-5'-P-CCNC: 34 U/L (ref 0–50)
AST SERPL W P-5'-P-CCNC: 37 U/L (ref 0–45)
CREAT SERPL-MCNC: 0.67 MG/DL (ref 0.51–0.95)
CREAT UR-MCNC: 17.9 MG/DL
GFR SERPL CREATININE-BSD FRML MDRD: >90 ML/MIN/1.73M2
HGB BLD-MCNC: 10.9 G/DL (ref 11.7–15.7)
HOLD SPECIMEN: NORMAL
MAGNESIUM SERPL-MCNC: 6.5 MG/DL (ref 1.7–2.3)
MAGNESIUM SERPL-MCNC: 7.2 MG/DL (ref 1.7–2.3)
PLATELET # BLD AUTO: 250 10E3/UL (ref 150–450)
PROT/CREAT 24H UR: NORMAL MG/G{CREAT}

## 2023-07-28 PROCEDURE — 36415 COLL VENOUS BLD VENIPUNCTURE: CPT | Performed by: OBSTETRICS & GYNECOLOGY

## 2023-07-28 PROCEDURE — 82565 ASSAY OF CREATININE: CPT | Performed by: OBSTETRICS & GYNECOLOGY

## 2023-07-28 PROCEDURE — 85049 AUTOMATED PLATELET COUNT: CPT | Performed by: OBSTETRICS & GYNECOLOGY

## 2023-07-28 PROCEDURE — 250N000011 HC RX IP 250 OP 636: Mod: JZ | Performed by: OBSTETRICS & GYNECOLOGY

## 2023-07-28 PROCEDURE — 99231 SBSQ HOSP IP/OBS SF/LOW 25: CPT | Performed by: OBSTETRICS & GYNECOLOGY

## 2023-07-28 PROCEDURE — 250N000013 HC RX MED GY IP 250 OP 250 PS 637: Performed by: OBSTETRICS & GYNECOLOGY

## 2023-07-28 PROCEDURE — 258N000003 HC RX IP 258 OP 636: Performed by: OBSTETRICS & GYNECOLOGY

## 2023-07-28 PROCEDURE — 120N000001 HC R&B MED SURG/OB

## 2023-07-28 PROCEDURE — 84450 TRANSFERASE (AST) (SGOT): CPT | Performed by: OBSTETRICS & GYNECOLOGY

## 2023-07-28 PROCEDURE — 84156 ASSAY OF PROTEIN URINE: CPT | Performed by: OBSTETRICS & GYNECOLOGY

## 2023-07-28 PROCEDURE — 83735 ASSAY OF MAGNESIUM: CPT | Performed by: OBSTETRICS & GYNECOLOGY

## 2023-07-28 PROCEDURE — 84460 ALANINE AMINO (ALT) (SGPT): CPT | Performed by: OBSTETRICS & GYNECOLOGY

## 2023-07-28 PROCEDURE — 85018 HEMOGLOBIN: CPT | Performed by: OBSTETRICS & GYNECOLOGY

## 2023-07-28 RX ORDER — BUTALBITAL, ACETAMINOPHEN AND CAFFEINE 50; 325; 40 MG/1; MG/1; MG/1
1 TABLET ORAL EVERY 4 HOURS PRN
Status: DISCONTINUED | OUTPATIENT
Start: 2023-07-28 | End: 2023-07-29 | Stop reason: HOSPADM

## 2023-07-28 RX ORDER — SODIUM CHLORIDE, SODIUM LACTATE, POTASSIUM CHLORIDE, CALCIUM CHLORIDE 600; 310; 30; 20 MG/100ML; MG/100ML; MG/100ML; MG/100ML
INJECTION, SOLUTION INTRAVENOUS CONTINUOUS
Status: DISCONTINUED | OUTPATIENT
Start: 2023-07-28 | End: 2023-07-29 | Stop reason: HOSPADM

## 2023-07-28 RX ORDER — OXYCODONE HYDROCHLORIDE 5 MG/1
5 TABLET ORAL EVERY 6 HOURS PRN
Status: DISCONTINUED | OUTPATIENT
Start: 2023-07-28 | End: 2023-07-29 | Stop reason: HOSPADM

## 2023-07-28 RX ORDER — MAGNESIUM SULFATE HEPTAHYDRATE 40 MG/ML
4 INJECTION, SOLUTION INTRAVENOUS
Status: DISCONTINUED | OUTPATIENT
Start: 2023-07-28 | End: 2023-07-29 | Stop reason: HOSPADM

## 2023-07-28 RX ORDER — LABETALOL 100 MG/1
200 TABLET, FILM COATED ORAL 2 TIMES DAILY
Status: DISCONTINUED | OUTPATIENT
Start: 2023-07-28 | End: 2023-07-28

## 2023-07-28 RX ORDER — AMOXICILLIN 250 MG
1 CAPSULE ORAL 2 TIMES DAILY
Status: DISCONTINUED | OUTPATIENT
Start: 2023-07-28 | End: 2023-07-29 | Stop reason: HOSPADM

## 2023-07-28 RX ORDER — CALCIUM GLUCONATE 94 MG/ML
1 INJECTION, SOLUTION INTRAVENOUS
Status: DISCONTINUED | OUTPATIENT
Start: 2023-07-28 | End: 2023-07-29 | Stop reason: HOSPADM

## 2023-07-28 RX ORDER — HYDROXYZINE HYDROCHLORIDE 25 MG/1
50 TABLET, FILM COATED ORAL EVERY 6 HOURS PRN
Status: DISCONTINUED | OUTPATIENT
Start: 2023-07-28 | End: 2023-07-29 | Stop reason: HOSPADM

## 2023-07-28 RX ORDER — MAGNESIUM SULFATE HEPTAHYDRATE 40 MG/ML
2 INJECTION, SOLUTION INTRAVENOUS
Status: DISCONTINUED | OUTPATIENT
Start: 2023-07-28 | End: 2023-07-29 | Stop reason: HOSPADM

## 2023-07-28 RX ORDER — IBUPROFEN 800 MG/1
800 TABLET, FILM COATED ORAL EVERY 6 HOURS PRN
Status: DISCONTINUED | OUTPATIENT
Start: 2023-07-28 | End: 2023-07-29 | Stop reason: HOSPADM

## 2023-07-28 RX ORDER — LIDOCAINE 40 MG/G
CREAM TOPICAL
Status: DISCONTINUED | OUTPATIENT
Start: 2023-07-28 | End: 2023-07-29 | Stop reason: HOSPADM

## 2023-07-28 RX ORDER — MAGNESIUM SULFATE IN WATER 40 MG/ML
1.5 INJECTION, SOLUTION INTRAVENOUS CONTINUOUS
Status: DISPENSED | OUTPATIENT
Start: 2023-07-28 | End: 2023-07-29

## 2023-07-28 RX ORDER — VITAMIN A ACETATE, .BETA.-CAROTENE, ASCORBIC ACID, CHOLECALCIFEROL, .ALPHA.-TOCOPHEROL ACETATE, DL-, THIAMINE MONONITRATE, RIBOFLAVIN, NIACINAMIDE, PYRIDOXINE HYDROCHLORIDE, FOLIC ACID, CYANOCOBALAMIN, CALCIUM CARBONATE, FERROUS FUMARATE, ZINC OXIDE, AND CUPRIC OXIDE 2000; 2000; 120; 400; 22; 1.84; 3; 20; 10; 1; 12; 200; 27; 25; 2 [IU]/1; [IU]/1; MG/1; [IU]/1; MG/1; MG/1; MG/1; MG/1; MG/1; MG/1; UG/1; MG/1; MG/1; MG/1; MG/1
1 TABLET ORAL DAILY
Status: DISCONTINUED | OUTPATIENT
Start: 2023-07-28 | End: 2023-07-29 | Stop reason: HOSPADM

## 2023-07-28 RX ADMIN — IBUPROFEN 800 MG: 800 TABLET, FILM COATED ORAL at 12:17

## 2023-07-28 RX ADMIN — IBUPROFEN 800 MG: 800 TABLET, FILM COATED ORAL at 20:22

## 2023-07-28 RX ADMIN — IBUPROFEN 800 MG: 800 TABLET, FILM COATED ORAL at 06:14

## 2023-07-28 RX ADMIN — OXYCODONE HYDROCHLORIDE 5 MG: 5 TABLET ORAL at 17:02

## 2023-07-28 RX ADMIN — PRENATAL VITAMINS-IRON FUMARATE 27 MG IRON-FOLIC ACID 0.8 MG TABLET 1 TABLET: at 09:57

## 2023-07-28 RX ADMIN — OXYCODONE HYDROCHLORIDE 5 MG: 5 TABLET ORAL at 04:55

## 2023-07-28 RX ADMIN — MAGNESIUM SULFATE IN WATER 2 G/HR: 40 INJECTION, SOLUTION INTRAVENOUS at 00:15

## 2023-07-28 RX ADMIN — OXYCODONE HYDROCHLORIDE 5 MG: 5 TABLET ORAL at 23:19

## 2023-07-28 RX ADMIN — OXYCODONE HYDROCHLORIDE 5 MG: 5 TABLET ORAL at 10:56

## 2023-07-28 RX ADMIN — LABETALOL HYDROCHLORIDE 200 MG: 100 TABLET, FILM COATED ORAL at 00:42

## 2023-07-28 RX ADMIN — SODIUM CHLORIDE, POTASSIUM CHLORIDE, SODIUM LACTATE AND CALCIUM CHLORIDE: 600; 310; 30; 20 INJECTION, SOLUTION INTRAVENOUS at 00:18

## 2023-07-28 RX ADMIN — SENNOSIDES AND DOCUSATE SODIUM 1 TABLET: 50; 8.6 TABLET ORAL at 21:03

## 2023-07-28 RX ADMIN — BUTALBITAL, ACETAMINOPHEN AND CAFFEINE 1 TABLET: 50; 325; 40 TABLET ORAL at 09:58

## 2023-07-28 RX ADMIN — HYDROXYZINE HYDROCHLORIDE 50 MG: 25 TABLET, FILM COATED ORAL at 23:19

## 2023-07-28 RX ADMIN — BUTALBITAL, ACETAMINOPHEN AND CAFFEINE 1 TABLET: 50; 325; 40 TABLET ORAL at 15:55

## 2023-07-28 ASSESSMENT — ACTIVITIES OF DAILY LIVING (ADL)
ADLS_ACUITY_SCORE: 18
TOILETING_ISSUES: NO
NUMBER_OF_TIMES_PATIENT_HAS_FALLEN_WITHIN_LAST_SIX_MONTHS: 1
ADLS_ACUITY_SCORE: 18
ADLS_ACUITY_SCORE: 18
WALKING_OR_CLIMBING_STAIRS_DIFFICULTY: NO
CONCENTRATING,_REMEMBERING_OR_MAKING_DECISIONS_DIFFICULTY: NO
WEAR_GLASSES_OR_BLIND: NO
ADLS_ACUITY_SCORE: 18
FALL_HISTORY_WITHIN_LAST_SIX_MONTHS: YES
DIFFICULTY_EATING/SWALLOWING: NO
DOING_ERRANDS_INDEPENDENTLY_DIFFICULTY: NO
ADLS_ACUITY_SCORE: 18
DRESSING/BATHING_DIFFICULTY: NO
ADLS_ACUITY_SCORE: 18
CHANGE_IN_FUNCTIONAL_STATUS_SINCE_ONSET_OF_CURRENT_ILLNESS/INJURY: NO
ADLS_ACUITY_SCORE: 18
ADLS_ACUITY_SCORE: 18

## 2023-07-28 NOTE — ED NOTES
Patient presents to the ED 5 days postpartum w/ c/o hypertension.   Hx of preeclampsia. Has had a headache that she can't get rid of with meds at home.     A&Ox4.   Bilateral LE edema.      scar appears dry, intact, symmetrical. No S/S of infection.

## 2023-07-28 NOTE — PLAN OF CARE
VSS, see flowsheet for assessment. Patient A&O, makes needs known. See MAR for pain management methods. Magnesium infusing per orders.     Face to face report given with opportunity to observe patient.    Report given to Beatriz Heredia RN   7/28/2023  7:08 AM

## 2023-07-28 NOTE — ED PROVIDER NOTES
History     Chief Complaint   Patient presents with    Hypertension    Postpartum Complications     HPI  Sarah Ugalde is a 38 year old female with a complex past medical history including WPW, thrombotic thrombocytopenic figueroa purpura hemolytic uremic syndrome, multiple sclerosis, preeclampsia with help syndrome, acute renal failure who presents to the emergency department today after a  section performed 4 days prior to arrival.  She is noted worsening swelling in her legs and high blood pressure so she came in for evaluation.  No other complaints at this time.  No fevers.  No excessive bleeding.  Lower abdominal pain is as she expected postoperatively.    Allergies:  Allergies   Allergen Reactions    Magnesium Other (See Comments)     Altered mental status after IV dosing       Problem List:    Patient Active Problem List    Diagnosis Date Noted    Pre-eclampsia, postpartum 2023     Priority: Medium    Pregnancy, supervision for, high-risk, third trimester 2023     Priority: Medium    AMA (advanced maternal age) multigravida 35+ 2023     Priority: Medium    IUGR (intrauterine growth restriction) affecting care of mother 2023     Priority: Medium    Hx of pre-eclampsia in prior pregnancy, currently pregnant 2023     Priority: Medium    Encounter for sterilization 2023     Priority: Medium    Hx of  section 2023     Priority: Medium    Rh negative state in antepartum period 2023     Priority: Medium    PARDEEP (generalized anxiety disorder) 2023     Priority: Medium    Poor fetal growth affecting management of mother in third trimester, fetus 1 of multiple gestation 2023     Priority: Medium    History of  delivery 2023     Priority: Medium    Encounter for triage in pregnant patient 2023     Priority: Medium    Multigravida of advanced maternal age in third trimester 2022     Priority: Medium     AMA:  NIPT -  declined  h/o WPW. BL ECG - nml  Zio Patch, Cards consult  H/o TTP/preeclampsia/HELLP/acute renal failure>  Baby asa 16 wks.  Nml BL labs.   level II - nml  H/o HUS  H/o MS  no flu or covid vax  Plan repeat /sterilization 23.  Sterilization forms signed.   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      HUS (hemolytic uremic syndrome) (H) 2015     Priority: Medium     Postpartum Thrombotic thrombocytopenic purpura (TTP) and 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      HELLP (hemolytic anemia/elev liver enzymes/low platelets in pregnancy) 2015     Priority: Medium     Severe preeclampsia with HELLP syndrome and acute renal failure      Detached retina, bilateral 2015     Priority: Medium     secondary to severe preeclampsia with HELLP syndroma and acute renal failure      WPW (Delaney-Parkinson-White syndrome) 2015     Priority: Medium     She has been evaluated for ablation, however, based on location this was not deemed safe by cardiology at the time      MS (multiple sclerosis) (H) 2015     Priority: Medium        Past Medical History:    Past Medical History:   Diagnosis Date    Detached retina, bilateral 2015    PARDEEP (generalized anxiety disorder) 2023    HELLP (hemolytic anemia/elev liver enzymes/low platelets in pregnancy) 2015    HUS (hemolytic uremic syndrome) (H) 2015    MS (multiple sclerosis) (H) 2015    Rh negative state in antepartum period 2023    TTP (thrombotic thrombocytopenic purpura) (H) 2015    WPW (Delaney-Parkinson-White syndrome) 2015       Past Surgical History:    Past Surgical History:   Procedure Laterality Date     SECTION  2015    Severe preeclampsia with HELLP syndrome  "and acute renal failure then developed postpartum Thrombotic thrombocytopenic purpura (TTP) and Hemolytic uremic syndrome (HUS)    COMBINED  SECTION, SALPINGECTOMY BILATERAL Bilateral 2023    Procedure:  SECTION, WITH BILATERAL SALPINGECTOMY, Viable baby girl born at 1851;  Surgeon: Naseem Cruz MD;  Location: [facility] OR       Family History:    Family History   Problem Relation Age of Onset    Cancer - colorectal Paternal Grandmother     Breast Cancer Other         3 great aunts on moms side    Allergies Maternal Grandmother         dust pollen    Allergies Maternal Aunt         \"       Social History:  Marital Status:  Single [1]  Social History     Tobacco Use    Smoking status: Never    Smokeless tobacco: Never   Substance Use Topics    Alcohol use: Not Currently     Comment: occa    Drug use: No        Medications:    aspirin (ASA) 81 MG chewable tablet  hydrOXYzine (ATARAX) 50 MG tablet  ibuprofen (ADVIL/MOTRIN) 800 MG tablet  oxyCODONE (ROXICODONE) 5 MG tablet  PRENATAL VIT-DOCUSATE-IRON-FA PO  senna-docusate (SENOKOT-S/PERICOLACE) 8.6-50 MG tablet          Review of Systems  See HPI  Physical Exam   BP: 173/91  Pulse: 91  Temp: 97.9  F (36.6  C)  Resp: 18  SpO2: 100 %      Physical Exam  Constitutional: Alert and conversant. NAD   HENT: NCAT   Eyes: Normal pupils   Neck: supple   CV: Normal rate, regular rhythm, no murmur   Pulmonary/Chest: Non-labored respirations, clear to auscultation bilaterally   Abdominal: Soft, non-tender, non-distended, Pfannenstiel incision without signs of infection clean dry and intact  MSK: HOGAN.  Mild lower extremity swelling, some pitting on the shins 1+  Neuro: Alert and appropriate   Skin: Warm and dry. No diaphoresis. No rashes on exposed skin    Psych: Appropriate mood and affect     ED Course              ED Course as of 23 2353   Thu 2023   2246 38-year-old female here with preeclampsia.  Initial blood pressures of 173/91, recheck in the " room was 181.  This qualifies as preeclampsia with severe features.  She she is not seizing.  Labs ordered.  Labetalol ordered.  Magnesium ordered.  She did tell me that she has a history of bad reactions to magnesium.  I discussed this with the patient as well as her obstetrician Dr. Grimaldo.  We are in agreement that the 1 tried and true method of treating preeclampsia is with magnesium and therefore we should proceed but do so with caution.  Instead of a 4 g infusion upfront.  We will start with a slower drip and continue to monitor.   2249 Case discussed with Dr. Grimaldo and he accepts her for admission to the labor and delivery floor.   2351 Dr. Grimaldo at the bedside.  Blood pressure is now under much better control.  Certainly less than 160s over 110s.  Magnesium appears to be going well.  Patient admitted.     Procedures                Results for orders placed or performed during the hospital encounter of 07/27/23 (from the past 24 hour(s))   CBC with platelets differential    Narrative    The following orders were created for panel order CBC with platelets differential.  Procedure                               Abnormality         Status                     ---------                               -----------         ------                     CBC with platelets and d...[[id number]]  Abnormal            Final result                 Please view results for these tests on the individual orders.   Comprehensive metabolic panel   Result Value Ref Range    Sodium 141 136 - 145 mmol/L    Potassium 3.9 3.4 - 5.3 mmol/L    Chloride 105 98 - 107 mmol/L    Carbon Dioxide (CO2) 22 22 - 29 mmol/L    Anion Gap 14 7 - 15 mmol/L    Urea Nitrogen 10.2 6.0 - 20.0 mg/dL    Creatinine 0.78 0.51 - 0.95 mg/dL    Calcium 9.3 8.6 - 10.0 mg/dL    Glucose 95 70 - 99 mg/dL    Alkaline Phosphatase 141 (H) 35 - 104 U/L    AST 48 (H) 0 - 45 U/L    ALT 42 0 - 50 U/L    Protein Total 6.7 6.4 - 8.3 g/dL    Albumin 3.5 3.5 - 5.2 g/dL    Bilirubin  Total <0.2 <=1.2 mg/dL    GFR Estimate >90 >60 mL/min/1.73m2   ABO/Rh type and screen    Narrative    The following orders were created for panel order ABO/Rh type and screen.  Procedure                               Abnormality         Status                     ---------                               -----------         ------                     Adult Type and Screen[[id number]]                            Preliminary result           Please view results for these tests on the individual orders.   [place] Draw    Narrative    The following orders were created for panel order [place] Draw.  Procedure                               Abnormality         Status                     ---------                               -----------         ------                     Extra Blue Top Tube[[id number]]                              In process                 Extra Red Top Tube[[id number]]                               In process                 Extra Green Top (Lithium...[[id number]]                      In process                 Extra Purple Top Tube[[id number]]                            In process                 Extra Green Top (Lithium...[[id number]]                      In process                   Please view results for these tests on the individual orders.   CBC with platelets and differential   Result Value Ref Range    WBC Count 8.7 4.0 - 11.0 10e3/uL    RBC Count 3.57 (L) 3.80 - 5.20 10e6/uL    Hemoglobin 11.0 (L) 11.7 - 15.7 g/dL    Hematocrit 33.3 (L) 35.0 - 47.0 %    MCV 93 78 - 100 fL    MCH 30.8 26.5 - 33.0 pg    MCHC 33.0 31.5 - 36.5 g/dL    RDW 12.9 10.0 - 15.0 %    Platelet Count 322 150 - 450 10e3/uL    % Neutrophils 64 %    % Lymphocytes 25 %    % Monocytes 6 %    % Eosinophils 3 %    % Basophils 1 %    % Immature Granulocytes 1 %    NRBCs per 100 WBC 0 <1 /100    Absolute Neutrophils 5.7 1.6 - 8.3 10e3/uL    Absolute Lymphocytes 2.2 0.8 - 5.3 10e3/uL    Absolute Monocytes 0.5 0.0 - 1.3 10e3/uL    Absolute  Eosinophils 0.3 0.0 - 0.7 10e3/uL    Absolute Basophils 0.0 0.0 - 0.2 10e3/uL    Absolute Immature Granulocytes 0.1 <=0.4 10e3/uL    Absolute NRBCs 0.0 10e3/uL   Adult Type and Screen   Result Value Ref Range    ABO/RH(D) AB NEG     SPECIMEN EXPIRATION DATE [id number]        Medications   labetalol (NORMODYNE/TRANDATE) syringe 20-80 mg (40 mg Intravenous $Given 7/27/23 9511)   hydrALAZINE (APRESOLINE) injection 10 mg (has no administration in time range)   lactated ringers infusion ( Intravenous $New Bag 7/27/23 7427)   calcium gluconate 10 % injection 1 g (has no administration in time range)   magnesium sulfate 4 g in 100 mL sterile water intermittent infusion (has no administration in time range)     Followed by   magnesium sulfate infusion (has no administration in time range)   magnesium sulfate 2 GM/50ML in 50 mL sterile water intermittent infusion (2 g  $New Bag 7/27/23 7773)       Assessments & Plan (with Medical Decision Making)     I have reviewed the nursing notes.    I have reviewed the findings, diagnosis, plan and need for follow up with the patient.  New Prescriptions    No medications on file       Final diagnoses:   Pre-eclampsia, postpartum - severe       7/27/2023   [facility] EM[facility]       Elvis Meier MD  07/27/23 2757

## 2023-07-28 NOTE — H&P
[facility] Labor and Delivery History and Physical    Ibrahima Ugalde MRN# [id number]   Age: 38 year old YOB: 1985     Date of Admission:  2023    Primary care provider: No Ref-Primary, Physician           Chief Complaint:   Ibrahima Ugalde is a 38 year old female who is 37w0d pregnant and being admitted for post partum Pre-eclampsia. She delivered by CS 4 days ago and discharged yesterday.  She has had intermittent HA not relieved by ibuprofen.  Denies vision changes, RUQ pain.  + pedal edema.  She does have a h/o severe preeclampsia/TTP/HUS last pregnancy. Prenatal record/H and P reviewed with patient and unchanged.  She is bottle feeding. SEE ED eval.  Bp's 160-180's/110s on ED admit and started on Magnesium. She declined Mag bolus as she had a bad experience when she was given bolus with last pregnancy/passed out.   Given two doses of IV labetalol in ED with Bp's responding/decreased and no longer in severe range and admitted to floor from ED for further management. .            Pregnancy history:     OBSTETRIC HISTORY:    OB History    Para Term  AB Living   3 2 1 1 1 2   SAB IAB Ectopic Multiple Live Births   1 0 0 0 2      # Outcome Date GA Lbr Brad/2nd Weight Sex Delivery Anes PTL Lv   3 Term 23 37w0d  2.32 kg (5 lb 1.8 oz) F CS-LTranv Spinal N EVE      Name: ADRIANNE,FEMALE-IBRAHIMA      Apgar1: 8  Apgar5: 8   2 SAB 2020           1  06/23/15 36w0d  2.325 kg (5 lb 2 oz) M CS-LTranv  Y EVE      Birth Comments: Severe preeclampsia with HELLP synbdrome and acute renal failure then developed postpartum Thrombotic thrombocytopenic purpura and Hemolytic uremic syndrome      Complications: Preeclampsia, severe, HELLP syndrome      Obstetric Comments   2015 36 wk- Severe preeclampsia with HELLP syndrome and acute renal failure then developed postpartum Thrombotic thrombocytopenic purpura (TTP) and Hemolytic uremic syndrome (HUS). Patient also  developed bilateral detached retinas.    37 wk, Repeat  section and bilateral salpingectomy, Intrauterine growth restriction, Advanced maternal age       EDC: Estimated Date of Delivery: Data Unavailable    Prenatal Labs:   Lab Results   Component Value Date    AS Positive (A) 2023    HEPBANG Nonreactive 2022    HGB 11.0 (L) 2023       GBS Status:   No results found for: GBS    Active Problem List  Patient Active Problem List   Diagnosis    WPW (Delaney-Parkinson-White syndrome)    MS (multiple sclerosis) (H)    Multigravida of advanced maternal age in third trimester    Encounter for triage in pregnant patient    TTP (thrombotic thrombocytopenic purpura) (H)    HUS (hemolytic uremic syndrome) (H)    HELLP (hemolytic anemia/elev liver enzymes/low platelets in pregnancy)    History of  delivery    Detached retina, bilateral    Poor fetal growth affecting management of mother in third trimester, fetus 1 of multiple gestation    Pregnancy, supervision for, high-risk, third trimester    AMA (advanced maternal age) multigravida 35+    IUGR (intrauterine growth restriction) affecting care of mother    Hx of pre-eclampsia in prior pregnancy, currently pregnant    Encounter for sterilization    Hx of  section    Rh negative state in antepartum period    PARDEEP (generalized anxiety disorder)    Pre-eclampsia, postpartum       Medication Prior to Admission  (Not in a hospital admission)  .        Maternal Past Medical History:     Past Medical History:   Diagnosis Date    Detached retina, bilateral 2015    secondary to severe preeclampsia with HELLP syndroma and acute renal failure    PARDEEP (generalized anxiety disorder) 2023    HELLP (hemolytic anemia/elev liver enzymes/low platelets in pregnancy) 2015    Severe preeclampsia with HELLP syndrome and acute renal failure    HUS (hemolytic uremic syndrome) (H) 2015    Postpartum Thrombotic thrombocytopenic purpura  "(TTP) and 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    MS (multiple sclerosis) (H) 01/01/2015    Rh negative state in antepartum period 07/23/2023    TTP (thrombotic thrombocytopenic purpura) (H) 06/25/2015    Postpartum Thrombotic thrombocytopenic purpura (TTP) and 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    WPW (Delaney-Parkinson-White syndrome) 01/01/2015    She has been evaluated for ablation, however, based on location this was not deemed safe by cardiology at the time                       Family History:   Unchanged from prenatal record            Social History:   Unchanged from prenatal record         Review of Systems:   Per HPI.  Other systems reviewed and negative         Physical Exam:     Vital signs:  Temp: 97.9  F (36.6  C) Temp src: Tympanic BP: 135/96 Pulse: 91   Resp: 18 SpO2: 98 % O2 Device: None (Room air)        Estimated body mass index is 26.94 kg/m  as calculated from the following:    Height as of 7/23/23: 1.499 m (4' 11\").    Weight as of 7/26/23: 60.5 kg (133 lb 6.4 oz).       Chest: CTA  CV:  RRR without murmers  General:  Alert and oriented  Abdomen soft, non-tender, gravid  Ext: + 2 pedal edema.  Brisk reflexes but negative clonus.      Results for orders placed or performed during the hospital encounter of 07/27/23 (from the past 24 hour(s))   CBC with platelets differential    Narrative    The following orders were created for panel order CBC with platelets differential.  Procedure                               Abnormality         Status                     ---------                               -----------         ------                     CBC with platelets and d...[[id number]]  Abnormal            Final result                 Please view results for these tests on the individual orders.   Comprehensive metabolic panel   Result Value Ref Range    Sodium 141 136 - 145 mmol/L    Potassium 3.9 3.4 - 5.3 mmol/L    Chloride 105 98 - 107 mmol/L    " Carbon Dioxide (CO2) 22 22 - 29 mmol/L    Anion Gap 14 7 - 15 mmol/L    Urea Nitrogen 10.2 6.0 - 20.0 mg/dL    Creatinine 0.78 0.51 - 0.95 mg/dL    Calcium 9.3 8.6 - 10.0 mg/dL    Glucose 95 70 - 99 mg/dL    Alkaline Phosphatase 141 (H) 35 - 104 U/L    AST 48 (H) 0 - 45 U/L    ALT 42 0 - 50 U/L    Protein Total 6.7 6.4 - 8.3 g/dL    Albumin 3.5 3.5 - 5.2 g/dL    Bilirubin Total <0.2 <=1.2 mg/dL    GFR Estimate >90 >60 mL/min/1.73m2   ABO/Rh type and screen    Narrative    The following orders were created for panel order ABO/Rh type and screen.  Procedure                               Abnormality         Status                     ---------                               -----------         ------                     Adult Type and Screen[[id number]]                            Preliminary result           Please view results for these tests on the individual orders.   [place] Draw    Narrative    The following orders were created for panel order [place] Draw.  Procedure                               Abnormality         Status                     ---------                               -----------         ------                     Extra Blue Top Tube[[id number]]                              In process                 Extra Red Top Tube[[id number]]                               In process                 Extra Green Top (Lithium...[[id number]]                      In process                 Extra Purple Top Tube[[id number]]                            In process                 Extra Green Top (Lithium...[[id number]]                      In process                   Please view results for these tests on the individual orders.   CBC with platelets and differential   Result Value Ref Range    WBC Count 8.7 4.0 - 11.0 10e3/uL    RBC Count 3.57 (L) 3.80 - 5.20 10e6/uL    Hemoglobin 11.0 (L) 11.7 - 15.7 g/dL    Hematocrit 33.3 (L) 35.0 - 47.0 %    MCV 93 78 - 100 fL    MCH 30.8 26.5 - 33.0 pg    MCHC 33.0 31.5 - 36.5 g/dL    RDW  12.9 10.0 - 15.0 %    Platelet Count 322 150 - 450 10e3/uL    % Neutrophils 64 %    % Lymphocytes 25 %    % Monocytes 6 %    % Eosinophils 3 %    % Basophils 1 %    % Immature Granulocytes 1 %    NRBCs per 100 WBC 0 <1 /100    Absolute Neutrophils 5.7 1.6 - 8.3 10e3/uL    Absolute Lymphocytes 2.2 0.8 - 5.3 10e3/uL    Absolute Monocytes 0.5 0.0 - 1.3 10e3/uL    Absolute Eosinophils 0.3 0.0 - 0.7 10e3/uL    Absolute Basophils 0.0 0.0 - 0.2 10e3/uL    Absolute Immature Granulocytes 0.1 <=0.4 10e3/uL    Absolute NRBCs 0.0 10e3/uL   Adult Type and Screen   Result Value Ref Range    ABO/RH(D) AB NEG     SPECIMEN EXPIRATION DATE [id number]           Assessment:   aSrah Ugalde is a 37w0d pregnant female admitted with post-partum severe htn/Pre-eclampsia.          Plan:   Admit, Preeclampsia antihypertension protocol with IV labetalol.  Mag Sulfate 2g/hr IV x 24 hours.    Repeat labs in AM.  Risks/benefits and plan of care discussed and pt agrees to admission and POC.     Scout Grimaldo MD

## 2023-07-28 NOTE — PLAN OF CARE
Assumed care of patient on [facility] at 0000. IV magnesium running with LR. Assessment as charted. Seizure precautions initiated. See flowsheets. Patient A&O x4.

## 2023-07-28 NOTE — ED TRIAGE NOTES
Patient presents with c/o high BP and headache after delivering her baby on Sunday. BP @ home 167/110. Triage 173/91.

## 2023-07-28 NOTE — PLAN OF CARE
Pt a&o x3. Pleasant and cooperative with cares. Vitals as charted, see flowsheet Pt up to bathroom with SBA x1. Steady gait noted. Pt denies dizziness or lightheadedness, reports generalized weakness. Clear, yellow urine noted. Will continue to monitor.

## 2023-07-28 NOTE — PROGRESS NOTES
[facility] Obstetrics Progress Note         Assessment and Plan:    Assessment:   Resolving PP htn/preeclampsia.   Magnesium toxicity, resolving off Mag           Plan:   Mag DC'd.  Continue hospital/BP observation overnight.  IF, continued nml BP overnight plan discharge in AM.              Interval History:   Doing well.  Continues to improve.  Pain is well-controlled.  No fevers.  oing well.  Was feeling very week, generalized earlier.  Magnesium was DC'd and symtoms have been improving since then.  She has been diuresing well and BP's nml today.  + HA on Mag.            Significant Problems:      Patient Active Problem List    Diagnosis Date Noted    Pre-eclampsia, postpartum 2023     Priority: Medium    Pregnancy, supervision for, high-risk, third trimester 2023     Priority: Medium    AMA (advanced maternal age) multigravida 35+ 2023     Priority: Medium    IUGR (intrauterine growth restriction) affecting care of mother 2023     Priority: Medium    Hx of pre-eclampsia in prior pregnancy, currently pregnant 2023     Priority: Medium    Encounter for sterilization 2023     Priority: Medium    Hx of  section 2023     Priority: Medium    Rh negative state in antepartum period 2023     Priority: Medium    PARDEEP (generalized anxiety disorder) 2023     Priority: Medium    Poor fetal growth affecting management of mother in third trimester, fetus 1 of multiple gestation 2023     Priority: Medium    History of  delivery 2023     Priority: Medium    Encounter for triage in pregnant patient 2023     Priority: Medium    Multigravida of advanced maternal age in third trimester 2022     Priority: Medium     AMA:  NIPT - declined  h/o WPW. BL ECG - nml  Zio Patch, Cards consult  H/o TTP/preeclampsia/HELLP/acute renal failure>  Baby asa 16 wks.  Nml BL labs.   level II - nml  H/o HUS  H/o MS  no flu or covid vax  Plan repeat  /sterilization 23.  Sterilization forms signed.   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      HUS (hemolytic uremic syndrome) (H) 2015     Priority: Medium     Postpartum Thrombotic thrombocytopenic purpura (TTP) and 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      HELLP (hemolytic anemia/elev liver enzymes/low platelets in pregnancy) 2015     Priority: Medium     Severe preeclampsia with HELLP syndrome and acute renal failure      Detached retina, bilateral 2015     Priority: Medium     secondary to severe preeclampsia with HELLP syndroma and acute renal failure      WPW (Delaney-Parkinson-White syndrome) 2015     Priority: Medium     She has been evaluated for ablation, however, based on location this was not deemed safe by cardiology at the time      MS (multiple sclerosis) (H) 2015     Priority: Medium             Review of Systems:    The patient denies any chest pain, shortness of breath, excessive pain, fever, chills, purulent drainage from the wound, nausea or vomiting.          Medications:   All medications related to the patient's surgery have been reviewed          Physical Exam:   All vitals have been reviewed        Intake/Output Summary (Last 24 hours) at 2023 1706  Last data filed at 2023 1605  Gross per 24 hour   Intake 458 ml   Output 4200 ml   Net -3742 ml     Wound clean and dry with minimal or no drainage.  Surrounding skin with minimal erythema.          Data:   All laboratory data related to this surgery reviewed  Results for orders placed or performed during the hospital encounter of 23 (from the past 24 hour(s))   CBC with platelets differential    Narrative    The following orders were created for panel order CBC  with platelets differential.  Procedure                               Abnormality         Status                     ---------                               -----------         ------                     CBC with platelets and d...[[id number]]  Abnormal            Final result                 Please view results for these tests on the individual orders.   Comprehensive metabolic panel   Result Value Ref Range    Sodium 141 136 - 145 mmol/L    Potassium 3.9 3.4 - 5.3 mmol/L    Chloride 105 98 - 107 mmol/L    Carbon Dioxide (CO2) 22 22 - 29 mmol/L    Anion Gap 14 7 - 15 mmol/L    Urea Nitrogen 10.2 6.0 - 20.0 mg/dL    Creatinine 0.78 0.51 - 0.95 mg/dL    Calcium 9.3 8.6 - 10.0 mg/dL    Glucose 95 70 - 99 mg/dL    Alkaline Phosphatase 141 (H) 35 - 104 U/L    AST 48 (H) 0 - 45 U/L    ALT 42 0 - 50 U/L    Protein Total 6.7 6.4 - 8.3 g/dL    Albumin 3.5 3.5 - 5.2 g/dL    Bilirubin Total <0.2 <=1.2 mg/dL    GFR Estimate >90 >60 mL/min/1.73m2   ABO/Rh type and screen    Narrative    The following orders were created for panel order ABO/Rh type and screen.  Procedure                               Abnormality         Status                     ---------                               -----------         ------                     Adult Type and Screen[[id number]]        Abnormal            Final result                 Please view results for these tests on the individual orders.   [place] Draw    Narrative    The following orders were created for panel order [place] Draw.  Procedure                               Abnormality         Status                     ---------                               -----------         ------                     Extra Blue Top Tube[[id number]]                              Final result               Extra Red Top Tube[[id number]]                               Final result               Extra Green Top (Lithium...[[id number]]                      Final result               Extra Purple Top  Tube[[id number]]                            Final result               Extra Green Top (Lithium...[[id number]]                      Final result                 Please view results for these tests on the individual orders.   CBC with platelets and differential   Result Value Ref Range    WBC Count 8.7 4.0 - 11.0 10e3/uL    RBC Count 3.57 (L) 3.80 - 5.20 10e6/uL    Hemoglobin 11.0 (L) 11.7 - 15.7 g/dL    Hematocrit 33.3 (L) 35.0 - 47.0 %    MCV 93 78 - 100 fL    MCH 30.8 26.5 - 33.0 pg    MCHC 33.0 31.5 - 36.5 g/dL    RDW 12.9 10.0 - 15.0 %    Platelet Count 322 150 - 450 10e3/uL    % Neutrophils 64 %    % Lymphocytes 25 %    % Monocytes 6 %    % Eosinophils 3 %    % Basophils 1 %    % Immature Granulocytes 1 %    NRBCs per 100 WBC 0 <1 /100    Absolute Neutrophils 5.7 1.6 - 8.3 10e3/uL    Absolute Lymphocytes 2.2 0.8 - 5.3 10e3/uL    Absolute Monocytes 0.5 0.0 - 1.3 10e3/uL    Absolute Eosinophils 0.3 0.0 - 0.7 10e3/uL    Absolute Basophils 0.0 0.0 - 0.2 10e3/uL    Absolute Immature Granulocytes 0.1 <=0.4 10e3/uL    Absolute NRBCs 0.0 10e3/uL   Adult Type and Screen   Result Value Ref Range    ABO/RH(D) AB NEG     Antibody Screen Positive (A) Negative    SPECIMEN EXPIRATION DATE [id number]    Extra Blue Top Tube   Result Value Ref Range    Hold Specimen JIC    Extra Red Top Tube   Result Value Ref Range    Hold Specimen JIC    Extra Green Top (Lithium Heparin) Tube   Result Value Ref Range    Hold Specimen JIC    Extra Purple Top Tube   Result Value Ref Range    Hold Specimen JIC    Extra Green Top (Lithium Heparin) ON ICE   Result Value Ref Range    Hold Specimen JIC    Protein  random urine   Result Value Ref Range    Total Protein Urine mg/dL <4.0   mg/dL    Total Protein UR MG/MG CR      Creatinine Urine mg/dL 17.9 mg/dL   ALT   Result Value Ref Range    ALT 34 0 - 50 U/L   AST   Result Value Ref Range    AST 37 0 - 45 U/L   Creatinine   Result Value Ref Range    Creatinine 0.67 0.51 - 0.95 mg/dL    GFR  Estimate >90 >60 mL/min/1.73m2   Hemoglobin   Result Value Ref Range    Hemoglobin 10.9 (L) 11.7 - 15.7 g/dL   Platelet count   Result Value Ref Range    Platelet Count 250 150 - 450 10e3/uL   Magnesium   Result Value Ref Range    Magnesium 6.5 (H) 1.7 - 2.3 mg/dL   Magnesium   Result Value Ref Range    Magnesium 7.2 (HH) 1.7 - 2.3 mg/dL     No imaging studies have been ordered    Scout Grimaldo MD

## 2023-07-29 VITALS
SYSTOLIC BLOOD PRESSURE: 146 MMHG | HEART RATE: 68 BPM | RESPIRATION RATE: 16 BRPM | BODY MASS INDEX: 26.18 KG/M2 | WEIGHT: 129.6 LBS | DIASTOLIC BLOOD PRESSURE: 92 MMHG | OXYGEN SATURATION: 99 % | TEMPERATURE: 98.4 F

## 2023-07-29 LAB
ALT SERPL W P-5'-P-CCNC: 29 U/L (ref 0–50)
AST SERPL W P-5'-P-CCNC: 26 U/L (ref 0–45)
CREAT SERPL-MCNC: 0.79 MG/DL (ref 0.51–0.95)
GFR SERPL CREATININE-BSD FRML MDRD: >90 ML/MIN/1.73M2
HGB BLD-MCNC: 10.7 G/DL (ref 11.7–15.7)
MAGNESIUM SERPL-MCNC: 3.2 MG/DL (ref 1.7–2.3)
PLATELET # BLD AUTO: 266 10E3/UL (ref 150–450)
TSH SERPL DL<=0.005 MIU/L-ACNC: 2.5 UIU/ML (ref 0.3–4.2)

## 2023-07-29 PROCEDURE — 250N000013 HC RX MED GY IP 250 OP 250 PS 637: Performed by: OBSTETRICS & GYNECOLOGY

## 2023-07-29 PROCEDURE — 84460 ALANINE AMINO (ALT) (SGPT): CPT | Performed by: OBSTETRICS & GYNECOLOGY

## 2023-07-29 PROCEDURE — 85018 HEMOGLOBIN: CPT | Performed by: OBSTETRICS & GYNECOLOGY

## 2023-07-29 PROCEDURE — 82565 ASSAY OF CREATININE: CPT | Performed by: OBSTETRICS & GYNECOLOGY

## 2023-07-29 PROCEDURE — 99238 HOSP IP/OBS DSCHRG MGMT 30/<: CPT | Performed by: OBSTETRICS & GYNECOLOGY

## 2023-07-29 PROCEDURE — 36415 COLL VENOUS BLD VENIPUNCTURE: CPT | Performed by: OBSTETRICS & GYNECOLOGY

## 2023-07-29 PROCEDURE — 85049 AUTOMATED PLATELET COUNT: CPT | Performed by: OBSTETRICS & GYNECOLOGY

## 2023-07-29 PROCEDURE — 83735 ASSAY OF MAGNESIUM: CPT | Performed by: OBSTETRICS & GYNECOLOGY

## 2023-07-29 PROCEDURE — 84443 ASSAY THYROID STIM HORMONE: CPT | Performed by: OBSTETRICS & GYNECOLOGY

## 2023-07-29 PROCEDURE — 84450 TRANSFERASE (AST) (SGOT): CPT | Performed by: OBSTETRICS & GYNECOLOGY

## 2023-07-29 RX ORDER — BUTALBITAL, ACETAMINOPHEN AND CAFFEINE 50; 325; 40 MG/1; MG/1; MG/1
1 TABLET ORAL EVERY 4 HOURS PRN
Qty: 10 TABLET | Refills: 0 | Status: SHIPPED | OUTPATIENT
Start: 2023-07-29 | End: 2023-09-13

## 2023-07-29 RX ADMIN — OXYCODONE HYDROCHLORIDE 5 MG: 5 TABLET ORAL at 10:16

## 2023-07-29 RX ADMIN — IBUPROFEN 800 MG: 800 TABLET, FILM COATED ORAL at 03:14

## 2023-07-29 RX ADMIN — SENNOSIDES AND DOCUSATE SODIUM 1 TABLET: 50; 8.6 TABLET ORAL at 09:33

## 2023-07-29 RX ADMIN — BUTALBITAL, ACETAMINOPHEN AND CAFFEINE 1 TABLET: 50; 325; 40 TABLET ORAL at 11:20

## 2023-07-29 ASSESSMENT — ACTIVITIES OF DAILY LIVING (ADL)
ADLS_ACUITY_SCORE: 18

## 2023-07-29 NOTE — PLAN OF CARE
Patient discharged at 11:55 AM via ambulation accompanied by significant other and . Prescriptions sent to patients preferred pharmacy. All belongings sent with patient. Room clear of belongings.    Discharge instructions reviewed with patient and Zan, partner with good verbal understanding.  AVS sent home with patient.  Patient  told she should continue to get better, but if there is any worsening symptoms she needs to go directly to ED.  Patient verbalizes understanding of discharge plan and follow up care importance.    Patient discharged to home.     Did patient receive discharge instruction on wound care and recognition of infection symptoms? Yes    MISC  Follow up appointment made:  Yes  Home and hospital aquired medications returned to patient: N/A  Patient reports pain was well managed at discharge: Yes  BLAYNE LOPEZ RN

## 2023-07-29 NOTE — DISCHARGE SUMMARY
Discharge Summary    Sarah Ugalde MRN# [id number]   YOB: 1985 Age: 38 year old     Date of Admission:  7/27/2023  Date of Discharge:  7/29/2023  Admitting Physician:  Scout Grimaldo MD  Discharge Physician:  Scout Grimaldo MD  Discharging Service:  Obstetrics and Gynecology     Primary Provider: No Ref-Primary, Physician          Admission Diagnoses:   Pre-eclampsia, postpartum [O14.95]     Discharge diagnosis:    Pre-eclampsia, postpartum [O14.95]       Discharge Disposition:     Discharged to home           Condition on Discharge:     Discharge condition: Stable   Discharge vitals: Blood pressure 129/78, pulse 84, temperature 98.4  F (36.9  C), temperature source Oral, resp. rate 16, weight 58.8 kg (129 lb 9.6 oz), SpO2 98 %, not currently breastfeeding.   Code status on discharge: Full Code           Procedures / Labs / Imaging:   IV labetalol for HTN, IV magnesium sulfate.      Most Recent 3 CBC's:  Recent Labs   Lab Test 07/29/23 0604 07/28/23 0604 07/27/23 2244 07/26/23 0629 07/24/23  0521   WBC  --   --  8.7 9.4 15.2*   HGB 10.7* 10.9* 11.0* 9.6* 11.1*   MCV  --   --  93 94 92    250 322 220 222      Most Recent 3 BMP's:  Recent Labs   Lab Test 07/29/23 0604 07/28/23 0604 07/27/23 2244 07/26/23 0629 07/24/23  0521   NA  --   --  141 138 137   POTASSIUM  --   --  3.9 4.0 4.2   CHLORIDE  --   --  105 107 107   CO2  --   --  22 23 18*   BUN  --   --  10.2 12.0 8.7   CR 0.79 0.67 0.78 0.84 0.65   ANIONGAP  --   --  14 8 12   MARVIN  --   --  9.3 8.4* 8.6   GLC  --   --  95 85 134*     Most Recent 2 LFT's:  Recent Labs   Lab Test 07/29/23 0604 07/28/23 0604 07/27/23 2244 07/26/23  0629   AST 26 37 48* 36   ALT 29 34 42 16   ALKPHOS  --   --  141* 106*   BILITOTAL  --   --  <0.2 <0.2               Medications Prior to Admission:     Medications Prior to Admission   Medication Sig Dispense Refill Last Dose    hydrOXYzine (ATARAX) 50 MG tablet Take 1 tablet (50 mg) by mouth every  6 hours as needed for other or anxiety (adjuvant pain) 30 tablet 1 Past Week    ibuprofen (ADVIL/MOTRIN) 800 MG tablet Take 1 tablet (800 mg) by mouth every 6 hours as needed for moderate pain or fever 100 tablet 0 2023    oxyCODONE (ROXICODONE) 5 MG tablet Take 1 tablet (5 mg) by mouth every 6 hours as needed for moderate to severe pain 20 tablet 0 Past Week    PRENATAL VIT-DOCUSATE-IRON-FA PO    Past Week    senna-docusate (SENOKOT-S/PERICOLACE) 8.6-50 MG tablet Take 1 tablet by mouth 2 times daily 60 tablet 0 2023    [DISCONTINUED] aspirin (ASA) 81 MG chewable tablet Take 81 mg by mouth daily   Past Week             Discharge Medications:     Current Discharge Medication List        START taking these medications    Details   butalbital-acetaminophen-caffeine (ESGIC) -40 MG tablet Take 1 tablet by mouth every 4 hours as needed for headaches or migraine  Qty: 10 tablet, Refills: 0    Associated Diagnoses: Pre-eclampsia, postpartum           CONTINUE these medications which have NOT CHANGED    Details   hydrOXYzine (ATARAX) 50 MG tablet Take 1 tablet (50 mg) by mouth every 6 hours as needed for other or anxiety (adjuvant pain)  Qty: 30 tablet, Refills: 1    Associated Diagnoses: Multigravida of advanced maternal age in third trimester      ibuprofen (ADVIL/MOTRIN) 800 MG tablet Take 1 tablet (800 mg) by mouth every 6 hours as needed for moderate pain or fever  Qty: 100 tablet, Refills: 0    Associated Diagnoses: Multigravida of advanced maternal age in third trimester      oxyCODONE (ROXICODONE) 5 MG tablet Take 1 tablet (5 mg) by mouth every 6 hours as needed for moderate to severe pain  Qty: 20 tablet, Refills: 0    Associated Diagnoses: History of  delivery; Multigravida of advanced maternal age in third trimester      PRENATAL VIT-DOCUSATE-IRON-FA PO       senna-docusate (SENOKOT-S/PERICOLACE) 8.6-50 MG tablet Take 1 tablet by mouth 2 times daily  Qty: 60 tablet, Refills: 0    Associated  Diagnoses: Multigravida of advanced maternal age in third trimester           STOP taking these medications       aspirin (ASA) 81 MG chewable tablet Comments:   Reason for Stopping:                     Consultations:     No consultations were requested during this admission             Brief History of Illness:   Sarah Ugalde is a 38 year old female who was admitted for sever postpartum hypertension/preeclampsia          Hospital Course:    Pt was admitted and treated for severe   HTN with Labetalol per protocol and Magnesium sulfate.   Magnesium was discontinued on HD #1 due to toxicity sx.    She remained afebrile with normal PIH labs during hospitalization.  Her BP remained labile but within parameters.  She was discharged on HD #2.            Significant Results:     None             Pending Results:     None           Discharge Instructions and Follow-Up:     Discharge diet: Regular   Discharge activity: Lifting restricted to 20 pounds for 6 weeks PO   Discharge follow-up: Follow up with  Dr. Cruz 1 week   Wound care: Keep clean and dry   Other instructions: Home BP monitoring

## 2023-07-29 NOTE — PLAN OF CARE
"Data: /82 this morning  TPR 98.2-78-16  Postpartum check normal except for brisk patellar reflexes with no clonus noted.  Patient c/o 5/10 headache in back of neck that radiates to top of head.  Mild light sensitivity noted.  Patient refuses ice, heat, aromatherapy, pain medication for her headache.  Patient told writer, \"I will call you if I need anything for pain\"  Patient wants to try and sleep.  Patient eating and drinking normally. Patient able to empty bladder independently and is up ambulating. No apparent signs of infection. Incision healing well -glue intact.  Patient performing self cares. FOB is able to care for infant @ home.  Action: Patient refusing pain medication at this time. Patient education done about pain control. See flow record.  Plan: Anticipate discharge sometimes today.  Will continue to monitor closely.  Patient not on routine antihypertensive medication. When writer asked her why she stated, \"They can't find one that regulates my blood pressure with out getting to low at times.\"  BLAYNE LOPEZ RN    "

## 2023-07-29 NOTE — PLAN OF CARE
Face to face report given with opportunity to observe patient.    Report given to Angélica Rangel RN   7/28/2023  7:07 PM

## 2023-07-29 NOTE — PLAN OF CARE
Patient has discharge orders.  Waiting for partner to get here to go over discharge instructions!  BLAYNE LOPEZ RN

## 2023-07-29 NOTE — PLAN OF CARE
Patient up ad ynes in room, A&O x3, MD notified of 2 high blood pressure readings (see flowsheet) continue to monitor patient per order/treat blood pressure of 160/110 or greater per protocol, denies headache, normal reflexes, trace edema noted to bilateral ankles and feet, pt requested Atarax and Oxycodone for anxiety and incisional pain, incision is clean, dry, and intact, pt showered, voiding without difficulty, will continue to monitor and provide cares as needed.

## 2023-07-29 NOTE — PLAN OF CARE
Face to face report given with opportunity to observe patient.    Report given to MICHELLE Pretty RN   7/29/2023  7:12 AM

## 2023-07-31 ENCOUNTER — ALLIED HEALTH/NURSE VISIT (OUTPATIENT)
Dept: OBGYN | Facility: OTHER | Age: 38
End: 2023-07-31
Attending: FAMILY MEDICINE

## 2023-07-31 VITALS — DIASTOLIC BLOOD PRESSURE: 90 MMHG | SYSTOLIC BLOOD PRESSURE: 140 MMHG

## 2023-08-03 ENCOUNTER — PRENATAL OFFICE VISIT (OUTPATIENT)
Dept: OBGYN | Facility: OTHER | Age: 38
End: 2023-08-03
Attending: OBSTETRICS & GYNECOLOGY

## 2023-08-03 VITALS
OXYGEN SATURATION: 99 % | DIASTOLIC BLOOD PRESSURE: 80 MMHG | SYSTOLIC BLOOD PRESSURE: 140 MMHG | HEART RATE: 79 BPM | TEMPERATURE: 99.3 F

## 2023-08-03 DIAGNOSIS — N71.9 ENDOMETRITIS: ICD-10-CM

## 2023-08-03 LAB
ALBUMIN SERPL BCG-MCNC: 3.8 G/DL (ref 3.5–5.2)
ALBUMIN UR-MCNC: NEGATIVE MG/DL
ALP SERPL-CCNC: 137 U/L (ref 35–104)
ALT SERPL W P-5'-P-CCNC: 27 U/L (ref 0–50)
ANION GAP SERPL CALCULATED.3IONS-SCNC: 16 MMOL/L (ref 7–15)
APPEARANCE UR: CLEAR
AST SERPL W P-5'-P-CCNC: 22 U/L (ref 0–45)
BACTERIA #/AREA URNS HPF: ABNORMAL /HPF
BILIRUB SERPL-MCNC: <0.2 MG/DL
BILIRUB UR QL STRIP: NEGATIVE
BUN SERPL-MCNC: 13.4 MG/DL (ref 6–20)
CALCIUM SERPL-MCNC: 9.5 MG/DL (ref 8.6–10)
CHLORIDE SERPL-SCNC: 103 MMOL/L (ref 98–107)
COLOR UR AUTO: ABNORMAL
CREAT SERPL-MCNC: 0.91 MG/DL (ref 0.51–0.95)
CRP SERPL-MCNC: 39.8 MG/L
DEPRECATED HCO3 PLAS-SCNC: 23 MMOL/L (ref 22–29)
ERYTHROCYTE [DISTWIDTH] IN BLOOD BY AUTOMATED COUNT: 12.6 % (ref 10–15)
GFR SERPL CREATININE-BSD FRML MDRD: 82 ML/MIN/1.73M2
GLUCOSE SERPL-MCNC: 94 MG/DL (ref 70–99)
GLUCOSE UR STRIP-MCNC: NEGATIVE MG/DL
HCT VFR BLD AUTO: 36.7 % (ref 35–47)
HGB BLD-MCNC: 12.1 G/DL (ref 11.7–15.7)
HGB UR QL STRIP: ABNORMAL
KETONES UR STRIP-MCNC: NEGATIVE MG/DL
LEUKOCYTE ESTERASE UR QL STRIP: ABNORMAL
MCH RBC QN AUTO: 29.8 PG (ref 26.5–33)
MCHC RBC AUTO-ENTMCNC: 33 G/DL (ref 31.5–36.5)
MCV RBC AUTO: 90 FL (ref 78–100)
MUCOUS THREADS #/AREA URNS LPF: PRESENT /LPF
NITRATE UR QL: NEGATIVE
PH UR STRIP: 6.5 [PH] (ref 4.7–8)
PLATELET # BLD AUTO: 402 10E3/UL (ref 150–450)
POTASSIUM SERPL-SCNC: 3.6 MMOL/L (ref 3.4–5.3)
PROT SERPL-MCNC: 7.2 G/DL (ref 6.4–8.3)
RBC # BLD AUTO: 4.06 10E6/UL (ref 3.8–5.2)
RBC URINE: 2 /HPF
SODIUM SERPL-SCNC: 142 MMOL/L (ref 136–145)
SP GR UR STRIP: 1.01 (ref 1–1.03)
SQUAMOUS EPITHELIAL: 0 /HPF
UROBILINOGEN UR STRIP-MCNC: NORMAL MG/DL
WBC # BLD AUTO: 9.6 10E3/UL (ref 4–11)
WBC URINE: 4 /HPF

## 2023-08-03 PROCEDURE — 85027 COMPLETE CBC AUTOMATED: CPT | Performed by: OBSTETRICS & GYNECOLOGY

## 2023-08-03 PROCEDURE — 80053 COMPREHEN METABOLIC PANEL: CPT | Performed by: OBSTETRICS & GYNECOLOGY

## 2023-08-03 PROCEDURE — 99207 PR NO CHARGE LOS: CPT | Performed by: OBSTETRICS & GYNECOLOGY

## 2023-08-03 PROCEDURE — 81001 URINALYSIS AUTO W/SCOPE: CPT | Performed by: OBSTETRICS & GYNECOLOGY

## 2023-08-03 PROCEDURE — 87086 URINE CULTURE/COLONY COUNT: CPT | Performed by: OBSTETRICS & GYNECOLOGY

## 2023-08-03 PROCEDURE — 86140 C-REACTIVE PROTEIN: CPT | Performed by: OBSTETRICS & GYNECOLOGY

## 2023-08-03 PROCEDURE — 36415 COLL VENOUS BLD VENIPUNCTURE: CPT | Performed by: OBSTETRICS & GYNECOLOGY

## 2023-08-03 RX ORDER — CLINDAMYCIN HCL 300 MG
300 CAPSULE ORAL 3 TIMES DAILY
Qty: 30 CAPSULE | Refills: 0 | Status: SHIPPED | OUTPATIENT
Start: 2023-08-03 | End: 2023-08-13

## 2023-08-03 ASSESSMENT — PAIN SCALES - GENERAL: PAINLEVEL: EXTREME PAIN (8)

## 2023-08-03 NOTE — PROGRESS NOTES
SUBJECTIVE:  Sarah Ugalde is a 38 year old female P2 here for a postpartum visit.  She had a  Section   She describes earlier she was feeling week, dizzy and lost her balance and hit her head.  No current focal neuro sx.   She also notes increased bleeding, changing pad q 2-3 hours with some clots and fever . Denies foul smelling discharge.  Bottle feeding.     delivery complications:  Yes  PP htn/Preeclampsia.    breast feeding:  No  bladder problems:  No  bowel problems/hemorrhoids:  No  episiotomy/laceration/incision problems:  No  [place]:  No  contraception:  Salpingectomy  emotional adjustment:  doing well and happy      OBJECTIVE:  Blood pressure (!) 140/80, pulse 79, temperature 99.3  F (37.4  C), SpO2 99 %, not currently breastfeeding.   General - pleasant female in no acute distress.  Breast - no nodularity, asymmetry or nipple discharge bilaterally.  Abdomen - soft, nontender, nondistended, no hepatosplenomegaly.  + uterine tenderness to palpation  Incision:  Clean, dry, intact.  No erythema or drainage.     Rectovaginal - deferred.    Labs:  Nml PIH labs.  Ua negative for protein and culture pending.  CBC WNL, CRP elevated.       ASSESSMENT:  Possible early endometritis. Resolving pp PIH.    PLAN:  Continue restrictions.  Clindamycin RX x 10 days.  Return if fever >101, bleeding > 1 pad per hour or increased BP outside of parameters.     The patient will use salpingectomy  for birth control. Full counseling was provided, and all questions answered.    F/up scheduled 1 week.    Scout Grimaldo MD

## 2023-08-04 ENCOUNTER — APPOINTMENT (OUTPATIENT)
Dept: ULTRASOUND IMAGING | Facility: HOSPITAL | Age: 38
End: 2023-08-04
Attending: EMERGENCY MEDICINE

## 2023-08-04 ENCOUNTER — HOSPITAL ENCOUNTER (EMERGENCY)
Facility: HOSPITAL | Age: 38
Discharge: HOME | End: 2023-08-04
Attending: EMERGENCY MEDICINE | Admitting: EMERGENCY MEDICINE

## 2023-08-04 VITALS
RESPIRATION RATE: 20 BRPM | TEMPERATURE: 98.9 F | HEART RATE: 73 BPM | OXYGEN SATURATION: 99 % | DIASTOLIC BLOOD PRESSURE: 107 MMHG | SYSTOLIC BLOOD PRESSURE: 126 MMHG

## 2023-08-04 DIAGNOSIS — T81.49XA INCISIONAL INFECTION: ICD-10-CM

## 2023-08-04 LAB
ALBUMIN SERPL BCG-MCNC: 3.7 G/DL (ref 3.5–5.2)
ALP SERPL-CCNC: 132 U/L (ref 35–104)
ALT SERPL W P-5'-P-CCNC: 25 U/L (ref 0–50)
ANION GAP SERPL CALCULATED.3IONS-SCNC: 14 MMOL/L (ref 7–15)
AST SERPL W P-5'-P-CCNC: 23 U/L (ref 0–45)
BASOPHILS # BLD AUTO: 0.1 10E3/UL (ref 0–0.2)
BASOPHILS NFR BLD AUTO: 1 %
BILIRUB SERPL-MCNC: 0.2 MG/DL
BUN SERPL-MCNC: 13.6 MG/DL (ref 6–20)
CALCIUM SERPL-MCNC: 9.7 MG/DL (ref 8.6–10)
CHLORIDE SERPL-SCNC: 104 MMOL/L (ref 98–107)
CREAT SERPL-MCNC: 0.94 MG/DL (ref 0.51–0.95)
CRP SERPL-MCNC: 41.34 MG/L
DEPRECATED HCO3 PLAS-SCNC: 22 MMOL/L (ref 22–29)
EOSINOPHIL # BLD AUTO: 0.1 10E3/UL (ref 0–0.7)
EOSINOPHIL NFR BLD AUTO: 1 %
ERYTHROCYTE [DISTWIDTH] IN BLOOD BY AUTOMATED COUNT: 12.8 % (ref 10–15)
ERYTHROCYTE [SEDIMENTATION RATE] IN BLOOD BY WESTERGREN METHOD: 55 MM/HR (ref 0–20)
GFR SERPL CREATININE-BSD FRML MDRD: 79 ML/MIN/1.73M2
GLUCOSE SERPL-MCNC: 108 MG/DL (ref 70–99)
HCT VFR BLD AUTO: 35.4 % (ref 35–47)
HGB BLD-MCNC: 11.8 G/DL (ref 11.7–15.7)
HOLD SPECIMEN: NORMAL
IMM GRANULOCYTES # BLD: 0 10E3/UL
IMM GRANULOCYTES NFR BLD: 0 %
LYMPHOCYTES # BLD AUTO: 1.7 10E3/UL (ref 0.8–5.3)
LYMPHOCYTES NFR BLD AUTO: 17 %
MCH RBC QN AUTO: 30.4 PG (ref 26.5–33)
MCHC RBC AUTO-ENTMCNC: 33.3 G/DL (ref 31.5–36.5)
MCV RBC AUTO: 91 FL (ref 78–100)
MONOCYTES # BLD AUTO: 0.4 10E3/UL (ref 0–1.3)
MONOCYTES NFR BLD AUTO: 4 %
NEUTROPHILS # BLD AUTO: 8 10E3/UL (ref 1.6–8.3)
NEUTROPHILS NFR BLD AUTO: 77 %
NRBC # BLD AUTO: 0 10E3/UL
NRBC BLD AUTO-RTO: 0 /100
PLATELET # BLD AUTO: 377 10E3/UL (ref 150–450)
POTASSIUM SERPL-SCNC: 3.8 MMOL/L (ref 3.4–5.3)
PROT SERPL-MCNC: 7.2 G/DL (ref 6.4–8.3)
RBC # BLD AUTO: 3.88 10E6/UL (ref 3.8–5.2)
SODIUM SERPL-SCNC: 140 MMOL/L (ref 136–145)
WBC # BLD AUTO: 10.3 10E3/UL (ref 4–11)

## 2023-08-04 PROCEDURE — 99285 EMERGENCY DEPT VISIT HI MDM: CPT | Mod: 25

## 2023-08-04 PROCEDURE — 76705 ECHO EXAM OF ABDOMEN: CPT

## 2023-08-04 PROCEDURE — 85025 COMPLETE CBC W/AUTO DIFF WBC: CPT | Performed by: EMERGENCY MEDICINE

## 2023-08-04 PROCEDURE — 80053 COMPREHEN METABOLIC PANEL: CPT | Performed by: EMERGENCY MEDICINE

## 2023-08-04 PROCEDURE — 36415 COLL VENOUS BLD VENIPUNCTURE: CPT | Performed by: EMERGENCY MEDICINE

## 2023-08-04 PROCEDURE — 86140 C-REACTIVE PROTEIN: CPT | Performed by: EMERGENCY MEDICINE

## 2023-08-04 PROCEDURE — 96365 THER/PROPH/DIAG IV INF INIT: CPT

## 2023-08-04 PROCEDURE — 99284 EMERGENCY DEPT VISIT MOD MDM: CPT | Performed by: EMERGENCY MEDICINE

## 2023-08-04 PROCEDURE — 85652 RBC SED RATE AUTOMATED: CPT | Performed by: EMERGENCY MEDICINE

## 2023-08-04 PROCEDURE — 250N000011 HC RX IP 250 OP 636: Mod: JZ | Performed by: EMERGENCY MEDICINE

## 2023-08-04 PROCEDURE — 96375 TX/PRO/DX INJ NEW DRUG ADDON: CPT

## 2023-08-04 RX ORDER — CEFTRIAXONE SODIUM 1 G/50ML
1 INJECTION, SOLUTION INTRAVENOUS ONCE
Status: COMPLETED | OUTPATIENT
Start: 2023-08-04 | End: 2023-08-04

## 2023-08-04 RX ORDER — KETOROLAC TROMETHAMINE 15 MG/ML
15 INJECTION, SOLUTION INTRAMUSCULAR; INTRAVENOUS ONCE
Status: COMPLETED | OUTPATIENT
Start: 2023-08-04 | End: 2023-08-04

## 2023-08-04 RX ADMIN — KETOROLAC TROMETHAMINE 15 MG: 15 INJECTION, SOLUTION INTRAMUSCULAR; INTRAVENOUS at 16:24

## 2023-08-04 RX ADMIN — CEFTRIAXONE SODIUM 1 G: 1 INJECTION, SOLUTION INTRAVENOUS at 17:37

## 2023-08-04 ASSESSMENT — ENCOUNTER SYMPTOMS
NEUROLOGICAL NEGATIVE: 1
EYES NEGATIVE: 1
CARDIOVASCULAR NEGATIVE: 1
ENDOCRINE NEGATIVE: 1
ROS SKIN COMMENTS: PLEASE SEE HISTORY OF CHIEF COMPLAINT
ABDOMINAL PAIN: 1
FEVER: 1
RESPIRATORY NEGATIVE: 1
HEMATOLOGIC/LYMPHATIC NEGATIVE: 1

## 2023-08-04 ASSESSMENT — ACTIVITIES OF DAILY LIVING (ADL): ADLS_ACUITY_SCORE: 35

## 2023-08-04 NOTE — ED NOTES
Patient given written and verbal discharge instructions, verbalized understanding. All questions answered, no concerns. Patient left ambulatory to home via self.    
Lito Ang

## 2023-08-04 NOTE — ED PROVIDER NOTES
History     Chief Complaint   Patient presents with    Wound Infection     HPI  Sarah Ugalde is a 38 year old female who comes to the emergency department complaining of lower abdominal pain and a possible surgical wound infection.  The patient had a  section on .  That she began to have some discomfort along her  scar this past Monday.  She was seen by Dr. Grimaldo yesterday.  She was started on clindamycin.  She has some increased discomfort today.  She states she had a fever yesterday.  She states pain does not seem to radiate into her back.  She said no change in her bowel or bladder habits.  She has had no nausea or vomiting.    Allergies:  Allergies   Allergen Reactions    Magnesium Other (See Comments)     Altered mental status after IV dosing       Problem List:    Patient Active Problem List    Diagnosis Date Noted    Pre-eclampsia, postpartum 2023     Priority: Medium    Pregnancy, supervision for, high-risk, third trimester 2023     Priority: Medium    AMA (advanced maternal age) multigravida 35+ 2023     Priority: Medium    IUGR (intrauterine growth restriction) affecting care of mother 2023     Priority: Medium    Hx of pre-eclampsia in prior pregnancy, currently pregnant 2023     Priority: Medium    Encounter for sterilization 2023     Priority: Medium    Hx of  section 2023     Priority: Medium    Rh negative state in antepartum period 2023     Priority: Medium    PARDEEP (generalized anxiety disorder) 2023     Priority: Medium    Poor fetal growth affecting management of mother in third trimester, fetus 1 of multiple gestation 2023     Priority: Medium    History of  delivery 2023     Priority: Medium    Encounter for triage in pregnant patient 2023     Priority: Medium    Multigravida of advanced maternal age in third trimester 2022     Priority: Medium     AMA:  NIPT -  declined  h/o WPW. BL ECG - nml  Zio Patch, Cards consult  H/o TTP/preeclampsia/HELLP/acute renal failure>  Baby asa 16 wks.  Nml BL labs.   level II - nml  H/o HUS  H/o MS  no flu or covid vax  Plan repeat /sterilization 23.  Sterilization forms signed.   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      HUS (hemolytic uremic syndrome) (H) 2015     Priority: Medium     Postpartum Thrombotic thrombocytopenic purpura (TTP) and 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      HELLP (hemolytic anemia/elev liver enzymes/low platelets in pregnancy) 2015     Priority: Medium     Severe preeclampsia with HELLP syndrome and acute renal failure      Detached retina, bilateral 2015     Priority: Medium     secondary to severe preeclampsia with HELLP syndroma and acute renal failure      WPW (Delaney-Parkinson-White syndrome) 2015     Priority: Medium     She has been evaluated for ablation, however, based on location this was not deemed safe by cardiology at the time      MS (multiple sclerosis) (H) 2015     Priority: Medium        Past Medical History:    Past Medical History:   Diagnosis Date    Detached retina, bilateral 2015    PARDEEP (generalized anxiety disorder) 2023    HELLP (hemolytic anemia/elev liver enzymes/low platelets in pregnancy) 2015    HUS (hemolytic uremic syndrome) (H) 2015    MS (multiple sclerosis) (H) 2015    Rh negative state in antepartum period 2023    TTP (thrombotic thrombocytopenic purpura) (H) 2015    WPW (Delaney-Parkinson-White syndrome) 2015       Past Surgical History:    Past Surgical History:   Procedure Laterality Date     SECTION  2015    Severe preeclampsia with HELLP syndrome  "and acute renal failure then developed postpartum Thrombotic thrombocytopenic purpura (TTP) and Hemolytic uremic syndrome (HUS)    COMBINED  SECTION, SALPINGECTOMY BILATERAL Bilateral 2023    Procedure:  SECTION, WITH BILATERAL SALPINGECTOMY, Viable baby girl born at 1851;  Surgeon: Naseem Cruz MD;  Location: [facility] OR       Family History:    Family History   Problem Relation Age of Onset    Cancer - colorectal Paternal Grandmother     Breast Cancer Other         3 great aunts on moms side    Allergies Maternal Grandmother         dust pollen    Allergies Maternal Aunt         \"       Social History:  Marital Status:  Single [1]  Social History     Tobacco Use    Smoking status: Never    Smokeless tobacco: Never   Substance Use Topics    Alcohol use: Not Currently     Comment: occa    Drug use: No        Medications:    butalbital-acetaminophen-caffeine (ESGIC) -40 MG tablet  clindamycin (CLEOCIN) 300 MG capsule  hydrOXYzine (ATARAX) 50 MG tablet  ibuprofen (ADVIL/MOTRIN) 800 MG tablet  oxyCODONE (ROXICODONE) 5 MG tablet  PRENATAL VIT-DOCUSATE-IRON-FA PO  senna-docusate (SENOKOT-S/PERICOLACE) 8.6-50 MG tablet          Review of Systems   Constitutional:  Positive for fever.   HENT: Negative.     Eyes: Negative.    Respiratory: Negative.     Cardiovascular: Negative.    Gastrointestinal:  Positive for abdominal pain.   Endocrine: Negative.    Genitourinary: Negative.    Musculoskeletal:         See history of chief complaint   Skin:         Please see history of chief complaint   Neurological: Negative.    Hematological: Negative.    All other systems reviewed and found unremarkable    Physical Exam   BP: (!) 158/104  Pulse: 87  Temp: 98.9  F (37.2  C)  Resp: 20  SpO2: 98 %      Physical Exam 38-year-old female who is awake alert oriented person place and time very pleasant and cooperative with my exam.  HEENT normocephalic extraocular muscles intact pupils equally round active light and " oropharynx clear.  Neck supple full range of motion without pain.  Lungs are clear bilaterally.  Heart maintains regular rhythm S1-S2 sounds are appreciated.  Abdomen is soft no rebound tenderness patient does have voluntary guarding to palpation along her lower abdomen and along the  incision.  Is some mild erythema and there is some induration on the left lateral aspect of the incision.  There is no drainage.  Extremities a full range of motion 5/5 strength.  No edema.  Neurologic exam no focal deficit.  Dermatologic exam no diffuse skin rashes or lesions.    ED Course              ED Course as of 08/04/23 1750   Fri Aug 04, 2023   1719 Anna the case with Dr. Grimaldo, his obstetrician and her obstetrician on-call.  We reviewed the ultrasound and labs.  He is requested that we give her a gram of Rocephin.  We will also instruct her to increase her clindamycin from 3 times a day to 4 times a day.  After IV antibiotics the plan will be to discharge the patient with appropriate discharge instructions.  She has a follow-up appointment with Dr. Cruz from OB next week.  She will however be advised to return to the emergency department for reassessment if further problems occur.                         Results for orders placed or performed during the hospital encounter of 23 (from the past 24 hour(s))   CBC with platelets differential    Narrative    The following orders were created for panel order CBC with platelets differential.  Procedure                               Abnormality         Status                     ---------                               -----------         ------                     CBC with platelets and d...[[id number]]                      Final result                 Please view results for these tests on the individual orders.   Comprehensive metabolic panel   Result Value Ref Range    Sodium 140 136 - 145 mmol/L    Potassium 3.8 3.4 - 5.3 mmol/L    Chloride 104 98 - 107 mmol/L     Carbon Dioxide (CO2) 22 22 - 29 mmol/L    Anion Gap 14 7 - 15 mmol/L    Urea Nitrogen 13.6 6.0 - 20.0 mg/dL    Creatinine 0.94 0.51 - 0.95 mg/dL    Calcium 9.7 8.6 - 10.0 mg/dL    Glucose 108 (H) 70 - 99 mg/dL    Alkaline Phosphatase 132 (H) 35 - 104 U/L    AST 23 0 - 45 U/L    ALT 25 0 - 50 U/L    Protein Total 7.2 6.4 - 8.3 g/dL    Albumin 3.7 3.5 - 5.2 g/dL    Bilirubin Total 0.2 <=1.2 mg/dL    GFR Estimate 79 >60 mL/min/1.73m2   CRP inflammation   Result Value Ref Range    CRP Inflammation 41.34 (H) <5.00 mg/L   Erythrocyte sedimentation rate auto   Result Value Ref Range    Erythrocyte Sedimentation Rate 55 (H) 0 - 20 mm/hr   [place] Draw    Narrative    The following orders were created for panel order [place] Draw.  Procedure                               Abnormality         Status                     ---------                               -----------         ------                     Extra Blue Top Tube[[id number]]                              Final result               Extra Red Top Tube[[id number]]                               Final result               Extra Green Top (Lithium...[[id number]]                      Final result               Extra Green Top (Lithium...[[id number]]                      Final result               Extra Purple Top Tube[[id number]]                            Final result                 Please view results for these tests on the individual orders.   CBC with platelets and differential   Result Value Ref Range    WBC Count 10.3 4.0 - 11.0 10e3/uL    RBC Count 3.88 3.80 - 5.20 10e6/uL    Hemoglobin 11.8 11.7 - 15.7 g/dL    Hematocrit 35.4 35.0 - 47.0 %    MCV 91 78 - 100 fL    MCH 30.4 26.5 - 33.0 pg    MCHC 33.3 31.5 - 36.5 g/dL    RDW 12.8 10.0 - 15.0 %    Platelet Count 377 150 - 450 10e3/uL    % Neutrophils 77 %    % Lymphocytes 17 %    % Monocytes 4 %    % Eosinophils 1 %    % Basophils 1 %    % Immature Granulocytes 0 %    NRBCs per 100 WBC 0 <1 /100    Absolute Neutrophils  8.0 1.6 - 8.3 10e3/uL    Absolute Lymphocytes 1.7 0.8 - 5.3 10e3/uL    Absolute Monocytes 0.4 0.0 - 1.3 10e3/uL    Absolute Eosinophils 0.1 0.0 - 0.7 10e3/uL    Absolute Basophils 0.1 0.0 - 0.2 10e3/uL    Absolute Immature Granulocytes 0.0 <=0.4 10e3/uL    Absolute NRBCs 0.0 10e3/uL   Extra Blue Top Tube   Result Value Ref Range    Hold Specimen JIC    Extra Red Top Tube   Result Value Ref Range    Hold Specimen JIC    Extra Green Top (Lithium Heparin) Tube   Result Value Ref Range    Hold Specimen JIC    Extra Green Top (Lithium Heparin) Tube   Result Value Ref Range    Hold Specimen JIC    Extra Purple Top Tube   Result Value Ref Range    Hold Specimen JIC    US Abdomen Limited    Narrative    PROCEDURES: US ABDOMEN LIMITED    HISTORY: Female, age 38 years, lower abdomen/rule out incisional  abscess s/p     TECHNIQUE: Ultrasound of the upper abdomen was performed.    COMPARISON: No relevant prior imaging.     FINDINGS:  There are 2 small fluid collection seen at the  incision site  measuring 6 x 5 x 4 and 10 x 6 x 23 mm in size..      Impression    IMPRESSION: There are 2 small fluid collections deep to the   incision. Differential diagnosis includes hematoma, seroma. Abscess is  not excluded. There is no distinct evidence of gas within the fluid  collections.    ERMELINDA SEN MD         SYSTEM ID:  [id number]       Medications   cefTRIAXone in d5w (ROCEPHIN) intermittent infusion 1 g (1 g Intravenous $New Bag 23 [id number])   ketorolac (TORADOL) injection 15 mg (15 mg Intravenous $Given 23 [id number])       Assessments & Plan (with Medical Decision Making)     I have reviewed the nursing notes.    I have reviewed the findings, diagnosis, plan and need for follow up with the patient.          Medical Decision Making  The patient's presentation was of moderate complexity (an acute illness with systemic symptoms).    The patient's evaluation involved:  ordering and/or review of 3+ test(s) in  this encounter (see separate area of note for details)    The patient's management necessitated moderate risk (prescription drug management including medications given in the ED).        New Prescriptions    No medications on file       Final diagnoses:   Incisional infection       8/4/2023   [facility]       Adin Prieto MD  08/04/23 1932

## 2023-08-04 NOTE — ED TRIAGE NOTES
Pt in for evaluation of worsening infection to  site.  was . Saw Dr. Grimaldo yesterday was told to come back in if fever or pain worsened. Both occurred prompting visit today. Some oozing from left side intermittently. Was started on clindamycin yesterday.

## 2023-08-05 LAB — BACTERIA UR CULT: NORMAL

## 2023-08-07 ENCOUNTER — PRENATAL OFFICE VISIT (OUTPATIENT)
Dept: OBGYN | Facility: OTHER | Age: 38
End: 2023-08-07
Attending: OBSTETRICS & GYNECOLOGY

## 2023-08-07 VITALS
TEMPERATURE: 99 F | WEIGHT: 123 LBS | DIASTOLIC BLOOD PRESSURE: 72 MMHG | HEIGHT: 59 IN | HEART RATE: 60 BPM | BODY MASS INDEX: 24.8 KG/M2 | SYSTOLIC BLOOD PRESSURE: 118 MMHG

## 2023-08-07 PROCEDURE — 99207 PR NO CHARGE LOS: CPT | Performed by: OBSTETRICS & GYNECOLOGY

## 2023-08-07 RX ORDER — CLINDAMYCIN HCL 300 MG
300 CAPSULE ORAL 4 TIMES DAILY
Qty: 10 CAPSULE | Refills: 0 | Status: SHIPPED | OUTPATIENT
Start: 2023-08-07 | End: 2023-08-17

## 2023-08-07 ASSESSMENT — PAIN SCALES - GENERAL: PAINLEVEL: SEVERE PAIN (7)

## 2023-08-07 ASSESSMENT — PATIENT HEALTH QUESTIONNAIRE - PHQ9: SUM OF ALL RESPONSES TO PHQ QUESTIONS 1-9: 5

## 2023-08-07 NOTE — PROGRESS NOTES
POSTPARTUM VISIT    REASON FOR VISIT / CHEIF COMPLAINT  Postpartum exam.   section 2 week incision check.    HISTORY OF PRESENT ILLNESS  Sarah Ugalde is a 38 year old  female who is 2 weeks status post  section bilateral salpingectomy on 2023. She presents for early postpartum visit and incision check today.  She developed postpartum hypertension and preeclampsia and was readmitted on 2023 and received magnesium prophylaxis.  She developed magnesium toxicity which resolved off the magnesium.  The patient was then seen last week and diagnosed with postpartum endometritis and she was started on clindamycin on 2023.  She presented to the emergency room with subjective fever on 2023 and she was found to be afebrile.  The case was discussed with the on-call OB provider who recommended that she receive Rocephin 1 g IM and increase her clindamycin to 300 mg 4 times daily.  The patient was discharged home.  She now presents for follow-up.  She denies fever or chills.  She had an episode yesterday where she felt weak and dizzy but denies current symptoms.  No headaches, vision changes, shortness of breath, chest pain, right upper quadrant tenderness.  She continues to have lochia and denies foul-smelling discharge.  Her abdominal tenderness is improving.  She is bottlefeeding her infant. Her infant is doing well.    Delivery complications: Intrauterine growth restriction in labor, Rh negative, postpartum hypertension and preeclampsia, postpartum endometritis  Breast feeding: No.  Bladder problems: No.  Bowel problems / hemorrhoids: No.  Episiotomy / laceration / incision healed: Yes.  Vaginal lochia: Light bleeding.  Emotional adjustment: doing well and happy.  Depression symptoms or suicide thoughts: No.  Post delivery pain:  Minimal as above .  Incision concerns: No.  Contraception plans: Salpingectomy.  Other concerns: No.    ALLERGIES     Allergies   Allergen  "Reactions    Magnesium Other (See Comments)     Altered mental status after IV dosing       MEDICATIONS    Current Outpatient Medications:     butalbital-acetaminophen-caffeine (ESGIC) -40 MG tablet, Take 1 tablet by mouth every 4 hours as needed for headaches or migraine, Disp: 10 tablet, Rfl: 0    clindamycin (CLEOCIN) 300 MG capsule, Take 1 capsule (300 mg) by mouth 4 times daily, Disp: 10 capsule, Rfl: 0    clindamycin (CLEOCIN) 300 MG capsule, Take 1 capsule (300 mg) by mouth 3 times daily for 10 days (Patient taking differently: Take 300 mg by mouth 4 times daily), Disp: 30 capsule, Rfl: 0    hydrOXYzine (ATARAX) 50 MG tablet, Take 1 tablet (50 mg) by mouth every 6 hours as needed for other or anxiety (adjuvant pain), Disp: 30 tablet, Rfl: 1    ibuprofen (ADVIL/MOTRIN) 800 MG tablet, Take 1 tablet (800 mg) by mouth every 6 hours as needed for moderate pain or fever, Disp: 100 tablet, Rfl: 0    PRENATAL VIT-DOCUSATE-IRON-FA PO, , Disp: , Rfl:     senna-docusate (SENOKOT-S/PERICOLACE) 8.6-50 MG tablet, Take 1 tablet by mouth 2 times daily, Disp: 60 tablet, Rfl: 0    oxyCODONE (ROXICODONE) 5 MG tablet, Take 1 tablet (5 mg) by mouth every 6 hours as needed for moderate to severe pain (Patient not taking: Reported on 8/7/2023), Disp: 20 tablet, Rfl: 0    REVIEW OF SYSTEMS  As per HPI, otherwise negative.    The patient's Medical Hx, Surgical Hx, Obstetrical Hx, Social Hx, and Family Hx have been reviewed and updated in the electronic medical record.    OBJECTIVE  /72   Pulse 60   Temp 99  F (37.2  C)   Ht 1.499 m (4' 11\")   Wt 55.8 kg (123 lb)   Breastfeeding No   BMI 24.84 kg/m      General:  Well-developed, well-nourished female in no apparent distress.  Neurological: Alert and oriented x3.  Breast: Deferred.  Abdomen: Soft, nontender, nondistended, positive bowel sounds.  No organomegaly. No rebound, no guarding. Fundus is firm and nontender above pubic symphysis. Incision is clean dry and " intact.  No erythema, induration or signs of infection.  Pelvic exam: Deferred.  Extremities:  No clubbing cyanosis or edema. Nontender bilaterally.    DIAGNOSTICS  CBC Results (Last 2)  Recent Labs   Lab Test 23  1613 23  1506   WBC 10.3 9.6   RBC 3.88 4.06   HGB 11.8 12.1   HCT 35.4 36.7   MCV 91 90    402     CMP Results (Last 3)  Recent Labs   Lab Test 23  1613 23  1506 23  0604 23  0604 23  2244    142  --   --  141   POTASSIUM 3.8 3.6  --   --  3.9   CHLORIDE 104 103  --   --  105   CO2 22 23  --   --  22   ANIONGAP 14 16*  --   --  14   BUN 13.6 13.4  --   --  10.2   CR 0.94 0.91 0.79   < > 0.78   * 94  --   --  95   GFRESTIMATED 79 82 >90   < > >90   MARVIN 9.7 9.5  --   --  9.3   PROTTOTAL 7.2 7.2  --   --  6.7   ALBUMIN 3.7 3.8  --   --  3.5   BILITOTAL 0.2 <0.2  --   --  <0.2   ALKPHOS 132* 137*  --   --  141*   AST 23 22 26   < > 48*   ALT 25 27 29   < > 42    < > = values in this interval not displayed.      2023 Pathology: Benign fallopian tubes bilaterally  2023 Ucx negative  2023 CRP 41.3    PHQ-9 score:       2023     3:56 PM   PHQ   PHQ-9 Total Score 5   Q9: Thoughts of better off dead/self-harm past 2 weeks Not at all       ASSESSMENT / PLAN  Sarah Ugalde is a 38 year old  female who is 2 weeks status post  section bilateral salpingectomy on 2023.    1 Incision healing well status post  section and bilateral salpingectomy on 2023  - The patient is making excellent postpartum progress.  - I reviewed the operation report and the indications for her  section.  - I reviewed pathology results with the patient.  - Routine postpartum precautions, recommendations and restrictions are reviewed with the patient.  - Routine postpartum depression precautions are reviewed with the patient.  - Routine postop activity recommendations and restrictions are reviewed with the  patient.  - Routine postoperative precautions, restrictions and recommendations are reviewed with the patient.  - Routine incision care instructions are reviewed with the patient.  - I recommend that the patient refrain from intercourse for 6-8 weeks after delivery.  I recommend the patient be on reliable contraception before resuming intercourse.    2 Postpartum endometritis  - The patient received Rocephin 1 g intramuscularly x1 on 08/04/2023.  - Continue with clindamycin 300 mg p.o. 4 times daily x10 days as prescribed.  New prescription for clindamycin 300 mg number 10 pills sent to pharmacy to make a total of 40 pills.  - Return to clinic if patient has fever greater than 101.    3 Postpartum hypertension/ preeclampsia  - The patient remains asymptomatic.  - The patient's blood pressure and vital signs are stable.  - Continue with home blood pressure monitoring as directed.  - Postpartum hypertension and preeclampsia precautions are reviewed with the patient.    4 Bottle feeding  - Nursing is encouraged.  - Follow up with outpatient lactation appointment as needed.    4 Anxiety  - I recommend the patient take hydroxyzine as needed for anxiety.  - I recommend that the patient see her mental health provider for anxiety and PTSD follow-up.    - All of the patient's questions were answered.  - Problem list reviewed and updated.  - Follow up in 1-2 weeks for postpartum visit including physical examination, pelvic examination and Pap smear as needed.    Naseem Cruz MD  Obstetrics and Gynecology

## 2023-08-08 ENCOUNTER — MYC MEDICAL ADVICE (OUTPATIENT)
Dept: OBGYN | Facility: OTHER | Age: 38
End: 2023-08-08

## 2023-08-08 RX ORDER — OXYCODONE HYDROCHLORIDE 5 MG/1
5 TABLET ORAL EVERY 6 HOURS PRN
Qty: 12 TABLET | Refills: 0 | Status: SHIPPED | OUTPATIENT
Start: 2023-08-08 | End: 2023-08-11

## 2023-08-10 ENCOUNTER — OFFICE VISIT (OUTPATIENT)
Dept: OBGYN | Facility: OTHER | Age: 38
End: 2023-08-10
Attending: OBSTETRICS & GYNECOLOGY

## 2023-08-10 VITALS
HEART RATE: 72 BPM | SYSTOLIC BLOOD PRESSURE: 151 MMHG | HEIGHT: 59 IN | DIASTOLIC BLOOD PRESSURE: 98 MMHG | WEIGHT: 125 LBS | BODY MASS INDEX: 25.2 KG/M2 | TEMPERATURE: 98.8 F | OXYGEN SATURATION: 98 %

## 2023-08-10 DIAGNOSIS — L08.9 LOCAL INFECTION OF WOUND: ICD-10-CM

## 2023-08-10 DIAGNOSIS — Z98.890 POST-OPERATIVE STATE: ICD-10-CM

## 2023-08-10 DIAGNOSIS — R10.84 ABDOMINAL PAIN, GENERALIZED: ICD-10-CM

## 2023-08-10 DIAGNOSIS — T14.8XXA DRAINAGE FROM WOUND: Primary | ICD-10-CM

## 2023-08-10 DIAGNOSIS — T14.8XXA LOCAL INFECTION OF WOUND: ICD-10-CM

## 2023-08-10 PROCEDURE — 99024 POSTOP FOLLOW-UP VISIT: CPT | Performed by: OBSTETRICS & GYNECOLOGY

## 2023-08-10 PROCEDURE — 87070 CULTURE OTHR SPECIMN AEROBIC: CPT | Performed by: OBSTETRICS & GYNECOLOGY

## 2023-08-10 RX ORDER — CEFDINIR 300 MG/1
300 CAPSULE ORAL 2 TIMES DAILY
Status: CANCELLED | OUTPATIENT
Start: 2023-08-10

## 2023-08-10 RX ORDER — CEPHALEXIN 500 MG/1
500 CAPSULE ORAL 4 TIMES DAILY
Qty: 40 CAPSULE | Refills: 0 | Status: SHIPPED | OUTPATIENT
Start: 2023-08-10 | End: 2023-08-20

## 2023-08-10 ASSESSMENT — PAIN SCALES - GENERAL: PAINLEVEL: SEVERE PAIN (7)

## 2023-08-10 NOTE — PROGRESS NOTES
"MIMI Ugalde is a 38 year old female presents for post operative check. She is  3  week(s) status post .  She reports she is still having lower abdominal pain, menstrual like bleeding and low grade fevers at home.  She is on Clindamycing for endometritis and or/depp wound infection.  She has noted some drainage form incision on left.  No erythema.  Bowel and bladder function is satisfactory. BP's have been variable up and down but within parameters.     O.  Blood pressure (!) 151/98, pulse 72, temperature 98.8  F (37.1  C), temperature source Tympanic, height 1.499 m (4' 11\"), weight 56.7 kg (125 lb), SpO2 98 %, not currently breastfeeding.    Abd: soft, non-tender, non-distended. Incision clear, no erythema or fluctuance noted but there is some serous drainage noted on left incision.  Culture swab obtained.      A. /P. Possible deep wound infection.  Cultures pending. Recommend CT abd/pelvis for further eval, r/o abscess.  Ordered.  Will call with results when available.  Keflex 500mg QID.    To ED if temp>101, bleeding > 1pad/hr, increasing pain    Scout Grimaldo MD   "

## 2023-08-11 ENCOUNTER — HOSPITAL ENCOUNTER (OUTPATIENT)
Dept: CT IMAGING | Facility: HOSPITAL | Age: 38
Discharge: HOME | End: 2023-08-11
Attending: OBSTETRICS & GYNECOLOGY | Admitting: OBSTETRICS & GYNECOLOGY

## 2023-08-11 DIAGNOSIS — R10.84 ABDOMINAL PAIN, GENERALIZED: ICD-10-CM

## 2023-08-11 PROCEDURE — 74177 CT ABD & PELVIS W/CONTRAST: CPT

## 2023-08-11 PROCEDURE — 250N000011 HC RX IP 250 OP 636: Performed by: RADIOLOGY

## 2023-08-11 RX ORDER — IOPAMIDOL 755 MG/ML
17 INJECTION, SOLUTION INTRAVASCULAR ONCE
Status: DISCONTINUED | OUTPATIENT
Start: 2023-08-11 | End: 2023-08-12 | Stop reason: HOSPADM

## 2023-08-11 RX ORDER — IOPAMIDOL 755 MG/ML
62 INJECTION, SOLUTION INTRAVASCULAR ONCE
Status: COMPLETED | OUTPATIENT
Start: 2023-08-11 | End: 2023-08-11

## 2023-08-11 RX ADMIN — IOPAMIDOL 62 ML: 755 INJECTION, SOLUTION INTRAVENOUS at 16:12

## 2023-08-15 LAB — BACTERIA WND CULT: NORMAL

## 2023-08-16 DIAGNOSIS — O09.523 MULTIGRAVIDA OF ADVANCED MATERNAL AGE IN THIRD TRIMESTER: ICD-10-CM

## 2023-08-17 ENCOUNTER — OFFICE VISIT (OUTPATIENT)
Dept: OBGYN | Facility: OTHER | Age: 38
End: 2023-08-17
Attending: OBSTETRICS & GYNECOLOGY

## 2023-08-17 ENCOUNTER — MYC MEDICAL ADVICE (OUTPATIENT)
Dept: OBGYN | Facility: OTHER | Age: 38
End: 2023-08-17

## 2023-08-17 VITALS
HEART RATE: 76 BPM | SYSTOLIC BLOOD PRESSURE: 116 MMHG | DIASTOLIC BLOOD PRESSURE: 73 MMHG | WEIGHT: 125 LBS | BODY MASS INDEX: 25.2 KG/M2 | HEIGHT: 59 IN | OXYGEN SATURATION: 97 %

## 2023-08-17 DIAGNOSIS — M79.18 CHRONIC MUSCULOSKELETAL PAIN DUE TO DISORDER OF NERVOUS SYSTEM: ICD-10-CM

## 2023-08-17 DIAGNOSIS — O09.523 MULTIGRAVIDA OF ADVANCED MATERNAL AGE IN THIRD TRIMESTER: ICD-10-CM

## 2023-08-17 DIAGNOSIS — T14.8XXA LOCAL INFECTION OF WOUND: Primary | ICD-10-CM

## 2023-08-17 DIAGNOSIS — G98.8 CHRONIC MUSCULOSKELETAL PAIN DUE TO DISORDER OF NERVOUS SYSTEM: ICD-10-CM

## 2023-08-17 DIAGNOSIS — G89.29 CHRONIC MUSCULOSKELETAL PAIN DUE TO DISORDER OF NERVOUS SYSTEM: ICD-10-CM

## 2023-08-17 DIAGNOSIS — L08.9 LOCAL INFECTION OF WOUND: Primary | ICD-10-CM

## 2023-08-17 PROCEDURE — 99024 POSTOP FOLLOW-UP VISIT: CPT | Performed by: OBSTETRICS & GYNECOLOGY

## 2023-08-17 RX ORDER — CEPHALEXIN 500 MG/1
500 CAPSULE ORAL 4 TIMES DAILY
Qty: 16 CAPSULE | Refills: 0 | Status: SHIPPED | OUTPATIENT
Start: 2023-08-17 | End: 2023-08-21

## 2023-08-17 RX ORDER — HYDROXYZINE HYDROCHLORIDE 50 MG/1
50 TABLET, FILM COATED ORAL EVERY 6 HOURS PRN
Qty: 40 TABLET | Refills: 1 | Status: SHIPPED | OUTPATIENT
Start: 2023-08-17 | End: 2023-08-29

## 2023-08-17 RX ORDER — FLUCONAZOLE 150 MG/1
150 TABLET ORAL
Qty: 3 TABLET | Refills: 0 | Status: SHIPPED | OUTPATIENT
Start: 2023-08-17 | End: 2023-08-24

## 2023-08-17 RX ORDER — GABAPENTIN 300 MG/1
300 CAPSULE ORAL 3 TIMES DAILY
Qty: 90 CAPSULE | Refills: 1 | Status: SHIPPED | OUTPATIENT
Start: 2023-08-17 | End: 2023-11-03

## 2023-08-17 RX ORDER — HYDROXYZINE HYDROCHLORIDE 50 MG/1
TABLET, FILM COATED ORAL
Qty: 30 TABLET | Refills: 0 | OUTPATIENT
Start: 2023-08-17

## 2023-08-17 ASSESSMENT — PAIN SCALES - GENERAL: PAINLEVEL: EXTREME PAIN (8)

## 2023-08-17 NOTE — PROGRESS NOTES
"MIMI Ugalde is a 38 year old female presents for post operative check. She is  3  week(s) status post .  She reports  continued variable BP and temps to 101 at home and joint pains.   Bowel and bladder function is satisfactory. Incision still with some slight drainage on left and c/o lower abdominal pain/sensitivity.  . Significant findings CT neg.  No evidence fluid collection/abscess.   Wound culture negative for pathogenic bacteria.     O.  Blood pressure 116/73, pulse 76, height 1.499 m (4' 11\"), weight 56.7 kg (125 lb), SpO2 97 %, not currently breastfeeding.    Abd: soft, non-tender, non-distended. Incision clear, with slight yeast and very superficial healing dehiscence in two spots on left.  No fluctuance/erythema, without evidence of infection.  Superficial skin abdominal wall tenderness.     A. /P. PO check.  Superficial wound dehiscence/yeast/nerve pain.   Finish 14 day Keflex course, Diflucan rx.  Discussed treatment options for nerve pain.  Gabapentin Rx with instructions on use, SE's. Risks, potential for withdrawal if stopped abruptly etc.      F/u  2 weeks.    Recommend establish with primary care for her ongoing medical issues as she may have underlying HTN, rheumatologic or autoimmune d/o contributing to her sx.      Scout Grimaldo MD   "

## 2023-08-25 ENCOUNTER — MEDICAL CORRESPONDENCE (OUTPATIENT)
Dept: HEALTH INFORMATION MANAGEMENT | Facility: HOSPITAL | Age: 38
End: 2023-08-25

## 2023-08-29 ENCOUNTER — OFFICE VISIT (OUTPATIENT)
Dept: OBGYN | Facility: OTHER | Age: 38
End: 2023-08-29
Attending: OBSTETRICS & GYNECOLOGY

## 2023-08-29 VITALS
WEIGHT: 125 LBS | BODY MASS INDEX: 25.2 KG/M2 | HEIGHT: 59 IN | OXYGEN SATURATION: 98 % | DIASTOLIC BLOOD PRESSURE: 82 MMHG | HEART RATE: 74 BPM | TEMPERATURE: 98.5 F | SYSTOLIC BLOOD PRESSURE: 140 MMHG

## 2023-08-29 DIAGNOSIS — Z98.890 POST-OPERATIVE STATE: ICD-10-CM

## 2023-08-29 DIAGNOSIS — I10 PRIMARY HYPERTENSION: Primary | ICD-10-CM

## 2023-08-29 PROCEDURE — 99207 PR POST PARTUM EXAM: CPT | Performed by: OBSTETRICS & GYNECOLOGY

## 2023-08-29 RX ORDER — LABETALOL 100 MG/1
50 TABLET, FILM COATED ORAL 2 TIMES DAILY
Qty: 60 TABLET | Refills: 3 | Status: ON HOLD | OUTPATIENT
Start: 2023-08-29 | End: 2024-07-03 | Stop reason: DRUGHIGH

## 2023-08-29 RX ORDER — IBUPROFEN 800 MG/1
800 TABLET, FILM COATED ORAL EVERY 6 HOURS PRN
Qty: 100 TABLET | Refills: 0 | Status: SHIPPED | OUTPATIENT
Start: 2023-08-29 | End: 2023-10-24

## 2023-08-29 RX ORDER — HYDROXYZINE HYDROCHLORIDE 50 MG/1
50 TABLET, FILM COATED ORAL EVERY 6 HOURS PRN
Qty: 40 TABLET | Refills: 1 | Status: SHIPPED | OUTPATIENT
Start: 2023-08-29 | End: 2023-10-03

## 2023-08-29 ASSESSMENT — PAIN SCALES - GENERAL: PAINLEVEL: SEVERE PAIN (7)

## 2023-08-29 NOTE — PROGRESS NOTES
"SUBJECTIVE:  Sarah Ugalde is a 38 year old female P2 here for a postpartum visit/incision check.  See my [prior evals. She had a  Section   delivering a healthy baby girl  Currently  has complaints of labile BP with systolic max 's with associated visual sx.    delivery complications:  Yes  PP preeclampsia, wound infection  breast feeding:  No  bladder problems:  No  bowel problems/hemorrhoids:  No  episiotomy/laceration/incision healed? Yes  vaginal flow:  light  [place]:  No  contraception:  Salpingectomy        OBJECTIVE:  Blood pressure (!) 140/82, pulse 74, temperature 98.5  F (36.9  C), temperature source Tympanic, height 1.499 m (4' 11\"), weight 56.7 kg (125 lb), SpO2 98 %, not currently breastfeeding.   General - pleasant female in no acute distress.  Breast - no nodularity, asymmetry or nipple discharge bilaterally.  Abdomen - soft, nontender, nondistended, no hepatosplenomegaly.  Incision:  Clean, dry, intact.  No erythema or drainage.     Rectovaginal - deferred.    ASSESSMENT:  Persisten pp HTN/chronic HTN.  Start low dose Labatalol as previously developed low BP on higher dosing.    Satifactory wound check.      PLAN:  F/unit(s) at 6 wk pp exam.  Continue home BP monitoring and restrictions.    Salpingectomy for BC.    Cards f/up in Dec.  Establish care with PCM, appt scheduled in October.  Gabapentin or neuropathic pain.      Scout Grimaldo MD   "

## 2023-09-05 ENCOUNTER — PRENATAL OFFICE VISIT (OUTPATIENT)
Dept: OBGYN | Facility: OTHER | Age: 38
End: 2023-09-05
Attending: OBSTETRICS & GYNECOLOGY

## 2023-09-05 VITALS
HEIGHT: 59 IN | SYSTOLIC BLOOD PRESSURE: 116 MMHG | DIASTOLIC BLOOD PRESSURE: 71 MMHG | HEART RATE: 67 BPM | OXYGEN SATURATION: 98 % | BODY MASS INDEX: 25.6 KG/M2 | WEIGHT: 127 LBS

## 2023-09-05 DIAGNOSIS — Z98.890 POST-OPERATIVE STATE: Primary | ICD-10-CM

## 2023-09-05 PROBLEM — Z98.891 HISTORY OF CESAREAN DELIVERY: Status: RESOLVED | Noted: 2023-05-21 | Resolved: 2023-09-05

## 2023-09-05 PROBLEM — Z67.91 RH NEGATIVE STATE IN ANTEPARTUM PERIOD: Status: RESOLVED | Noted: 2023-07-23 | Resolved: 2023-09-05

## 2023-09-05 PROBLEM — O26.899 RH NEGATIVE STATE IN ANTEPARTUM PERIOD: Status: RESOLVED | Noted: 2023-07-23 | Resolved: 2023-09-05

## 2023-09-05 PROCEDURE — 99207 PR POST PARTUM EXAM: CPT | Performed by: OBSTETRICS & GYNECOLOGY

## 2023-09-05 ASSESSMENT — PAIN SCALES - GENERAL: PAINLEVEL: SEVERE PAIN (7)

## 2023-09-05 NOTE — PROGRESS NOTES
"SUBJECTIVE:  Sarah Ugalde is a 38 year old female P2 here for a postpartum visit.  She had a  Section   delivering a healthy baby girl  Currently no complaints and doing well.    Today's Depression Rating was 9    delivery complications:  Yes  PP preeclampsia.  Labile HTN.  On Labatelol  breast feeding:  No  bladder problems:  No  bowel problems/hemorrhoids:  Yes  episiotomy/laceration/incision healed? Yes  vaginal flow:  Intermittent.  moderate  [place]:  No  contraception:  Salpingectomy  emotional adjustment:  happy, having some difficulties adjusting, and still with pain from delivery      OBJECTIVE:  Blood pressure 116/71, pulse 67, height 1.499 m (4' 11\"), weight 57.6 kg (127 lb), SpO2 98 %, not currently breastfeeding.   General - pleasant female in no acute distress.  Abdomen - soft, nontender, nondistended, no hepatosplenomegaly.  Rectovaginal - deferred.    ASSESSMENT:  normal postpartum exam.  Released from pregnancy/PO related restrictions.  Labile HTN.  H/o WPW syndrome.  F/up with cardiology/PCM scheduled.     PLAN:    May resume normal activities without restrictions  Pap smear Not Done today    The patient will use salpingectomy for birth control.  Declines treatment for depression/anxiety.  Likely situational with her child being sick.  Will continue to monitor.  Return to clinic in one year for an annual, when due for a pap smear or PRN.    Scout Grimaldo MD    " RN to set up to see Dr. Laura Hernandez for liver lesion and cirrhosis history

## 2023-09-13 ENCOUNTER — OFFICE VISIT (OUTPATIENT)
Dept: CARDIOLOGY | Facility: OTHER | Age: 38
End: 2023-09-13
Attending: NURSE PRACTITIONER

## 2023-09-13 VITALS
HEART RATE: 73 BPM | RESPIRATION RATE: 16 BRPM | WEIGHT: 130 LBS | SYSTOLIC BLOOD PRESSURE: 106 MMHG | BODY MASS INDEX: 26.21 KG/M2 | HEIGHT: 59 IN | OXYGEN SATURATION: 95 % | DIASTOLIC BLOOD PRESSURE: 78 MMHG

## 2023-09-13 DIAGNOSIS — I10 ESSENTIAL HYPERTENSION: ICD-10-CM

## 2023-09-13 DIAGNOSIS — R55 SYNCOPE, UNSPECIFIED SYNCOPE TYPE: ICD-10-CM

## 2023-09-13 DIAGNOSIS — I45.6 WPW (WOLFF-PARKINSON-WHITE SYNDROME): Primary | ICD-10-CM

## 2023-09-13 DIAGNOSIS — R00.2 PALPITATIONS: ICD-10-CM

## 2023-09-13 PROCEDURE — 99214 OFFICE O/P EST MOD 30 MIN: CPT | Performed by: NURSE PRACTITIONER

## 2023-09-13 ASSESSMENT — PAIN SCALES - GENERAL: PAINLEVEL: NO PAIN (0)

## 2023-09-13 NOTE — PATIENT INSTRUCTIONS
Thank you for allowing Nadine Plaza CNP and our  team to participate in your care. Please call our office at [phone] with scheduling questions or if you need to cancel or change your appointment. With any other questions or concerns you may call cardiology nurse at [phone] or [phone].       If you experience chest pain, chest pressure, chest tightness, shortness of breath, fainting, lightheadedness, nausea, vomiting, or other concerning symptoms, please report to the Emergency Department or call 911. These symptoms may be emergent, and best treated in the Emergency Department.      Stop labetolol   Start lisinopril 5 mg once daily tonight  Continue to stay hydrated    Send a [employer] message in 1 week with home blood pressure readings and any symptoms of high blood pressure/low blood pressures      Nadine Plaza CNP

## 2023-09-13 NOTE — PROGRESS NOTES
[facility] HEART CARE   CARDIOLOGY PROGRESS NOTE     Chief Complaint   Patient presents with    Follow Up    Hypertension          Diagnosis:    ICD-10-CM    1. WPW (Delaney-Parkinson-White syndrome)  I45.6       2. Palpitations  R00.2       3. Syncope, unspecified syncope type  R55       4. Essential hypertension  I10 lisinopril (ZESTRIL) 5 MG tablet            Assessment/Plan:    Hypertension  She was started on labetolol for pre-eclampsia. She has been having fatigue and sometimes does not feel well after labetolol. Fatigue certainly could be related to  at home; however, beta-blocker could also be confounding this.   She has had issues with her blood pressure since her prior childbirth. She is more than 6 weeks post-partum making chronic hypertension likely. Discussed stopping labetolol, starting lisinopril-  monitoring home blood pressures and symptoms of fatigue, unwell after labetolol.     Delaney-Parkinson-White syndrome  Syncope  She signed release of medical records today and will let us know which facility she had a cardiac MRI and EP study done.  She did see EP, Dr. Leigh for telemedicine consult regarding history of Delaney-Parkinson-White syndrome and recent syncopal episodes. Syncope likely related to orthostatism and hydration was encouraged.  Transthoracic echocardiogram 2023 showed normal cardiac structure and functioning      Send a [employer] message in 1 week with home blood pressure readings and any symptoms of high blood pressure/low blood pressures       Interval history:  Sarah Ugalde is a pleasant 38-year old female who presents for cardiology follow-up. Recall, she has a cardiovascular history including Delaney-Parkinson-White syndrome, syncopal episodes, pre-eclampsia. She has a non-cardiac history including prior pregnancy complicated by pre-eclampsia.     Today, Ms. Ugalde is s/p  on 2023. She was re-admitted to the hospital on 2023 for postpartum pre-eclampsia.  "She was started on labetolol 50 mg twice daily for management in the past week or so. She tells me that since starting the labetalol she feels tired, week and \"out of it.\" She is checking her blood pressures at home and ranges from 154-160 systolic and  diastolic. Since starting labetalol home blood pressures have improved with average of 134/83. This morning prior to labetalol blood pressure was 145/90. No episodes of pre-syncope or syncope.       HPI:    Sarah Ugalde is a pleasant 38-year old female who presents for cardiology consult regarding Delaney-Parkinson-White syndrome. She has no other significant cardiac history.  She has a non-cardiac history including prior pregnancy complicated by pre-eclampsia.     Ms Ugalde endorses symptoms of SVT since she was younger but symptoms always attributed to something else-- low blood sugar, heat stroke, etc. In her twenties, she was told she was having panic attacks. She had an episode of fainting while sitting one day at work. She was sent to the ED and arrhythmia noted so she was instructed to follow-up with cardiology. She was told she had Delaney-Parkinson-White Syndrome. She tells me she had a cardiac MR, EP study over 8 years ago in [place]. She was told that her WPW was not in an ideal location during EP study. She continues to get symptoms of racing, hard beating, pounding, sensation of not being able to catch her breath. Prior to pregnancy she did not pay attention to symptoms as it became normal. Since pregnancy she is getting symptoms multiple times per day. Symptoms last seconds usually but up to 2-5 minutes. Symptoms can present at rest or during activity. She has had three syncopal episodes while being pregnant and had subsequent zio patch. One episode she was standing and baking bread in the kitchen- she started to feel lightheaded and nauseated so went to the bathroom thinking she had to throw up. She took a few steps and went down. She " "estimates being out for only a few seconds. When she came to she felt \"out of sorts.\" Another episode occurred while standing. The third episode she was sitting in the recliner and felt a little lightheaded but no nausea and passed out.       She tells me that her blood pressure has always been normal until she had her son who is soon to be 8 years old. She developed preeclampsia, TTP and was hospitalized for about 2 months post-partum. She thinks she was on Bystolic and another blood pressure medication. She has not been on anti-hypertensive for about 6 years.          Smoking History: Lifelong nonsmoker    Alcohol History: None    Substance Abuse History: None    Sleep History: History of insomnia. No snoring. No witnessed apnea.     Family History: Maternal aunt- pacemaker uncertain reason for pacemaker; mom- similar symptoms of racing, skipping, fluttering;           RELEVANT TESTING:  Transthoracic Echocardiogram 5/31/2023  Interpretation Summary  Global and regional left ventricular function is normal with an EF of 60-65%.  Left ventricular wall thickness is normal. Left ventricular size is normal.  Left ventricular diastolic function is normal. No regional wall motion  abnormalities are seen.  Right ventricular function, chamber size, wall motion, and thickness are  normal.  Pulmonary artery systolic pressure is normal.  The inferior vena cava is normal.  No pericardial effusion is present.  There is no prior study for direct comparison.    Leadless EKG monitor 4/2023  Conclusion  Zio XT patch report on [facility].  Ordered secondary to WPW.   Worn for 9 days and 6 hr's.  After removing artifact, total time was 9 days and 5 hr's. Placed on 4/3/23 at 12:28 pm and completed on 4/12/23 at 6:10 pm.   Underlying rhythm was sinus.   Hrt rate ranged from 52 bpm, maximum heart rate of 190 bmp, averaging 75 bmp.   No significant bradycardia, pauses, Mobitz type II or 3rd degree heart block.   No atrial " fibrillation on this study.   x21 triggered events and x0 diary entries.  These corresponded to SVT, NSR, sinus tachycardia, SVEs and VEs.   x0 runs of VT.     x48 runs of SVT longest lasting 12.3 sec's with a maximum heart rate of 190 bmp.   Rare, <1% of PAC's, atrial couplets, atrial triplets.   Occasional PVCs at 1.3% ([id number])   + episodes of ventricular bigeminy lasting up to 4.8 sec's.   + episodes of ventricular trigeminy lasting up to 1 m 56 sec's.      Anoop Kim,         Past Medical History:   Diagnosis Date    Detached retina, bilateral 2015    secondary to severe preeclampsia with HELLP syndroma and acute renal failure    PARDEEP (generalized anxiety disorder) 2023    HELLP (hemolytic anemia/elev liver enzymes/low platelets in pregnancy) 2015    Severe preeclampsia with HELLP syndrome and acute renal failure    HUS (hemolytic uremic syndrome) (H) 2015    Postpartum Thrombotic thrombocytopenic purpura (TTP) and 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    MS (multiple sclerosis) (H) 2015    Rh negative state in antepartum period 2023    TTP (thrombotic thrombocytopenic purpura) (H) 2015    Postpartum Thrombotic thrombocytopenic purpura (TTP) and 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    WPW (Delaney-Parkinson-White syndrome) 2015    She has been evaluated for ablation, however, based on location this was not deemed safe by cardiology at the time       Past Surgical History:   Procedure Laterality Date     SECTION  2015    Severe preeclampsia with HELLP syndrome and acute renal failure then developed postpartum Thrombotic thrombocytopenic purpura (TTP) and Hemolytic uremic syndrome (HUS)    COMBINED  SECTION, SALPINGECTOMY BILATERAL Bilateral 2023    Procedure:  SECTION, WITH BILATERAL SALPINGECTOMY, Viable baby girl born at 1851;  Surgeon: Naseem Cruz MD;  Location: [facility] OR        Allergies   Allergen Reactions    Magnesium Other (See Comments)     Altered mental status after IV dosing       Current Outpatient Medications   Medication Sig Dispense Refill    gabapentin (NEURONTIN) 300 MG capsule Take 1 capsule (300 mg) by mouth 3 times daily Start with 1 tablet daily for 2 days, then increase to 2 tablets daily for 2 days then 3 times daily. 90 capsule 1    hydrOXYzine (ATARAX) 50 MG tablet Take 1 tablet (50 mg) by mouth every 6 hours as needed for other or anxiety (adjuvant pain) 40 tablet 1    ibuprofen (ADVIL/MOTRIN) 800 MG tablet Take 1 tablet (800 mg) by mouth every 6 hours as needed for moderate pain or fever 100 tablet 0    labetalol (NORMODYNE) 100 MG tablet Take 0.5 tablets (50 mg) by mouth 2 times daily 60 tablet 3    lisinopril (ZESTRIL) 5 MG tablet Take 1 tablet (5 mg) by mouth daily 90 tablet 3       Social History     Socioeconomic History    Marital status: Single     Spouse name: Not on file    Number of children: 0    Years of education: 16    Highest education level: Not on file   Occupational History    Occupation:     Occupation: retail sales     Employer: [employer]UD[employer]   Tobacco Use    Smoking status: Never    Smokeless tobacco: Never   Substance and Sexual Activity    Alcohol use: Not Currently     Comment: occa    Drug use: No    Sexual activity: Yes     Partners: Male     Birth control/protection: None   Other Topics Concern    Not on file   Social History Narrative    Not on file     Social Determinants of Health     Financial Resource Strain: Not on file   Food Insecurity: Not on file   Transportation Needs: Not on file   Physical Activity: Not on file   Stress: Not on file   Social Connections: Not on file   Interpersonal Safety: Not on file   Housing Stability: Not on file       LAB RESULTS:   Orders placed or performed in visit on 09/13/23    lisinopril (ZESTRIL) 5 MG tablet         Review of systems: Negative except that which was noted in the  "HPI.    Physical examination:    Vitals: /78 (BP Location: Right arm, Patient Position: Right side, Cuff Size: Adult Regular)   Pulse 73   Resp 16   Ht 1.499 m (4' 11\")   Wt 59 kg (130 lb)   SpO2 95%   BMI 26.26 kg/m    BMI= Body mass index is 26.26 kg/m .      GENERAL APPEARANCE: healthy, alert and no distress  HEENT: no icterus, no xanthelasmas, normal pupil size and reaction, no cyanosis.  NECK: no adenopathy, no asymmetry, masses.  CHEST: lungs clear to auscultation - no rales, rhonchi or wheezes, no use of accessory muscles, no retractions, respirations are unlabored, normal respiratory rate  CARDIOVASCULAR: regular rhythm, normal S1 with physiologic split S2, no S3 or S4 and no murmur, click or rub  EXTREMITIES: no clubbing, cyanosis or edema  NEURO: alert and oriented normal speech, and affect  VASC: No vascular bruits heard.  SKIN: no ecchymoses, no rashes        Thank you for allowing me to participate in the care of your patient. Please do not hesitate to contact me if you have any questions.         "

## 2023-09-16 ENCOUNTER — HEALTH MAINTENANCE LETTER (OUTPATIENT)
Age: 38
End: 2023-09-16

## 2023-09-19 ENCOUNTER — OFFICE VISIT (OUTPATIENT)
Dept: PEDIATRICS | Facility: OTHER | Age: 38
End: 2023-09-19
Attending: PEDIATRICS

## 2023-09-19 ENCOUNTER — TELEPHONE (OUTPATIENT)
Dept: PEDIATRICS | Facility: OTHER | Age: 38
End: 2023-09-19

## 2023-09-19 DIAGNOSIS — J06.9 UPPER RESPIRATORY TRACT INFECTION, UNSPECIFIED TYPE: ICD-10-CM

## 2023-09-19 DIAGNOSIS — J06.9 UPPER RESPIRATORY TRACT INFECTION, UNSPECIFIED TYPE: Primary | ICD-10-CM

## 2023-09-19 LAB
FLUAV RNA SPEC QL NAA+PROBE: NEGATIVE
FLUBV RNA RESP QL NAA+PROBE: NEGATIVE
RSV RNA SPEC NAA+PROBE: NEGATIVE
SARS-COV-2 RNA RESP QL NAA+PROBE: NEGATIVE

## 2023-09-19 PROCEDURE — 87637 SARSCOV2&INF A&B&RSV AMP PRB: CPT

## 2023-09-20 RX ORDER — LISINOPRIL 5 MG/1
5 TABLET ORAL DAILY
Qty: 90 TABLET | Refills: 3 | Status: SHIPPED | OUTPATIENT
Start: 2023-09-20 | End: 2023-10-03 | Stop reason: ALTCHOICE

## 2023-09-22 NOTE — PROGRESS NOTES
Fetal Non-Stress Test Results    NST Ordered By: Dr. Grimaldo       NST Start & Stop Times                                  NST Results  Fetus A   Baseline Rate: Normal  Accelerations: Present  Decelerations: None  Interpretation: reactive

## 2023-09-30 NOTE — PROGRESS NOTES
"  Assessment & Plan     Encounter to establish care  Patient is in need of a new provider. she has been explained the role of a CNP and the fact that I do not follow patients in the hospital. she was told that should he get admitted, he would then be followed by a hospitalist. she verbalizes understanding and would like to establish a relationship now.     Declines all vaccines.     MS (multiple sclerosis) (H)  No current issues. Will send to neurology so they are onboard with her care.     - Adult Neurology  Referral    WPW (Delaney-Parkinson-White syndrome)  Follows with cardiology. Plans is to proceed with EP study.     Essential hypertension  Blood pressures labile. Will therefore order 24 hour BP monitor to better assess. Will notify patient of the results when available and intervene accordingly. She also feels the lisinopril is causing fatigue. Will stop and start losartan instead. Will see her back in 4 weeks for a recheck.     - 24 Hour Blood Pressure Monitor - Adult; Future  - losartan (COZAAR) 25 MG tablet; Take 1 tablet (25 mg) by mouth daily    Pelvic pain in female  Patient with persistent pelvic pain and vaginal bleeding since delivering her daughter 10 weeks ago. Pain currently tolerable with gabapentin, but she feels it is causing weight gain. Will stop stop, but switch ibuprofen to Celebrex to see if this helps.  Will then reassess in 4 weeks. Sooner with new or worsening symptoms.     She will also make a follow-up with Dr. Grimaldo if pain and vaginal bleeding persist.     - hydrOXYzine (ATARAX) 50 MG tablet; Take 1 tablet (50 mg) by mouth every 6 hours as needed for other or anxiety (adjuvant pain)  - celecoxib (CELEBREX) 100 MG capsule; Take 1 capsule (100 mg) by mouth 2 times daily    6}     BMI:   Estimated body mass index is 26.05 kg/m  as calculated from the following:    Height as of this encounter: 1.499 m (4' 11\").    Weight as of this encounter: 58.5 kg (129 lb).         Return in " about 4 weeks (around 10/31/2023).    Trisha Fonseca NP  [facility] - SHAN Smith is a 38 year old, presenting for the following health issues:  Establish Care and Hypertension      HPI     NEW PATIENT  At this time, past medical history, current medications, allergies and drug sensitivities, immunizations, habits and life style, family history, and social history are reviewed and updated.    Multiple Sclerosis; diagnosed in  while hospitalized post-partum in [place]. Currently doing well; will occasionally have leg pain with some tingling below her knees and an associated headache. Has not seen neurology recently, but is willing.     WPW, HTN; sees cardiology, taking lisinopril 5 mg with labetalol without side effects. The lisinopril was recently started and she feels it is causing fatigue. She has been checking her blood pressures at home and she notes they jare labile. Will be around 160/80 one minute and then drop to 60/50. Limits her sodium intake. She also saw Dr. Leigh -plan is to complete EP study in 2023.     Due for several vaccines. Declines.         Hypertension Follow-up    Do you check your blood pressure regularly outside of the clinic? Yes   Are you following a low salt diet? No  Are your blood pressures ever more than 140 on the top number (systolic) OR more   than 90 on the bottom number (diastolic), for example 140/90? Yes    She also has some chronic pelvic pain around her  scar. Present since her daughter was born who is now 10 weeks. No erythema or swelling. Unable to take Tylenol as it causes stomach pains. Was using ibuprofen with lidocaine patches, but it was not helping. Dr. Grimaldo recently started gabapentin and she notes that it is helping, but she has gained weight with the medication. Also has had vaginal bleeding since her . No nausea or vomiting.     Review of Systems   Constitutional, HEENT, cardiovascular, pulmonary, gi  "and gu systems are negative, except as otherwise noted.      Objective    /86   Pulse 78   Temp 97.4  F (36.3  C) (Tympanic)   Ht 1.499 m (4' 11\")   Wt 58.5 kg (129 lb)   SpO2 99%   BMI 26.05 kg/m    Body mass index is 26.05 kg/m .  Physical Exam   GENERAL: healthy, alert and no distress  EYES: Eyes grossly normal to inspection, PERRL and conjunctivae and sclerae normal  HENT: ear canals and TM's normal, nose and mouth without ulcers or lesions  NECK: no adenopathy, no asymmetry, masses, or scars and thyroid normal to palpation  RESP: lungs clear to auscultation - no rales, rhonchi or wheezes  CV: regular rate and rhythm, no murmur, click or rub, no peripheral edema and peripheral pulses strong  ABDOMEN: soft, some tenderness with deep palpation in her pelvic region, no hepatosplenomegaly, no masses and bowel sounds normal  MS: no gross musculoskeletal defects noted, no edema  NEURO: Normal strength and tone, mentation intact and speech normal  PSYCH: mentation appears normal, affect normal/bright                      "

## 2023-10-03 ENCOUNTER — OFFICE VISIT (OUTPATIENT)
Dept: FAMILY MEDICINE | Facility: OTHER | Age: 38
End: 2023-10-03
Attending: NURSE PRACTITIONER

## 2023-10-03 VITALS
SYSTOLIC BLOOD PRESSURE: 136 MMHG | DIASTOLIC BLOOD PRESSURE: 86 MMHG | BODY MASS INDEX: 26 KG/M2 | HEIGHT: 59 IN | HEART RATE: 78 BPM | WEIGHT: 129 LBS | OXYGEN SATURATION: 99 % | TEMPERATURE: 97.4 F

## 2023-10-03 DIAGNOSIS — I45.6 WPW (WOLFF-PARKINSON-WHITE SYNDROME): ICD-10-CM

## 2023-10-03 DIAGNOSIS — R10.2 PELVIC PAIN IN FEMALE: ICD-10-CM

## 2023-10-03 DIAGNOSIS — G35 MS (MULTIPLE SCLEROSIS) (H): ICD-10-CM

## 2023-10-03 DIAGNOSIS — Z76.89 ENCOUNTER TO ESTABLISH CARE: Primary | ICD-10-CM

## 2023-10-03 DIAGNOSIS — I10 ESSENTIAL HYPERTENSION: ICD-10-CM

## 2023-10-03 PROCEDURE — 99214 OFFICE O/P EST MOD 30 MIN: CPT | Performed by: NURSE PRACTITIONER

## 2023-10-03 RX ORDER — CELECOXIB 100 MG/1
100 CAPSULE ORAL 2 TIMES DAILY
Qty: 60 CAPSULE | Refills: 1 | Status: SHIPPED | OUTPATIENT
Start: 2023-10-03 | End: 2023-11-28

## 2023-10-03 RX ORDER — HYDROXYZINE HYDROCHLORIDE 50 MG/1
50 TABLET, FILM COATED ORAL EVERY 6 HOURS PRN
Qty: 40 TABLET | Refills: 1 | Status: SHIPPED | OUTPATIENT
Start: 2023-10-03 | End: 2023-10-27

## 2023-10-03 RX ORDER — LOSARTAN POTASSIUM 25 MG/1
25 TABLET ORAL DAILY
Qty: 90 TABLET | Refills: 0 | Status: SHIPPED | OUTPATIENT
Start: 2023-10-03 | End: 2023-11-03

## 2023-10-03 ASSESSMENT — ANXIETY QUESTIONNAIRES
7. FEELING AFRAID AS IF SOMETHING AWFUL MIGHT HAPPEN: NOT AT ALL
IF YOU CHECKED OFF ANY PROBLEMS ON THIS QUESTIONNAIRE, HOW DIFFICULT HAVE THESE PROBLEMS MADE IT FOR YOU TO DO YOUR WORK, TAKE CARE OF THINGS AT HOME, OR GET ALONG WITH OTHER PEOPLE: SOMEWHAT DIFFICULT
1. FEELING NERVOUS, ANXIOUS, OR ON EDGE: SEVERAL DAYS
5. BEING SO RESTLESS THAT IT IS HARD TO SIT STILL: SEVERAL DAYS
2. NOT BEING ABLE TO STOP OR CONTROL WORRYING: SEVERAL DAYS
3. WORRYING TOO MUCH ABOUT DIFFERENT THINGS: SEVERAL DAYS
GAD7 TOTAL SCORE: 5
6. BECOMING EASILY ANNOYED OR IRRITABLE: NOT AT ALL
GAD7 TOTAL SCORE: 5
4. TROUBLE RELAXING: SEVERAL DAYS

## 2023-10-03 ASSESSMENT — PAIN SCALES - GENERAL: PAINLEVEL: NO PAIN (0)

## 2023-10-03 ASSESSMENT — PATIENT HEALTH QUESTIONNAIRE - PHQ9
SUM OF ALL RESPONSES TO PHQ QUESTIONS 1-9: 10
SUM OF ALL RESPONSES TO PHQ QUESTIONS 1-9: 10
10. IF YOU CHECKED OFF ANY PROBLEMS, HOW DIFFICULT HAVE THESE PROBLEMS MADE IT FOR YOU TO DO YOUR WORK, TAKE CARE OF THINGS AT HOME, OR GET ALONG WITH OTHER PEOPLE: SOMEWHAT DIFFICULT

## 2023-10-03 NOTE — COMMUNITY RESOURCES LIST (ENGLISH)
10/03/2023   [facility] - Outpatient Clinics  N/A  For additional resource needs, please contact your health insurance member services or your primary care team.  Phone: [phone]   Email: N/A   Address: [address] [place] [id number]   Hours: N/A        Food and Nutrition       Food pantry  1  [facility] Distance: 16.51 miles      Pick   [address] [address]  Language: English  Hours: Mon 9:00 AM - 11:00 AM , Tue 1:00 PM - 3:00 PM , Wed - Thu 9:00 AM - 11:00 AM  Fees: Free, Self Pay   Phone: (511) 355-9254 Email: ronan@[facility] CitymParticleThe Hospitals of Providence Transmountain CampusNotch Wearable Movement Capture.TicketsNow Website: https://LookAcross.StyleUpNemours Children's Hospital, DelawareCultureAlley/northern/[place]     SNAP application assistance  2  [facility] - Economic Services & Supports - [place] Distance: 16.73 miles      In-Person, Phone/Virtual   [address] [place] [address]  Language: English  Hours: Mon - Fri 8:00 AM - 4:30 PM  Fees: Free   Phone: (804) 794-9525 Website: https://www.[facility]/difvkaxhitj-f-h/[facility]-human-services/economic-services-supports     Soup kitchen or free meals  3  [facility] Distance: 16.51 miles      [facility]   [address] [place] [address]  Language: English  Hours: Mon - Fri 4:00 PM - 4:45 PM  Fees: Free   Phone: (336) 776-8011 Email: [email or web address]@[facility] CityStayhoundNemours Children's Hospital, DelawareNotch Wearable Movement Capture.TicketsNow Website: https://JelasticNemours Children's Hospital, DelawareSoMoLendorg/northern/[place]          Important Numbers & Websites       [facility]   [address] [facility] Westitedway.org  Poison Control   (190) 541-1654 [place]poison.org  Suicide and Crisis Lifeline   [address] [place], [place] [address]line.org  Childhelp National Child Abuse Hotline   480.164.2791 Childhelphotline.org  National Sexual Assault Hotline   (205) 764-5281 (HOPE) Rainn.org  National Runaway Safeline   (813) 416-3639 (RUNAWAY) [facility].org  Pregnancy & Postpartum Support [place]   Call/text 968-609-3015  Ppsupportmn.org  Substance Abuse National Helpline ([facility]   662-330-HELP (4796) Findtreatment.gov  Emergency Services   917

## 2023-10-03 NOTE — COMMUNITY RESOURCES LIST (ENGLISH)
10/03/2023   [facility]  N/A  For questions about this resource list or additional care needs, please contact your primary care clinic or care manager.  Phone: [phone]   Email: N/A   Address: [address] [place] [address]   Hours: N/A        Food and Nutrition       Food pantry  1  [facility] Distance: 16.51 miles      Pickup   [address] [address]  Language: English  Hours: Mon 9:00 AM - 11:00 AM , Tue 1:00 PM - 3:00 PM , Wed - Thu 9:00 AM - 11:00 AM  Fees: Free, Self Pay   Phone: (703) 503-6852 Email: [email or web address]@[email or web address] Oklahoma CityKazeon.TeamSupport Website: [email or web address]://HealOr/northern/[place]     SNAP application assistance  2  [facility] & [facility] - Economic Services & Supports - [place] Distance: 16.73 miles      In-Person, Phone/Virtual   [address]  [place] [address]  Language: English  Hours: Mon - Fri 8:00 AM - 4:30 PM  Fees: Free   Phone: (659) 504-2944 Website: https://www.[facility]/wwcmlcbpfrn-k-a/[facility]-human-services/economic-services-supports     Soup kitchen or free meals  3  [facility] Distance: 16.51 miles      [facility]   [address] [place] [address]  Language: English  Hours: Mon - Fri 4:00 PM - 4:45 PM  Fees: Free   Phone: (642) 435-2335 Email: ronan@[facility]"[employer]."[facility] Psychiatric CenterpluriSelect.TeamSupport Website: https://[employer] Systems.PacerPro/northern/[place]          Important Numbers & Websites       Emergency Services   911  City Services   311  Poison Control   (230) 830-5067  Suicide Prevention Lifeline   (537) 676-8256 (TALK)  Child Abuse Hotline   (653) 598-4373 (4-A-Child)  Sexual Assault Hotline   (496) 913-1912 (HOPE)  National Runaway Safeline   (770) 934-8404 (RUNAWAY)  All-Options Talkline   (857) 917-1788  Substance Abuse Referral   (843) 316-1428 (HELP)

## 2023-10-06 ENCOUNTER — TRANSCRIBE ORDERS (OUTPATIENT)
Dept: OTHER | Age: 38
End: 2023-10-06

## 2023-10-06 DIAGNOSIS — G35 MULTIPLE SCLEROSIS (H): Primary | ICD-10-CM

## 2023-10-10 ENCOUNTER — HOSPITAL ENCOUNTER (OUTPATIENT)
Dept: CARDIOLOGY | Facility: HOSPITAL | Age: 38
Discharge: HOME | End: 2023-10-10
Attending: NURSE PRACTITIONER | Admitting: NURSE PRACTITIONER

## 2023-10-10 DIAGNOSIS — I10 ESSENTIAL HYPERTENSION: ICD-10-CM

## 2023-10-10 PROCEDURE — 93786 AMBL BP MNTR W/SW REC ONLY: CPT

## 2023-10-12 ENCOUNTER — MEDICAL CORRESPONDENCE (OUTPATIENT)
Dept: HEALTH INFORMATION MANAGEMENT | Facility: HOSPITAL | Age: 38
End: 2023-10-12

## 2023-10-16 ENCOUNTER — TELEPHONE (OUTPATIENT)
Dept: FAMILY MEDICINE | Facility: OTHER | Age: 38
End: 2023-10-16

## 2023-10-18 ENCOUNTER — MYC MEDICAL ADVICE (OUTPATIENT)
Dept: FAMILY MEDICINE | Facility: OTHER | Age: 38
End: 2023-10-18

## 2023-10-18 NOTE — TELEPHONE ENCOUNTER
10/18/2023 11:24 AM reviewed results. Results received from Provider. Pt verbalizes understanding and agrees to plan  Sarita Menchaca RN    Future Appointments 10/18/2023 - 4/15/2024        Date Visit Type Length Department Provider     11/3/2023 10:00 AM SHORT 30 min [facility] Trisha Fonseca, NP    Location Instructions:     From [place] Area: Take [address]. Turn left at [address]. Turn left at the first stoplight on [address]. At the first stop sign, take a right onto [address]. The upper level parking lot will be on the left. East Entrance Door number 10.   From [place]: Take [address]. Take a right at [address]. At the first stop sign, take a right onto [address]. The upper level parking lot will be on the left. East Entrance Door number 10.   From [place]: Take [address]. Take the [address] ramp towards [place]. Turn left onto [address]. Take a slight right onto [address]. Turn left at the first stoplight on [address]. At the first stop sign, take a right onto [address] [facility] [address]. The upper level parking lot will be on the left. East Entrance Door number 10.

## 2023-10-19 DIAGNOSIS — N93.9 ABNORMAL UTERINE BLEEDING (AUB): Primary | ICD-10-CM

## 2023-10-23 ENCOUNTER — HOSPITAL ENCOUNTER (OUTPATIENT)
Dept: ULTRASOUND IMAGING | Facility: HOSPITAL | Age: 38
Discharge: HOME | End: 2023-10-23
Attending: OBSTETRICS & GYNECOLOGY | Admitting: OBSTETRICS & GYNECOLOGY

## 2023-10-23 DIAGNOSIS — N93.9 ABNORMAL UTERINE BLEEDING (AUB): ICD-10-CM

## 2023-10-23 LAB
ABO/RH(D): ABNORMAL
ANTIBODY SCREEN: POSITIVE
SPECIMEN EXPIRATION DATE: ABNORMAL

## 2023-10-23 PROCEDURE — 76856 US EXAM PELVIC COMPLETE: CPT

## 2023-10-24 ENCOUNTER — ANESTHESIA EVENT (OUTPATIENT)
Dept: SURGERY | Facility: HOSPITAL | Age: 38
End: 2023-10-24

## 2023-10-24 ENCOUNTER — OFFICE VISIT (OUTPATIENT)
Dept: OBGYN | Facility: OTHER | Age: 38
End: 2023-10-24
Attending: OBSTETRICS & GYNECOLOGY

## 2023-10-24 ENCOUNTER — PREP FOR PROCEDURE (OUTPATIENT)
Dept: OBGYN | Facility: OTHER | Age: 38
End: 2023-10-24

## 2023-10-24 VITALS
HEART RATE: 67 BPM | SYSTOLIC BLOOD PRESSURE: 121 MMHG | WEIGHT: 129 LBS | DIASTOLIC BLOOD PRESSURE: 70 MMHG | BODY MASS INDEX: 26 KG/M2 | HEIGHT: 59 IN | OXYGEN SATURATION: 99 %

## 2023-10-24 DIAGNOSIS — N93.9 ABNORMAL UTERINE BLEEDING (AUB): Primary | ICD-10-CM

## 2023-10-24 DIAGNOSIS — Z01.818 PREOP GENERAL PHYSICAL EXAM: ICD-10-CM

## 2023-10-24 LAB
CRP SERPL-MCNC: 10.37 MG/L
ERYTHROCYTE [DISTWIDTH] IN BLOOD BY AUTOMATED COUNT: 14 % (ref 10–15)
HCG INTACT+B SERPL-ACNC: <1 MIU/ML
HCT VFR BLD AUTO: 39.8 % (ref 35–47)
HGB BLD-MCNC: 12.8 G/DL (ref 11.7–15.7)
MCH RBC QN AUTO: 28.6 PG (ref 26.5–33)
MCHC RBC AUTO-ENTMCNC: 32.2 G/DL (ref 31.5–36.5)
MCV RBC AUTO: 89 FL (ref 78–100)
PLATELET # BLD AUTO: 276 10E3/UL (ref 150–450)
RBC # BLD AUTO: 4.47 10E6/UL (ref 3.8–5.2)
WBC # BLD AUTO: 5.7 10E3/UL (ref 4–11)

## 2023-10-24 PROCEDURE — 86901 BLOOD TYPING SEROLOGIC RH(D): CPT | Performed by: OBSTETRICS & GYNECOLOGY

## 2023-10-24 PROCEDURE — 86850 RBC ANTIBODY SCREEN: CPT | Performed by: OBSTETRICS & GYNECOLOGY

## 2023-10-24 PROCEDURE — 86870 RBC ANTIBODY IDENTIFICATION: CPT | Performed by: OBSTETRICS & GYNECOLOGY

## 2023-10-24 PROCEDURE — 36415 COLL VENOUS BLD VENIPUNCTURE: CPT | Performed by: OBSTETRICS & GYNECOLOGY

## 2023-10-24 PROCEDURE — 86900 BLOOD TYPING SEROLOGIC ABO: CPT | Performed by: OBSTETRICS & GYNECOLOGY

## 2023-10-24 PROCEDURE — 84702 CHORIONIC GONADOTROPIN TEST: CPT | Performed by: OBSTETRICS & GYNECOLOGY

## 2023-10-24 PROCEDURE — 99214 OFFICE O/P EST MOD 30 MIN: CPT | Mod: 57 | Performed by: OBSTETRICS & GYNECOLOGY

## 2023-10-24 PROCEDURE — 86140 C-REACTIVE PROTEIN: CPT | Performed by: OBSTETRICS & GYNECOLOGY

## 2023-10-24 PROCEDURE — 85027 COMPLETE CBC AUTOMATED: CPT | Performed by: OBSTETRICS & GYNECOLOGY

## 2023-10-24 ASSESSMENT — PAIN SCALES - GENERAL: PAINLEVEL: MILD PAIN (3)

## 2023-10-24 ASSESSMENT — ENCOUNTER SYMPTOMS: DYSRHYTHMIAS: 1

## 2023-10-24 NOTE — H&P (VIEW-ONLY)
HPI: Sarah Ugalde is a 38 year old female 10 wks pp with AUB.  See my prior evals.  She describes intermittent low grade fevers, heavy vaginal bleeding with clots/abnormal discharge, gushing.      Delivery with bilateral salpingectomy 23.  Pregnancy complicated by:  1. 38 year old  female at 37w0d with active labor and cervical change  2. Advanced maternal age  3. Intrauterine growth restriction  4. History of prior  section  5. Desires elective sterilization  6. Rh Negative  7. History of Delaney-Parkinson-White syndrome  8. History of multiple sclerosis  9. History of severe preeclampsia, help syndrome, TTP, acute renal failure 2015.    Postpartum period complicated by PP htn/preeclampsia and wound infection.  She had recent nml Echo, cardiology eval and BP has been under good control on [place].       Past Medical History:   Diagnosis Date    Detached retina, bilateral 2015    secondary to severe preeclampsia with HELLP syndroma and acute renal failure    PARDEEP (generalized anxiety disorder) 2023    HELLP (hemolytic anemia/elev liver enzymes/low platelets in pregnancy) 2015    Severe preeclampsia with HELLP syndrome and acute renal failure    HUS (hemolytic uremic syndrome) (H) 2015    Postpartum Thrombotic thrombocytopenic purpura (TTP) and 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    MS (multiple sclerosis) (H) 2015    Rh negative state in antepartum period 2023    TTP (thrombotic thrombocytopenic purpura) (H) 2015    Postpartum Thrombotic thrombocytopenic purpura (TTP) and 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    WPW (Delaney-Parkinson-White syndrome) 2015    She has been evaluated for ablation, however, based on location this was not deemed safe by cardiology at the time       Past Surgical History:   Procedure Laterality Date     SECTION  2015    Severe preeclampsia with HELLP  "syndrome and acute renal failure then developed postpartum Thrombotic thrombocytopenic purpura (TTP) and Hemolytic uremic syndrome (HUS)    COMBINED  SECTION, SALPINGECTOMY BILATERAL Bilateral 2023    Procedure:  SECTION, WITH BILATERAL SALPINGECTOMY, Viable baby girl born at 1851;  Surgeon: Naseem Cruz MD;  Location: [facility]    TUBAL LIGATION         Family History   Problem Relation Age of Onset    Hypertension Mother     Allergies Maternal Grandmother         dust pollen    Cancer - colorectal Paternal Grandmother 59    Allergies Maternal Aunt         \"    Cardiovascular Maternal Aunt     Breast Cancer Other 60        3 great aunts on moms side     Current Outpatient Medications   Medication Sig Dispense Refill    celecoxib (CELEBREX) 100 MG capsule Take 1 capsule (100 mg) by mouth 2 times daily 60 capsule 1    labetalol (NORMODYNE) 100 MG tablet Take 0.5 tablets (50 mg) by mouth 2 times daily 60 tablet 3    losartan (COZAAR) 25 MG tablet Take 1 tablet (25 mg) by mouth daily 90 tablet 0    gabapentin (NEURONTIN) 300 MG capsule Take 1 capsule (300 mg) by mouth 3 times daily Start with 1 tablet daily for 2 days, then increase to 2 tablets daily for 2 days then 3 times daily. (Patient not taking: Reported on 10/24/2023) 90 capsule 1    hydrOXYzine (ATARAX) 50 MG tablet Take 1 tablet (50 mg) by mouth every 6 hours as needed for other or anxiety (adjuvant pain) (Patient not taking: Reported on 10/24/2023) 40 tablet 1     Social History     Socioeconomic History    Marital status: Single     Spouse name: None    Number of children: 0    Years of education: 16    Highest education level: None   Occupational History    Occupation:     Occupation: retail sales     Employer: [employer]   Tobacco Use    Smoking status: Never     Passive exposure: Never    Smokeless tobacco: Never   Vaping Use    Vaping Use: Never used   Substance and Sexual Activity    Alcohol use: Not Currently     Comment: occa "    Drug use: No    Sexual activity: Yes     Partners: Male     Birth control/protection: None   Social History Narrative    10/3/2023: on maternity leave, also runs [employer], has 8 year old and 2 month old     Social Determinants of Health     Financial Resource Strain: Low Risk  (10/3/2023)    Financial Resource Strain     Within the past 12 months, have you or your family members you live with been unable to get utilities (heat, electricity) when it was really needed?: No   Food Insecurity: High Risk (10/3/2023)    Food Insecurity     Within the past 12 months, did you worry that your food would run out before you got money to buy more?: No     Within the past 12 months, did the food you bought just not last and you didn t have money to get more?: Yes   Transportation Needs: Low Risk  (10/3/2023)    Transportation Needs     Within the past 12 months, has lack of transportation kept you from medical appointments, getting your medicines, non-medical meetings or appointments, work, or from getting things that you need?: No   Interpersonal Safety: Low Risk  (10/3/2023)    Interpersonal Safety     Do you feel physically and emotionally safe where you currently live?: Yes     Within the past 12 months, have you been hit, slapped, kicked or otherwise physically hurt by someone?: No     Within the past 12 months, have you been humiliated or emotionally abused in other ways by your partner or ex-partner?: No   Housing Stability: Low Risk  (10/3/2023)    Housing Stability     Do you have housing? : Yes     Are you worried about losing your housing?: No       Allergies: Lisinopril and Magnesium    ROS:  CONSTITUTIONAL: Per HPI:  NEGATIVE for  change in weight  ENT/MOUTH: NEGATIVE for ear, mouth and throat problems  RESP: NEGATIVE for significant cough or SOB  CV: NEGATIVE for chest pain, palpitations or peripheral edema  GI: NEGATIVE for nausea, abdominal pain, heartburn, or change in bowel habits  : NEGATIVE for  "frequency, dysuria, hematuria, vaginal discharge, or irregular bleeding  MUSCULOSKELETAL: NEGATIVE for significant arthralgias or myalgia  NEURO: NEGATIVE for weakness, dizziness or paresthesias      EXAM:  Blood pressure 121/70, pulse 67, height 1.499 m (4' 11\"), weight 58.5 kg (129 lb), SpO2 99%, not currently breastfeeding.   BMI= Body mass index is 26.05 kg/m .  General - pleasant female in no acute distress.  Neck - supple without lymphadenopathy or thyromegaly.  Lungs - clear to auscultation bilaterally.  Heart - regular rate and rhythm without murmur.  Abdomen - soft, nontender, nondistended, no hepatosplenomegaly.  Rectovaginal - deferred.  Musculoskeletal - no gross deformities or edmema  Neurological - normal strength, sensation, and mental status.    PROCEDURE: US PELVIC TRANSABDOMINAL AND TRANSVAGINAL 10/23/2023 5:11  PM     HISTORY: Abnormal uterine bleeding (AUB)     COMPARISONS: CT of the same date.     TECHNIQUE: Transabdominal and endovaginal imaging.     FINDINGS: Uterus measures 9.9 x 3.5 x 4.4 cm. No focal uterine mass is  seen.     Endometrial stripe measures 1.3 cm. There is increased vascularity in  the endometrial cavity suggestive of retained products of conception.  Trace amount of fluid is seen in the lower uterine segment.     Both ovaries are seen. Right ovary measures 3.3 x 3.0 x 2.6 cm in the  left 2.7 x 1.3 x 2.2 cm. Follicles are seen on both ovaries. No free  fluid is seen in the cul-de-sac.                                                                        IMPRESSION: Increased vascularity in the endometrial cavity in a  postpartum patient, most consistent with retained products of  conception.           ASSESSMENT/PLAN:  (N93.9) Abnormal uterine bleeding (AUB)  (primary encounter diagnosis)  Comment: Suspected retained POC on US  Plan: CBC with platelets, ABO/Rh type and screen,         CRP, inflammation        Clindamycin rx.      Recommend D and C/hysteroscopy for further " evaluation.   Surgery is scheduled for tomorrow. Reviewed goals, risks, alternatives for planned procedure.  Including risk of bleeding, infection, damage to nerves, blood vessels, bowel and bladder and uterus. Discussed recovery period and expected discomfort.. All questions were answered. Consent form reviewed and signed.  Preoperative instructions discussed.  NPO after midnight.        Scout Grimaldo MD

## 2023-10-24 NOTE — DISCHARGE INSTRUCTIONS
No driving today or while on pain medications  Pelvic rest for 2 weeks  Call Dr. Grimaldo [phone] as necessary if problems  Schedule PO check 3-4 weeks Dr. Grimaldo      Post-Anesthesia Patient Instructions    IMMEDIATELY FOLLOWING SURGERY:  Do not drive or operate machinery for the first twenty four hours after surgery.  Do not make any important decisions for twenty four hours after surgery or while taking narcotic pain medications or sedatives.  If you develop intractable nausea and vomiting or a severe headache please notify your doctor immediately.    FOLLOW-UP:  Please make an appointment with your surgeon as instructed. You do not need to follow up with anesthesia unless specifically instructed to do so.    WOUND CARE INSTRUCTIONS (if applicable):  Keep a dry clean dressing on the anesthesia/puncture wound site if there is drainage.  Once the wound has quit draining you may leave it open to air.  Generally you should leave the bandage intact for twenty four hours unless there is drainage.  If the epidural site drains for more than 36-48 hours please call the anesthesia department.    QUESTIONS?:  Please feel free to call your physician or the hospital  if you have any questions, and they will be happy to assist you.

## 2023-10-25 ENCOUNTER — HOSPITAL ENCOUNTER (OUTPATIENT)
Facility: HOSPITAL | Age: 38
Discharge: HOME | End: 2023-10-25
Attending: OBSTETRICS & GYNECOLOGY | Admitting: OBSTETRICS & GYNECOLOGY

## 2023-10-25 ENCOUNTER — ANESTHESIA (OUTPATIENT)
Dept: SURGERY | Facility: HOSPITAL | Age: 38
End: 2023-10-25

## 2023-10-25 VITALS
OXYGEN SATURATION: 100 % | HEART RATE: 60 BPM | DIASTOLIC BLOOD PRESSURE: 85 MMHG | BODY MASS INDEX: 25.8 KG/M2 | HEIGHT: 59 IN | SYSTOLIC BLOOD PRESSURE: 136 MMHG | TEMPERATURE: 97.4 F | RESPIRATION RATE: 16 BRPM | WEIGHT: 128 LBS

## 2023-10-25 DIAGNOSIS — Z98.890 POST-OPERATIVE STATE: Primary | ICD-10-CM

## 2023-10-25 LAB
ANTIBODY ID: NORMAL
SPECIMEN EXPIRATION DATE: NORMAL

## 2023-10-25 PROCEDURE — 58558 HYSTEROSCOPY BIOPSY: CPT | Performed by: NURSE ANESTHETIST, CERTIFIED REGISTERED

## 2023-10-25 PROCEDURE — 250N000013 HC RX MED GY IP 250 OP 250 PS 637: Performed by: OBSTETRICS & GYNECOLOGY

## 2023-10-25 PROCEDURE — 272N000001 HC OR GENERAL SUPPLY STERILE: Performed by: OBSTETRICS & GYNECOLOGY

## 2023-10-25 PROCEDURE — 250N000009 HC RX 250: Performed by: NURSE ANESTHETIST, CERTIFIED REGISTERED

## 2023-10-25 PROCEDURE — 250N000011 HC RX IP 250 OP 636: Performed by: NURSE ANESTHETIST, CERTIFIED REGISTERED

## 2023-10-25 PROCEDURE — 58558 HYSTEROSCOPY BIOPSY: CPT | Performed by: OBSTETRICS & GYNECOLOGY

## 2023-10-25 PROCEDURE — 370N000017 HC ANESTHESIA TECHNICAL FEE, PER MIN: Performed by: OBSTETRICS & GYNECOLOGY

## 2023-10-25 PROCEDURE — 258N000003 HC RX IP 258 OP 636: Performed by: NURSE PRACTITIONER

## 2023-10-25 PROCEDURE — 710N000012 HC RECOVERY PHASE 2, PER MINUTE: Performed by: OBSTETRICS & GYNECOLOGY

## 2023-10-25 PROCEDURE — 999N000141 HC STATISTIC PRE-PROCEDURE NURSING ASSESSMENT: Performed by: OBSTETRICS & GYNECOLOGY

## 2023-10-25 PROCEDURE — 250N000009 HC RX 250: Performed by: OBSTETRICS & GYNECOLOGY

## 2023-10-25 PROCEDURE — 250N000011 HC RX IP 250 OP 636: Performed by: OBSTETRICS & GYNECOLOGY

## 2023-10-25 PROCEDURE — 88305 TISSUE EXAM BY PATHOLOGIST: CPT | Mod: TC | Performed by: OBSTETRICS & GYNECOLOGY

## 2023-10-25 PROCEDURE — 258N000003 HC RX IP 258 OP 636: Performed by: OBSTETRICS & GYNECOLOGY

## 2023-10-25 PROCEDURE — 360N000076 HC SURGERY LEVEL 3, PER MIN: Performed by: OBSTETRICS & GYNECOLOGY

## 2023-10-25 PROCEDURE — 88305 TISSUE EXAM BY PATHOLOGIST: CPT | Mod: 26 | Performed by: PATHOLOGY

## 2023-10-25 PROCEDURE — 250N000013 HC RX MED GY IP 250 OP 250 PS 637: Performed by: NURSE PRACTITIONER

## 2023-10-25 RX ORDER — HYDROXYZINE HYDROCHLORIDE 25 MG/1
50 TABLET, FILM COATED ORAL
Status: CANCELLED | OUTPATIENT
Start: 2023-10-25

## 2023-10-25 RX ORDER — OXYCODONE HYDROCHLORIDE 5 MG/1
5 TABLET ORAL
Status: CANCELLED | OUTPATIENT
Start: 2023-10-25

## 2023-10-25 RX ORDER — LIDOCAINE 40 MG/G
CREAM TOPICAL
Status: DISCONTINUED | OUTPATIENT
Start: 2023-10-25 | End: 2023-10-25 | Stop reason: HOSPADM

## 2023-10-25 RX ORDER — FENTANYL CITRATE 50 UG/ML
INJECTION, SOLUTION INTRAMUSCULAR; INTRAVENOUS PRN
Status: DISCONTINUED | OUTPATIENT
Start: 2023-10-25 | End: 2023-10-25

## 2023-10-25 RX ORDER — ONDANSETRON 4 MG/1
4 TABLET, ORALLY DISINTEGRATING ORAL EVERY 30 MIN PRN
Status: DISCONTINUED | OUTPATIENT
Start: 2023-10-25 | End: 2023-10-25 | Stop reason: HOSPADM

## 2023-10-25 RX ORDER — ACETAMINOPHEN 325 MG/1
975 TABLET ORAL ONCE
Status: COMPLETED | OUTPATIENT
Start: 2023-10-25 | End: 2023-10-25

## 2023-10-25 RX ORDER — PROPOFOL 10 MG/ML
INJECTION, EMULSION INTRAVENOUS PRN
Status: DISCONTINUED | OUTPATIENT
Start: 2023-10-25 | End: 2023-10-25

## 2023-10-25 RX ORDER — CLINDAMYCIN PHOSPHATE 900 MG/50ML
900 INJECTION, SOLUTION INTRAVENOUS
Status: COMPLETED | OUTPATIENT
Start: 2023-10-25 | End: 2023-10-25

## 2023-10-25 RX ORDER — CLINDAMYCIN PHOSPHATE 900 MG/50ML
900 INJECTION, SOLUTION INTRAVENOUS SEE ADMIN INSTRUCTIONS
Status: DISCONTINUED | OUTPATIENT
Start: 2023-10-25 | End: 2023-10-25 | Stop reason: HOSPADM

## 2023-10-25 RX ORDER — OXYCODONE HYDROCHLORIDE 5 MG/1
5 TABLET ORAL EVERY 4 HOURS PRN
Qty: 10 TABLET | Refills: 0 | Status: SHIPPED | OUTPATIENT
Start: 2023-10-25 | End: 2023-11-28

## 2023-10-25 RX ORDER — KETOROLAC TROMETHAMINE 30 MG/ML
30 INJECTION, SOLUTION INTRAMUSCULAR; INTRAVENOUS ONCE
Status: COMPLETED | OUTPATIENT
Start: 2023-10-25 | End: 2023-10-25

## 2023-10-25 RX ORDER — ONDANSETRON 4 MG/1
4 TABLET, ORALLY DISINTEGRATING ORAL
Status: CANCELLED | OUTPATIENT
Start: 2023-10-25

## 2023-10-25 RX ORDER — PROPOFOL 10 MG/ML
INJECTION, EMULSION INTRAVENOUS CONTINUOUS PRN
Status: DISCONTINUED | OUTPATIENT
Start: 2023-10-25 | End: 2023-10-25

## 2023-10-25 RX ORDER — OXYCODONE HYDROCHLORIDE 5 MG/1
5 TABLET ORAL
Status: COMPLETED | OUTPATIENT
Start: 2023-10-25 | End: 2023-10-25

## 2023-10-25 RX ORDER — LIDOCAINE HYDROCHLORIDE 10 MG/ML
INJECTION, SOLUTION INFILTRATION; PERINEURAL PRN
Status: DISCONTINUED | OUTPATIENT
Start: 2023-10-25 | End: 2023-10-25 | Stop reason: HOSPADM

## 2023-10-25 RX ORDER — LIDOCAINE HYDROCHLORIDE 20 MG/ML
INJECTION, SOLUTION INFILTRATION; PERINEURAL PRN
Status: DISCONTINUED | OUTPATIENT
Start: 2023-10-25 | End: 2023-10-25

## 2023-10-25 RX ORDER — OXYCODONE HYDROCHLORIDE 5 MG/1
10 TABLET ORAL
Status: DISCONTINUED | OUTPATIENT
Start: 2023-10-25 | End: 2023-10-25 | Stop reason: HOSPADM

## 2023-10-25 RX ORDER — MISOPROSTOL 200 UG/1
200 TABLET ORAL ONCE
Status: COMPLETED | OUTPATIENT
Start: 2023-10-25 | End: 2023-10-25

## 2023-10-25 RX ORDER — ONDANSETRON 2 MG/ML
4 INJECTION INTRAMUSCULAR; INTRAVENOUS EVERY 30 MIN PRN
Status: DISCONTINUED | OUTPATIENT
Start: 2023-10-25 | End: 2023-10-25 | Stop reason: HOSPADM

## 2023-10-25 RX ORDER — SODIUM CHLORIDE, SODIUM LACTATE, POTASSIUM CHLORIDE, CALCIUM CHLORIDE 600; 310; 30; 20 MG/100ML; MG/100ML; MG/100ML; MG/100ML
INJECTION, SOLUTION INTRAVENOUS CONTINUOUS
Status: DISCONTINUED | OUTPATIENT
Start: 2023-10-25 | End: 2023-10-25 | Stop reason: HOSPADM

## 2023-10-25 RX ADMIN — PROPOFOL 20 MG: 10 INJECTION, EMULSION INTRAVENOUS at 08:58

## 2023-10-25 RX ADMIN — TRANEXAMIC ACID 1 G: 1 INJECTION, SOLUTION INTRAVENOUS at 09:27

## 2023-10-25 RX ADMIN — SODIUM CHLORIDE, POTASSIUM CHLORIDE, SODIUM LACTATE AND CALCIUM CHLORIDE: 600; 310; 30; 20 INJECTION, SOLUTION INTRAVENOUS at 08:15

## 2023-10-25 RX ADMIN — LIDOCAINE HYDROCHLORIDE 20 MG: 20 INJECTION, SOLUTION INFILTRATION; PERINEURAL at 08:55

## 2023-10-25 RX ADMIN — CLINDAMYCIN PHOSPHATE 900 MG: 900 INJECTION, SOLUTION INTRAVENOUS at 08:12

## 2023-10-25 RX ADMIN — OXYCODONE HYDROCHLORIDE 5 MG: 5 TABLET ORAL at 09:52

## 2023-10-25 RX ADMIN — MIDAZOLAM 2 MG: 1 INJECTION INTRAMUSCULAR; INTRAVENOUS at 08:36

## 2023-10-25 RX ADMIN — PROPOFOL 20 MG: 10 INJECTION, EMULSION INTRAVENOUS at 09:08

## 2023-10-25 RX ADMIN — PROPOFOL 75 MCG/KG/MIN: 10 INJECTION, EMULSION INTRAVENOUS at 08:58

## 2023-10-25 RX ADMIN — FENTANYL CITRATE 50 MCG: 50 INJECTION INTRAMUSCULAR; INTRAVENOUS at 08:55

## 2023-10-25 RX ADMIN — KETOROLAC TROMETHAMINE 30 MG: 30 INJECTION, SOLUTION INTRAMUSCULAR; INTRAVENOUS at 08:10

## 2023-10-25 RX ADMIN — GENTAMICIN SULFATE 120 MG: 40 INJECTION, SOLUTION INTRAMUSCULAR; INTRAVENOUS at 08:31

## 2023-10-25 RX ADMIN — MISOPROSTOL 200 MCG: 200 TABLET ORAL at 08:12

## 2023-10-25 ASSESSMENT — ACTIVITIES OF DAILY LIVING (ADL): ADLS_ACUITY_SCORE: 35

## 2023-10-25 NOTE — ANESTHESIA POSTPROCEDURE EVALUATION
Patient: Sarah Ugalde    Procedure: Procedure(s):  HYSTEROSCOPY, WITH DILATION AND CURETTAGE OF UTERUS       Anesthesia Type:  MAC    Note:  Disposition: Outpatient   Postop Pain Control: Uneventful            Sign Out: Well controlled pain   PONV: No   Neuro/Psych: Uneventful            Sign Out: Acceptable/Baseline neuro status   Airway/Respiratory: Uneventful            Sign Out: Acceptable/Baseline resp. status   CV/Hemodynamics: Uneventful            Sign Out: Acceptable CV status; No obvious hypovolemia; No obvious fluid overload   Other NRE: NONE   DID A NON-ROUTINE EVENT OCCUR? No       Last vitals:  Vitals Value Taken Time   /84 10/25/23 0950   Temp 97.2  F (36.2  C) 10/25/23 0935   Pulse 61 10/25/23 0952   Resp 16 10/25/23 0952   SpO2 100 % 10/25/23 0952   Vitals shown include unfiled device data.    Electronically Signed By: EVERARDO Liang CRNA  October 25, 2023  9:53 AM

## 2023-10-25 NOTE — OR NURSING
Patient and responsible adult given discharge instructions with no questions regarding instructions. Pablo score 19. Pain level 3/10.  Discharged from unit via wheelchair. Patient discharged to home with S.O. Tolerated liquids and food.    08-Feb-2019

## 2023-10-25 NOTE — ANESTHESIA PREPROCEDURE EVALUATION
Anesthesia Pre-Procedure Evaluation    Patient: Sarah ATKINSON [facility]   MRN: [id number] : 1985        Procedure : Procedure(s):  HYSTEROSCOPY, WITH DILATION AND CURETTAGE OF UTERUS          Past Medical History:   Diagnosis Date     Detached retina, bilateral 2015    secondary to severe preeclampsia with HELLP syndroma and acute renal failure     PARDEEP (generalized anxiety disorder) 2023     HELLP (hemolytic anemia/elev liver enzymes/low platelets in pregnancy) 2015    Severe preeclampsia with HELLP syndrome and acute renal failure     HUS (hemolytic uremic syndrome) (H) 2015    Postpartum Thrombotic thrombocytopenic purpura (TTP) and 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     MS (multiple sclerosis) (H) 2015     Rh negative state in antepartum period 2023     TTP (thrombotic thrombocytopenic purpura) (H) 2015    Postpartum Thrombotic thrombocytopenic purpura (TTP) and 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     WPW (Delaney-Parkinson-White syndrome) 2015    She has been evaluated for ablation, however, based on location this was not deemed safe by cardiology at the time      Past Surgical History:   Procedure Laterality Date      SECTION  2015    Severe preeclampsia with HELLP syndrome and acute renal failure then developed postpartum Thrombotic thrombocytopenic purpura (TTP) and Hemolytic uremic syndrome (HUS)     COMBINED  SECTION, SALPINGECTOMY BILATERAL Bilateral 2023    Procedure:  SECTION, WITH BILATERAL SALPINGECTOMY, Viable baby girl born at 1851;  Surgeon: Naseem Cruz MD;  Location: [facility]     TUBAL LIGATION        Allergies   Allergen Reactions     Lisinopril Fatigue     Magnesium Other (See Comments)     Altered mental status after IV dosing      Social History     Tobacco Use     Smoking status: Never     Passive exposure: Never     Smokeless tobacco: Never   Substance Use  Topics     Alcohol use: Not Currently     Comment: occa      Wt Readings from Last 1 Encounters:   10/24/23 58.5 kg (129 lb)        Anesthesia Evaluation   Pt has had prior anesthetic. Type: General and Regional.        ROS/MED HX  ENT/Pulmonary: Comment: Hx bilateral detached retina    (+)     SEFERINO risk factors,  hypertension,                                Neurologic:     (+)                   Multiple Sclerosis, limitations: bi annual infusions-progressive type.            Cardiovascular: Comment: Delaney-Parkinson white syndrome - has been evaluated for ablation, but based on location this was not deemed safe by cardiology at the time.   HELLP Syndrome  Hx pre-eclampsia    Recent cardiology follow up 9/13/23:  -She did see EP, Dr. Leigh for telemedicine consult regarding history of Delaney-Parkinson-White syndrome and recent syncopal episodes. Syncope likely related to orthostatism and hydration was encouraged.  -Transthoracic echocardiogram 5/2023 showed normal cardiac structure and functioning    Recently stopped 2 bp meds that were for during pregnancy     (+)  hypertension- -   -  - -             fainting (syncope) (hx noted, thought to be benign per cardiology).           dysrhythmias, Other,        Previous cardiac testing   Echo: Date: 5/31/23 Results:  Transthoracic Echocardiogram 5/31/2023  Interpretation Summary  Global and regional left ventricular function is normal with an EF of 60-65%.  Left ventricular wall thickness is normal. Left ventricular size is normal.  Left ventricular diastolic function is normal. No regional wall motion  abnormalities are seen.  Right ventricular function, chamber size, wall motion, and thickness are  normal.  Pulmonary artery systolic pressure is normal.  The inferior vena cava is normal.  No pericardial effusion is present.  There is no prior study for direct comparison.    Stress Test:  Date: Results:    ECG Reviewed:  Date: Results:  EKG 7/27/23: HR 80, NSR, cannot rule  out anterior infarct, age undetermined  EKG 3/2023: HR 81, sinus rhythm with sinus arrhythmia with short MA, not specific ST abnormality.     2023: Ordered secondary to WPW.   Worn for 9 days and 6 hr's.  After removing artifact, total time was 9 days and 5 hr's. Placed on 4/3/23 at 12:28 pm and completed on 23 at 6:10 pm.   Underlying rhythm was sinus.   Hrt rate ranged from 52 bpm, maximum heart rate of 190 bmp, averaging 75 bmp.   No significant bradycardia, pauses, Mobitz type II or 3rd degree heart block.   No atrial fibrillation on this study.   x21 triggered events and x0 diary entries.  These corresponded to SVT, NSR, sinus tachycardia, SVEs and VEs.   x0 runs of VT.     x48 runs of SVT longest lasting 12.3 sec's with a maximum heart rate of 190 bmp.   Rare, <1% of PAC's, atrial couplets, atrial triplets.   Occasional PVCs at 1.3% (21712)   + episodes of ventricular bigeminy lasting up to 4.8 sec's.   + episodes of ventricular trigeminy lasting up to 1 m 56 sec's.    Cath:  Date: Results:      METS/Exercise Tolerance: >4 METS    Hematologic: Comments: Postpartum thrombotic thrombocytopenic purpura treated with plasmapheresis.       Musculoskeletal:  - neg musculoskeletal ROS     GI/Hepatic:  - neg GI/hepatic ROS     Renal/Genitourinary: Comment: Abnormal uterine bleeding, retained products of conception, 10 weeks post partum  Hemolytic uremic syndrome  Hx acute renal failure 2015    Recent pelvic US: IMPRESSION: Increased vascularity in the endometrial cavity in a  postpartum patient, most consistent with retained products of  conception.        Endo:  - neg endo ROS     Psychiatric/Substance Use:     (+) psychiatric history anxiety       Infectious Disease: Comment: Recent infection at  incision site, treated with multiple antibiotics.       Malignancy:  - neg malignancy ROS     Other: Comment: 23 delivery with bilateral salpingectomy,  37 weeks 0 days with multiple pregnancy  complications.            Physical Exam    Airway        Mallampati: II   TM distance: > 3 FB   Neck ROM: full   Mouth opening: > 3 cm    Respiratory Devices and Support         Dental       (+) Modest Abnormalities - crowns, retainers, 1 or 2 missing teeth      Cardiovascular   cardiovascular exam normal          Pulmonary   pulmonary exam normal            OUTSIDE LABS:  CBC:   Lab Results   Component Value Date    WBC 5.7 10/24/2023    WBC 10.3 08/04/2023    HGB 12.8 10/24/2023    HGB 11.8 08/04/2023    HCT 39.8 10/24/2023    HCT 35.4 08/04/2023     10/24/2023     08/04/2023     BMP:   Lab Results   Component Value Date     08/04/2023     08/03/2023    POTASSIUM 3.8 08/04/2023    POTASSIUM 3.6 08/03/2023    CHLORIDE 104 08/04/2023    CHLORIDE 103 08/03/2023    CO2 22 08/04/2023    CO2 23 08/03/2023    BUN 13.6 08/04/2023    BUN 13.4 08/03/2023    CR 0.94 08/04/2023    CR 0.91 08/03/2023     (H) 08/04/2023    GLC 94 08/03/2023     COAGS:   Lab Results   Component Value Date    PTT 28 07/23/2023    INR 0.89 07/23/2023     POC:   Lab Results   Component Value Date    HCG Negative 12/05/2022     HEPATIC:   Lab Results   Component Value Date    ALBUMIN 3.7 08/04/2023    PROTTOTAL 7.2 08/04/2023    ALT 25 08/04/2023    AST 23 08/04/2023    ALKPHOS 132 (H) 08/04/2023    BILITOTAL 0.2 08/04/2023     OTHER:   Lab Results   Component Value Date    LACT 4.5 (H) 01/10/2016    MARVIN 9.7 08/04/2023    MAG 3.2 (H) 07/29/2023    TSH 2.50 07/29/2023    CRP <2.9 10/29/2018    SED 55 (H) 08/04/2023       Anesthesia Plan    ASA Status:  3    NPO Status:  NPO Appropriate    Anesthesia Type: MAC.     - Reason for MAC: straight local not clinically adequate   Induction: Intravenous, Propofol.   Maintenance: Balanced.        Consents    Anesthesia Plan(s) and associated risks, benefits, and realistic alternatives discussed. Questions answered and patient/representative(s) expressed understanding.     -  Discussed: Risks, Benefits and Alternatives for BOTH SEDATION and the PROCEDURE were discussed     - Discussed with:  Patient      - Extended Intubation/Ventilatory Support Discussed: No.      - Patient is DNR/DNI Status: No     Use of blood products discussed: No .     Postoperative Care    Pain management: IV analgesics.        Comments:    Other Comments: Risks and benefits of MAC anesthetic discussed including dental damage, aspiration, loss of airway, conversion to general anesthetic, CV complications, MI, stroke, death. Pt wishes to proceed. Has had some recent abnormal uterine bleeding due to retained placenta.   Exam 10/24 by Dr. Grimaldo. Urine HCG <1. Platelets and Hgb within range.   Delaney Parkinson White Syndrome  HELLP syndrome  TTP  HUS    Recent  (about 10 weeks ago) due to prenatal complications. No hx noted of anesthesia problems.    Recent cardiology follow up in September. Doing ok at that time. EVERARDO Blackwell CNP

## 2023-10-25 NOTE — INTERVAL H&P NOTE
"I have reviewed the surgical (or preoperative) H&P that is linked to this encounter, and examined the patient. There are no significant changes    Clinical Conditions Present on Arrival:  Clinically Significant Risk Factors Present on Admission                  # Overweight: Estimated body mass index is 25.85 kg/m  as calculated from the following:    Height as of this encounter: 1.499 m (4' 11\").    Weight as of this encounter: 58.1 kg (128 lb).       "

## 2023-10-25 NOTE — ANESTHESIA CARE TRANSFER NOTE
Patient: Sarah Ugalde    Procedure: Procedure(s):  HYSTEROSCOPY, WITH DILATION AND CURETTAGE OF UTERUS       Diagnosis: Abnormal uterine bleeding (AUB) [N93.9]  Retained products of conception, postpartum [O72.0]  Diagnosis Additional Information: No value filed.    Anesthesia Type:   MAC     Note:    Oropharynx: oropharynx clear of all foreign objects and spontaneously breathing  Level of Consciousness: awake  Oxygen Supplementation: nasal cannula  Level of Supplemental Oxygen (L/min / FiO2): 2  Independent Airway: airway patency satisfactory and stable  Dentition: dentition unchanged  Vital Signs Stable: post-procedure vital signs reviewed and stable  Report to RN Given: handoff report given  Patient transferred to: Phase II    Handoff Report: Identifed the Patient, Identified the Reponsible Provider, Reviewed the pertinent medical history, Discussed the surgical course, Reviewed Intra-OP anesthesia mangement and issues during anesthesia, Set expectations for post-procedure period and Allowed opportunity for questions and acknowledgement of understanding    Vitals:  Vitals Value Taken Time   /83 10/25/23 0935   Temp     Pulse 64 10/25/23 0940   Resp     SpO2 100 % 10/25/23 0940       Electronically Signed By: EVERARDO Jang CRNA  October 25, 2023  9:41 AM

## 2023-10-25 NOTE — OP NOTE
[facility]  Operative Note    Pre-operative diagnosis: Abnormal uterine bleeding   Suspected retained products of conception, postpartum    Post-operative diagnosis Same, Pathology pending   Procedure: Dilatation and curettage, Hysteroscopy   Surgeon:  Assistant: Scout Grimaldo MD  None   Anesthesia: MAC(Monitored IV sedation), Local cervical/paracervical block      COMPLICATIONS: None.   EBL:  Minimal  SPECIMENS:   Endometrial biopsy curettings  OPERATIVE FINDINGS:  The uterus sounded to 8.5 cm.  No evidence of submucosal fibroids or polyps. Both tubal ostia were identified with otherwise normally contoured endometrial cavity.  Endometrial thickening/tissue present anterior and posterior lower uterine segment.    OPERATIVE DICTATION: The patient was brought to the operating room and uneventfully placed under IV sedation.  She was prepped and draped in the dorsal lithotomy position and her bladder drained. The cervix was visualized with a weighted speculum and grasped anteriorly with a fine-toothed tenaculum. A cervical/paracervical block was performed with 1% lidocaine.   The cervix was gently dilated with Hegar dilators.  Hysteroscopy was performed using a 30-degree rigid hysteroscope and normal saline distention media. Visualization was excellent. Findings were as described above.  A directed endometrial biopsy was performed with a hysteroscopic biopsy forceps of the endometrial abnormality.  The hysteroscope was removed.  Sharp curettage was performed in all 4 quadrants and endometrial curettings sent to [pathology for review.   Hysteroscopy was re-performed showing a well-curetted endometrium.  The instruments were removed. There were no complications and the patient was transferred to the recovery room in excellent stable condition.       Scout Grimaldo MD  10/25/2023  9:35 AM

## 2023-10-27 ENCOUNTER — MYC REFILL (OUTPATIENT)
Dept: FAMILY MEDICINE | Facility: OTHER | Age: 38
End: 2023-10-27

## 2023-10-27 DIAGNOSIS — R10.2 PELVIC PAIN IN FEMALE: ICD-10-CM

## 2023-10-27 LAB
PATH REPORT.COMMENTS IMP SPEC: NORMAL
PATH REPORT.COMMENTS IMP SPEC: NORMAL
PATH REPORT.FINAL DX SPEC: NORMAL
PATH REPORT.GROSS SPEC: NORMAL
PATH REPORT.MICROSCOPIC SPEC OTHER STN: NORMAL
PATH REPORT.RELEVANT HX SPEC: NORMAL
PHOTO IMAGE: NORMAL

## 2023-10-30 RX ORDER — HYDROXYZINE HYDROCHLORIDE 50 MG/1
50 TABLET, FILM COATED ORAL EVERY 6 HOURS PRN
Qty: 40 TABLET | Refills: 1 | Status: SHIPPED | OUTPATIENT
Start: 2023-10-30 | End: 2023-11-26

## 2023-10-31 NOTE — PROGRESS NOTES
"  Assessment & Plan     Essential hypertension  BP borderline high at 124/92. Also having higher readings at home. Will increase her losartan to 50 mg daily from 25 mg daily and reassess in 3 weeks. At this time, will also run a CMP. Will notify patient of the results when available and intervene accordingly.     - losartan (COZAAR) 50 MG tablet; Take 1 tablet (50 mg) by mouth daily    WPW (Delaney-Parkinson-White syndrome)  Sees EP in 1/2024.         Return in about 3 weeks (around 11/24/2023).    Trisha Fonseca NP  [facility] SHAN Smith is a 38 year old, presenting for the following health issues:  Hypertension      HPI     Hypertension Follow-up    Last seen on 10/3/23; felt lisinopril was causing fatigue. This was stopped and losartan was started. Also on labetalol. She also felt her blood pressures were dropping. A 24 hour monitor was done, average BP was 132/90, lowest blood pressure was 119/79.    Do you check your blood pressure regularly outside of the clinic? Yes , occasionally around 149/90  Are you following a low salt diet? Yes  Are your blood pressures ever more than 140 on the top number (systolic) OR more   than 90 on the bottom number (diastolic), for example 140/90? Yes  -Tolerating the losartan without side effects.   -Denies chest pain, shortness of breath, dizziness, syncope, or palpitations.   -She does follow with cardiology and has been referred to an EP specialist as she does have WPW syndrome. Has an appointment on 1/9/2024.             Review of Systems   Constitutional, HEENT, cardiovascular, pulmonary, gi and gu systems are negative, except as otherwise noted.      Objective    BP (!) 124/92   Pulse 72   Temp 98.6  F (37  C) (Tympanic)   Resp 16   Ht 1.499 m (4' 11\")   Wt 58.2 kg (128 lb 6.4 oz)   SpO2 99%   BMI 25.93 kg/m    Body mass index is 25.93 kg/m .  Physical Exam   GENERAL: healthy, alert and no distress  EYES: Eyes grossly normal to " inspection, PERRL and conjunctivae and sclerae normal  HENT: ear canals and TM's normal, nose and mouth without ulcers or lesions  NECK: no adenopathy, no asymmetry, masses, or scars and thyroid normal to palpation  RESP: lungs clear to auscultation - no rales, rhonchi or wheezes  CV: regular rate and rhythm, no murmur, click or rub, no peripheral edema and peripheral pulses strong  ABDOMEN: soft, nontender, no masses and bowel sounds normal  NEURO: Normal strength and tone, mentation intact and speech normal  PSYCH: mentation appears normal, affect normal/bright

## 2023-11-03 ENCOUNTER — OFFICE VISIT (OUTPATIENT)
Dept: FAMILY MEDICINE | Facility: OTHER | Age: 38
End: 2023-11-03
Attending: NURSE PRACTITIONER

## 2023-11-03 VITALS
DIASTOLIC BLOOD PRESSURE: 92 MMHG | WEIGHT: 128.4 LBS | BODY MASS INDEX: 25.88 KG/M2 | RESPIRATION RATE: 16 BRPM | HEIGHT: 59 IN | SYSTOLIC BLOOD PRESSURE: 124 MMHG | OXYGEN SATURATION: 99 % | TEMPERATURE: 98.6 F | HEART RATE: 72 BPM

## 2023-11-03 DIAGNOSIS — I45.6 WPW (WOLFF-PARKINSON-WHITE SYNDROME): ICD-10-CM

## 2023-11-03 DIAGNOSIS — I10 ESSENTIAL HYPERTENSION: Primary | ICD-10-CM

## 2023-11-03 PROCEDURE — 99214 OFFICE O/P EST MOD 30 MIN: CPT | Performed by: NURSE PRACTITIONER

## 2023-11-03 RX ORDER — LOSARTAN POTASSIUM 50 MG/1
50 TABLET ORAL DAILY
Qty: 90 TABLET | Refills: 0 | Status: ON HOLD | OUTPATIENT
Start: 2023-11-03 | End: 2024-04-19

## 2023-11-03 ASSESSMENT — PAIN SCALES - GENERAL: PAINLEVEL: MODERATE PAIN (4)

## 2023-11-16 ENCOUNTER — OFFICE VISIT (OUTPATIENT)
Dept: OBGYN | Facility: OTHER | Age: 38
End: 2023-11-16
Attending: OBSTETRICS & GYNECOLOGY

## 2023-11-16 VITALS — DIASTOLIC BLOOD PRESSURE: 86 MMHG | HEART RATE: 64 BPM | SYSTOLIC BLOOD PRESSURE: 120 MMHG

## 2023-11-16 DIAGNOSIS — N93.9 ABNORMAL UTERINE BLEEDING (AUB): Primary | ICD-10-CM

## 2023-11-16 PROCEDURE — 99024 POSTOP FOLLOW-UP VISIT: CPT | Performed by: OBSTETRICS & GYNECOLOGY

## 2023-11-16 RX ORDER — MEDROXYPROGESTERONE ACETATE 10 MG
10 TABLET ORAL DAILY
Qty: 30 TABLET | Refills: 1 | Status: SHIPPED | OUTPATIENT
Start: 2023-11-16 | End: 2024-03-19

## 2023-11-16 NOTE — PROGRESS NOTES
S:  f/up AUB    See my prior eval.  Pt 3 months s/p CS/salpingectomy with AUB, lower abdominal/pelvic cramping.  US showed abnormal endometrium and possible retained POC.  Pt underwent D and C/hysteroscopy with findings of thickened, disordered proliferative endometrium but no retained POC.  She continues to have light intermittent bleeding.  No other complaints.          Patient Active Problem List   Diagnosis    WPW (Delaney-Parkinson-White syndrome)    MS (multiple sclerosis) (H)    Multigravida of advanced maternal age in third trimester    Encounter for triage in pregnant patient    TTP (thrombotic thrombocytopenic purpura) (H)    HUS (hemolytic uremic syndrome) (H)    HELLP (hemolytic anemia/elev liver enzymes/low platelets in pregnancy)    Detached retina, bilateral    Poor fetal growth affecting management of mother in third trimester, fetus 1 of multiple gestation    Pregnancy, supervision for, high-risk, third trimester    AMA (advanced maternal age) multigravida 35+    IUGR (intrauterine growth restriction) affecting care of mother    Hx of pre-eclampsia in prior pregnancy, currently pregnant    Encounter for sterilization    Hx of  section    PARDEEP (generalized anxiety disorder)    Pre-eclampsia, postpartum    Postpartum endometritis            Past Medical History:   Diagnosis Date    Detached retina, bilateral 2015    secondary to severe preeclampsia with HELLP syndroma and acute renal failure    PARDEEP (generalized anxiety disorder) 2023    HELLP (hemolytic anemia/elev liver enzymes/low platelets in pregnancy) 2015    Severe preeclampsia with HELLP syndrome and acute renal failure    HUS (hemolytic uremic syndrome) (H) 2015    Postpartum Thrombotic thrombocytopenic purpura (TTP) and 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    MS (multiple sclerosis) (H) 2015    Rh negative state in antepartum period 2023    TTP (thrombotic thrombocytopenic  "purpura) (H) 2015    Postpartum Thrombotic thrombocytopenic purpura (TTP) and 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    WPW (Delaney-Parkinson-White syndrome) 2015    She has been evaluated for ablation, however, based on location this was not deemed safe by cardiology at the time            Past Surgical History:   Procedure Laterality Date     SECTION  2015    Severe preeclampsia with HELLP syndrome and acute renal failure then developed postpartum Thrombotic thrombocytopenic purpura (TTP) and Hemolytic uremic syndrome (HUS)    COMBINED  SECTION, SALPINGECTOMY BILATERAL Bilateral 2023    Procedure:  SECTION, WITH BILATERAL SALPINGECTOMY, Viable baby girl born at 1851;  Surgeon: Naseem Cruz MD;  Location: [facility]    DILATION AND CURETTAGE, OPERATIVE HYSTEROSCOPY, COMBINED N/A 10/25/2023    Procedure: HYSTEROSCOPY, WITH DILATION AND CURETTAGE OF UTERUS;  Surgeon: Scout Grimaldo MD;  Location: [facility]    TUBAL LIGATION              Social History     Tobacco Use    Smoking status: Never     Passive exposure: Never    Smokeless tobacco: Never   Substance Use Topics    Alcohol use: Not Currently     Comment: occa            Family History   Problem Relation Age of Onset    Hypertension Mother     Allergies Maternal Grandmother         dust pollen    Cancer - colorectal Paternal Grandmother 59    Allergies Maternal Aunt         \"    Cardiovascular Maternal Aunt     Breast Cancer Other 60        3 great aunts on moms side               Allergies   Allergen Reactions    Lisinopril Fatigue    Magnesium Other (See Comments)     Altered mental status after IV dosing            Current Outpatient Medications   Medication Sig Dispense Refill    celecoxib (CELEBREX) 100 MG capsule Take 1 capsule (100 mg) by mouth 2 times daily 60 capsule 1    hydrOXYzine (ATARAX) 50 MG tablet Take 1 tablet (50 mg) by mouth every 6 hours as needed for other or anxiety (adjuvant " pain) 40 tablet 1    labetalol (NORMODYNE) 100 MG tablet Take 0.5 tablets (50 mg) by mouth 2 times daily 60 tablet 3    losartan (COZAAR) 50 MG tablet Take 1 tablet (50 mg) by mouth daily 90 tablet 0    medroxyPROGESTERone (PROVERA) 10 MG tablet Take 1 tablet (10 mg) by mouth daily For 10 days monthy 30 tablet 1    oxyCODONE (ROXICODONE) 5 MG tablet Take 1 tablet (5 mg) by mouth every 4 hours as needed for moderate to severe pain (Patient not taking: Reported on 11/16/2023) 10 tablet 0          Review Of Systems  Constitutional:  Denies fever  GI/ negative except as noted per hpi    O:   /86 (BP Location: Left arm, Patient Position: Sitting)   Pulse 64   Gen:  NAD, A and O  [unfilled]         A:  AUB    P:  Discussed expectant,  medical, IUD and and surgical options(endometrial ablation/hysterectomy). Patient would like to proceed with  attempted hormonal regulation with Provera 10gm daily 10 days monthly for 3-6 months.  I will see her back in 4 months for f/up appt.   Pt has my card and phone number to call as needed if problems in the interim.

## 2023-11-22 ENCOUNTER — MYC MEDICAL ADVICE (OUTPATIENT)
Dept: FAMILY MEDICINE | Facility: OTHER | Age: 38
End: 2023-11-22

## 2023-11-22 NOTE — TELEPHONE ENCOUNTER
Pt called  and is having some elevated blood pressures.  Pt report some dizziness.  She also reports some neck pain and ear pain.      Pt scheduled for an appointment on 12/08/2023. Pt encourage to reach out if she has any new or worsening symptoms.  Pt stated that she would.

## 2023-11-26 ENCOUNTER — MYC REFILL (OUTPATIENT)
Dept: FAMILY MEDICINE | Facility: OTHER | Age: 38
End: 2023-11-26

## 2023-11-26 DIAGNOSIS — R10.2 PELVIC PAIN IN FEMALE: ICD-10-CM

## 2023-11-28 RX ORDER — HYDROXYZINE HYDROCHLORIDE 50 MG/1
50 TABLET, FILM COATED ORAL EVERY 6 HOURS PRN
Qty: 40 TABLET | Refills: 1 | Status: SHIPPED | OUTPATIENT
Start: 2023-11-28 | End: 2023-12-15

## 2023-11-28 RX ORDER — IBUPROFEN 800 MG/1
800 TABLET, FILM COATED ORAL EVERY 8 HOURS PRN
Qty: 30 TABLET | Refills: 0 | Status: SHIPPED | OUTPATIENT
Start: 2023-11-28 | End: 2024-01-04

## 2023-11-28 NOTE — TELEPHONE ENCOUNTER
Hydroxyzine (Atarax)  50 MG tablet   Last Written Prescription Date:  10/30/2023  Last Fill Quantity: 40,   # refills: 1  Last Office Visit: 11/03/2023

## 2023-12-15 ENCOUNTER — MYC REFILL (OUTPATIENT)
Dept: FAMILY MEDICINE | Facility: OTHER | Age: 38
End: 2023-12-15

## 2023-12-15 DIAGNOSIS — R10.2 PELVIC PAIN IN FEMALE: ICD-10-CM

## 2023-12-18 ENCOUNTER — MEDICAL CORRESPONDENCE (OUTPATIENT)
Dept: HEALTH INFORMATION MANAGEMENT | Facility: HOSPITAL | Age: 38
End: 2023-12-18

## 2023-12-18 RX ORDER — HYDROXYZINE HYDROCHLORIDE 50 MG/1
50 TABLET, FILM COATED ORAL EVERY 6 HOURS PRN
Qty: 40 TABLET | Refills: 4 | Status: SHIPPED | OUTPATIENT
Start: 2023-12-18 | End: 2024-02-02

## 2024-01-04 ENCOUNTER — MYC REFILL (OUTPATIENT)
Dept: FAMILY MEDICINE | Facility: OTHER | Age: 39
End: 2024-01-04

## 2024-01-04 DIAGNOSIS — R10.2 PELVIC PAIN IN FEMALE: ICD-10-CM

## 2024-01-04 NOTE — TELEPHONE ENCOUNTER
Ibuprofen 800 mg       Last Written Prescription Date:  11/28/23  Last Fill Quantity: 30,   # refills: 0  Last Office Visit: 11/3/23  Future Office visit:    Next 5 appointments (look out 90 days)      Jan 09, 2024  1:00 PM  (Arrive by 12:45 PM)  Return Visit with Samira Liriano PA-C  [facility] ([facility] [address]  [place] [address]  [phone]     Mar 12, 2024  8:30 AM  (Arrive by 8:15 AM)  SHORT with Scout Grimaldo MD  [facility] ([facility] ) [address]  [place] [address]  [phone]             Routing refill request to provider for review/approval because:  NSAID Medications Failed      Always Fail Criteria - Chart Review Required    Validate Diagnosis. If the medication is requested for an acute flare of a chronic pain associated with a musculoskeletal or rheumatologic condition; okay to authorize if all other criteria are met. If not, then forward to provider for review.    Normal GFR on file in past 12 months          Recent Labs   Lab Test 08/04/23  1613 12/20/22  1006 10/29/18  1513   GFRESTIMATED 79   < > 63   GFRESTBLACK  --   --  76    < > = values in this interval not displayed.

## 2024-01-05 RX ORDER — IBUPROFEN 800 MG/1
800 TABLET, FILM COATED ORAL EVERY 8 HOURS PRN
Qty: 30 TABLET | Refills: 0 | Status: SHIPPED | OUTPATIENT
Start: 2024-01-05 | End: 2024-03-13

## 2024-02-02 ENCOUNTER — MYC REFILL (OUTPATIENT)
Dept: FAMILY MEDICINE | Facility: OTHER | Age: 39
End: 2024-02-02

## 2024-02-02 DIAGNOSIS — R10.2 PELVIC PAIN IN FEMALE: ICD-10-CM

## 2024-02-02 RX ORDER — HYDROXYZINE HYDROCHLORIDE 50 MG/1
50 TABLET, FILM COATED ORAL EVERY 6 HOURS PRN
Qty: 40 TABLET | Refills: 3 | Status: SHIPPED | OUTPATIENT
Start: 2024-02-02 | End: 2024-03-08

## 2024-03-06 ENCOUNTER — MYC REFILL (OUTPATIENT)
Dept: FAMILY MEDICINE | Facility: OTHER | Age: 39
End: 2024-03-06

## 2024-03-06 DIAGNOSIS — R10.2 PELVIC PAIN IN FEMALE: ICD-10-CM

## 2024-03-07 ENCOUNTER — MYC MEDICAL ADVICE (OUTPATIENT)
Dept: FAMILY MEDICINE | Facility: OTHER | Age: 39
End: 2024-03-07

## 2024-03-07 DIAGNOSIS — R10.2 PELVIC PAIN IN FEMALE: ICD-10-CM

## 2024-03-07 RX ORDER — HYDROXYZINE HYDROCHLORIDE 50 MG/1
50 TABLET, FILM COATED ORAL EVERY 6 HOURS PRN
Qty: 40 TABLET | Refills: 3 | OUTPATIENT
Start: 2024-03-07

## 2024-03-07 NOTE — TELEPHONE ENCOUNTER
hydrOXYzine HCl (ATARAX) 50 MG tablet 40 tablet 3 2/2/2024   Last Office Visit: 11/3/23     Future Office visit:    Next 5 appointments (look out 90 days)      Mar 12, 2024  8:30 AM  (Arrive by 8:15 AM)  SHORT with Scout Grimaldo MD  [facility] [place] ([facility] [place] ) [address]  [place] [address]  [phone]             Routing refill request to provider for review/approval because:

## 2024-03-08 RX ORDER — HYDROXYZINE HYDROCHLORIDE 50 MG/1
50 TABLET, FILM COATED ORAL EVERY 8 HOURS PRN
Qty: 40 TABLET | Refills: 0 | Status: SHIPPED | OUTPATIENT
Start: 2024-03-08 | End: 2024-04-12

## 2024-03-13 ENCOUNTER — OFFICE VISIT (OUTPATIENT)
Dept: OBGYN | Facility: OTHER | Age: 39
End: 2024-03-13
Attending: OBSTETRICS & GYNECOLOGY
Payer: MEDICAID

## 2024-03-13 ENCOUNTER — DOCUMENTATION ONLY (OUTPATIENT)
Dept: OBGYN | Facility: OTHER | Age: 39
End: 2024-03-13

## 2024-03-13 VITALS
DIASTOLIC BLOOD PRESSURE: 80 MMHG | OXYGEN SATURATION: 98 % | BODY MASS INDEX: 25.93 KG/M2 | HEIGHT: 59 IN | SYSTOLIC BLOOD PRESSURE: 126 MMHG | HEART RATE: 76 BPM

## 2024-03-13 DIAGNOSIS — R10.2 PELVIC PAIN IN FEMALE: ICD-10-CM

## 2024-03-13 DIAGNOSIS — N93.9 ABNORMAL UTERINE BLEEDING (AUB): Primary | ICD-10-CM

## 2024-03-13 PROCEDURE — G0463 HOSPITAL OUTPT CLINIC VISIT: HCPCS | Performed by: OBSTETRICS & GYNECOLOGY

## 2024-03-13 PROCEDURE — 99213 OFFICE O/P EST LOW 20 MIN: CPT | Performed by: OBSTETRICS & GYNECOLOGY

## 2024-03-13 RX ORDER — KETOROLAC TROMETHAMINE 10 MG/1
10 TABLET, FILM COATED ORAL EVERY 6 HOURS PRN
Qty: 20 TABLET | Refills: 0 | Status: SHIPPED | OUTPATIENT
Start: 2024-03-13 | End: 2024-04-12

## 2024-03-13 ASSESSMENT — PAIN SCALES - GENERAL: PAINLEVEL: MODERATE PAIN (5)

## 2024-03-13 NOTE — PROGRESS NOTES
Clinic Visit      Subjective:   Sarah Ugalde is a 38 year old  who presents for abnormal uterine bleeding. Please see Dr. Grimaldo's prior documentation for her full story. Basically, she has a long, complicated history of abnormal uterine bleeding that spans nearly her entire reproductive life. She has had two complicated pregnancies, the last further complicated by retained products of conception and persistent postpartum bleeding. The cyclic provera hasn't made a difference whatsoever - she doesn't even notice that it affects her bleeding significantly whether she is taking it or not. She first considered a hysterectomy long ago, before her kids were born, and now she is absolutely certain of her decision.    Patient Active Problem List   Diagnosis    WPW (Delaney-Parkinson-White syndrome)    MS (multiple sclerosis) (H)    Multigravida of advanced maternal age in third trimester    Encounter for triage in pregnant patient    TTP (thrombotic thrombocytopenic purpura) (H)    HUS (hemolytic uremic syndrome) (H)    HELLP (hemolytic anemia/elev liver enzymes/low platelets in pregnancy)    Detached retina, bilateral    Poor fetal growth affecting management of mother in third trimester, fetus 1 of multiple gestation    Pregnancy, supervision for, high-risk, third trimester    AMA (advanced maternal age) multigravida 35+    IUGR (intrauterine growth restriction) affecting care of mother    Hx of pre-eclampsia in prior pregnancy, currently pregnant    Encounter for sterilization    Hx of  section    PARDEEP (generalized anxiety disorder)    Pre-eclampsia, postpartum    Postpartum endometritis       Past Medical History:   Diagnosis Date    Detached retina, bilateral 2015    secondary to severe preeclampsia with HELLP syndroma and acute renal failure    PARDEEP (generalized anxiety disorder) 2023    HELLP (hemolytic anemia/elev liver enzymes/low platelets in pregnancy) 2015    Severe  "preeclampsia with HELLP syndrome and acute renal failure    HUS (hemolytic uremic syndrome) (H) 2015    Postpartum Thrombotic thrombocytopenic purpura (TTP) and 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    MS (multiple sclerosis) (H) 2015    Rh negative state in antepartum period 2023    TTP (thrombotic thrombocytopenic purpura) (H) 2015    Postpartum Thrombotic thrombocytopenic purpura (TTP) and 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    WPW (Delaney-Parkinson-White syndrome) 2015    She has been evaluated for ablation, however, based on location this was not deemed safe by cardiology at the time       Past Surgical History:   Procedure Laterality Date     SECTION  2015    Severe preeclampsia with HELLP syndrome and acute renal failure then developed postpartum Thrombotic thrombocytopenic purpura (TTP) and Hemolytic uremic syndrome (HUS)    COMBINED  SECTION, SALPINGECTOMY BILATERAL Bilateral 2023    Procedure:  SECTION, WITH BILATERAL SALPINGECTOMY, Viable baby girl born at 1851;  Surgeon: Naseem Cruz MD;  Location: [facility]    DILATION AND CURETTAGE, OPERATIVE HYSTEROSCOPY, COMBINED N/A 10/25/2023    Procedure: HYSTEROSCOPY, WITH DILATION AND CURETTAGE OF UTERUS;  Surgeon: Scout Grimaldo MD;  Location: [facility]    TUBAL LIGATION         Social History     Tobacco Use    Smoking status: Never     Passive exposure: Never    Smokeless tobacco: Never   Substance Use Topics    Alcohol use: Not Currently     Comment: occa       Family History   Problem Relation Age of Onset    Hypertension Mother     Allergies Maternal Grandmother         dust pollen    Cancer - colorectal Paternal Grandmother 59    Allergies Maternal Aunt         \"    Cardiovascular Maternal Aunt     Breast Cancer Other 60        3 great aunts on moms side          Allergies   Allergen Reactions    Lisinopril Fatigue    Magnesium Other (See Comments)     " "Altered mental status after IV dosing       Current Outpatient Medications   Medication Sig Dispense Refill    hydrOXYzine HCl (ATARAX) 50 MG tablet Take 1 tablet (50 mg) by mouth every 8 hours as needed for other or anxiety (adjuvant pain) 40 tablet 0    ketorolac (TORADOL) 10 MG tablet Take 1 tablet (10 mg) by mouth every 6 hours as needed for moderate pain 20 tablet 0    labetalol (NORMODYNE) 100 MG tablet Take 0.5 tablets (50 mg) by mouth 2 times daily 60 tablet 3    losartan (COZAAR) 50 MG tablet Take 1 tablet (50 mg) by mouth daily 90 tablet 0    medroxyPROGESTERone (PROVERA) 10 MG tablet Take 1 tablet (10 mg) by mouth daily For 10 days monthy (Patient not taking: Reported on 3/13/2024) 30 tablet 1         Review Of Systems  See HPI      Objective:  /80 (BP Location: Left arm, Cuff Size: Adult Regular)   Pulse 76   Ht 1.499 m (4' 11\")   SpO2 98%   BMI 25.93 kg/m      Exam:  Constitutional: NAD           Assessment/Plan:    1. Abnormal uterine bleeding (AUB)  2. Pelvic pain in female  - ketorolac (TORADOL) 10 MG tablet; Take 1 tablet (10 mg) by mouth every 6 hours as needed for moderate pain  Dispense: 20 tablet; Refill: 0    We had a long discussion about her history, current symptoms of bleeding and pain, and desire to proceed with hysterectomy. She has already had this initial discussion with Dr. Grimaldo, and they had tentatively planned an abdominal hysterectomy if the Provera didn't provide a sufficient solution for her bleeding. She will return soon to discuss preop planning with Dr. Grimaldo - she had inadvertently missed her scheduled appointment with him yesterday. In the interim, I have provided her with an rx for ketorolac to help in the short term with her pain.       20 minutes spent on the date of the encounter doing chart review, history and exam, documentation and further activities per the note.        Valencia Arguello MD  Obstetrics/Gynecology    "

## 2024-03-13 NOTE — PROGRESS NOTES
Spoke with patient at her appointment. Patient is scheduled with Dr. Grimaldo on 3- to go over surgery. Get soap and to schedule a pre op. Tentative date for surgery is 4 .

## 2024-03-19 ENCOUNTER — HOSPITAL ENCOUNTER (INPATIENT)
Facility: HOSPITAL | Age: 39
Setting detail: SURGERY ADMIT
End: 2024-03-19
Attending: OBSTETRICS & GYNECOLOGY | Admitting: OBSTETRICS & GYNECOLOGY

## 2024-03-19 ENCOUNTER — OFFICE VISIT (OUTPATIENT)
Dept: OBGYN | Facility: OTHER | Age: 39
End: 2024-03-19
Attending: OBSTETRICS & GYNECOLOGY
Payer: MEDICAID

## 2024-03-19 ENCOUNTER — PREP FOR PROCEDURE (OUTPATIENT)
Dept: OBGYN | Facility: OTHER | Age: 39
End: 2024-03-19

## 2024-03-19 VITALS
BODY MASS INDEX: 26.41 KG/M2 | SYSTOLIC BLOOD PRESSURE: 122 MMHG | WEIGHT: 131 LBS | OXYGEN SATURATION: 99 % | HEIGHT: 59 IN | HEART RATE: 91 BPM | TEMPERATURE: 98.2 F | DIASTOLIC BLOOD PRESSURE: 80 MMHG

## 2024-03-19 DIAGNOSIS — N93.9 ABNORMAL UTERINE BLEEDING (AUB): Primary | ICD-10-CM

## 2024-03-19 DIAGNOSIS — R10.2 PELVIC PAIN IN FEMALE: ICD-10-CM

## 2024-03-19 DIAGNOSIS — R10.2 PELVIC PAIN: ICD-10-CM

## 2024-03-19 PROCEDURE — G0463 HOSPITAL OUTPT CLINIC VISIT: HCPCS | Performed by: OBSTETRICS & GYNECOLOGY

## 2024-03-19 PROCEDURE — 99214 OFFICE O/P EST MOD 30 MIN: CPT | Performed by: OBSTETRICS & GYNECOLOGY

## 2024-03-19 ASSESSMENT — PAIN SCALES - GENERAL: PAINLEVEL: MODERATE PAIN (4)

## 2024-03-19 NOTE — PROGRESS NOTES
F/up AUB, dysmenorrhea.   HPI:  Sarah Ugalde is a 38 year old female P2 (CS). No LMP recorded. (Menstrual status: UNKNOWN)..   See my prior evals.  Pt 5 months s/p CS/salpingectomy with AUB, lower abdominal/pelvic cramping. US showed abnormal endometrium and possible retained POC. Pt underwent D and C/hysteroscopy with findings of thickened, disordered proliferative endometrium but no retained POC. She was tried on hormonal management with cyclical progesterone which did not help.  She continues to have bleeding most days of the month with cramping.   No other complaints.   Her bleeding/pain is limiting her clothing choices and interferes with lifestyle/activites.     Past GYN history:        Last PAP smear:  Normal and neg HPV     Patients records are available and reviewed at today's visit.    Past Medical History:   Diagnosis Date    Detached retina, bilateral 2015    secondary to severe preeclampsia with HELLP syndroma and acute renal failure    PARDEEP (generalized anxiety disorder) 2023    HELLP (hemolytic anemia/elev liver enzymes/low platelets in pregnancy) 2015    Severe preeclampsia with HELLP syndrome and acute renal failure    HUS (hemolytic uremic syndrome) (H) 2015    Postpartum Thrombotic thrombocytopenic purpura (TTP) and 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    MS (multiple sclerosis) (H) 2015    Rh negative state in antepartum period 2023    TTP (thrombotic thrombocytopenic purpura) (H) 2015    Postpartum Thrombotic thrombocytopenic purpura (TTP) and 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    WPW (Delaney-Parkinson-White syndrome) 2015    She has been evaluated for ablation, however, based on location this was not deemed safe by cardiology at the time       Past Surgical History:   Procedure Laterality Date     SECTION  2015    Severe preeclampsia with HELLP syndrome and acute renal failure  "then developed postpartum Thrombotic thrombocytopenic purpura (TTP) and Hemolytic uremic syndrome (HUS)    COMBINED  SECTION, SALPINGECTOMY BILATERAL Bilateral 2023    Procedure:  SECTION, WITH BILATERAL SALPINGECTOMY, Viable baby girl born at 1851;  Surgeon: Naseem Cruz MD;  Location: [facility]    DILATION AND CURETTAGE, OPERATIVE HYSTEROSCOPY, COMBINED N/A 10/25/2023    Procedure: HYSTEROSCOPY, WITH DILATION AND CURETTAGE OF UTERUS;  Surgeon: Scout Grimaldo MD;  Location: [facility]    TUBAL LIGATION         Family History   Problem Relation Age of Onset    Hypertension Mother     Allergies Maternal Grandmother         dust pollen    Cancer - colorectal Paternal Grandmother 59    Allergies Maternal Aunt         \"    Cardiovascular Maternal Aunt     Breast Cancer Other 60        3 great aunts on moms side       Current Outpatient Medications   Medication Sig Dispense Refill    hydrOXYzine HCl (ATARAX) 50 MG tablet Take 1 tablet (50 mg) by mouth every 8 hours as needed for other or anxiety (adjuvant pain) 40 tablet 0    labetalol (NORMODYNE) 100 MG tablet Take 0.5 tablets (50 mg) by mouth 2 times daily 60 tablet 3    losartan (COZAAR) 50 MG tablet Take 1 tablet (50 mg) by mouth daily 90 tablet 0    ketorolac (TORADOL) 10 MG tablet Take 1 tablet (10 mg) by mouth every 6 hours as needed for moderate pain (Patient not taking: Reported on 3/19/2024) 20 tablet 0       Allergies: Lisinopril and Magnesium    ROS:  CONSTITUTIONAL: NEGATIVE for fever, chills, change in weight  GI neg  : NEGATIVE for frequency, dysuria, hematuria, vaginal discharge      EXAM:  Blood pressure 122/80, pulse 91, temperature 98.2  F (36.8  C), temperature source Tympanic, height 1.499 m (4' 11\"), weight 59.4 kg (131 lb), SpO2 99%, not currently breastfeeding.   BMI= Body mass index is 26.46 kg/m .  General - pleasant female in no acute distress.  masses or tenderness.  Rectovaginal - deferred.  Musculoskeletal - no gross " deformities or edema  Neurological - normal mental status.      ASSESSMENT/PLAN:  Longstanding AUB/pelvic pain refractory to medical management.  Desires definitive surgical management (hysterectomy).  We discussed various approaches (abdominal/laparoscopic) +/- cervical removal.  R/B of each.  Pt desires to proceed with abdominal supracervical hysterectomy.  Reviewed goals, risks, alternatives for planned procedure;  Including risk of bleeding, transfusion, infection, damage to nerves, blood vessels, bowel and bladder, blood clots, pneumonia, and other rare or unforseen complications.   Discussed recovery period and expected discomfort.. All questions were answered.  Preoperative instructions discussed.  Patient desires to proceed.  Scheduled 4/17/24.

## 2024-03-19 NOTE — PROGRESS NOTES
Patient provide preoperative education, surgical packet discussed, Ensure drink provided, and two bottles of soap (Hibiclens) provided to the patient. Patient has preoperative appointment on 4/9/24 with TATA Fonseca NP. No further questions or concerns at completion of education.

## 2024-03-27 ENCOUNTER — PREP FOR PROCEDURE (OUTPATIENT)
Dept: OBGYN | Facility: OTHER | Age: 39
End: 2024-03-27

## 2024-04-10 ENCOUNTER — MYC MEDICAL ADVICE (OUTPATIENT)
Dept: OBGYN | Facility: OTHER | Age: 39
End: 2024-04-10

## 2024-04-10 ENCOUNTER — MYC MEDICAL ADVICE (OUTPATIENT)
Dept: FAMILY MEDICINE | Facility: OTHER | Age: 39
End: 2024-04-10

## 2024-04-10 DIAGNOSIS — N93.9 ABNORMAL UTERINE BLEEDING (AUB): ICD-10-CM

## 2024-04-10 DIAGNOSIS — R10.2 PELVIC PAIN: Primary | ICD-10-CM

## 2024-04-10 DIAGNOSIS — R50.9 FEVER: ICD-10-CM

## 2024-04-10 NOTE — TELEPHONE ENCOUNTER
Writer spoke with patient. Patient reports significant increase in bleeding and two episodes of loss of consciousness yesterday. Writer advised patient to call 911 to be transported and evaluated in the ED. Patient verbalized understanding and is agreeable.

## 2024-04-10 NOTE — TELEPHONE ENCOUNTER
OK agree you should go to ED if bleeding more than 1 pad/her or passing out.  Depending on results of that evaluation could try medical hormonal management until you are able to get hysterectomy.    .

## 2024-04-11 ENCOUNTER — TELEPHONE (OUTPATIENT)
Dept: OBGYN | Facility: OTHER | Age: 39
End: 2024-04-11

## 2024-04-11 NOTE — TELEPHONE ENCOUNTER
Pt was scheduled for an appointment for lab and Dr. Grimaldo today to follow-up from her ER visit to [facility] for her pelvic pain and AUB. Pt appointment was flagged due to no insurance coverage and that the pt can not be seen in the clinic but can still be seen in the ER/urgent care.  I called pt and explained that we could not see her in clinic due to her not currently having insurance.  That she could still come to the clinic to see patient financial services and they can help her fill out another application for state insurance. Pt states she will do that but only if they can send the application in for only her at this time and not include the her kids.  Pt was upset saying that she will not be going to the ER/urgent care here because they never do anything for her other than telling her to follow up with Dr. Grimaldo. Pt states she is going to go elsewhere for care if she can't be seen here.

## 2024-04-11 NOTE — TELEPHONE ENCOUNTER
Did talk with dangelo in patient financial services. She advised me to talk with her supervisor michelle. I did contact michelle she said that the patient was not to be seen for any outpatient appointments but can be seen in the ER or Urgent care until she gets her outstanding balance cleared up. Michelle said she is welcome to meet with someone in patient financial services today to work on clearing this up.

## 2024-04-11 NOTE — TELEPHONE ENCOUNTER
Did they do any testing, imaging.  I cannot see [facility] records so I don't know anything in terms of your results or evaluation unfortunately.

## 2024-04-11 NOTE — TELEPHONE ENCOUNTER
OK that's not a stable blood pressure or temperature and not a diagnosis  so I am surprised that you were discharged.  Did they treat you with anything?  I really don't know what is going on from that. If you are still bleeding heavily, having  severe pain, and fever would advise you return to ED, preferably [facility] where I could see you or your  results.   If things are improved I could see you in clinic and we can try to see what is going on or options for treatment/surgery.   If she can come in today at 1 I could see her, would have her got to lab 1st for CBC, CMP, CRP stat prior to appt.

## 2024-04-12 ENCOUNTER — OFFICE VISIT (OUTPATIENT)
Dept: OBGYN | Facility: OTHER | Age: 39
End: 2024-04-12
Attending: OBSTETRICS & GYNECOLOGY
Payer: MEDICAID

## 2024-04-12 ENCOUNTER — LAB (OUTPATIENT)
Dept: LAB | Facility: OTHER | Age: 39
End: 2024-04-12
Attending: OBSTETRICS & GYNECOLOGY
Payer: MEDICAID

## 2024-04-12 ENCOUNTER — PREP FOR PROCEDURE (OUTPATIENT)
Dept: OBGYN | Facility: OTHER | Age: 39
End: 2024-04-12

## 2024-04-12 VITALS
BODY MASS INDEX: 26.41 KG/M2 | WEIGHT: 131 LBS | HEIGHT: 59 IN | DIASTOLIC BLOOD PRESSURE: 76 MMHG | SYSTOLIC BLOOD PRESSURE: 120 MMHG | OXYGEN SATURATION: 98 % | HEART RATE: 78 BPM

## 2024-04-12 DIAGNOSIS — N93.9 ABNORMAL UTERINE BLEEDING (AUB): ICD-10-CM

## 2024-04-12 DIAGNOSIS — N93.9 ABNORMAL UTERINE BLEEDING (AUB): Primary | ICD-10-CM

## 2024-04-12 DIAGNOSIS — R10.2 PELVIC PAIN IN FEMALE: ICD-10-CM

## 2024-04-12 DIAGNOSIS — R50.9 FEVER: ICD-10-CM

## 2024-04-12 DIAGNOSIS — R10.2 PELVIC PAIN: ICD-10-CM

## 2024-04-12 LAB
ALBUMIN SERPL BCG-MCNC: 4.7 G/DL (ref 3.5–5.2)
ALP SERPL-CCNC: 96 U/L (ref 40–150)
ALT SERPL W P-5'-P-CCNC: 19 U/L (ref 0–50)
ANION GAP SERPL CALCULATED.3IONS-SCNC: 15 MMOL/L (ref 7–15)
AST SERPL W P-5'-P-CCNC: 24 U/L (ref 0–45)
BILIRUB SERPL-MCNC: 0.5 MG/DL
BUN SERPL-MCNC: 13.3 MG/DL (ref 6–20)
CALCIUM SERPL-MCNC: 9.3 MG/DL (ref 8.6–10)
CHLORIDE SERPL-SCNC: 102 MMOL/L (ref 98–107)
CREAT SERPL-MCNC: 1.08 MG/DL (ref 0.51–0.95)
CRP SERPL-MCNC: <3 MG/L
DEPRECATED HCO3 PLAS-SCNC: 23 MMOL/L (ref 22–29)
EGFRCR SERPLBLD CKD-EPI 2021: 67 ML/MIN/1.73M2
ERYTHROCYTE [DISTWIDTH] IN BLOOD BY AUTOMATED COUNT: 14 % (ref 10–15)
GLUCOSE SERPL-MCNC: 97 MG/DL (ref 70–99)
HCT VFR BLD AUTO: 43.1 % (ref 35–47)
HGB BLD-MCNC: 14.6 G/DL (ref 11.7–15.7)
MCH RBC QN AUTO: 30.5 PG (ref 26.5–33)
MCHC RBC AUTO-ENTMCNC: 33.9 G/DL (ref 31.5–36.5)
MCV RBC AUTO: 90 FL (ref 78–100)
PLATELET # BLD AUTO: 380 10E3/UL (ref 150–450)
POTASSIUM SERPL-SCNC: 3.8 MMOL/L (ref 3.4–5.3)
PROT SERPL-MCNC: 7.6 G/DL (ref 6.4–8.3)
RBC # BLD AUTO: 4.78 10E6/UL (ref 3.8–5.2)
SODIUM SERPL-SCNC: 140 MMOL/L (ref 135–145)
WBC # BLD AUTO: 6 10E3/UL (ref 4–11)

## 2024-04-12 PROCEDURE — 99213 OFFICE O/P EST LOW 20 MIN: CPT | Performed by: OBSTETRICS & GYNECOLOGY

## 2024-04-12 PROCEDURE — 85041 AUTOMATED RBC COUNT: CPT | Mod: ZL

## 2024-04-12 PROCEDURE — 36415 COLL VENOUS BLD VENIPUNCTURE: CPT | Mod: ZL

## 2024-04-12 PROCEDURE — 82247 BILIRUBIN TOTAL: CPT | Mod: ZL

## 2024-04-12 PROCEDURE — 86140 C-REACTIVE PROTEIN: CPT | Mod: ZL

## 2024-04-12 PROCEDURE — G0463 HOSPITAL OUTPT CLINIC VISIT: HCPCS | Mod: 25

## 2024-04-12 PROCEDURE — G0463 HOSPITAL OUTPT CLINIC VISIT: HCPCS

## 2024-04-12 RX ORDER — HYDROXYZINE HYDROCHLORIDE 50 MG/1
50 TABLET, FILM COATED ORAL EVERY 8 HOURS PRN
Qty: 40 TABLET | Refills: 0 | Status: SHIPPED | OUTPATIENT
Start: 2024-04-12 | End: 2024-05-22

## 2024-04-12 ASSESSMENT — PAIN SCALES - GENERAL: PAINLEVEL: MODERATE PAIN (5)

## 2024-04-15 ENCOUNTER — ANESTHESIA EVENT (OUTPATIENT)
Dept: SURGERY | Facility: HOSPITAL | Age: 39
End: 2024-04-15
Payer: MEDICAID

## 2024-04-15 ASSESSMENT — ENCOUNTER SYMPTOMS: DYSRHYTHMIAS: 1

## 2024-04-15 NOTE — ANESTHESIA PREPROCEDURE EVALUATION
Anesthesia Pre-Procedure Evaluation    Patient: Sarah ATKINSON [facility]   MRN: [id number] : 1985        Procedure : Procedure(s):  HYSTERECTOMY, SUPRACERVICAL, ABDOMINAL          Past Medical History:   Diagnosis Date     Detached retina, bilateral 2015    secondary to severe preeclampsia with HELLP syndroma and acute renal failure     PARDEEP (generalized anxiety disorder) 2023     HELLP (hemolytic anemia/elev liver enzymes/low platelets in pregnancy) 2015    Severe preeclampsia with HELLP syndrome and acute renal failure     HUS (hemolytic uremic syndrome) (H) 2015    Postpartum Thrombotic thrombocytopenic purpura (TTP) and 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     MS (multiple sclerosis) (H) 2015     Rh negative state in antepartum period 2023     TTP (thrombotic thrombocytopenic purpura) (H) 2015    Postpartum Thrombotic thrombocytopenic purpura (TTP) and 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     WPW (Delaney-Parkinson-White syndrome) 2015    She has been evaluated for ablation, however, based on location this was not deemed safe by cardiology at the time      Past Surgical History:   Procedure Laterality Date      SECTION  2015    Severe preeclampsia with HELLP syndrome and acute renal failure then developed postpartum Thrombotic thrombocytopenic purpura (TTP) and Hemolytic uremic syndrome (HUS)     COMBINED  SECTION, SALPINGECTOMY BILATERAL Bilateral 2023    Procedure:  SECTION, WITH BILATERAL SALPINGECTOMY, Viable baby girl born at 1851;  Surgeon: Naseem Cruz MD;  Location: [facility]     DILATION AND CURETTAGE, OPERATIVE HYSTEROSCOPY, COMBINED N/A 10/25/2023    Procedure: HYSTEROSCOPY, WITH DILATION AND CURETTAGE OF UTERUS;  Surgeon: Scout Grimaldo MD;  Location: [facility]     TUBAL LIGATION        Allergies   Allergen Reactions     Lisinopril Fatigue     Magnesium Other (See Comments)      Altered mental status after IV dosing      Social History     Tobacco Use     Smoking status: Never     Passive exposure: Never     Smokeless tobacco: Never   Substance Use Topics     Alcohol use: Not Currently     Comment: occa      Wt Readings from Last 1 Encounters:   04/12/24 59.4 kg (131 lb)        Anesthesia Evaluation   Pt has had prior anesthetic. Type: Regional, General and MAC.        ROS/MED HX  ENT/Pulmonary:       Neurologic: Comment: MS: follows with neurology - through [facility] - no current treatment.  She went off after last pregnancy     (+)                   Multiple Sclerosis, limitations: last exacerbation 2/2024; muscle pain/dizziness; managed at home without intervention.            Cardiovascular: Comment: WPW    WPW: follows with cardiology.  Reviewed cardiology note from 1/9/24 normal echo and zio patch did not have any concerns.  She does have some symptoms of palpitations with history of short runs of PSVT - she will be following up for repeat EP study - to check for WPW - no current symptoms; not yet scheduled; specialist in [place] told pt that he believes it is probably not WPW--wants to do EP study once other issues are taken care of/no urgency to having work-up     (+)  hypertension-range: 155/100 labetalol this am; held losartan per request of [place]/ -   -  - -                        dysrhythmias, WPW,        Previous cardiac testing   Echo: Date: Results:    Stress Test:  Date: Results:    ECG Reviewed:  Date: HP Results:  Sinus rhythm   Anterior infarct , age undetermined   Abnormal ECG   No previous ECGs available   Confirmed by MD Sandro, Hunter Gabriela ([id number]) on 4/16/2024 3:00:39 PM     Follows with cardiology/no symptoms    Cath:  Date: Results:      METS/Exercise Tolerance: >4 METS    Hematologic: Comments: HELLP with pregnancy  thrombotic thrombocytopenic purpura      Musculoskeletal:       GI/Hepatic:  - neg GI/hepatic ROS     Renal/Genitourinary: Comment: AUB  dysmenorrhea      Endo:  - neg endo ROS     Psychiatric/Substance Use: Comment: Etoh 2 times/week 1-2 beers     (+) psychiatric history anxiety       Infectious Disease:  - neg infectious disease ROS     Malignancy:  - neg malignancy ROS     Other:      (+)  LMP: history of tubal; pt refused, ,         Physical Exam    Airway        Mallampati: II   TM distance: > 3 FB   Neck ROM: full   Mouth opening: > 3 cm    Respiratory Devices and Support         Dental       (+) Minor Abnormalities - some fillings, tiny chips      Cardiovascular          Rhythm and rate: regular and normal     Pulmonary           breath sounds clear to auscultation       OUTSIDE LABS:  CBC:   Lab Results   Component Value Date    WBC 6.0 04/12/2024    WBC 5.7 10/24/2023    HGB 14.6 04/12/2024    HGB 12.8 10/24/2023    HCT 43.1 04/12/2024    HCT 39.8 10/24/2023     04/12/2024     10/24/2023     BMP:   Lab Results   Component Value Date     04/12/2024     08/04/2023    POTASSIUM 3.8 04/12/2024    POTASSIUM 3.8 08/04/2023    CHLORIDE 102 04/12/2024    CHLORIDE 104 08/04/2023    CO2 23 04/12/2024    CO2 22 08/04/2023    BUN 13.3 04/12/2024    BUN 13.6 08/04/2023    CR 1.08 (H) 04/12/2024    CR 0.94 08/04/2023    GLC 97 04/12/2024     (H) 08/04/2023     COAGS:   Lab Results   Component Value Date    PTT 28 07/23/2023    INR 0.89 07/23/2023     POC:   Lab Results   Component Value Date    HCG Negative 12/05/2022     HEPATIC:   Lab Results   Component Value Date    ALBUMIN 4.7 04/12/2024    PROTTOTAL 7.6 04/12/2024    ALT 19 04/12/2024    AST 24 04/12/2024    ALKPHOS 96 04/12/2024    BILITOTAL 0.5 04/12/2024     OTHER:   Lab Results   Component Value Date    LACT 4.5 (H) 01/10/2016    MARVIN 9.3 04/12/2024    MAG 3.2 (H) 07/29/2023    TSH 2.50 07/29/2023    CRP <2.9 10/29/2018    SED 55 (H) 08/04/2023       Anesthesia Plan    ASA Status:  3    NPO Status:  NPO Appropriate (0700 surgical pre procedure drink; food last pm)  "   Anesthesia Type: General.   Induction: Intravenous.   Maintenance: Balanced.        Consents    Anesthesia Plan(s) and associated risks, benefits, and realistic alternatives discussed. Questions answered and patient/representative(s) expressed understanding.     - Discussed:     - Discussed with:  Patient      - Extended Intubation/Ventilatory Support Discussed: No.      - Patient is DNR/DNI Status: No     Use of blood products discussed: No .     Postoperative Care            Comments:    Other Comments: HCG ordered-refused     HP 4/16/24 [place]     MS; OB/GYN increased risk of exacerbation, maintain deep anesthesia to avoid stressing patient, avoid scopolamine and atropine, avoid depolarizers, potential heat sensitivity.    TAP block while asleep; spoke with patient regarding risks, benefits, alternatives including prolonged block related to MS, bleeding, nerve damage, infection, LAST, pt wishes to proceed.             EVERARDO Jang CRNA    I have reviewed the pertinent notes and labs in the chart from the past 30 days and (re)examined the patient.  Any updates or changes from those notes are reflected in this note.              # Overweight: Estimated body mass index is 26.46 kg/m  as calculated from the following:    Height as of 4/12/24: 1.499 m (4' 11\").    Weight as of 4/12/24: 59.4 kg (131 lb).      "

## 2024-04-15 NOTE — PROGRESS NOTES
Preoperative Evaluation  [facility] [place]  [address]  [place] [address]  Phone: [phone]  Primary Provider: Trisha Fonseca  Pre-op Performing Provider: APRIL DAMON  Apr 16, 2024       Sarah is a 38 year old, presenting for the following:  Pre-Op Exam        4/16/2024    10:03 AM   Additional Questions   Roomed by April   Accompanied by self         4/16/2024    10:03 AM   Patient Reported Additional Medications   Patient reports taking the following new medications none     Surgical Information  Surgery/Procedure: Hysterectomy  Surgery Location: [facility] – [place]  Surgeon: Dr. Grimaldo  Surgery Date: 4/18/24  Time of Surgery: TBD  Where patient plans to recover: At home with family  Fax number for surgical facility: Note does not need to be faxed, will be available electronically in Epic.    Assessment & Plan     The proposed surgical procedure is considered INTERMEDIATE risk.    Preop general physical exam  Abnormal uterine bleeding  Pelvic pain in female  Cleared for procedure   - EKG 12-lead complete w/read - (Clinic Performed)    Essential hypertension  Blood  pressure stable in clinic today   Continue with current treatment - hold losartan morning of procedure     WPW (Delaney-Parkinson-White syndrome)  Continues to be worked up for possible WPW -     MS (multiple sclerosis) (H)  Continue with neurology - no current treatment         - No identified additional risk factors other than previously addressed    Antiplatelet or Anticoagulation Medication Instructions   - Patient is on no antiplatelet or anticoagulation medications.    Additional Medication Instructions  Patient is to take all scheduled medications on the day of surgery EXCEPT for modifications listed below:   - ACE/ARB: HOLD on day of surgery (minimum 11 hours for general anesthesia).   - Beta Blockers: Continue taking on the day of surgery.    Recommendation  APPROVAL GIVEN to proceed with proposed procedure, without further  diagnostic evaluation.    Ordering of each unique test  Prescription drug management  I spent a total of 33 minutes on the day of the visit.   Time spent by me doing chart review, history and exam, documentation and further activities per the note    Subjective       HPI related to upcoming procedure: abnormal menstrual cycle, excessive bleeding and pelvic pain           4/16/2024     8:19 AM   Preop Questions   1. Have you ever had a heart attack or stroke? No   2. Have you ever had surgery on your heart or blood vessels, such as a stent placement, a coronary artery bypass, or surgery on an artery in your head, neck, heart, or legs? No   3. Do you have chest pain with activity? No   4. Do you have a history of  heart failure? No   5. Do you currently have a cold, bronchitis or symptoms of other infection? No   6. Do you have a cough, shortness of breath, or wheezing? No   7. Do you or anyone in your family have previous history of blood clots? No   8. Do you or does anyone in your family have a serious bleeding problem such as prolonged bleeding following surgeries or cuts? No   9. Have you ever had problems with anemia or been told to take iron pills? No   10. Have you had any abnormal blood loss such as black, tarry or bloody stools, or abnormal vaginal bleeding? YES - period lasting months long   11. Have you ever had a blood transfusion? YES - 2015 c section    11a. Have you ever had a transfusion reaction? UNKNOWN    12. Are you willing to have a blood transfusion if it is medically needed before, during, or after your surgery? Yes   13. Have you or any of your relatives ever had problems with anesthesia? No   14. Do you have sleep apnea, excessive snoring or daytime drowsiness? No   15. Do you have any artifical heart valves or other implanted medical devices like a pacemaker, defibrillator, or continuous glucose monitor? No   16. Do you have artificial joints? No   17. Are you allergic to latex? No   18. Is  there any chance that you may be pregnant? No - tubes are removed      Health Care Directive  Patient does not have a Health Care Directive or Living Will: Discussed advance care planning with patient; however, patient declined at this time.    Preoperative Review of    reviewed - no current medications       Status of Chronic Conditions:  See problem list for active medical problems.  Problems all longstanding and stable, except as noted/documented.  See ROS for pertinent symptoms related to these conditions.    Hypertension: reviewed 24 hour blood pressure.  Variable.  Current treatment with labetalol and losartan      MS: follows with neurology - through [facility] - no current treatment.  She went off after last pregnancy     WPW: follows with cardiology.  Reviewed cardiology note from 24 normal echo and zio patch did not have any concerns.  She does have some symptoms of palpitations with history of short runs of PSVT - she will be following up for repeat EP study - to check for WPW - no current symptoms     Patient Active Problem List    Diagnosis Date Noted    Postpartum endometritis 2023     Priority: Medium    Pre-eclampsia, postpartum 2023     Priority: Medium    Pregnancy, supervision for, high-risk, third trimester 2023     Priority: Medium    AMA (advanced maternal age) multigravida 35+ 2023     Priority: Medium    IUGR (intrauterine growth restriction) affecting care of mother 2023     Priority: Medium    Hx of pre-eclampsia in prior pregnancy, currently pregnant 2023     Priority: Medium    Encounter for sterilization 2023     Priority: Medium    Hx of  section 2023     Priority: Medium    PARDEEP (generalized anxiety disorder) 2023     Priority: Medium    Poor fetal growth affecting management of mother in third trimester, fetus 1 of multiple gestation 2023     Priority: Medium    Encounter for triage in pregnant patient  2023     Priority: Medium    Multigravida of advanced maternal age in third trimester 2022     Priority: Medium     AMA:  NIPT - declined  h/o WPW. BL ECG - nml  Zio Patch, Cards consult  H/o TTP/preeclampsia/HELLP/acute renal failure>  Baby asa 16 wks.  Nml BL labs.   level II - nml  H/o HUS  H/o MS  no flu or covid vax  Plan repeat /sterilization 23.  Sterilization forms signed.   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      HUS (hemolytic uremic syndrome) (H) 2015     Priority: Medium     Postpartum Thrombotic thrombocytopenic purpura (TTP) and 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      HELLP (hemolytic anemia/elev liver enzymes/low platelets in pregnancy) 2015     Priority: Medium     Severe preeclampsia with HELLP syndrome and acute renal failure      Detached retina, bilateral 2015     Priority: Medium     secondary to severe preeclampsia with HELLP syndroma and acute renal failure      WPW (Delaney-Parkinson-White syndrome) 2015     Priority: Medium     She has been evaluated for ablation, however, based on location this was not deemed safe by cardiology at the time      MS (multiple sclerosis) (H) 2015     Priority: Medium      Past Medical History:   Diagnosis Date    Detached retina, bilateral 2015    secondary to severe preeclampsia with HELLP syndroma and acute renal failure    PARDEEP (generalized anxiety disorder) 2023    HELLP (hemolytic anemia/elev liver enzymes/low platelets in pregnancy) 2015    Severe preeclampsia with HELLP syndrome and acute renal failure    HUS (hemolytic uremic syndrome) (H) 2015    Postpartum Thrombotic thrombocytopenic purpura (TTP) and Hemolytic uremic syndrome (HUS) treated with  plasmapheresis    MS (multiple sclerosis) (H) 2015    Rh negative state in antepartum period 2023    TTP (thrombotic thrombocytopenic purpura) (H) 2015    Postpartum Thrombotic thrombocytopenic purpura (TTP) and 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    WPW (Delaney-Parkinson-White syndrome) 2015    She has been evaluated for ablation, however, based on location this was not deemed safe by cardiology at the time     Past Surgical History:   Procedure Laterality Date     SECTION  2015    Severe preeclampsia with HELLP syndrome and acute renal failure then developed postpartum Thrombotic thrombocytopenic purpura (TTP) and Hemolytic uremic syndrome (HUS)    COMBINED  SECTION, SALPINGECTOMY BILATERAL Bilateral 2023    Procedure:  SECTION, WITH BILATERAL SALPINGECTOMY, Viable baby girl born at 1851;  Surgeon: Naseem Cruz MD;  Location: [facility] OR    DILATION AND CURETTAGE, OPERATIVE HYSTEROSCOPY, COMBINED N/A 10/25/2023    Procedure: HYSTEROSCOPY, WITH DILATION AND CURETTAGE OF UTERUS;  Surgeon: Scout Grimaldo MD;  Location: [facility] OR    TUBAL LIGATION       Current Outpatient Medications   Medication Sig Dispense Refill    hydrOXYzine HCl (ATARAX) 50 MG tablet Take 1 tablet (50 mg) by mouth every 8 hours as needed for other or anxiety (adjuvant pain) 40 tablet 0    labetalol (NORMODYNE) 100 MG tablet Take 0.5 tablets (50 mg) by mouth 2 times daily 60 tablet 3    losartan (COZAAR) 50 MG tablet Take 1 tablet (50 mg) by mouth daily 90 tablet 0       Allergies   Allergen Reactions    Lisinopril Fatigue    Magnesium Other (See Comments)     Altered mental status after IV dosing        Social History     Tobacco Use    Smoking status: Never     Passive exposure: Never    Smokeless tobacco: Never   Substance Use Topics    Alcohol use: Not Currently     Comment: occa     Family History   Problem Relation Age of Onset    Hypertension Mother     Allergies  "Maternal Grandmother         dust pollen    Cancer - colorectal Paternal Grandmother 59    Allergies Maternal Aunt         \"    Cardiovascular Maternal Aunt     Breast Cancer Other 60        3 great aunts on moms side     History   Drug Use No         Review of Systems    Review of Systems  CONSTITUTIONAL: NEGATIVE for fever, chills, change in weight  INTEGUMENTARY/SKIN: NEGATIVE for worrisome rashes, moles or lesions  EYES: NEGATIVE for vision changes or irritation  ENT/MOUTH: NEGATIVE for ear, mouth and throat problems  RESP: NEGATIVE for significant cough or SOB  CV: NEGATIVE for chest pain, palpitations or peripheral edema  GI: NEGATIVE for nausea, abdominal pain, heartburn, or change in bowel habits  : abnormal menstrual cycles and denies dysuria   MUSCULOSKELETAL: NEGATIVE for significant arthralgias or myalgia  NEURO: NEGATIVE for weakness, dizziness or paresthesias  ENDOCRINE: NEGATIVE for temperature intolerance, skin/hair changes  PSYCHIATRIC: NEGATIVE for changes in mood or affect    Objective    /70 (BP Location: Right arm, Patient Position: Sitting, Cuff Size: Adult Regular)   Pulse 76   Temp 97.2  F (36.2  C) (Tympanic)   Resp 16   Ht 1.499 m (4' 11\")   Wt 59.4 kg (131 lb)   SpO2 100%   BMI 26.46 kg/m     Estimated body mass index is 26.46 kg/m  as calculated from the following:    Height as of this encounter: 1.499 m (4' 11\").    Weight as of this encounter: 59.4 kg (131 lb).  Physical Exam  GENERAL: alert and no distress  EYES: Eyes grossly normal to inspection, PERRL and conjunctivae and sclerae normal  HENT: ear canals and TM's normal, nose and mouth without ulcers or lesions  NECK: no adenopathy, no asymmetry, masses, or scars  RESP: lungs clear to auscultation - no rales, rhonchi or wheezes  CV: regular rate and rhythm, normal S1 S2, no S3 or S4, no murmur, click or rub, no peripheral edema  ABDOMEN: tenderness lower abdomen - no recent changes  and bowel sounds normal  MS: no " gross musculoskeletal defects noted, no edema  SKIN: no suspicious lesions or rashes  NEURO: Normal strength and tone, mentation intact and speech normal  PSYCH: mentation appears normal, affect normal/bright  LYMPH: normal ant/post cervical, supraclavicular nodes    Recent Labs   Lab Test 04/12/24  1122 10/24/23  1432 08/04/23  1613 07/24/23  0521 07/23/23  1728   HGB 14.6 12.8 11.8   < > 12.3    276 377   < > 230   INR  --   --   --   --  0.89     --  140   < > 135*   POTASSIUM 3.8  --  3.8   < > 3.8   CR 1.08*  --  0.94   < > 0.71    < > = values in this interval not displayed.        Diagnostics  Recent Results (from the past 168 hour(s))   CBC with platelets    Collection Time: 04/12/24 11:22 AM   Result Value Ref Range    WBC Count 6.0 4.0 - 11.0 10e3/uL    RBC Count 4.78 3.80 - 5.20 10e6/uL    Hemoglobin 14.6 11.7 - 15.7 g/dL    Hematocrit 43.1 35.0 - 47.0 %    MCV 90 78 - 100 fL    MCH 30.5 26.5 - 33.0 pg    MCHC 33.9 31.5 - 36.5 g/dL    RDW 14.0 10.0 - 15.0 %    Platelet Count 380 150 - 450 10e3/uL   Comprehensive metabolic panel (BMP + Alb, Alk Phos, ALT, AST, Total. Bili, TP)    Collection Time: 04/12/24 11:22 AM   Result Value Ref Range    Sodium 140 135 - 145 mmol/L    Potassium 3.8 3.4 - 5.3 mmol/L    Carbon Dioxide (CO2) 23 22 - 29 mmol/L    Anion Gap 15 7 - 15 mmol/L    Urea Nitrogen 13.3 6.0 - 20.0 mg/dL    Creatinine 1.08 (H) 0.51 - 0.95 mg/dL    GFR Estimate 67 >60 mL/min/1.73m2    Calcium 9.3 8.6 - 10.0 mg/dL    Chloride 102 98 - 107 mmol/L    Glucose 97 70 - 99 mg/dL    Alkaline Phosphatase 96 40 - 150 U/L    AST 24 0 - 45 U/L    ALT 19 0 - 50 U/L    Protein Total 7.6 6.4 - 8.3 g/dL    Albumin 4.7 3.5 - 5.2 g/dL    Bilirubin Total 0.5 <=1.2 mg/dL   CRP, inflammation    Collection Time: 04/12/24 11:22 AM   Result Value Ref Range    CRP Inflammation <3.00 <5.00 mg/L      NSR anterior infarct age undetermined - no symptoms - follows with Cardiology     Revised Cardiac Risk Index  (RCRI)  The patient has the following serious cardiovascular risks for perioperative complications:   - No serious cardiac risks = 0 points     RCRI Interpretation: 1 point: Class II (low risk - 0.9% complication rate)         Signed Electronically by: EVERARDO Damian CNP  Copy of this evaluation report is provided to requesting physician.

## 2024-04-16 ENCOUNTER — OFFICE VISIT (OUTPATIENT)
Dept: FAMILY MEDICINE | Facility: OTHER | Age: 39
End: 2024-04-16
Attending: NURSE PRACTITIONER
Payer: MEDICAID

## 2024-04-16 VITALS
BODY MASS INDEX: 26.41 KG/M2 | DIASTOLIC BLOOD PRESSURE: 70 MMHG | OXYGEN SATURATION: 100 % | WEIGHT: 131 LBS | TEMPERATURE: 97.2 F | HEART RATE: 76 BPM | HEIGHT: 59 IN | SYSTOLIC BLOOD PRESSURE: 132 MMHG | RESPIRATION RATE: 16 BRPM

## 2024-04-16 DIAGNOSIS — N93.9 ABNORMAL UTERINE BLEEDING: ICD-10-CM

## 2024-04-16 DIAGNOSIS — I45.6 WPW (WOLFF-PARKINSON-WHITE SYNDROME): ICD-10-CM

## 2024-04-16 DIAGNOSIS — Z01.818 PREOP GENERAL PHYSICAL EXAM: Primary | ICD-10-CM

## 2024-04-16 DIAGNOSIS — R10.2 PELVIC PAIN IN FEMALE: ICD-10-CM

## 2024-04-16 DIAGNOSIS — I10 ESSENTIAL HYPERTENSION: ICD-10-CM

## 2024-04-16 DIAGNOSIS — G35 MS (MULTIPLE SCLEROSIS) (H): ICD-10-CM

## 2024-04-16 LAB
ATRIAL RATE - MUSE: 74 BPM
DIASTOLIC BLOOD PRESSURE - MUSE: NORMAL MMHG
INTERPRETATION ECG - MUSE: NORMAL
P AXIS - MUSE: 58 DEGREES
PR INTERVAL - MUSE: 116 MS
QRS DURATION - MUSE: 76 MS
QT - MUSE: 384 MS
QTC - MUSE: 426 MS
R AXIS - MUSE: 48 DEGREES
SYSTOLIC BLOOD PRESSURE - MUSE: NORMAL MMHG
T AXIS - MUSE: 16 DEGREES
VENTRICULAR RATE- MUSE: 74 BPM

## 2024-04-16 PROCEDURE — 93010 ELECTROCARDIOGRAM REPORT: CPT | Mod: 77 | Performed by: INTERNAL MEDICINE

## 2024-04-16 PROCEDURE — G0463 HOSPITAL OUTPT CLINIC VISIT: HCPCS

## 2024-04-16 PROCEDURE — 93005 ELECTROCARDIOGRAM TRACING: CPT | Performed by: NURSE PRACTITIONER

## 2024-04-16 PROCEDURE — 99214 OFFICE O/P EST MOD 30 MIN: CPT | Performed by: NURSE PRACTITIONER

## 2024-04-16 ASSESSMENT — PAIN SCALES - GENERAL: PAINLEVEL: MODERATE PAIN (4)

## 2024-04-16 NOTE — PATIENT INSTRUCTIONS
Preparing for Your Surgery  Getting started  A nurse will call you to review your health history and instructions. They will give you an arrival time based on your scheduled surgery time. Please be ready to share:  Your doctor's clinic name and phone number  Your medical, surgical, and anesthesia history  A list of allergies and sensitivities  A list of medicines, including herbal treatments and over-the-counter drugs  Whether the patient has a legal guardian (ask how to send us the papers in advance)  Please tell us if you're pregnant--or if there's any chance you might be pregnant. Some surgeries may injure a fetus (unborn baby), so they require a pregnancy test. Surgeries that are safe for a fetus don't always need a test, and you can choose whether to have one.   If you have a child who's having surgery, please ask for a copy of Preparing for Your Child's Surgery.    Preparing for surgery  Within 10 to 30 days of surgery: Have a pre-op exam (sometimes called an H&P, or History and Physical). This can be done at a clinic or pre-operative center.  If you're having a , you may not need this exam. Talk to your care team.  At your pre-op exam, talk to your care team about all medicines you take. If you need to stop any medicines before surgery, ask when to start taking them again.  We do this for your safety. Many medicines can make you bleed too much during surgery. Some change how well surgery (anesthesia) drugs work.  Call your insurance company to let them know you're having surgery. (If you don't have insurance, call [phone].)  Call your clinic if there's any change in your health. This includes signs of a cold or flu (sore throat, runny nose, cough, rash, fever). It also includes a scrape or scratch near the surgery site.  If you have questions on the day of surgery, call your hospital or surgery center.  Eating and drinking guidelines  For your safety: Unless your surgeon tells you otherwise,  follow the guidelines below.  Eat and drink as usual until 8 hours before you arrive for surgery. After that, no food or milk.  Drink clear liquids until 2 hours before you arrive. These are liquids you can see through, like water, Gatorade, and Propel Water. They also include plain black coffee and tea (no cream or milk), candy, and breath mints. You can spit out gum when you arrive.  If you drink alcohol: Stop drinking it the night before surgery.  If your care team tells you to take medicine on the morning of surgery, it's okay to take it with a sip of water.  Preventing infection  Shower or bathe the night before and morning of your surgery. Follow the instructions your clinic gave you. (If no instructions, use regular soap.)  Don't shave or clip hair near your surgery site. We'll remove the hair if needed.  Don't smoke or vape the morning of surgery. You may chew nicotine gum up to 2 hours before surgery. A nicotine patch is okay.  Note: Some surgeries require you to completely quit smoking and nicotine. Check with your surgeon.  Your care team will make every effort to keep you safe from infection. We will:  Clean our hands often with soap and water (or an alcohol-based hand rub).  Clean the skin at your surgery site with a special soap that kills germs.  Give you a special gown to keep you warm. (Cold raises the risk of infection.)  Wear special hair covers, masks, gowns and gloves during surgery.  Give antibiotic medicine, if prescribed. Not all surgeries need antibiotics.  What to bring on the day of surgery  Photo ID and insurance card  Copy of your health care directive, if you have one  Glasses and hearing aids (bring cases)  You can't wear contacts during surgery  Inhaler and eye drops, if you use them (tell us about these when you arrive)  CPAP machine or breathing device, if you use them  A few personal items, if spending the night  If you have . . .  A pacemaker, ICD (cardiac defibrillator) or other  implant: Bring the ID card.  An implanted stimulator: Bring the remote control.  A legal guardian: Bring a copy of the certified (court-stamped) guardianship papers.  Please remove any jewelry, including body piercings. Leave jewelry and other valuables at home.  If you're going home the day of surgery  You must have a responsible adult drive you home. They should stay with you overnight as well.  If you don't have someone to stay with you, and you aren't safe to go home alone, we may keep you overnight. Insurance often won't pay for this.  After surgery  If it's hard to control your pain or you need more pain medicine, please call your surgeon's office.  Questions?   If you have any questions for your care team, list them here: _________________________________________________________________________________________________________________________________________________________________________ ____________________________________ ____________________________________ ____________________________________  For informational purposes only. Not to replace the advice of your health care provider. Copyright   2003, 2019 [facility]. All rights reserved. Clinically reviewed by Gretta Ruiz MD. [employer] [id number] - REV 12/22.    How to Take Your Medication Before Surgery  - Take all of your medications before surgery except as noted below  - hold Losartan the morning of procedure

## 2024-04-17 NOTE — PROGRESS NOTES
F/up AUB, dysmenorrhea, pelvic pain.  See my prior evals.  Pt now cleared by billing office to proceed with hysterectomy which was previously canceled.    HPI:  Sarah Ugalde is a 38 year old female P2 (CS). Pt 6 months s/p CS/salpingectomy with AUB, lower abdominal/pelvic cramping. US showed abnormal endometrium and possible retained POC. Pt underwent D and C/hysteroscopy with findings of thickened, disordered proliferative endometrium but no retained POC. She was tried on hormonal management with cyclical progesterone which did not help.  She continues to have bleeding most days of the month with cramping/pelvic pain.  She reportedly was bleeding heavily and passing out and  was seen in [facility](no records). She by report was noted to have low BPand high CRP and temp of 102.   Labs were obtained today and normal and she is afebrile with nml hg and CRP.    Her bleeding/pain is limiting her clothing choices and interferes with lifestyle/activites.      Past GYN history:        Last PAP smear:  Normal and neg HPV 2023     Patients records are available and reviewed at today's visit.     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     Postpartum Thrombotic thrombocytopenic purpura (TTP) and 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    MS (multiple sclerosis) (H) 01/01/2015    Rh negative state in antepartum period 07/23/2023    TTP (thrombotic thrombocytopenic purpura) (H) 06/25/2015     Postpartum Thrombotic thrombocytopenic purpura (TTP) and Hemolytic uremic syndrome (HUS) treated with  "plasmapheresis    WPW (Delaney-Parkinson-White syndrome) 2015     She has been evaluated for ablation, however, based on location this was not deemed safe by cardiology at the time            Past Surgical History         Past Surgical History:   Procedure Laterality Date     SECTION   2015     Severe preeclampsia with HELLP syndrome and acute renal failure then developed postpartum Thrombotic thrombocytopenic purpura (TTP) and Hemolytic uremic syndrome (HUS)    COMBINED  SECTION, SALPINGECTOMY BILATERAL Bilateral 2023     Procedure:  SECTION, WITH BILATERAL SALPINGECTOMY, Viable baby girl born at 1851;  Surgeon: Naseem Cruz MD;  Location: [facility] OR    DILATION AND CURETTAGE, OPERATIVE HYSTEROSCOPY, COMBINED N/A 10/25/2023     Procedure: HYSTEROSCOPY, WITH DILATION AND CURETTAGE OF UTERUS;  Surgeon: Scout Grimaldo MD;  Location: [facility] OR    TUBAL LIGATION                Family History         Family History   Problem Relation Age of Onset    Hypertension Mother      Allergies Maternal Grandmother           dust pollen    Cancer - colorectal Paternal Grandmother 59    Allergies Maternal Aunt           \"    Cardiovascular Maternal Aunt      Breast Cancer Other 60         3 great aunts on moms side            Current Outpatient Prescriptions          Current Outpatient Medications   Medication Sig Dispense Refill    hydrOXYzine HCl (ATARAX) 50 MG tablet Take 1 tablet (50 mg) by mouth every 8 hours as needed for other or anxiety (adjuvant pain) 40 tablet 0    labetalol (NORMODYNE) 100 MG tablet Take 0.5 tablets (50 mg) by mouth 2 times daily 60 tablet 3    losartan (COZAAR) 50 MG tablet Take 1 tablet (50 mg) by mouth daily 90 tablet 0    ketorolac (TORADOL) 10 MG tablet Take 1 tablet (10 mg) by mouth every 6 hours as needed for moderate pain (Patient not taking: Reported on 3/19/2024) 20 tablet 0            Allergies: Lisinopril and Magnesium     ROS:  CONSTITUTIONAL: NEGATIVE for " "fever, chills, change in weight  GI neg  : NEGATIVE for frequency, dysuria, hematuria, vaginal discharge        EXAM:  Blood pressure 122/80, pulse 91, temperature 98.2  F (36.8  C), temperature source Tympanic, height 1.499 m (4' 11\"), weight 59.4 kg (131 lb), SpO2 99%, not currently breastfeeding.   BMI= Body mass index is 26.46 kg/m .  General - pleasant female in no acute distress.  masses or tenderness.  Rectovaginal - deferred.  Musculoskeletal - no gross deformities or edema  Neurological - normal mental status.        ASSESSMENT/PLAN:  Longstanding AUB/pelvic pain refractory to medical management.  Desires definitive surgical management (hysterectomy).  We discussed various approaches (abdominal/laparoscopic) +/- cervical removal.  R/B of each.  Pt desires to proceed with abdominal supracervical hysterectomy.  Reviewed goals, risks, alternatives for planned procedure;  Including risk of bleeding, transfusion, infection, damage to nerves, blood vessels, bowel and bladder, blood clots, pneumonia, and other rare or unforseen complications.   Discussed recovery period and expected discomfort.. All questions were answered.  Preoperative instructions discussed.  Patient desires to proceed.  Scheduled 4/18/24.             "

## 2024-04-18 ENCOUNTER — APPOINTMENT (OUTPATIENT)
Dept: ULTRASOUND IMAGING | Facility: HOSPITAL | Age: 39
End: 2024-04-18
Attending: NURSE ANESTHETIST, CERTIFIED REGISTERED
Payer: MEDICAID

## 2024-04-18 ENCOUNTER — HOSPITAL ENCOUNTER (INPATIENT)
Facility: HOSPITAL | Age: 39
LOS: 3 days | Discharge: HOME | End: 2024-04-21
Attending: OBSTETRICS & GYNECOLOGY | Admitting: OBSTETRICS & GYNECOLOGY
Payer: MEDICAID

## 2024-04-18 ENCOUNTER — APPOINTMENT (OUTPATIENT)
Dept: ULTRASOUND IMAGING | Facility: HOSPITAL | Age: 39
End: 2024-04-18
Attending: OBSTETRICS & GYNECOLOGY
Payer: MEDICAID

## 2024-04-18 ENCOUNTER — ANESTHESIA (OUTPATIENT)
Dept: SURGERY | Facility: HOSPITAL | Age: 39
End: 2024-04-18
Payer: MEDICAID

## 2024-04-18 DIAGNOSIS — Z90.710 S/P ABDOMINAL HYSTERECTOMY: Primary | ICD-10-CM

## 2024-04-18 LAB
ABO/RH(D): NORMAL
ANION GAP SERPL CALCULATED.3IONS-SCNC: 10 MMOL/L (ref 7–15)
ANTIBODY SCREEN: NEGATIVE
BUN SERPL-MCNC: 14 MG/DL (ref 6–20)
CALCIUM SERPL-MCNC: 8.6 MG/DL (ref 8.6–10)
CHLORIDE SERPL-SCNC: 104 MMOL/L (ref 98–107)
CREAT SERPL-MCNC: 0.92 MG/DL (ref 0.51–0.95)
DEPRECATED HCO3 PLAS-SCNC: 24 MMOL/L (ref 22–29)
EGFRCR SERPLBLD CKD-EPI 2021: 81 ML/MIN/1.73M2
GLUCOSE BLDC GLUCOMTR-MCNC: 86 MG/DL (ref 70–99)
GLUCOSE SERPL-MCNC: 106 MG/DL (ref 70–99)
HOLD SPECIMEN: NORMAL
POTASSIUM SERPL-SCNC: 4.2 MMOL/L (ref 3.4–5.3)
SODIUM SERPL-SCNC: 138 MMOL/L (ref 135–145)
SPECIMEN EXPIRATION DATE: NORMAL

## 2024-04-18 PROCEDURE — 370N000017 HC ANESTHESIA TECHNICAL FEE, PER MIN: Performed by: OBSTETRICS & GYNECOLOGY

## 2024-04-18 PROCEDURE — 250N000009 HC RX 250: Performed by: OBSTETRICS & GYNECOLOGY

## 2024-04-18 PROCEDURE — 250N000011 HC RX IP 250 OP 636: Performed by: OBSTETRICS & GYNECOLOGY

## 2024-04-18 PROCEDURE — 272N000001 HC OR GENERAL SUPPLY STERILE: Performed by: OBSTETRICS & GYNECOLOGY

## 2024-04-18 PROCEDURE — 258N000003 HC RX IP 258 OP 636: Performed by: NURSE ANESTHETIST, CERTIFIED REGISTERED

## 2024-04-18 PROCEDURE — 250N000011 HC RX IP 250 OP 636: Performed by: NURSE ANESTHETIST, CERTIFIED REGISTERED

## 2024-04-18 PROCEDURE — 250N000013 HC RX MED GY IP 250 OP 250 PS 637: Performed by: OBSTETRICS & GYNECOLOGY

## 2024-04-18 PROCEDURE — 250N000011 HC RX IP 250 OP 636

## 2024-04-18 PROCEDURE — 64488 TAP BLOCK BI INJECTION: CPT | Mod: XU | Performed by: NURSE ANESTHETIST, CERTIFIED REGISTERED

## 2024-04-18 PROCEDURE — 36415 COLL VENOUS BLD VENIPUNCTURE: CPT | Performed by: OBSTETRICS & GYNECOLOGY

## 2024-04-18 PROCEDURE — 0UT70ZZ RESECTION OF BILATERAL FALLOPIAN TUBES, OPEN APPROACH: ICD-10-PCS | Performed by: OBSTETRICS & GYNECOLOGY

## 2024-04-18 PROCEDURE — 250N000025 HC SEVOFLURANE, PER MIN: Performed by: OBSTETRICS & GYNECOLOGY

## 2024-04-18 PROCEDURE — C9290 INJ, BUPIVACAINE LIPOSOME: HCPCS | Performed by: NURSE ANESTHETIST, CERTIFIED REGISTERED

## 2024-04-18 PROCEDURE — 86900 BLOOD TYPING SEROLOGIC ABO: CPT | Performed by: OBSTETRICS & GYNECOLOGY

## 2024-04-18 PROCEDURE — 0UT90ZL RESECTION OF UTERUS, SUPRACERVICAL, OPEN APPROACH: ICD-10-PCS | Performed by: OBSTETRICS & GYNECOLOGY

## 2024-04-18 PROCEDURE — 88307 TISSUE EXAM BY PATHOLOGIST: CPT | Mod: TC | Performed by: OBSTETRICS & GYNECOLOGY

## 2024-04-18 PROCEDURE — 710N000010 HC RECOVERY PHASE 1, LEVEL 2, PER MIN: Performed by: OBSTETRICS & GYNECOLOGY

## 2024-04-18 PROCEDURE — 360N000076 HC SURGERY LEVEL 3, PER MIN: Performed by: OBSTETRICS & GYNECOLOGY

## 2024-04-18 PROCEDURE — 58180 PARTIAL HYSTERECTOMY: CPT | Performed by: OBSTETRICS & GYNECOLOGY

## 2024-04-18 PROCEDURE — 999N000141 HC STATISTIC PRE-PROCEDURE NURSING ASSESSMENT: Performed by: OBSTETRICS & GYNECOLOGY

## 2024-04-18 PROCEDURE — 999N000157 HC STATISTIC RCP TIME EA 10 MIN

## 2024-04-18 PROCEDURE — 58180 PARTIAL HYSTERECTOMY: CPT | Performed by: NURSE ANESTHETIST, CERTIFIED REGISTERED

## 2024-04-18 PROCEDURE — 250N000009 HC RX 250: Performed by: NURSE ANESTHETIST, CERTIFIED REGISTERED

## 2024-04-18 PROCEDURE — 80048 BASIC METABOLIC PNL TOTAL CA: CPT | Performed by: OBSTETRICS & GYNECOLOGY

## 2024-04-18 PROCEDURE — 120N000001 HC R&B MED SURG/OB

## 2024-04-18 PROCEDURE — 88307 TISSUE EXAM BY PATHOLOGIST: CPT | Mod: 26 | Performed by: PATHOLOGY

## 2024-04-18 RX ORDER — ONDANSETRON 2 MG/ML
INJECTION INTRAMUSCULAR; INTRAVENOUS PRN
Status: DISCONTINUED | OUTPATIENT
Start: 2024-04-18 | End: 2024-04-18

## 2024-04-18 RX ORDER — ACETAMINOPHEN 325 MG/1
650 TABLET ORAL EVERY 6 HOURS
Status: DISCONTINUED | OUTPATIENT
Start: 2024-04-21 | End: 2024-04-21 | Stop reason: HOSPADM

## 2024-04-18 RX ORDER — CEFAZOLIN SODIUM/WATER 2 G/20 ML
2 SYRINGE (ML) INTRAVENOUS
Status: COMPLETED | OUTPATIENT
Start: 2024-04-18 | End: 2024-04-18

## 2024-04-18 RX ORDER — OXYCODONE HYDROCHLORIDE 5 MG/1
10 TABLET ORAL EVERY 4 HOURS PRN
Status: DISCONTINUED | OUTPATIENT
Start: 2024-04-18 | End: 2024-04-21 | Stop reason: HOSPADM

## 2024-04-18 RX ORDER — ACETAMINOPHEN 325 MG/1
975 TABLET ORAL EVERY 6 HOURS
Status: DISCONTINUED | OUTPATIENT
Start: 2024-04-18 | End: 2024-04-21 | Stop reason: HOSPADM

## 2024-04-18 RX ORDER — ONDANSETRON 4 MG/1
4 TABLET, ORALLY DISINTEGRATING ORAL EVERY 30 MIN PRN
Status: DISCONTINUED | OUTPATIENT
Start: 2024-04-18 | End: 2024-04-18 | Stop reason: HOSPADM

## 2024-04-18 RX ORDER — ACETAMINOPHEN 325 MG/1
975 TABLET ORAL ONCE
Status: COMPLETED | OUTPATIENT
Start: 2024-04-18 | End: 2024-04-18

## 2024-04-18 RX ORDER — HYDROXYZINE HYDROCHLORIDE 25 MG/1
50 TABLET, FILM COATED ORAL EVERY 6 HOURS PRN
Status: DISCONTINUED | OUTPATIENT
Start: 2024-04-18 | End: 2024-04-19

## 2024-04-18 RX ORDER — ONDANSETRON 4 MG/1
4 TABLET, ORALLY DISINTEGRATING ORAL EVERY 6 HOURS PRN
Status: DISCONTINUED | OUTPATIENT
Start: 2024-04-18 | End: 2024-04-21 | Stop reason: HOSPADM

## 2024-04-18 RX ORDER — TRANEXAMIC ACID 10 MG/ML
1 INJECTION, SOLUTION INTRAVENOUS ONCE
Status: COMPLETED | OUTPATIENT
Start: 2024-04-18 | End: 2024-04-18

## 2024-04-18 RX ORDER — FENTANYL CITRATE 50 UG/ML
50 INJECTION, SOLUTION INTRAMUSCULAR; INTRAVENOUS EVERY 5 MIN PRN
Status: DISCONTINUED | OUTPATIENT
Start: 2024-04-18 | End: 2024-04-18 | Stop reason: HOSPADM

## 2024-04-18 RX ORDER — AMOXICILLIN 250 MG
1 CAPSULE ORAL 2 TIMES DAILY
Status: DISCONTINUED | OUTPATIENT
Start: 2024-04-18 | End: 2024-04-21 | Stop reason: HOSPADM

## 2024-04-18 RX ORDER — LIDOCAINE 40 MG/G
CREAM TOPICAL
Status: DISCONTINUED | OUTPATIENT
Start: 2024-04-18 | End: 2024-04-21 | Stop reason: HOSPADM

## 2024-04-18 RX ORDER — HYDRALAZINE HYDROCHLORIDE 20 MG/ML
2.5-5 INJECTION INTRAMUSCULAR; INTRAVENOUS EVERY 10 MIN PRN
Status: DISCONTINUED | OUTPATIENT
Start: 2024-04-18 | End: 2024-04-18 | Stop reason: HOSPADM

## 2024-04-18 RX ORDER — SODIUM CHLORIDE, SODIUM LACTATE, POTASSIUM CHLORIDE, CALCIUM CHLORIDE 600; 310; 30; 20 MG/100ML; MG/100ML; MG/100ML; MG/100ML
INJECTION, SOLUTION INTRAVENOUS CONTINUOUS
Status: DISCONTINUED | OUTPATIENT
Start: 2024-04-18 | End: 2024-04-18 | Stop reason: HOSPADM

## 2024-04-18 RX ORDER — HYDROMORPHONE HCL IN WATER/PF 6 MG/30 ML
0.4 PATIENT CONTROLLED ANALGESIA SYRINGE INTRAVENOUS
Status: DISCONTINUED | OUTPATIENT
Start: 2024-04-18 | End: 2024-04-21 | Stop reason: HOSPADM

## 2024-04-18 RX ORDER — NALOXONE HYDROCHLORIDE 0.4 MG/ML
0.1 INJECTION, SOLUTION INTRAMUSCULAR; INTRAVENOUS; SUBCUTANEOUS
Status: DISCONTINUED | OUTPATIENT
Start: 2024-04-18 | End: 2024-04-18 | Stop reason: HOSPADM

## 2024-04-18 RX ORDER — OXYCODONE HYDROCHLORIDE 5 MG/1
5 TABLET ORAL EVERY 4 HOURS PRN
Status: DISCONTINUED | OUTPATIENT
Start: 2024-04-18 | End: 2024-04-21 | Stop reason: HOSPADM

## 2024-04-18 RX ORDER — ONDANSETRON 2 MG/ML
4 INJECTION INTRAMUSCULAR; INTRAVENOUS EVERY 6 HOURS PRN
Status: DISCONTINUED | OUTPATIENT
Start: 2024-04-18 | End: 2024-04-21 | Stop reason: HOSPADM

## 2024-04-18 RX ORDER — CALCIUM CARBONATE 500 MG/1
500 TABLET, CHEWABLE ORAL 4 TIMES DAILY PRN
Status: DISCONTINUED | OUTPATIENT
Start: 2024-04-18 | End: 2024-04-21 | Stop reason: HOSPADM

## 2024-04-18 RX ORDER — LABETALOL 20 MG/4 ML (5 MG/ML) INTRAVENOUS SYRINGE
10
Status: DISCONTINUED | OUTPATIENT
Start: 2024-04-18 | End: 2024-04-18 | Stop reason: HOSPADM

## 2024-04-18 RX ORDER — IBUPROFEN 200 MG
800 TABLET ORAL EVERY 6 HOURS
Status: DISCONTINUED | OUTPATIENT
Start: 2024-04-18 | End: 2024-04-21 | Stop reason: HOSPADM

## 2024-04-18 RX ORDER — CEFAZOLIN SODIUM/WATER 2 G/20 ML
2 SYRINGE (ML) INTRAVENOUS SEE ADMIN INSTRUCTIONS
Status: DISCONTINUED | OUTPATIENT
Start: 2024-04-18 | End: 2024-04-18 | Stop reason: HOSPADM

## 2024-04-18 RX ORDER — BUPIVACAINE HYDROCHLORIDE 2.5 MG/ML
INJECTION, SOLUTION EPIDURAL; INFILTRATION; INTRACAUDAL
Status: COMPLETED | OUTPATIENT
Start: 2024-04-18 | End: 2024-04-18

## 2024-04-18 RX ORDER — HYDROMORPHONE HYDROCHLORIDE 1 MG/ML
0.2 INJECTION, SOLUTION INTRAMUSCULAR; INTRAVENOUS; SUBCUTANEOUS EVERY 5 MIN PRN
Status: DISCONTINUED | OUTPATIENT
Start: 2024-04-18 | End: 2024-04-18 | Stop reason: HOSPADM

## 2024-04-18 RX ORDER — KETOROLAC TROMETHAMINE 30 MG/ML
INJECTION, SOLUTION INTRAMUSCULAR; INTRAVENOUS PRN
Status: DISCONTINUED | OUTPATIENT
Start: 2024-04-18 | End: 2024-04-18

## 2024-04-18 RX ORDER — PROPOFOL 10 MG/ML
INJECTION, EMULSION INTRAVENOUS CONTINUOUS PRN
Status: DISCONTINUED | OUTPATIENT
Start: 2024-04-18 | End: 2024-04-18

## 2024-04-18 RX ORDER — ONDANSETRON 2 MG/ML
4 INJECTION INTRAMUSCULAR; INTRAVENOUS EVERY 30 MIN PRN
Status: DISCONTINUED | OUTPATIENT
Start: 2024-04-18 | End: 2024-04-18 | Stop reason: HOSPADM

## 2024-04-18 RX ORDER — LIDOCAINE HYDROCHLORIDE 20 MG/ML
INJECTION, SOLUTION INFILTRATION; PERINEURAL PRN
Status: DISCONTINUED | OUTPATIENT
Start: 2024-04-18 | End: 2024-04-18

## 2024-04-18 RX ORDER — LIDOCAINE 40 MG/G
CREAM TOPICAL
Status: DISCONTINUED | OUTPATIENT
Start: 2024-04-18 | End: 2024-04-18 | Stop reason: HOSPADM

## 2024-04-18 RX ORDER — HYDROMORPHONE HYDROCHLORIDE 1 MG/ML
0.4 INJECTION, SOLUTION INTRAMUSCULAR; INTRAVENOUS; SUBCUTANEOUS EVERY 5 MIN PRN
Status: DISCONTINUED | OUTPATIENT
Start: 2024-04-18 | End: 2024-04-18 | Stop reason: HOSPADM

## 2024-04-18 RX ORDER — ONDANSETRON 4 MG/1
4 TABLET, ORALLY DISINTEGRATING ORAL EVERY 30 MIN PRN
Status: DISCONTINUED | OUTPATIENT
Start: 2024-04-18 | End: 2024-04-18

## 2024-04-18 RX ORDER — FENTANYL CITRATE 50 UG/ML
25 INJECTION, SOLUTION INTRAMUSCULAR; INTRAVENOUS EVERY 5 MIN PRN
Status: DISCONTINUED | OUTPATIENT
Start: 2024-04-18 | End: 2024-04-18 | Stop reason: HOSPADM

## 2024-04-18 RX ORDER — DEXAMETHASONE SODIUM PHOSPHATE 4 MG/ML
INJECTION, SOLUTION INTRA-ARTICULAR; INTRALESIONAL; INTRAMUSCULAR; INTRAVENOUS; SOFT TISSUE PRN
Status: DISCONTINUED | OUTPATIENT
Start: 2024-04-18 | End: 2024-04-18

## 2024-04-18 RX ORDER — HYDROMORPHONE HCL IN WATER/PF 6 MG/30 ML
0.2 PATIENT CONTROLLED ANALGESIA SYRINGE INTRAVENOUS
Status: DISCONTINUED | OUTPATIENT
Start: 2024-04-18 | End: 2024-04-21 | Stop reason: HOSPADM

## 2024-04-18 RX ORDER — HYDROXYZINE HYDROCHLORIDE 50 MG/ML
100 INJECTION, SOLUTION INTRAMUSCULAR EVERY 6 HOURS PRN
Status: DISCONTINUED | OUTPATIENT
Start: 2024-04-18 | End: 2024-04-19

## 2024-04-18 RX ORDER — EPHEDRINE SULFATE 50 MG/ML
INJECTION, SOLUTION INTRAMUSCULAR; INTRAVENOUS; SUBCUTANEOUS PRN
Status: DISCONTINUED | OUTPATIENT
Start: 2024-04-18 | End: 2024-04-18

## 2024-04-18 RX ORDER — FENTANYL CITRATE 50 UG/ML
INJECTION, SOLUTION INTRAMUSCULAR; INTRAVENOUS PRN
Status: DISCONTINUED | OUTPATIENT
Start: 2024-04-18 | End: 2024-04-18

## 2024-04-18 RX ORDER — BISACODYL 10 MG
10 SUPPOSITORY, RECTAL RECTAL DAILY PRN
Status: DISCONTINUED | OUTPATIENT
Start: 2024-04-18 | End: 2024-04-21 | Stop reason: HOSPADM

## 2024-04-18 RX ORDER — GLYCOPYRROLATE 0.2 MG/ML
INJECTION, SOLUTION INTRAMUSCULAR; INTRAVENOUS PRN
Status: DISCONTINUED | OUTPATIENT
Start: 2024-04-18 | End: 2024-04-18

## 2024-04-18 RX ORDER — KETOROLAC TROMETHAMINE 15 MG/ML
15 INJECTION, SOLUTION INTRAMUSCULAR; INTRAVENOUS EVERY 6 HOURS
Status: DISCONTINUED | OUTPATIENT
Start: 2024-04-18 | End: 2024-04-21 | Stop reason: HOSPADM

## 2024-04-18 RX ORDER — LOSARTAN POTASSIUM 50 MG/1
50 TABLET ORAL DAILY
Status: DISCONTINUED | OUTPATIENT
Start: 2024-04-19 | End: 2024-04-21 | Stop reason: HOSPADM

## 2024-04-18 RX ORDER — ONDANSETRON 2 MG/ML
4 INJECTION INTRAMUSCULAR; INTRAVENOUS EVERY 30 MIN PRN
Status: DISCONTINUED | OUTPATIENT
Start: 2024-04-18 | End: 2024-04-18

## 2024-04-18 RX ORDER — SODIUM CHLORIDE, SODIUM LACTATE, POTASSIUM CHLORIDE, CALCIUM CHLORIDE 600; 310; 30; 20 MG/100ML; MG/100ML; MG/100ML; MG/100ML
INJECTION, SOLUTION INTRAVENOUS CONTINUOUS
Status: DISCONTINUED | OUTPATIENT
Start: 2024-04-18 | End: 2024-04-21 | Stop reason: HOSPADM

## 2024-04-18 RX ORDER — ALBUTEROL SULFATE 0.83 MG/ML
2.5 SOLUTION RESPIRATORY (INHALATION) EVERY 4 HOURS PRN
Status: DISCONTINUED | OUTPATIENT
Start: 2024-04-18 | End: 2024-04-18 | Stop reason: HOSPADM

## 2024-04-18 RX ORDER — NALOXONE HYDROCHLORIDE 0.4 MG/ML
0.1 INJECTION, SOLUTION INTRAMUSCULAR; INTRAVENOUS; SUBCUTANEOUS
Status: DISCONTINUED | OUTPATIENT
Start: 2024-04-18 | End: 2024-04-21 | Stop reason: HOSPADM

## 2024-04-18 RX ORDER — KETAMINE HYDROCHLORIDE 10 MG/ML
INJECTION INTRAMUSCULAR; INTRAVENOUS PRN
Status: DISCONTINUED | OUTPATIENT
Start: 2024-04-18 | End: 2024-04-18

## 2024-04-18 RX ORDER — PROCHLORPERAZINE MALEATE 10 MG
10 TABLET ORAL EVERY 6 HOURS PRN
Status: DISCONTINUED | OUTPATIENT
Start: 2024-04-18 | End: 2024-04-21 | Stop reason: HOSPADM

## 2024-04-18 RX ADMIN — FENTANYL CITRATE 50 MCG: 50 INJECTION INTRAMUSCULAR; INTRAVENOUS at 13:53

## 2024-04-18 RX ADMIN — FENTANYL CITRATE 50 MCG: 50 INJECTION, SOLUTION INTRAMUSCULAR; INTRAVENOUS at 15:06

## 2024-04-18 RX ADMIN — Medication 30 MG: at 13:40

## 2024-04-18 RX ADMIN — Medication 2 G: at 12:57

## 2024-04-18 RX ADMIN — HYDROMORPHONE HYDROCHLORIDE 0.5 MG: 1 INJECTION, SOLUTION INTRAMUSCULAR; INTRAVENOUS; SUBCUTANEOUS at 13:42

## 2024-04-18 RX ADMIN — FENTANYL CITRATE 50 MCG: 50 INJECTION, SOLUTION INTRAMUSCULAR; INTRAVENOUS at 15:16

## 2024-04-18 RX ADMIN — PROPOFOL 100 MCG/KG/MIN: 10 INJECTION, EMULSION INTRAVENOUS at 13:25

## 2024-04-18 RX ADMIN — OXYCODONE HYDROCHLORIDE 10 MG: 5 TABLET ORAL at 16:13

## 2024-04-18 RX ADMIN — TRANEXAMIC ACID 1 G: 10 INJECTION, SOLUTION INTRAVENOUS at 13:28

## 2024-04-18 RX ADMIN — Medication 5 MG: at 13:49

## 2024-04-18 RX ADMIN — FENTANYL CITRATE 50 MCG: 50 INJECTION INTRAMUSCULAR; INTRAVENOUS at 13:19

## 2024-04-18 RX ADMIN — LABETALOL HYDROCHLORIDE 50 MG: 100 TABLET, FILM COATED ORAL at 17:06

## 2024-04-18 RX ADMIN — MIDAZOLAM 2 MG: 1 INJECTION INTRAMUSCULAR; INTRAVENOUS at 13:07

## 2024-04-18 RX ADMIN — DEXAMETHASONE SODIUM PHOSPHATE 6 MG: 4 INJECTION, SOLUTION INTRA-ARTICULAR; INTRALESIONAL; INTRAMUSCULAR; INTRAVENOUS; SOFT TISSUE at 14:36

## 2024-04-18 RX ADMIN — SODIUM CHLORIDE, POTASSIUM CHLORIDE, SODIUM LACTATE AND CALCIUM CHLORIDE: 600; 310; 30; 20 INJECTION, SOLUTION INTRAVENOUS at 10:45

## 2024-04-18 RX ADMIN — BUPIVACAINE HYDROCHLORIDE 40 ML: 2.5 INJECTION, SOLUTION EPIDURAL; INFILTRATION; INTRACAUDAL at 13:18

## 2024-04-18 RX ADMIN — FENTANYL CITRATE 50 MCG: 50 INJECTION INTRAMUSCULAR; INTRAVENOUS at 13:14

## 2024-04-18 RX ADMIN — PROPOFOL 120 MG: 10 INJECTION, EMULSION INTRAVENOUS at 13:14

## 2024-04-18 RX ADMIN — SUGAMMADEX 200 MG: 100 INJECTION, SOLUTION INTRAVENOUS at 14:30

## 2024-04-18 RX ADMIN — IBUPROFEN 800 MG: 200 TABLET, FILM COATED ORAL at 20:46

## 2024-04-18 RX ADMIN — GLYCOPYRROLATE 0.2 MG: 0.2 INJECTION, SOLUTION INTRAMUSCULAR; INTRAVENOUS at 13:47

## 2024-04-18 RX ADMIN — OXYCODONE HYDROCHLORIDE 10 MG: 5 TABLET ORAL at 23:01

## 2024-04-18 RX ADMIN — BUPIVACAINE 20 ML: 13.3 INJECTION, SUSPENSION, LIPOSOMAL INFILTRATION at 13:18

## 2024-04-18 RX ADMIN — ROCURONIUM BROMIDE 30 MG: 10 INJECTION INTRAVENOUS at 13:14

## 2024-04-18 RX ADMIN — HYDROMORPHONE HYDROCHLORIDE 0.2 MG: 0.2 INJECTION, SOLUTION INTRAMUSCULAR; INTRAVENOUS; SUBCUTANEOUS at 21:10

## 2024-04-18 RX ADMIN — HYDROMORPHONE HYDROCHLORIDE 0.2 MG: 0.2 INJECTION, SOLUTION INTRAMUSCULAR; INTRAVENOUS; SUBCUTANEOUS at 23:49

## 2024-04-18 RX ADMIN — HYDROXYZINE HYDROCHLORIDE 100 MG: 50 INJECTION, SOLUTION INTRAMUSCULAR at 17:09

## 2024-04-18 RX ADMIN — ONDANSETRON 4 MG: 2 INJECTION INTRAMUSCULAR; INTRAVENOUS at 13:28

## 2024-04-18 RX ADMIN — PROPOFOL 80 MG: 10 INJECTION, EMULSION INTRAVENOUS at 13:19

## 2024-04-18 RX ADMIN — SENNOSIDES AND DOCUSATE SODIUM 1 TABLET: 8.6; 5 TABLET ORAL at 20:46

## 2024-04-18 RX ADMIN — LIDOCAINE HYDROCHLORIDE 40 MG: 20 INJECTION, SOLUTION INFILTRATION; PERINEURAL at 13:14

## 2024-04-18 RX ADMIN — KETOROLAC TROMETHAMINE 30 MG: 30 INJECTION, SOLUTION INTRAMUSCULAR at 14:38

## 2024-04-18 ASSESSMENT — ACTIVITIES OF DAILY LIVING (ADL)
ADLS_ACUITY_SCORE: 18

## 2024-04-18 NOTE — ANESTHESIA POSTPROCEDURE EVALUATION
Patient: Sarah Ugalde    Procedure: Procedure(s):  HYSTERECTOMY, SUPRACERVICAL, ABDOMINAL       Anesthesia Type:  General    Note:  Disposition: Admission   Postop Pain Control: Uneventful            Sign Out: Well controlled pain   PONV: No   Neuro/Psych: Uneventful            Sign Out: Acceptable/Baseline neuro status   Airway/Respiratory: Uneventful            Sign Out: Acceptable/Baseline resp. status   CV/Hemodynamics: Uneventful            Sign Out: Acceptable CV status; No obvious hypovolemia; No obvious fluid overload   Other NRE: NONE   DID A NON-ROUTINE EVENT OCCUR? No           Last vitals:  Vitals Value Taken Time   /94 04/18/24 1505   Temp 98.3  F (36.8  C) 04/18/24 1450   Pulse 90 04/18/24 1507   Resp 6 04/18/24 1507   SpO2 100 % 04/18/24 1507   Vitals shown include unfiled device data.    Electronically Signed By: EVERARDO Gill CRNA  April 18, 2024  3:08 PM

## 2024-04-18 NOTE — OR NURSING
"Patient's  called and updated re; admission to 4th floor.  Patient was able to speak with patient as well.    Of note; patient's -150/ per patient \"this is my normal\" patient denies headache/distress and does not want anything for her BP at this time.      Patient to 4th floor with rn staff x 2.  "

## 2024-04-18 NOTE — ANESTHESIA PROCEDURE NOTES
TAP Procedure Note    Pre-Procedure   Staff -        Resident/Fellow: Erica Warren       CRNA: Ella Li APRN CRNA       Performed By: CRNA and BRISEIDA       Location: OR       Procedure Start/Stop Times: 4/18/2024 1:18 PM and 4/18/2024 1:22 PM       Pre-Anesthestic Checklist: patient identified, IV checked, site marked, risks and benefits discussed, informed consent, monitors and equipment checked, pre-op evaluation, at physician/surgeon's request and post-op pain management  Timeout:       Correct Patient: Yes        Correct Procedure: Yes        Correct Site: Yes        Correct Position: Yes        Correct Laterality: Yes        Site Marked: Yes  Procedure Documentation  Procedure: TAP       Diagnosis: HYSTERECTOMY       Laterality: bilateral       Patient Position: supine       Skin prep: Chloraprep       Needle Type: insulated       Needle Gauge: 20.        Needle Length (Inches): 4        Ultrasound guided       1. Ultrasound was used to identify targeted nerve, plexus, vascular marker, or fascial plane and place a needle adjacent to it in real-time.       2. Ultrasound was used to visualize the spread of anesthetic in close proximity to the above referenced structure.       3. A permanent image is entered into the patient's record.       4. The visualized anatomic structures appeared normal.       5. There were no apparent abnormal pathologic findings.    Assessment/Narrative         The placement was negative for: blood aspirated and painful injection       Paresthesias: No.       Bolus given via needle..        Secured via.        Insertion/Infusion Method: Single Shot       Complications: none       Injection made incrementally with aspirations every 5 mL.    Medication(s) Administered   Bupivacaine 0.25% PF (Infiltration) - Infiltration, Abdominal Tissue   40 mL - 4/18/2024 1:18:00 PM  Bupivacaine liposome (Exparel) 1.3% LA inj susp (Infiltration) - Infiltration, Abdominal Tissue   20 mL -  "4/18/2024 1:18:00 PM  Medication Administration Time: 4/18/2024 1:18 PM      FOR [facility] ONLY:   Pain Team Contact information: please page the Pain Team Via [employer]. Search \"Pain\". During daytime hours, please page the attending first. At night please page the resident first.      "

## 2024-04-18 NOTE — OR NURSING
"Patient doing very well.  Notes that after her second dose of Fentanyl pain is 3/10 and \"tolerable\"  patient tolerating ice chips.  Ready to for admission to 4th floor.      PACU Respiratory Event Documentation     1) Episodes of Apnea greater than or equal to 10 seconds: 0    2) Bradypnea - less than 8 breaths per minute: 0    3) Pain score on 0 to 10 scale: 3    4) Pain-sedation mismatch (yes or no): no    5) Repeated 02 desaturation less than 90% (yes or no): 1 (only after IV Fentanyl given)    Anesthesia notified? (yes or no): n/a    Any of the above events occuring repeatedly in separate 30 minute intervals may be considered recurrent PACU respiratory events.        "

## 2024-04-18 NOTE — ANESTHESIA POSTPROCEDURE EVALUATION
Patient: Sarah Ugalde    Procedure: Procedure(s):  HYSTERECTOMY, SUPRACERVICAL, ABDOMINAL       Anesthesia Type:  General    Note:  Disposition: Inpatient   Postop Pain Control: Uneventful            Sign Out: Well controlled pain   PONV: No   Neuro/Psych: Uneventful            Sign Out: Acceptable/Baseline neuro status   Airway/Respiratory: Uneventful            Sign Out: Acceptable/Baseline resp. status   CV/Hemodynamics: Uneventful            Sign Out: Acceptable CV status; No obvious hypovolemia; No obvious fluid overload   Other NRE: NONE   DID A NON-ROUTINE EVENT OCCUR? No           Last vitals:  Vitals Value Taken Time   /102 04/18/24 1535   Temp 97.7  F (36.5  C) 04/18/24 1540   Pulse 82 04/18/24 1542   Resp 10 04/18/24 1542   SpO2 100 % 04/18/24 1545   Vitals shown include unfiled device data.    Electronically Signed By: EVERARDO PUTNAM CRNA  April 18, 2024  4:47 PM

## 2024-04-18 NOTE — OP NOTE
[facility]   Operative Note    Pre-operative diagnosis: Abnormal uterine bleeding (AUB) [N93.9]  Pelvic pain in female [R10.2]   Post-operative diagnosis Same, Pathology pending   Procedure: Procedure(s):  HYSTERECTOMY, SUPRACERVICAL, ABDOMINAL   Surgeon:  Assistant: MD Kristin Quijano NP (assistance required for retraction, exposure, instrument handling, and wound closure)      Anesthesia: General    Estimated blood loss: Less than 50 ml   Blood transfusion: No transfusion was given during surgery   Drains: Friedman catheter   Specimens: Mild pelvic/bladder adhesions.Fallopian tubes surgically absent.    Otherwise normal pelvic anatomy.    Findings: See operative dictation   Complications: None   Condition: Stable           DESCRIPTION OF PROCEDURE:   The patient was brought to the operating room and uneventfully placed under general anesthesia.  She was prepped and draped in the supine position and a Friedman catheter was then placed.  A low transverse skin incision was performed with a scalpel.  The dissection was continued to the abdominal fascia, which was entered transversely.  The rectus muscles were dissected from the overlying fascia and split in the midline.  The peritoneum was identified and entered without difficulty.  A ring retractor was placed and packs were inserted to displace the bowel out  of the pelvis.  The patient was placed in Trendelenburg position.  The uterus was grasped with 2 long Pean clamps and elevated.  The right round ligament was transected and  clamped, cut and suture ligated with 0 Vicryl and tagged. The posterior leaf of the broad ligament was opened and the ureter identified.   The mesosalpinx of each fallopian tube was transected with the Ligasure bipolar cutting cautery device.    Next the bladder flap was developed with sharp and blunt dissection.  The cardinal ligament complex was clamped, cut and ligated with Ligasure being  palpably free of the  ureter.  The same procedures were repeated on the patient's left side.  The bladder flap was then completed in the midline using both sharp and blunt dissection.  The uterus was amputated form the cervix at the level of the internal cervical left eye with monopolar cautery and the hysterectomy completed.  The cervix was swabbed with betadine and over sewn with interrupted O vicryl sutures incorporating the cardinal ligament complex into the closure.   This resulted with excellent apical vaginal support and hemostasis.  The pelvis was then irrigated and checked for hemostasis.  The retractors and sponges were removed.  Sponge counts were correct x2.  The rectus muscles were reapproximated with running 0 Vicryl.  The abdominal fascia was closed with running 0 Vicryl.  The subcutaneous space was irrigated and checked for hemostasis and closed with running 2.0 plain gut suture. .  The skin was closed with a running subcuticular 3-0 Monocryl stitch and surgical glue.  There were no complications and the patient was transferred to the recovery room in excellent stable condition.            ZANE ESPANA MD

## 2024-04-18 NOTE — ANESTHESIA CARE TRANSFER NOTE
Patient: Sarah Ugalde    Procedure: Procedure(s):  HYSTERECTOMY, SUPRACERVICAL, ABDOMINAL       Diagnosis: Abnormal uterine bleeding (AUB) [N93.9]  Pelvic pain in female [R10.2]  Diagnosis Additional Information: No value filed.    Anesthesia Type:   General     Note:    Oropharynx: oropharynx clear of all foreign objects and spontaneously breathing  Level of Consciousness: drowsy  Oxygen Supplementation: nasal cannula  Level of Supplemental Oxygen (L/min / FiO2): 2  Independent Airway: airway patency satisfactory and stable  Dentition: dentition unchanged  Vital Signs Stable: post-procedure vital signs reviewed and stable  Report to RN Given: handoff report given  Patient transferred to: PACU    Handoff Report: Identifed the Patient, Identified the Reponsible Provider, Reviewed the pertinent medical history, Discussed the surgical course, Reviewed Intra-OP anesthesia mangement and issues during anesthesia, Set expectations for post-procedure period and Allowed opportunity for questions and acknowledgement of understanding      Vitals:  Vitals Value Taken Time   /99 04/18/24 1449   Temp     Pulse 95 04/18/24 1452   Resp 12 04/18/24 1452   SpO2 100 % 04/18/24 1452   Vitals shown include unfiled device data.    Electronically Signed By: Erica Warren  April 18, 2024  2:53 PM

## 2024-04-18 NOTE — ANESTHESIA PROCEDURE NOTES
Airway         Procedure Start/Stop Times: 4/18/2024 1:17 PM  Staff -        CRNA: Ella Li APRN CRNA       Other Anesthesia Staff: Erica Warren       Performed By: CRNA and SRNA  Consent for Airway        Urgency: elective  Indications and Patient Condition       Indications for airway management: lisa-procedural       Induction type:intravenous       Mask difficulty assessment: 1 - vent by mask    Final Airway Details       Final airway type: endotracheal airway       Successful airway: ETT - single  Endotracheal Airway Details        ETT size (mm): 6.5       Cuffed: yes       Successful intubation technique: direct laryngoscopy       DL Blade Type: Gamez 2       Grade View of Cords: 1       Adjucts: stylet       Position: Center       Measured from: lips       Secured at (cm): 21    Post intubation assessment        Placement verified by: capnometry, equal breath sounds and chest rise        Number of attempts at approach: 1       Secured with: tape       Ease of procedure: easy       Dentition: Intact    Medication(s) Administered   Medication Administration Time: 4/18/2024 1:17 PM

## 2024-04-19 ENCOUNTER — APPOINTMENT (OUTPATIENT)
Dept: ULTRASOUND IMAGING | Facility: HOSPITAL | Age: 39
End: 2024-04-19
Attending: NURSE ANESTHETIST, CERTIFIED REGISTERED
Payer: MEDICAID

## 2024-04-19 ENCOUNTER — ANESTHESIA (OUTPATIENT)
Dept: ULTRASOUND IMAGING | Facility: HOSPITAL | Age: 39
End: 2024-04-19
Payer: MEDICAID

## 2024-04-19 ENCOUNTER — ANESTHESIA EVENT (OUTPATIENT)
Dept: ULTRASOUND IMAGING | Facility: HOSPITAL | Age: 39
End: 2024-04-19
Payer: MEDICAID

## 2024-04-19 LAB
ERYTHROCYTE [DISTWIDTH] IN BLOOD BY AUTOMATED COUNT: 13.7 % (ref 10–15)
HCT VFR BLD AUTO: 36.9 % (ref 35–47)
HGB BLD-MCNC: 12.2 G/DL (ref 11.7–15.7)
HOLD SPECIMEN: NORMAL
MCH RBC QN AUTO: 30.3 PG (ref 26.5–33)
MCHC RBC AUTO-ENTMCNC: 33.1 G/DL (ref 31.5–36.5)
MCV RBC AUTO: 92 FL (ref 78–100)
PLATELET # BLD AUTO: 264 10E3/UL (ref 150–450)
RBC # BLD AUTO: 4.02 10E6/UL (ref 3.8–5.2)
WBC # BLD AUTO: 9 10E3/UL (ref 4–11)

## 2024-04-19 PROCEDURE — 250N000011 HC RX IP 250 OP 636: Performed by: OBSTETRICS & GYNECOLOGY

## 2024-04-19 PROCEDURE — 85027 COMPLETE CBC AUTOMATED: CPT | Performed by: OBSTETRICS & GYNECOLOGY

## 2024-04-19 PROCEDURE — 36415 COLL VENOUS BLD VENIPUNCTURE: CPT | Performed by: OBSTETRICS & GYNECOLOGY

## 2024-04-19 PROCEDURE — 64488 TAP BLOCK BI INJECTION: CPT | Mod: XU | Performed by: NURSE ANESTHETIST, CERTIFIED REGISTERED

## 2024-04-19 PROCEDURE — 250N000013 HC RX MED GY IP 250 OP 250 PS 637: Performed by: OBSTETRICS & GYNECOLOGY

## 2024-04-19 PROCEDURE — 120N000001 HC R&B MED SURG/OB

## 2024-04-19 PROCEDURE — 250N000011 HC RX IP 250 OP 636: Performed by: NURSE ANESTHETIST, CERTIFIED REGISTERED

## 2024-04-19 PROCEDURE — 999N000157 HC STATISTIC RCP TIME EA 10 MIN

## 2024-04-19 RX ORDER — DIPHENHYDRAMINE HCL 50 MG
50 CAPSULE ORAL EVERY 6 HOURS PRN
Status: DISCONTINUED | OUTPATIENT
Start: 2024-04-19 | End: 2024-04-21 | Stop reason: HOSPADM

## 2024-04-19 RX ORDER — OXYCODONE HYDROCHLORIDE 5 MG/1
15 TABLET ORAL EVERY 4 HOURS PRN
Status: DISCONTINUED | OUTPATIENT
Start: 2024-04-19 | End: 2024-04-21 | Stop reason: HOSPADM

## 2024-04-19 RX ORDER — HYDROXYZINE HYDROCHLORIDE 25 MG/1
50 TABLET, FILM COATED ORAL EVERY 6 HOURS
Status: DISCONTINUED | OUTPATIENT
Start: 2024-04-19 | End: 2024-04-21 | Stop reason: HOSPADM

## 2024-04-19 RX ORDER — LOSARTAN POTASSIUM 50 MG/1
50 TABLET ORAL AT BEDTIME
COMMUNITY
End: 2024-05-14

## 2024-04-19 RX ORDER — BUPIVACAINE HYDROCHLORIDE 2.5 MG/ML
INJECTION, SOLUTION EPIDURAL; INFILTRATION; INTRACAUDAL PRN
Status: DISCONTINUED | OUTPATIENT
Start: 2024-04-19 | End: 2024-04-19

## 2024-04-19 RX ORDER — IBUPROFEN 200 MG
400-600 TABLET ORAL 4 TIMES DAILY PRN
Status: ON HOLD | COMMUNITY
End: 2024-04-21

## 2024-04-19 RX ADMIN — OXYCODONE HYDROCHLORIDE 10 MG: 5 TABLET ORAL at 08:11

## 2024-04-19 RX ADMIN — HYDROXYZINE HYDROCHLORIDE 50 MG: 25 TABLET, FILM COATED ORAL at 21:56

## 2024-04-19 RX ADMIN — HYDROXYZINE HYDROCHLORIDE 50 MG: 25 TABLET, FILM COATED ORAL at 09:34

## 2024-04-19 RX ADMIN — OXYCODONE HYDROCHLORIDE 10 MG: 5 TABLET ORAL at 03:08

## 2024-04-19 RX ADMIN — LOSARTAN POTASSIUM 50 MG: 50 TABLET, FILM COATED ORAL at 08:10

## 2024-04-19 RX ADMIN — SENNOSIDES AND DOCUSATE SODIUM 1 TABLET: 8.6; 5 TABLET ORAL at 21:56

## 2024-04-19 RX ADMIN — BUPIVACAINE HYDROCHLORIDE 5 ML: 2.5 INJECTION, SOLUTION EPIDURAL; INFILTRATION; INTRACAUDAL at 14:02

## 2024-04-19 RX ADMIN — BUPIVACAINE HYDROCHLORIDE 5 ML: 2.5 INJECTION, SOLUTION EPIDURAL; INFILTRATION; INTRACAUDAL at 14:00

## 2024-04-19 RX ADMIN — IBUPROFEN 800 MG: 200 TABLET, FILM COATED ORAL at 03:08

## 2024-04-19 RX ADMIN — OXYCODONE HYDROCHLORIDE 15 MG: 5 TABLET ORAL at 22:08

## 2024-04-19 RX ADMIN — OXYCODONE HYDROCHLORIDE 10 MG: 5 TABLET ORAL at 12:24

## 2024-04-19 RX ADMIN — KETOROLAC TROMETHAMINE 15 MG: 15 INJECTION INTRAMUSCULAR; INTRAVENOUS at 14:10

## 2024-04-19 RX ADMIN — KETOROLAC TROMETHAMINE 15 MG: 15 INJECTION INTRAMUSCULAR; INTRAVENOUS at 08:15

## 2024-04-19 RX ADMIN — DIPHENHYDRAMINE HYDROCHLORIDE 50 MG: 50 CAPSULE ORAL at 16:57

## 2024-04-19 RX ADMIN — HYDROXYZINE HYDROCHLORIDE 50 MG: 25 TABLET, FILM COATED ORAL at 15:12

## 2024-04-19 RX ADMIN — OXYCODONE HYDROCHLORIDE 15 MG: 5 TABLET ORAL at 17:26

## 2024-04-19 RX ADMIN — KETOROLAC TROMETHAMINE 15 MG: 15 INJECTION INTRAMUSCULAR; INTRAVENOUS at 21:54

## 2024-04-19 RX ADMIN — SENNOSIDES AND DOCUSATE SODIUM 1 TABLET: 8.6; 5 TABLET ORAL at 08:10

## 2024-04-19 RX ADMIN — LABETALOL HYDROCHLORIDE 50 MG: 100 TABLET, FILM COATED ORAL at 09:33

## 2024-04-19 ASSESSMENT — ACTIVITIES OF DAILY LIVING (ADL)
ADLS_ACUITY_SCORE: 18

## 2024-04-19 ASSESSMENT — ENCOUNTER SYMPTOMS: DYSRHYTHMIAS: 1

## 2024-04-19 NOTE — ANESTHESIA POSTPROCEDURE EVALUATION
Patient: Sarah Ugalde    Procedure: * No procedures listed *       Anesthesia Type:  No value filed.    Note:     Postop Pain Control:    PONV:    Neuro/Psych:    Airway/Respiratory:    CV/Hemodynamics:    Other NRE:    DID A NON-ROUTINE EVENT OCCUR?     Event details/Postop Comments:  EPIC note required to close chart           Last vitals:  Vitals Value Taken Time   BP     Temp     Pulse     Resp     SpO2         Electronically Signed By: EVERARDO Jang CRNA  April 19, 2024  2:19 PM

## 2024-04-19 NOTE — PLAN OF CARE
Face to face report given with opportunity to observe patient.    Report given to Ruth Pham RN   4/18/2024  8:15 PM

## 2024-04-19 NOTE — ANESTHESIA CARE TRANSFER NOTE
Patient: Sarah Ugalde    Procedure: * No procedures listed *       Diagnosis: * No pre-op diagnosis entered *  Diagnosis Additional Information: No value filed.    Anesthesia Type:   No value filed.     Note:                      Comments: EPIC requires note to close chart, Patient care not taken over, procedure only per surgeon Grimaldo request        Vitals:  Vitals Value Taken Time   BP     Temp     Pulse     Resp     SpO2         Electronically Signed By: EVERARDO Jang CRNA  April 19, 2024  2:19 PM

## 2024-04-19 NOTE — ANESTHESIA PROCEDURE NOTES
TAP Procedure Note    Pre-Procedure   Staff -        CRNA: Zarina Blanca APRN CRNA       Performed By: CRNA       Location: floor       Procedure Start/Stop Times: 4/19/2024 1:55 PM and 4/19/2024 2:05 PM       Pre-Anesthestic Checklist: patient identified, IV checked, site marked, risks and benefits discussed, informed consent, monitors and equipment checked, pre-op evaluation, at physician/surgeon's request and post-op pain management  Timeout:       Correct Patient: Yes        Correct Procedure: Yes        Correct Site: Yes        Correct Position: Yes        Correct Laterality: Yes        Site Marked: Yes  Procedure Documentation  Procedure: TAP       Laterality: bilateral       Patient Position: supine       Skin prep: Chloraprep       Needle Type: short bevel       Needle Gauge: 20.        Needle Length (Inches): 4        Ultrasound guided       1. Ultrasound was used to identify targeted nerve, plexus, vascular marker, or fascial plane and place a needle adjacent to it in real-time.       2. Ultrasound was used to visualize the spread of anesthetic in close proximity to the above referenced structure.       3. A permanent image is entered into the patient's record.       4. The visualized anatomic structures appeared normal.       5. There were no apparent abnormal pathologic findings.    Assessment/Narrative         The placement was negative for: blood aspirated, painful injection and site bleeding       Bolus given via needle..        Secured via.        Insertion/Infusion Method: Single Shot       Complications: none       Injection made incrementally with aspirations every 5 mL.    Medication(s) Administered   Medication Administration Time: 4/19/2024 1:55 PM     Comments:  Injected left side first 5ml 0.25% Marcaine diluted with 10ml preservative free saline, total volume 15ml  Right side next, injected 5ml 0.25% Marcaine diluted with 10ml PF NS total volume 15 ml      FOR [facility] ([facility]) ONLY:   " Pain Team Contact information: please page the Pain Team Via [facility]. Search \"Pain\". During daytime hours, please page the attending first. At night please page the resident first.      "

## 2024-04-19 NOTE — PLAN OF CARE
Goal Outcome Evaluation:    Pt is alert and oriented. Reports pain moderately to severe when pt first got to floor. Better controlled towards end of shift. Obtained and abdominal binder for pt as she states this was helpful when she had her . BS active. Passing gas. Tolerating food and fluids. Voiding without difficulty, no bleeding or clots. Incision site intact, no drainage. VSS. Assessment as charted. Call light in reach, uses appropriately.

## 2024-04-19 NOTE — PLAN OF CARE
Face to face report given with opportunity to observe patient.    Report given to MICHELLE Villela RN   4/19/2024  7:18 AM

## 2024-04-19 NOTE — PROGRESS NOTES
Medical record and Mark Anthony Score reviewed. Participated in interdisciplinary rounds.  No apparent needs noted at this time. Care Transitions will remain available if needs arise.    DANIELLE Booth @[phone] April 19, 2024

## 2024-04-19 NOTE — PLAN OF CARE
Progress note:       VSS (BP's elevated, this is normal per patient, did not take 2nd dose of labetalol prior to arriving to floo) , afebrile, pain 5-6/10 upon arrival to unit.  Oxy 10 mg given w/o relief, Vistaril given IM with decrease in pain.  Patient ambulated x 1 in room.  Friedman out, SCD's off.    Patient denies nausea, requests reg diet.     Face to face report given with opportunity to observe patient.    Report given to MICHELLE Stinson RN   4/18/2024  7:58 PM

## 2024-04-19 NOTE — PLAN OF CARE
Goal Outcome Evaluation:      Plan of Care Reviewed With: patient    Overall Patient Progress: improvingOverall Patient Progress: improving    Outcome Evaluation: Up independently and showered on shift. PIV to LUE is saline locked. Maintains O2 on RA. ABD binder to abdomen and incisions are CDI. PRN Oxy given per order on MAR. Please see MAR for details. Voiding adequately. No BM on shift. Redness and itching reported from patient to bridge of nose. Provider notified and benadryl ordered/given per MAR. Plan for discharge tomorrow. Tolerating diet well. Patient also got a bedside U/S guided block placed on shift today to help with pain management.     Face to face report given with opportunity to observe patient.    Report given to Noc shift MICHELLE Martinez RN   4/19/2024  7:00 PM

## 2024-04-19 NOTE — MEDICATION SCRIBE - ADMISSION MEDICATION HISTORY
Medication Scribe Admission Medication History    Admission medication history is complete. The information provided in this note is only as accurate as the sources available at the time of the update.    Information Source(s): Patient, Patient's pharmacy, and [facility] via in-person    Pertinent Information:   Patient manages her medications and is a good historian.   No rx fill hx for labetolol or losartan at either pharmacy pt reported filling at but patient reports she takes.     Changes made to PTA medication list:  Added: ibu- pt taking for the past 9 months most days for pain, hoping not to take since having this surgery  Deleted: None  Changed: losartan- pt takes at [facility]    Allergies reviewed with patient and updates made in EHR: yes    Medication History Completed By: Isela Crook 4/19/2024 10:27 AM    PTA Med List   Medication Sig Last Dose    hydrOXYzine HCl (ATARAX) 50 MG tablet Take 1 tablet (50 mg) by mouth every 8 hours as needed for other or anxiety (adjuvant pain) 4/17/2024    ibuprofen (ADVIL/MOTRIN) 200 MG tablet Take 400-600 mg by mouth 4 times daily as needed for pain Past Month    labetalol (NORMODYNE) 100 MG tablet Take 0.5 tablets (50 mg) by mouth 2 times daily 4/18/2024 at AM    losartan (COZAAR) 50 MG tablet Take 50 mg by mouth at bedtime 4/17/2024 at HS

## 2024-04-19 NOTE — DISCHARGE INSTRUCTIONS
No heavy lifting greater than 15lb  for 6 weeks  No driving 2 weeks or while on pain meds  Pelvic rest for 4 weeks  No vigorous activity, exercise, swim, bath for 4 weeks  Schedule Postop check Dr. Grimaldo 4 weeks  Call Dr. Grimaldo [phone] as necessary if problems in interim     Please call to schedule your follow up appointment with Dr. Grimaldo in 6 weeks, sooner as needed. [phone]

## 2024-04-19 NOTE — PROGRESS NOTES
Assessment and Plan:    Assessment:   Post-operative day #1  Procedure(s): Abdominal supracervical hysterectomy     Doing well.  Clean wound without signs of infection.  Normal healing wound.  No immediate surgical complications identified.  Pain not well-controlled.      Plan:   -Ambulate  -Advance activity as tolerated  -Pain control measures  -Advance diet as tolerated  -Routine wound care  Anticipate discharge tomorrow.             Interval History:   Doing well.  Continues to improve.  Pain 6-7/10 No fevers.  Voiding, ambulation, toleration PO.            Review of Systems:    The patient denies any chest pain, shortness of breath, excessive pain, fever, chills, purulent drainage from the wound, nausea or vomiting.             Medications:   I have reviewed this patient's current medications          Physical Exam:   All vitals stable and reviewed  Abd soft, ND  Wound clean and dry with minimal or no drainage.  Surrounding skin with minimal erythema.  Ext Neg           Data:   All laboratory data related to this surgery reviewed

## 2024-04-19 NOTE — ANESTHESIA PREPROCEDURE EVALUATION
Anesthesia Pre-Procedure Evaluation    Patient: Sarah ATKINSON [facility]   MRN: [id number] : 1985        Procedure : Procedure(s):  HYSTERECTOMY, SUPRACERVICAL, ABDOMINAL          Past Medical History:   Diagnosis Date    Detached retina, bilateral 2015    secondary to severe preeclampsia with HELLP syndroma and acute renal failure    PARDEEP (generalized anxiety disorder) 2023    HELLP (hemolytic anemia/elev liver enzymes/low platelets in pregnancy) 2015    Severe preeclampsia with HELLP syndrome and acute renal failure    HUS (hemolytic uremic syndrome) (H) 2015    Postpartum Thrombotic thrombocytopenic purpura (TTP) and 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    MS (multiple sclerosis) (H) 2015    Rh negative state in antepartum period 2023    TTP (thrombotic thrombocytopenic purpura) (H) 2015    Postpartum Thrombotic thrombocytopenic purpura (TTP) and 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    WPW (Delaney-Parkinson-White syndrome) 2015    She has been evaluated for ablation, however, based on location this was not deemed safe by cardiology at the time      Past Surgical History:   Procedure Laterality Date     SECTION  2015    Severe preeclampsia with HELLP syndrome and acute renal failure then developed postpartum Thrombotic thrombocytopenic purpura (TTP) and Hemolytic uremic syndrome (HUS)    COMBINED  SECTION, SALPINGECTOMY BILATERAL Bilateral 2023    Procedure:  SECTION, WITH BILATERAL SALPINGECTOMY, Viable baby girl born at 1851;  Surgeon: Naseem Cruz MD;  Location: [facility] OR    DILATION AND CURETTAGE, OPERATIVE HYSTEROSCOPY, COMBINED N/A 10/25/2023    Procedure: HYSTEROSCOPY, WITH DILATION AND CURETTAGE OF UTERUS;  Surgeon: Scout Grimaldo MD;  Location: [facility] OR    HYSTERECTOMY SUPRACERVICAL N/A 2024    Procedure: HYSTERECTOMY, SUPRACERVICAL, ABDOMINAL;  Surgeon: Scout Grimaldo MD;   Location: [facility] OR    TUBAL LIGATION        Allergies   Allergen Reactions    Magnesium Other (See Comments)     Altered mental status after IV dosing    Zestril [Lisinopril] Fatigue      Social History     Tobacco Use    Smoking status: Never     Passive exposure: Never    Smokeless tobacco: Never   Substance Use Topics    Alcohol use: Yes     Comment: occasional      Wt Readings from Last 1 Encounters:   04/18/24 58.1 kg (128 lb)        Anesthesia Evaluation   Pt has had prior anesthetic. Type: Regional, General and MAC.        ROS/MED HX  ENT/Pulmonary:       Neurologic: Comment: MS: follows with neurology - through [facility] - no current treatment.  She went off after last pregnancy     (+)                   Multiple Sclerosis, limitations: last exacerbation 2/2024; muscle pain/dizziness; managed at home without intervention.            Cardiovascular: Comment: WPW    WPW: follows with cardiology.  Reviewed cardiology note from 1/9/24 normal echo and zio patch did not have any concerns.  She does have some symptoms of palpitations with history of short runs of PSVT - she will be following up for repeat EP study - to check for WPW - no current symptoms; not yet scheduled; specialist in [place] told pt that he believes it is probably not WPW--wants to do EP study once other issues are taken care of/no urgency to having work-up     (+)  hypertension-range: 155/100 labetalol this am; [facility] -   -  - -                        dysrhythmias, WPW,        Previous cardiac testing   Echo: Date: Results:    Stress Test:  Date: Results:    ECG Reviewed:  Date:  Results:  Sinus rhythm   Anterior infarct , age undetermined   Abnormal ECG   No previous ECGs available   Confirmed by MD Sandro, [facility] Gabriela ([id number]) on 4/16/2024 3:00:39 PM     Follows with cardiology/no symptoms    Cath:  Date: Results:      METS/Exercise Tolerance: >4 METS    Hematologic: Comments: HELLP with pregnancy  thrombotic  thrombocytopenic purpura      Musculoskeletal:       GI/Hepatic:  - neg GI/hepatic ROS     Renal/Genitourinary: Comment: AUB dysmenorrhea      Endo:  - neg endo ROS     Psychiatric/Substance Use: Comment: Etoh 2 times/week 1-2 beers     (+) psychiatric history anxiety       Infectious Disease:  - neg infectious disease ROS     Malignancy:  - neg malignancy ROS     Other:      (+)  LMP: history of tubal; pt refused, ,          OUTSIDE LABS:  CBC:   Lab Results   Component Value Date    WBC 9.0 04/19/2024    WBC 6.0 04/12/2024    HGB 12.2 04/19/2024    HGB 14.6 04/12/2024    HCT 36.9 04/19/2024    HCT 43.1 04/12/2024     04/19/2024     04/12/2024     BMP:   Lab Results   Component Value Date     04/18/2024     04/12/2024    POTASSIUM 4.2 04/18/2024    POTASSIUM 3.8 04/12/2024    CHLORIDE 104 04/18/2024    CHLORIDE 102 04/12/2024    CO2 24 04/18/2024    CO2 23 04/12/2024    BUN 14.0 04/18/2024    BUN 13.3 04/12/2024    CR 0.92 04/18/2024    CR 1.08 (H) 04/12/2024     (H) 04/18/2024    GLC 86 04/18/2024     COAGS:   Lab Results   Component Value Date    PTT 28 07/23/2023    INR 0.89 07/23/2023     POC:   Lab Results   Component Value Date    HCG Negative 12/05/2022     HEPATIC:   Lab Results   Component Value Date    ALBUMIN 4.7 04/12/2024    PROTTOTAL 7.6 04/12/2024    ALT 19 04/12/2024    AST 24 04/12/2024    ALKPHOS 96 04/12/2024    BILITOTAL 0.5 04/12/2024     OTHER:   Lab Results   Component Value Date    LACT 4.5 (H) 01/10/2016    MARVIN 8.6 04/18/2024    MAG 3.2 (H) 07/29/2023    TSH 2.50 07/29/2023    CRP <2.9 10/29/2018    SED 55 (H) 08/04/2023       Anesthesia Plan    ASA Status:  2    - Procedure: Procedure only, no anesthetic delivered                    Consents    Anesthesia Plan(s) and associated risks, benefits, and realistic alternatives discussed. Questions answered and patient/representative(s) expressed understanding.     - Discussed: Risks, Benefits and Alternatives for  the PROCEDURE were discussed     - Discussed with:  Patient            Postoperative Care            Comments:    Other Comments: Patient one day post operative hysterectomy, Dr. Grimaldo requesting bilateral TAP block for acute pain. Surgeon notified that block will not include exparel and limits to local since tap block already done yesterday. Patient risks and benefits discussed and wishes to proceed, patient understands block will be diluted and not the same dose as yesterday. Patient wants to proceed. Paper consent completed due to med surg floor limitations of [facility].           EVERARDO Jang CRNA    I have reviewed the pertinent notes and labs in the chart from the past 30 days and (re)examined the patient.  Any updates or changes from those notes are reflected in this note.

## 2024-04-20 PROCEDURE — 250N000013 HC RX MED GY IP 250 OP 250 PS 637: Performed by: OBSTETRICS & GYNECOLOGY

## 2024-04-20 PROCEDURE — 120N000001 HC R&B MED SURG/OB

## 2024-04-20 PROCEDURE — 250N000011 HC RX IP 250 OP 636: Performed by: OBSTETRICS & GYNECOLOGY

## 2024-04-20 RX ORDER — POLYETHYLENE GLYCOL 3350 17 G/17G
17 POWDER, FOR SOLUTION ORAL DAILY
Status: DISCONTINUED | OUTPATIENT
Start: 2024-04-20 | End: 2024-04-21 | Stop reason: HOSPADM

## 2024-04-20 RX ADMIN — HYDROXYZINE HYDROCHLORIDE 50 MG: 25 TABLET, FILM COATED ORAL at 08:04

## 2024-04-20 RX ADMIN — IBUPROFEN 800 MG: 200 TABLET, FILM COATED ORAL at 15:42

## 2024-04-20 RX ADMIN — OXYCODONE HYDROCHLORIDE 15 MG: 5 TABLET ORAL at 14:55

## 2024-04-20 RX ADMIN — OXYCODONE HYDROCHLORIDE 15 MG: 5 TABLET ORAL at 20:15

## 2024-04-20 RX ADMIN — HYDROMORPHONE HYDROCHLORIDE 0.4 MG: 0.2 INJECTION, SOLUTION INTRAMUSCULAR; INTRAVENOUS; SUBCUTANEOUS at 07:42

## 2024-04-20 RX ADMIN — KETOROLAC TROMETHAMINE 15 MG: 15 INJECTION INTRAMUSCULAR; INTRAVENOUS at 04:01

## 2024-04-20 RX ADMIN — IBUPROFEN 800 MG: 200 TABLET, FILM COATED ORAL at 09:39

## 2024-04-20 RX ADMIN — HYDROMORPHONE HYDROCHLORIDE 0.4 MG: 0.2 INJECTION, SOLUTION INTRAMUSCULAR; INTRAVENOUS; SUBCUTANEOUS at 23:11

## 2024-04-20 RX ADMIN — HYDROXYZINE HYDROCHLORIDE 50 MG: 25 TABLET, FILM COATED ORAL at 13:52

## 2024-04-20 RX ADMIN — POLYETHYLENE GLYCOL 3350 17 G: 17 POWDER, FOR SOLUTION ORAL at 10:55

## 2024-04-20 RX ADMIN — SENNOSIDES AND DOCUSATE SODIUM 1 TABLET: 8.6; 5 TABLET ORAL at 08:04

## 2024-04-20 RX ADMIN — OXYCODONE HYDROCHLORIDE 15 MG: 5 TABLET ORAL at 04:01

## 2024-04-20 RX ADMIN — HYDROXYZINE HYDROCHLORIDE 50 MG: 25 TABLET, FILM COATED ORAL at 19:40

## 2024-04-20 RX ADMIN — OXYCODONE HYDROCHLORIDE 15 MG: 5 TABLET ORAL at 09:39

## 2024-04-20 RX ADMIN — KETOROLAC TROMETHAMINE 15 MG: 15 INJECTION INTRAMUSCULAR; INTRAVENOUS at 21:57

## 2024-04-20 RX ADMIN — HYDROXYZINE HYDROCHLORIDE 50 MG: 25 TABLET, FILM COATED ORAL at 04:01

## 2024-04-20 RX ADMIN — HYDROMORPHONE HYDROCHLORIDE 0.2 MG: 0.2 INJECTION, SOLUTION INTRAMUSCULAR; INTRAVENOUS; SUBCUTANEOUS at 04:54

## 2024-04-20 ASSESSMENT — ACTIVITIES OF DAILY LIVING (ADL)
ADLS_ACUITY_SCORE: 18

## 2024-04-20 NOTE — PLAN OF CARE
"Pt. A&Ox4 and able to make needs known appropriately. Pt. C/o 7-8/10 pain in lower abdomen from recent hysterectomy Sx. Pt. Given PRN oxycodone 15mg per orders x2 this shift that pt. Stated, \"took the edge off but didn't help very much.\" Following the 2nd dose of oxycodone that was given, pt. Was given 1 dose of PRN IV dilaudid 0.2mg per orders that was ineffective. 2hrs later pt. Was given 0.4mg dilaudid per PRN orders that was slightly effective. Pt. Stated that she actually was able to \"feel it working this time.\" Educated pt. To stay on top of pain medication as she wanted to push off taking her next pain medication. This nurse stated that since her pain was still sitting at a 7/10, she should stay on top of her pain meds to make sure her pain doesn't continue to rise; pt. Agreed and said she would. Will pass along to oncoming shift. Prior to dilaudid being given, Pt. Stated that she was having a hard time sleeping the entire night. Left IV patent and saline locked. VSS and BP remains WDL. Pt. Has Hx of MS and pt. Thinks she may be having a flare, but isnt sure. Will tell oncoming staff and update provider (pt. Stated she would tell Dr. Quintana this morning, too). Abd binder remained in place this shift and pt. States that it is helping compared to the other day when she didn't have one. Heat pack also utilized this shift.Takes pills whole with water. Skin CDI. Pt. Up Ind. In room without assistive device. Transverse Abd. Incision is CDI and has liquid bandage keeping wound approximated. No drainage and incision is ERIN. Will continue to monitor pt. Pain and incision.     Face to face report given with opportunity to observe patient.    Report given to MICHELLE Villela RN   4/20/2024  8:45 AM        "

## 2024-04-20 NOTE — PROGRESS NOTES
Assessment and Plan:    Assessment:   Post-operative day #2  Procedure(s): Abdominal supracervical hysterectomy     Doing fairly well.   Itchy nose resolved.  Feeling a little flu like, achy all over.  Not hungry.  No bowel movement yet.  Concerned about her blood pressure.  Clean wound without signs of infection.  Normal healing wound.  No immediate surgical complications identified.  Pain control improving after second TAP block, still not optimal.      Plan:   -Ambulate  -Miralax  -Advance activity as tolerated  -Pain control measures - discussed TAP block and expectations  -Advance diet as tolerated  -Routine wound care  - Anticipate discharge Sunday.             Interval History:   Overall doing well.  Continues to improve.  Pain improved, still suboptimal. No fevers.  Voiding, ambulation, toleration PO.            Review of Systems:    The patient denies any chest pain, shortness of breath, excessive pain, fever, chills, purulent drainage from the wound, nausea or vomiting.             Medications:   I have reviewed this patient's current medications          Physical Exam:   All vitals stable and reviewed  Abd soft, ND  Wound clean and dry with no drainage.  Surrounding skin with minimal erythema.  Ext Neg           Data:   All laboratory data related to this surgery reviewed

## 2024-04-20 NOTE — PLAN OF CARE
Goal Outcome Evaluation:      Plan of Care Reviewed With: patient    Overall Patient Progress: improvingOverall Patient Progress: improving    Outcome Evaluation: Room air. PIV SL. Up ad ynes and independent. No IV pain meds on shift. PRN oxy 15 mg given per order for pain. Loose watery bowel movement reported from pt on shift. Pt remained afebrile and tolerating diet well. Incision to ABD remains CDI with ABD binder in place. Call light with in reach and pt uses it appropriately.    Face to face report given with opportunity to observe patient.    Report given to Noc shift MICHELLE Martinez RN   4/20/2024  7:00 PM

## 2024-04-21 VITALS
HEART RATE: 83 BPM | OXYGEN SATURATION: 97 % | WEIGHT: 128 LBS | HEIGHT: 59 IN | TEMPERATURE: 98.2 F | BODY MASS INDEX: 25.8 KG/M2 | DIASTOLIC BLOOD PRESSURE: 71 MMHG | SYSTOLIC BLOOD PRESSURE: 167 MMHG | RESPIRATION RATE: 18 BRPM

## 2024-04-21 PROCEDURE — 250N000013 HC RX MED GY IP 250 OP 250 PS 637: Performed by: OBSTETRICS & GYNECOLOGY

## 2024-04-21 RX ORDER — KETOROLAC TROMETHAMINE 10 MG/1
10 TABLET, FILM COATED ORAL EVERY 6 HOURS
Qty: 20 TABLET | Refills: 0 | Status: SHIPPED | OUTPATIENT
Start: 2024-04-21 | End: 2024-05-14

## 2024-04-21 RX ORDER — IBUPROFEN 800 MG/1
800 TABLET, FILM COATED ORAL EVERY 8 HOURS PRN
Qty: 60 TABLET | Refills: 0 | Status: SHIPPED | OUTPATIENT
Start: 2024-04-21 | End: 2024-05-14

## 2024-04-21 RX ORDER — OXYCODONE HYDROCHLORIDE 15 MG/1
15 TABLET ORAL EVERY 4 HOURS PRN
Qty: 20 TABLET | Refills: 0 | Status: SHIPPED | OUTPATIENT
Start: 2024-04-21 | End: 2024-05-14

## 2024-04-21 RX ADMIN — IBUPROFEN 800 MG: 200 TABLET, FILM COATED ORAL at 04:03

## 2024-04-21 RX ADMIN — HYDROXYZINE HYDROCHLORIDE 50 MG: 25 TABLET, FILM COATED ORAL at 04:03

## 2024-04-21 RX ADMIN — IBUPROFEN 800 MG: 200 TABLET, FILM COATED ORAL at 09:59

## 2024-04-21 RX ADMIN — OXYCODONE HYDROCHLORIDE 15 MG: 5 TABLET ORAL at 08:49

## 2024-04-21 RX ADMIN — HYDROXYZINE HYDROCHLORIDE 50 MG: 25 TABLET, FILM COATED ORAL at 09:59

## 2024-04-21 ASSESSMENT — ACTIVITIES OF DAILY LIVING (ADL)
ADLS_ACUITY_SCORE: 18

## 2024-04-21 NOTE — DISCHARGE SUMMARY
OB DISCHARGE SUMMARY      Name: Sarah Ugalde    Age: 39 year old    YOB: 1985   Medical Record #: [id number]       Date of admission: 2024  Date of discharge: 2024      Narrative Summary: 39 year old  s/p abdominal supracervical hysterectomy for bleeding. Postoperatively she experienced significant pain despite a TAP block, so the block was placed again and pain was improved. On postop day 1 she complained of an itchy nose, possibly an early sign of an allergic reaction, which was relieved with benadryl. She voiced concerns about her variable blood pressure in light of fluid shifting and inadequate pain control. Routine advances were slow but achieved by postop day 3, when she was ready to go home.      Labs:  Hemoglobin   Date Value Ref Range Status   2024 12.2 11.7 - 15.7 g/dL Final   10/29/2018 14.5 11.7 - 15.7 g/dL Final         Discharged to home on POD 3 with pain controlled with oral medications, tolerating po, voiding spontaneously, having had a bowel movement, ambulating.    Final diagnosis:   (Z90.710) S/P abdominal hysterectomy  (primary encounter diagnosis)      Complications: none  Consults: none  Condition on discharge: good    Discharge Medications:     Review of your medicines        START taking        Dose / Directions   ketorolac 10 MG tablet  Commonly known as: TORADOL      Dose: 10 mg  Take 1 tablet (10 mg) by mouth every 6 hours  Quantity: 20 tablet  Refills: 0     oxyCODONE IR 15 MG tablet  Commonly known as: ROXICODONE      Dose: 15 mg  Take 1 tablet (15 mg) by mouth every 4 hours as needed for severe pain  Quantity: 20 tablet  Refills: 0            CONTINUE these medicines which may have CHANGED, or have new prescriptions. If we are uncertain of the size of tablets/capsules you have at home, strength may be listed as something that might have changed.        Dose / Directions   ibuprofen 800 MG tablet  Commonly known as: ADVIL/MOTRIN  This may  have changed:   medication strength  how much to take  when to take this  reasons to take this      Dose: 800 mg  Take 1 tablet (800 mg) by mouth every 8 hours as needed for moderate pain  Quantity: 60 tablet  Refills: 0            CONTINUE these medicines which have NOT CHANGED        Dose / Directions   hydrOXYzine HCl 50 MG tablet  Commonly known as: ATARAX  Used for: Pelvic pain in female      Dose: 50 mg  Take 1 tablet (50 mg) by mouth every 8 hours as needed for other or anxiety (adjuvant pain)  Quantity: 40 tablet  Refills: 0     labetalol 100 MG tablet  Commonly known as: NORMODYNE  Used for: Primary hypertension      Dose: 50 mg  Take 0.5 tablets (50 mg) by mouth 2 times daily  Quantity: 60 tablet  Refills: 3     losartan 50 MG tablet  Commonly known as: COZAAR      Dose: 50 mg  Take 50 mg by mouth at bedtime  Refills: 0               Where to get your medicines        These [id number] [place] [place] [facility] [id number], [place] [address]      Phone: [phone]   ibuprofen 800 MG tablet  ketorolac 10 MG tablet  oxyCODONE IR 15 MG tablet          Discharge to home.  Ibuprofen and tylenol for pain. She may take the ketorolac instead of the ibuprofen if desired. Oxycodone for breakthrough pain.  She will keep a close eye on her blood pressure at home and follow up with Trisha if it fails to normalize or other concerns arise.  Follow up with Dr Grimaldo in 4-6 weeks.      Valencia Arguello MD  Obstetrics and Gynecology

## 2024-04-21 NOTE — PLAN OF CARE
Medicare Wellness Visit  Plan for Preventive Care    A good way for you to stay healthy is to use preventive care.  Medicare covers many services that can help you stay healthy.* The goal of these services is to find any health problems as quickly as possible. Finding problems early can help make them easier to treat.  Your personal plan below lists the services you may need and when they are due.     Health Maintenance Summary     Topic Due On Due Status Completed On    MAMMOGRAM - BREAST CANCER SCREENING Oct 31, 2018 Overdue Oct 31, 2016    Colorectal Cancer Screening - Colonoscopy Dec 6, 2017 Overdue Dec 6, 2007    Osteoporosis Screening Dec 3, 2017 Overdue     Immunization - TDAP Pregnancy  Hidden     Medicare Wellness Visit Dec 13, 2018 Overdue Dec 13, 2017    IMMUNIZATION - DTaP/Tdap/Td Mar 31, 2027 Not Due Mar 31, 2017    Immunization-Influenza  Completed Oct 11, 2018    Depression Screening May 13, 2020 Not Due May 13, 2019    Hepatitis C Screening  Completed Dec 19, 2017    Pneumococcal Vaccine 65+ Low/Medium Risk Dec 13, 2018 Overdue Dec 13, 2017    Immunization - Shingles Nov 11, 2015 Overdue Sep 16, 2015    Immunization - MMR  Hidden     IMMUNIZATION - MENINGITIS SEROGROUP B  Hidden            Preventive Care for Women and Men    Heart Screenings (Cardiovascular):  · Blood tests are used to check your cholesterol, lipid and triglyceride levels. High levels can increase your risk for heart disease and stroke. High levels can be treated with medications, diet and exercise. Lowering your levels can help keep your heart and blood vessels healthy.  Your provider will order these tests if they are needed.    · An ultrasound is done to see if you have an abdominal aortic aneurysm (AAA).  This is an enlargement of one of the main blood vessels that delivers blood to the body.   In the [place], 9,000 deaths are caused by AAA.  You may not even know you have this problem and as many as 1 in 3 people will  "Reason for hospital stay:  S/P Abdominal Hysterectomy  Living situation PTA: Home w/ Spouse and Children  Most recent vitals: /87 (BP Location: Right arm, Patient Position: Semi-Jorgensen's, Cuff Size: Adult Regular)   Pulse 92   Temp 97.7  F (36.5  C) (Tympanic)   Resp 16   Ht 1.499 m (4' 11\")   Wt 58.1 kg (128 lb)   SpO2 99%   BMI 25.85 kg/m        Pain Management:  Pt rated pain at 8/10 upon initial assessment. After pt showered she was given 15 mg Oxycodone. This brought the pain down to 7/10. She was then given the scheduled Toradol and pain stayed a t 7/10 so 0.4 mg Dilaud was given w/ relief at  5/10 on pain scale. Patient was able to sleep through the night at or below 6/10 on pain scale.  LOC:  A&O x 4   Cardiac:  HRR regular, BP a bit elevated, but stable.  Respiratory:  Lungs clear and equal bilat. 99% on RA.  GI:  Normoactive BS x 4. Pt had 1 loose stool after Miralax today so pt declined scheduled senna.  :  Voids spontaneously w/o difficulty.  Skin Issues:  Transverse incision to Abdomen. Open to air, CDI.    IVF:  SL  ABX:  N/A    Nutrition: regular Diet w/ good appetite.  Activity: Independent in room. Pt showered this shift.  Safety:  Bed in lowest position, wheels locked. Call light and personal items within reach and makes needs known.     Face to face report given with opportunity to observe patient.    Report given to MICHELLE Escamilla RN   4/20/2024  7:15 AM    " have a serious problem if it is not treated.  Early diagnosis allows for more effective treatment and cure.  If you have a family history of AAA or are a male age 65-75 who has smoked, you are at higher risk of an AAA.  Your provider can order this test, if needed.    Colorectal Screening:  · There are many tests that are used to check for cancer of your colon and rectum. You and your provider should discuss what test is best for you and when to have it done.  Options include:  · Screening Colonoscopy: exam of the entire colon, seen through a flexible lighted tube.  · Flexible Sigmoidoscopy: exam of the last third (sigmoid portion) of the colon and rectum, seen through a flexible lighted tube.  · Cologuard DNA stool test: a sample of your stool is used to screen for cancer and unseen blood in your stool.  · Fecal Occult Blood Test: a sample of your stool is studied to find any unseen blood    Flu Shot:  · An immunization that helps to prevent influenza (the flu). You should get this every year. The best time to get the shot is in the fall.    Pneumococcal Shot:  • Vaccines are available that can help prevent pneumococcal disease, which is any type of infection caused by Streptococcus pneumoniae bacteria.   Their use can prevent some cases of pneumonia, meningitis, and sepsis. There are two types of pneumococcal vaccines:   o Conjugate vaccines (PCV-13 or Prevnar 13®) - helps protect against the 13 types of pneumococcal bacteria that are the most common causes of serious infections in children and adults.    o Polysaccharide vaccine (PPSV23 or Mwgonsark16®) - helps protect against 23 types of pneumococcal bacteria for patients who are recommended to get it.  These vaccines should be given at least 12 months apart.  A booster is usually not needed.     Hepatitis B Shot:  · An immunization that helps to protect people from getting Hepatitis B. Hepatitis B is a virus that spreads through contact with infected blood or  body fluids. Many people with the virus do not have symptoms.  The virus can lead to serious problems, such as liver disease. Some people are at higher risk than others. Your doctor will tell you if you need this shot.     Diabetes Screening:  · A test to measure sugar (glucose) in your blood is called a fasting blood sugar. Fasting means you cannot have food or drink for at least 8 hours before the test. This test can detect diabetes long before you may notice symptoms.    Glaucoma Screening:  · Glaucoma screening is performed by your eye doctor. The test measures the fluid pressure inside your eyes to determine if you have glaucoma.     Hepatitis C Screening:  · A blood test to see if you have the hepatitis C virus.  Hepatitis C attacks the liver and is a major cause of chronic liver disease.  Medicare will cover a single screening for all adults born between 1945 & 1965, or high risk patients (people who have injected illegal drugs or people who have had blood transfusions).  High risk patients who continue to inject illegal drugs can be screened for Hepatitis C every year.    Smoking and Tobacco-Use Cessation Counseling:  · Tobacco is the single greatest cause of disease and early death in our country today. Medication and counseling together can increase a person’s chance of quitting for good.   · Medicare covers two quitting attempts per year, with four counseling sessions per attempt (eight sessions in a 12 month period)    Preventive Screening tests for Women    Screening Mammograms and Breast Exams:  · An x-ray of your breasts to check for breast cancer before you or your doctor may be able to feel it.  If breast cancer is found early it can usually be treated with success.    Pelvic Exams and Pap Tests:  · An exam to check for cervical and vaginal cancer. A Pap test is a lab test in which cells are taken from your cervix and sent to the lab to look for signs of cervical cancer. If cancer of the cervix is  found early, chances for a cure are good. Testing can generally end at age 65, or if a woman has a hysterectomy for a benign condition. Your provider may recommend more frequent testing if certain abnormal results are found.    Bone Mass Measurements:  · A painless x-ray of your bone density to see if you are at risk for a broken bone. Bone density refers to the thickness of bones or how tightly the bone tissue is packed.    Preventive Screening tests for Men    Prostate Screening:  · PSA - Prostate Cancer blood test.  Experts do not recommend routine screening of healthy men with no signs or symptoms of prostate disease.  However, men should not ignore urinary symptoms, and should discuss their family history with their doctor.    *Medicare pays for many preventive services to keep you healthy. For some of these services, you might have to pay a deductible, coinsurance, and / or copayment.  The amounts vary depending on the type of services you need and the kind of Medicare health plan you have.              Education Materials    Handouts provided during this visit.  ________________________________________  BRIAN instructions provided during this visit.  ________________________________________  Websites discussed during this visit.  ________________________________________  Additional Materials

## 2024-04-21 NOTE — PLAN OF CARE
Goal Outcome Evaluation:      Plan of Care Reviewed With: patient    Overall Patient Progress: improvingOverall Patient Progress: improving    Outcome Evaluation: Met    Patient discharged at 10:45 AM via wheel chair accompanied by spouse and staff. Prescriptions sent to patients preferred pharmacy. All belongings sent with patient. Iv removed per order. Pt wheeled to exit where her  picked her up to drive her home.     Discharge instructions reviewed with patient. Listed belongings gathered and returned to patient. yes    Patient discharged to Home.   Report called to n/a    Surgical Patient   Surgical Procedures during stay: Transverse Hysterectomy  Did patient receive discharge instruction on wound care and recognition of infection symptoms? Yes    MISC  Follow up appointment made:   unable to schedule on Sunday. Patient instructed to call.   Home medications returned to patient: N/A  Patient reports pain was well managed at discharge: Yes

## 2024-04-22 ENCOUNTER — TELEPHONE (OUTPATIENT)
Dept: OBGYN | Facility: OTHER | Age: 39
End: 2024-04-22

## 2024-04-22 ENCOUNTER — MYC MEDICAL ADVICE (OUTPATIENT)
Dept: OBGYN | Facility: OTHER | Age: 39
End: 2024-04-22

## 2024-04-22 ENCOUNTER — TELEPHONE (OUTPATIENT)
Facility: HOSPITAL | Age: 39
End: 2024-04-22

## 2024-04-22 ENCOUNTER — PATIENT OUTREACH (OUTPATIENT)
Dept: CARE COORDINATION | Facility: OTHER | Age: 39
End: 2024-04-22

## 2024-04-22 DIAGNOSIS — Z98.890 POSTOPERATIVE STATE: Primary | ICD-10-CM

## 2024-04-22 RX ORDER — OXYCODONE HCL 10 MG/1
10 TABLET, FILM COATED, EXTENDED RELEASE ORAL EVERY 12 HOURS
Qty: 10 TABLET | Refills: 0 | Status: SHIPPED | OUTPATIENT
Start: 2024-04-22 | End: 2024-04-27

## 2024-04-22 RX ORDER — IBUPROFEN 800 MG/1
800 TABLET, FILM COATED ORAL EVERY 8 HOURS PRN
Qty: 60 TABLET | Refills: 0 | Status: SHIPPED | OUTPATIENT
Start: 2024-04-22 | End: 2024-06-03

## 2024-04-22 NOTE — TELEPHONE ENCOUNTER
Received PA request from [facility] for OxyCONTIN 10MG er tablets.  PA Submitted on CMM, waiting for response.

## 2024-04-22 NOTE — PROGRESS NOTES
Clinic Care Coordination Contact  [facility]: Post-Discharge Note  SITUATION                                                      Admission:    Admission Date: 24   Reason for Admission: s/p abdominal supracervical hysterectomy for bleeding  Discharge:   Discharge Date: 24  Discharge Diagnosis: s/p abdominal supracervical hysterectomy for bleeding    BACKGROUND                                                      Per hospital discharge summary and inpatient provider notes:  39 year old  s/p abdominal supracervical hysterectomy for bleeding. Postoperatively she experienced significant pain despite a TAP block, so the block was placed again and pain was improved. On postop day 1 she complained of an itchy nose, possibly an early sign of an allergic reaction, which was relieved with benadryl. She voiced concerns about her variable blood pressure in light of fluid shifting and inadequate pain control. Routine advances were slow but achieved by postop day 3, when she was ready to go home.     ASSESSMENT           Discharge Assessment  How are you doing now that you are home?: Patient reports she is doing okay since being home, is taking care of her 8 month old baby while trying to recover. Has her boyfriend's help in the evenings. Reports her pain is okay, she was not able to fill all of her medications at [facility] yesterday, but this is being worked on today and should have new prescriptions at [facility]. Has her discharge instructions and no concerns. No concerns with wound. No fevers or chills. Wound has no signs of infection. Able to eat and drink without concerns. Patient reports her blood pressures go from high to low. Last BP was checked around 11:00 am today and was 165/86. No symptoms. Checks her BP randomly throughout the day. Would like a follow up appointment with PCP to discuss BPs. offered patient an appointment tomorrow morning with PCP. Patient declined stating she would not be able  to come in this week and would like to wait until next week so mother can drive her to appointment. Scheduled patient for 4/30/24 with PCP to discuss and advised patient be seen in the ER with any concerning symptoms.  How are your symptoms? (Red Flag symptoms escalate to triage hotline per guidelines): Improved  Do you feel your condition is stable enough to be safe at home until your provider visit?: Yes  Does the patient have their discharge instructions? : Yes  Does the patient have questions regarding their discharge instructions? : No  Were you started on any new medications or were there changes to any of your previous medications? : Yes  Does the patient have all of their medications?: No (see comment) (Working on getting medications sent to [facility]. See separate encounter on this date.)  Do you have questions regarding any of your medications? : No  Do you have all of your needed medical supplies or equipment (DME)?  (i.e. oxygen tank, CPAP, cane, etc.): Yes  Discharge follow-up appointment scheduled within 14 calendar days? : Yes  Discharge Follow Up Appointment Date: 04/29/24  Discharge Follow Up Appointment Scheduled with?: Primary Care Provider    Post-op ([facility] Only)  If the patient had a surgery or procedure, do they have any questions for a nurse?: No    Post-op (Clinicians Only)  Did the patient have surgery or a procedure: Yes  Incision: healing  Drainage: No  Fever: No  Chills: No  Redness: No  Warmth: No  Swelling: No  Incision site pain: No  Eating & Drinking: eating and drinking without complaints/concerns  PO Intake: regular diet  Bowel Function: normal  Date of last BM: 04/21/24  Urinary Status: voiding without complaint/concerns        PLAN                                                      Outpatient Plan:  Patient reports she is doing okay since being home, is taking care of her 8 month old baby while trying to recover. Has her boyfriend's help in the evenings. Reports her pain is  okay, she was not able to fill all of her medications at [facility] yesterday, but this is being worked on today and should have new prescriptions at [facility]. Has her discharge instructions and no concerns. No concerns with wound. No fevers or chills. Wound has no signs of infection. Able to eat and drink without concerns. Patient reports her blood pressures go from high to low. Last BP was checked around 11:00 am today and was 165/86. No symptoms. Checks her BP randomly throughout the day. Would like a follow up appointment with PCP to discuss BPs. offered patient an appointment tomorrow morning with PCP. Patient declined stating she would not be able to come in this week and would like to wait until next week so mother can drive her to appointment. Scheduled patient for 4/30/24 with PCP to discuss and advised patient be seen in the ER with any concerning symptoms.     Future Appointments   Date Time Provider Department Center   4/30/2024 10:00 AM Trisha Fonseca NP [facility]   5/14/2024 10:45 AM Scout Grimaldo MD [facility]         For any urgent concerns, please contact our 24 hour nurse triage line: [phone] ([phone])         Marialuisa Ellington RN

## 2024-04-22 NOTE — TELEPHONE ENCOUNTER
Called and spoke with [facility] to get more information. Spoke with Rachana. Per Rachana, they had a flag come through for filling both the Ibuprofen and Toradol. They called and spoke with someone and received a fax from [employer], to discontinue the Ibuprofen.   Rachana also reports that they have a restriction for a max day dosing for the Oxycodone of 7 days, they were only able to fill 14 tablets. This was also approved by a [employer].   Will route off to OB nurses to discuss with provider and any new medications.     Socrates ROACH RN, Care Coordinator  [facility]

## 2024-04-22 NOTE — TELEPHONE ENCOUNTER
Pt was notified that Dr. Arguello sent oxycodone 10 mg tabs #10 to [facility] pharmacy. Pt is wondering if Dr. Arguello could send in a new rx for Ibuprofen 800 mg to [facility] also.   Sully Singer LPN

## 2024-04-22 NOTE — TELEPHONE ENCOUNTER
Sarah was unable to get the pain medication she needed on discharge yesterday secondary to [facility] policies regarding opioid prescribing. She received enough medication for the very short term, not enough for her recovery based on pain control while an inpatient. Additional rx sent to [facility] to get her through the week. She has also been given toradol or ibuprofen, binder, discussion about additional modalities that should help. Recommend that she follow up this week if her pain is not improving day by day.    Valencia Arguello MD  Obstetrics and Gynecology

## 2024-04-22 NOTE — TELEPHONE ENCOUNTER
Pt states she was told by Dr. Arguello to call her today regarding her medications. Pt states she wasn't given all her medications at [facility] yesterday. They did not give her the full rx for oxycodone and they didn't give her the Ibuprofen 800 at all. Please call pt and send rx to [facility].  Sully Singer LPN

## 2024-04-23 RX ORDER — OXYCODONE HYDROCHLORIDE 5 MG/1
15 TABLET ORAL EVERY 6 HOURS PRN
Qty: 36 TABLET | Refills: 0 | Status: SHIPPED | OUTPATIENT
Start: 2024-04-23 | End: 2024-04-26

## 2024-04-23 NOTE — TELEPHONE ENCOUNTER
Received DENIAL from [employer]ICA[employer] for OxyCONTIN 10MG er tablets.    Letter scanned to [facility].  Please let patient know of this decision.    Thank you  [facility]ARC[facility] Team.

## 2024-04-23 NOTE — TELEPHONE ENCOUNTER
Sarah's order for pain medication was denied by her insurance company, so a third order was placed for a different medication taking into account the dose and quantity she was receiving two days ago in the hospital with the goal of getting her through the immediate postoperative period.     Valencia Arguello MD  Obstetrics and Gynecology

## 2024-04-29 PROBLEM — Z09 HOSPITAL DISCHARGE FOLLOW-UP: Status: ACTIVE | Noted: 2024-04-29

## 2024-04-29 RX ORDER — OXYCODONE HYDROCHLORIDE 5 MG/1
5 TABLET ORAL
Qty: 5 TABLET | Refills: 0 | Status: SHIPPED | OUTPATIENT
Start: 2024-04-29 | End: 2024-05-02

## 2024-04-29 NOTE — TELEPHONE ENCOUNTER
Additional rx ordered - oxycodone 5 mg at bedtime prn #5 for postoperative pain following abdominal hysterectomy.

## 2024-05-14 ENCOUNTER — OFFICE VISIT (OUTPATIENT)
Dept: OBGYN | Facility: OTHER | Age: 39
End: 2024-05-14
Attending: OBSTETRICS & GYNECOLOGY
Payer: MEDICAID

## 2024-05-14 VITALS
HEART RATE: 98 BPM | OXYGEN SATURATION: 100 % | TEMPERATURE: 98.2 F | DIASTOLIC BLOOD PRESSURE: 96 MMHG | BODY MASS INDEX: 25.8 KG/M2 | HEIGHT: 59 IN | WEIGHT: 128 LBS | SYSTOLIC BLOOD PRESSURE: 146 MMHG

## 2024-05-14 DIAGNOSIS — Z98.890 POST-OPERATIVE STATE: Primary | ICD-10-CM

## 2024-05-14 PROCEDURE — 99024 POSTOP FOLLOW-UP VISIT: CPT | Performed by: OBSTETRICS & GYNECOLOGY

## 2024-05-14 PROCEDURE — G0463 HOSPITAL OUTPT CLINIC VISIT: HCPCS

## 2024-05-14 ASSESSMENT — PAIN SCALES - GENERAL: PAINLEVEL: EXTREME PAIN (8)

## 2024-05-14 NOTE — PROGRESS NOTES
"MIMI Ugalde is a 39 year old female presents for post operative check. She is  4  week(s) status post abdominal supracervical hysterectomy.  She reports doing well and denies significant pain or bleeding.  Bowel and bladder function is satisfactory except some problems relaxing to initiate streem with voiding. . Denies incisional problems. She did fall a few days ago with some increased pain on right. Significant findings benign    O.  Blood pressure (!) 146/96, pulse 98, temperature 98.2  F (36.8  C), temperature source Tympanic, height 1.499 m (4' 11\"), weight 58.1 kg (128 lb), SpO2 100%, not currently breastfeeding.    Abd: soft, non-tender, non-distended. Incision clear, dry, and intact without evidence of infection.    A. /P. Satisfactory post-op check.Released from restrictions 6 wks PO. Pelvic floor dysfunction/urinary hesitancy. If no improvement in next couple of weeks will call or let me know via my chart and will initiate Pelvic floor PT consult.     F/u prn problems or at next annual examination.    Scout Grimaldo MD   "

## 2024-05-15 ENCOUNTER — HOSPITAL ENCOUNTER (EMERGENCY)
Facility: HOSPITAL | Age: 39
Discharge: HOME | End: 2024-05-15
Attending: PHYSICIAN ASSISTANT | Admitting: PHYSICIAN ASSISTANT
Payer: MEDICAID

## 2024-05-15 ENCOUNTER — MYC MEDICAL ADVICE (OUTPATIENT)
Dept: FAMILY MEDICINE | Facility: OTHER | Age: 39
End: 2024-05-15

## 2024-05-15 VITALS
BODY MASS INDEX: 25.6 KG/M2 | RESPIRATION RATE: 16 BRPM | OXYGEN SATURATION: 97 % | HEART RATE: 67 BPM | TEMPERATURE: 97.6 F | WEIGHT: 126.76 LBS | SYSTOLIC BLOOD PRESSURE: 162 MMHG | DIASTOLIC BLOOD PRESSURE: 80 MMHG

## 2024-05-15 DIAGNOSIS — I10 ESSENTIAL HYPERTENSION: Primary | ICD-10-CM

## 2024-05-15 DIAGNOSIS — J06.9 VIRAL URI: ICD-10-CM

## 2024-05-15 LAB — GROUP A STREP BY PCR: NOT DETECTED

## 2024-05-15 PROCEDURE — G0463 HOSPITAL OUTPT CLINIC VISIT: HCPCS

## 2024-05-15 PROCEDURE — 99213 OFFICE O/P EST LOW 20 MIN: CPT | Performed by: PHYSICIAN ASSISTANT

## 2024-05-15 PROCEDURE — 87651 STREP A DNA AMP PROBE: CPT | Performed by: PHYSICIAN ASSISTANT

## 2024-05-15 RX ORDER — AMLODIPINE BESYLATE 5 MG/1
5 TABLET ORAL DAILY
Qty: 30 TABLET | Refills: 0 | Status: SHIPPED | OUTPATIENT
Start: 2024-05-15 | End: 2024-05-16

## 2024-05-15 ASSESSMENT — ACTIVITIES OF DAILY LIVING (ADL): ADLS_ACUITY_SCORE: 35

## 2024-05-15 ASSESSMENT — COLUMBIA-SUICIDE SEVERITY RATING SCALE - C-SSRS
1. IN THE PAST MONTH, HAVE YOU WISHED YOU WERE DEAD OR WISHED YOU COULD GO TO SLEEP AND NOT WAKE UP?: NO
2. HAVE YOU ACTUALLY HAD ANY THOUGHTS OF KILLING YOURSELF IN THE PAST MONTH?: NO
6. HAVE YOU EVER DONE ANYTHING, STARTED TO DO ANYTHING, OR PREPARED TO DO ANYTHING TO END YOUR LIFE?: NO

## 2024-05-15 NOTE — ED PROVIDER NOTES
History     Chief Complaint   Patient presents with    Pharyngitis     HPI  Sarah Ugalde is a 39 year old female who presents with sore throat, congestion, and slight cough x 3 days. Low grade fever of 100 yesterday. Requests strep swab. No difficulty breathing.     Allergies:  Allergies   Allergen Reactions    Magnesium Other (See Comments)     Altered mental status after IV dosing    Zestril [Lisinopril] Fatigue    Losartan Difficulty breathing, Hives, Itching, Rash, Swelling and Visual Disturbance       Problem List:    Patient Active Problem List    Diagnosis Date Noted    Hospital discharge follow-up 2024     Priority: Medium    S/P abdominal hysterectomy 2024     Priority: Medium    Postpartum endometritis 2023     Priority: Medium    Pre-eclampsia, postpartum 2023     Priority: Medium    Pregnancy, supervision for, high-risk, third trimester 2023     Priority: Medium    AMA (advanced maternal age) multigravida 35+ 2023     Priority: Medium    IUGR (intrauterine growth restriction) affecting care of mother 2023     Priority: Medium    Hx of pre-eclampsia in prior pregnancy, currently pregnant 2023     Priority: Medium    Encounter for sterilization 2023     Priority: Medium    Hx of  section 2023     Priority: Medium    PARDEEP (generalized anxiety disorder) 2023     Priority: Medium    Poor fetal growth affecting management of mother in third trimester, fetus 1 of multiple gestation 2023     Priority: Medium    Encounter for triage in pregnant patient 2023     Priority: Medium    Multigravida of advanced maternal age in third trimester 2022     Priority: Medium     AMA:  NIPT - declined  h/o WPW. BL ECG - nml  Zio Patch, Cards consult  H/o TTP/preeclampsia/HELLP/acute renal failure>  Baby asa 16 wks.  Nml BL labs.   level II - nml  H/o HUS  H/o MS  no flu or covid vax  Plan repeat /sterilization 23.   Sterilization forms signed.   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      HUS (hemolytic uremic syndrome) (H) 2015     Priority: Medium     Postpartum Thrombotic thrombocytopenic purpura (TTP) and 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      HELLP (hemolytic anemia/elev liver enzymes/low platelets in pregnancy) 2015     Priority: Medium     Severe preeclampsia with HELLP syndrome and acute renal failure      Detached retina, bilateral 2015     Priority: Medium     secondary to severe preeclampsia with HELLP syndroma and acute renal failure      WPW (Delaney-Parkinson-White syndrome) 2015     Priority: Medium     She has been evaluated for ablation, however, based on location this was not deemed safe by cardiology at the time      MS (multiple sclerosis) (H) 2015     Priority: Medium        Past Medical History:    Past Medical History:   Diagnosis Date    Detached retina, bilateral 2015    PARDEEP (generalized anxiety disorder) 2023    HELLP (hemolytic anemia/elev liver enzymes/low platelets in pregnancy) 2015    HUS (hemolytic uremic syndrome) (H) 2015    MS (multiple sclerosis) (H) 2015    Rh negative state in antepartum period 2023    TTP (thrombotic thrombocytopenic purpura) (H) 2015    WPW (Delaney-Parkinson-White syndrome) 2015       Past Surgical History:    Past Surgical History:   Procedure Laterality Date     SECTION  2015    Severe preeclampsia with HELLP syndrome and acute renal failure then developed postpartum Thrombotic thrombocytopenic purpura (TTP) and Hemolytic uremic syndrome (HUS)    COMBINED  SECTION, SALPINGECTOMY BILATERAL Bilateral 2023    Procedure:  SECTION, WITH  "BILATERAL SALPINGECTOMY, Viable baby girl born at 1851;  Surgeon: Naseem Cruz MD;  Location: [facility] OR    DILATION AND CURETTAGE, OPERATIVE HYSTEROSCOPY, COMBINED N/A 10/25/2023    Procedure: HYSTEROSCOPY, WITH DILATION AND CURETTAGE OF UTERUS;  Surgeon: Scout Grimaldo MD;  Location: [facility] OR    HYSTERECTOMY SUPRACERVICAL N/A 4/18/2024    Procedure: HYSTERECTOMY, SUPRACERVICAL, ABDOMINAL;  Surgeon: Scout Grimaldo MD;  Location: [facility] OR    TUBAL LIGATION         Family History:    Family History   Problem Relation Age of Onset    Hypertension Mother     Allergies Maternal Grandmother         dust pollen    Cancer - colorectal Paternal Grandmother 59    Allergies Maternal Aunt         \"    Cardiovascular Maternal Aunt     Breast Cancer Other 60        3 great aunts on moms side       Social History:  Marital Status:  Single [1]  Social History     Tobacco Use    Smoking status: Never     Passive exposure: Never    Smokeless tobacco: Never   Vaping Use    Vaping status: Never Used   Substance Use Topics    Alcohol use: Yes     Comment: occasional    Drug use: No        Medications:    hydrOXYzine HCl (ATARAX) 50 MG tablet  ibuprofen (ADVIL/MOTRIN) 800 MG tablet  labetalol (NORMODYNE) 100 MG tablet          Review of Systems   All other systems reviewed and are negative.      Physical Exam   BP: (!) 176/128  Pulse: 67  Temp: 97.6  F (36.4  C)  Resp: 16  Weight: 57.5 kg (126 lb 12.2 oz)  SpO2: 97 %      Physical Exam  Vitals and nursing note reviewed.   Constitutional:       General: She is not in acute distress.     Appearance: She is well-developed. She is not diaphoretic.   HENT:      Head: Normocephalic and atraumatic.      Right Ear: External ear normal.      Left Ear: External ear normal.      Nose: Congestion present.      Mouth/Throat:      Pharynx: No oropharyngeal exudate.   Eyes:      General: No scleral icterus.        Right eye: No discharge.         Left eye: No discharge.      Conjunctiva/sclera: Conjunctivae " normal.      Pupils: Pupils are equal, round, and reactive to light.   Cardiovascular:      Rate and Rhythm: Normal rate and regular rhythm.      Heart sounds: Normal heart sounds. No murmur heard.     No friction rub. No gallop.   Pulmonary:      Effort: Pulmonary effort is normal. No respiratory distress.      Breath sounds: Normal breath sounds. No wheezing or rales.   Chest:      Chest wall: No tenderness.   Abdominal:      General: Bowel sounds are normal.      Palpations: Abdomen is soft.      Tenderness: There is no abdominal tenderness.   Musculoskeletal:      Cervical back: Normal range of motion and neck supple.   Lymphadenopathy:      Cervical: No cervical adenopathy.   Skin:     General: Skin is warm and dry.      Coloration: Skin is not pale.      Findings: No erythema or rash.   Neurological:      Mental Status: She is alert and oriented to person, place, and time.      Cranial Nerves: No cranial nerve deficit.      Coordination: Coordination normal.   Psychiatric:         Behavior: Behavior normal.         Thought Content: Thought content normal.         Judgment: Judgment normal.         ED Course        Procedures       Results for orders placed or performed during the hospital encounter of 05/15/24 (from the past 24 hour(s))   Group A Streptococcus PCR Throat Swab    Specimen: Throat; Swab   Result Value Ref Range    Group A strep by PCR Not Detected Not Detected    Narrative    The Xpert Xpress Strep A test, performed on the [employer], is a rapid, qualitative in vitro diagnostic test for the detection of Streptococcus pyogenes (Group A ß-hemolytic Streptococcus, Strep A) in throat swab specimens from patients with signs and symptoms of pharyngitis. The Xpert Xpress Strep A test can be used as an aid in the diagnosis of Group A Streptococcal pharyngitis. The assay is not intended to monitor treatment for Group A Streptococcus infections. The Xpert Xpress Strep A test utilizes an  automated real-time polymerase chain reaction (PCR) to detect Streptococcus pyogenes DNA.       Medications - No data to display    Assessments & Plan (with Medical Decision Making)   Exam consistent with mild viral URI. Strep swab negative. Recommended continued supportive care. Pt was discharged home following in stable condition.     Plan:  Alternate between Ibuprofen and Tylenol every 4 hours for fever control.  Increase fluids and rest.  Use a humidifier at night.  Return here with any difficulty breathing or new/concerning symptoms.     I have reviewed the nursing notes.    I have reviewed the findings, diagnosis, plan and need for follow up with the patient.      New Prescriptions    No medications on file       Final diagnoses:   Viral URI       5/15/2024   [facility]

## 2024-05-15 NOTE — TELEPHONE ENCOUNTER
Pt called and per pt she has been taking the labetalol (NORMODYNE) 100 MG daily.  Pt stated that she had to stop the Losartan prior to her surgery and then was restarted on it the day after due to elevated blood pressures but had a reaction to it 30 minutes after getting it with facial swelling and difficulty breathing.

## 2024-05-16 RX ORDER — HYDROCHLOROTHIAZIDE 12.5 MG/1
12.5 TABLET ORAL DAILY
Qty: 30 TABLET | Refills: 0 | Status: SHIPPED | OUTPATIENT
Start: 2024-05-16 | End: 2024-05-16

## 2024-05-17 RX ORDER — SPIRONOLACTONE 25 MG/1
25 TABLET ORAL DAILY
Qty: 30 TABLET | Refills: 0 | Status: SHIPPED | OUTPATIENT
Start: 2024-05-17 | End: 2024-09-25

## 2024-05-20 ENCOUNTER — MYC MEDICAL ADVICE (OUTPATIENT)
Dept: FAMILY MEDICINE | Facility: OTHER | Age: 39
End: 2024-05-20

## 2024-05-21 ENCOUNTER — NURSE TRIAGE (OUTPATIENT)
Dept: CARE COORDINATION | Facility: OTHER | Age: 39
End: 2024-05-21

## 2024-05-21 NOTE — TELEPHONE ENCOUNTER
Writer spoke with patient to further assess symptoms.  Patient reports continued cough that started 05/15/24. Patient was seen in UC and diagnosed with Viral URI. Patient reports continued/ worsening cough and fever. Patient reports highest temperature was 104. Patient reports abdominal pain from recent Hysterectomy with coughing. Patient denies any difficulty breathing or chest pain.  Per protocol patient to be seen within 4 hours. Due to clinic hours patient advised to be seen in UC. Patient declined multiple times. Writer spoke with PCP who advises for patient to be evaluated tonight in UC/ED. Writer informed patient who verbalized understanding.

## 2024-05-21 NOTE — TELEPHONE ENCOUNTER
Reason for Disposition   Dry cough (non-productive;  no sputum or minimal clear sputum)    Additional Information   Negative: SEVERE difficulty breathing (e.g., struggling for each breath, speaks in single words)   Negative: Bluish (or gray) lips or face now   Negative: [1] Difficulty breathing AND [2] exposure to flames, smoke, or fumes   Negative: [1] Stridor AND [2] difficulty breathing   Negative: Sounds like a life-threatening emergency to the triager   Negative: [1] Previous asthma attacks AND [2] this feels like asthma attack   Negative: SEVERE difficulty breathing (e.g., struggling for each breath, speaks in single words)   Negative: Bluish (or gray) lips or face now   Negative: [1] Rapid onset of cough AND [2] has hives   Negative: Coughing started suddenly after medicine, an allergic food or bee sting   Negative: [1] Difficulty breathing AND [2] exposure to flames, smoke, or fumes   Negative: [1] Stridor AND [2] difficulty breathing   Negative: Sounds like a life-threatening emergency to the triager   Negative: Choked on object of food that could be caught in the throat   Negative: Chest pain is main symptom   Negative: [1] Previous asthma attacks AND [2] this feels like asthma attack   Negative: Cough lasts > 3 weeks   Negative: Wet cough (productive; white-yellow, yellow, green, or roger colored sputum)   Negative: [1] Dry cough (non-productive;  no sputum or minimal clear sputum) AND [2] within 14 days of COVID-19 Exposure   Negative: [1] MODERATE difficulty breathing (e.g., speaks in phrases, SOB even at rest, pulse 100-120) AND [2] still present when not coughing   Negative: Chest pain  (Exception: MILD central chest pain, present only when coughing.)   Negative: Patient sounds very sick or weak to the triager   Negative: [1] MILD difficulty breathing (e.g., minimal/no SOB at rest, SOB with walking, pulse <100) AND [2] still present when not coughing   Negative: [1] Coughed up blood AND [2] > 1  "tablespoon (15 ml)   (Exception: Blood-tinged sputum.)    Answer Assessment - Initial Assessment Questions  1. ONSET: \"When did the cough begin?\"       05/15/24  2. SEVERITY: \"How bad is the cough today?\"       Worsening since last Wednesday  3. SPUTUM: \"Describe the color of your sputum\" (none, dry cough; clear, white, yellow, green)      No sputum  4. HEMOPTYSIS: \"Are you coughing up any blood?\" If so ask: \"How much?\" (flecks, streaks, tablespoons, etc.)      no  5. DIFFICULTY BREATHING: \"Are you having difficulty breathing?\" If Yes, ask: \"How bad is it?\" (e.g., mild, moderate, severe)     - MILD: No SOB at rest, mild SOB with walking, speaks normally in sentences, can lie down, no retractions, pulse < 100.     - MODERATE: SOB at rest, SOB with minimal exertion and prefers to sit, cannot lie down flat, speaks in phrases, mild retractions, audible wheezing, pulse 100-120.     - SEVERE: Very SOB at rest, speaks in single words, struggling to breathe, sitting hunched forward, retractions, pulse > 120       No difficulty breathing  6. FEVER: \"Do you have a fever?\" If Yes, ask: \"What is your temperature, how was it measured, and when did it start?\"      Yes intermittently since Sunday  7. CARDIAC HISTORY: \"Do you have any history of heart disease?\" (e.g., heart attack, congestive heart failure)       Leslie parkinson white syndrome and hypertension  8. LUNG HISTORY: \"Do you have any history of lung disease?\"  (e.g., pulmonary embolus, asthma, emphysema)      no  9. PE RISK FACTORS: \"Do you have a history of blood clots?\" (or: recent major surgery, recent prolonged travel, bedridden)      no  10. OTHER SYMPTOMS: \"Do you have any other symptoms?\" (e.g., runny nose, wheezing, chest pain)        Runny nose  11. PREGNANCY: \"Is there any chance you are pregnant?\" \"When was your last menstrual period?\"        no  12. TRAVEL: \"Have you traveled out of the country in the last month?\" (e.g., travel history, exposures)        " no    Protocols used: Cough - Acute Udroeeyvxu-Y-HP, Cough - Acute Non-Productive-A-AH

## 2024-05-22 ENCOUNTER — HOSPITAL ENCOUNTER (EMERGENCY)
Facility: HOSPITAL | Age: 39
Discharge: HOME | End: 2024-05-22
Attending: NURSE PRACTITIONER | Admitting: NURSE PRACTITIONER
Payer: MEDICAID

## 2024-05-22 ENCOUNTER — TELEPHONE (OUTPATIENT)
Dept: FAMILY MEDICINE | Facility: OTHER | Age: 39
End: 2024-05-22

## 2024-05-22 VITALS
HEIGHT: 59 IN | DIASTOLIC BLOOD PRESSURE: 83 MMHG | SYSTOLIC BLOOD PRESSURE: 157 MMHG | RESPIRATION RATE: 15 BRPM | OXYGEN SATURATION: 100 % | WEIGHT: 126.76 LBS | HEART RATE: 90 BPM | BODY MASS INDEX: 25.56 KG/M2 | TEMPERATURE: 96.8 F

## 2024-05-22 DIAGNOSIS — J20.9 ACUTE BRONCHITIS WITH SYMPTOMS > 10 DAYS: Primary | ICD-10-CM

## 2024-05-22 DIAGNOSIS — R10.2 PELVIC PAIN IN FEMALE: ICD-10-CM

## 2024-05-22 DIAGNOSIS — R10.9 ABDOMINAL PAIN: ICD-10-CM

## 2024-05-22 PROCEDURE — 250N000011 HC RX IP 250 OP 636: Performed by: NURSE PRACTITIONER

## 2024-05-22 PROCEDURE — 96372 THER/PROPH/DIAG INJ SC/IM: CPT | Performed by: NURSE PRACTITIONER

## 2024-05-22 PROCEDURE — 99213 OFFICE O/P EST LOW 20 MIN: CPT | Performed by: NURSE PRACTITIONER

## 2024-05-22 PROCEDURE — G0463 HOSPITAL OUTPT CLINIC VISIT: HCPCS | Mod: 25

## 2024-05-22 RX ORDER — HYDROXYZINE HYDROCHLORIDE 50 MG/1
50 TABLET, FILM COATED ORAL EVERY 8 HOURS PRN
Qty: 20 TABLET | Refills: 0 | Status: SHIPPED | OUTPATIENT
Start: 2024-05-22 | End: 2024-06-03

## 2024-05-22 RX ORDER — KETOROLAC TROMETHAMINE 30 MG/ML
30 INJECTION, SOLUTION INTRAMUSCULAR; INTRAVENOUS ONCE
Status: COMPLETED | OUTPATIENT
Start: 2024-05-22 | End: 2024-05-22

## 2024-05-22 RX ORDER — BENZONATATE 100 MG/1
100 CAPSULE ORAL 2 TIMES DAILY PRN
Qty: 15 CAPSULE | Refills: 0 | Status: SHIPPED | OUTPATIENT
Start: 2024-05-22 | End: 2024-06-03

## 2024-05-22 RX ADMIN — KETOROLAC TROMETHAMINE 30 MG: 30 INJECTION, SOLUTION INTRAMUSCULAR at 08:45

## 2024-05-22 ASSESSMENT — ENCOUNTER SYMPTOMS
SHORTNESS OF BREATH: 0
RHINORRHEA: 0
HEADACHES: 0
TROUBLE SWALLOWING: 0
FEVER: 0
PSYCHIATRIC NEGATIVE: 1
ABDOMINAL PAIN: 1
CHILLS: 0
EYE DISCHARGE: 0
EYE REDNESS: 0
VOMITING: 0
SORE THROAT: 0
COUGH: 1
NAUSEA: 0
DIARRHEA: 0

## 2024-05-22 ASSESSMENT — ACTIVITIES OF DAILY LIVING (ADL): ADLS_ACUITY_SCORE: 33

## 2024-05-22 NOTE — ED TRIAGE NOTES
Pt presents with c/o cough. Reports that daughter has bronchitis. States she is 4 weeks post hysterectomy. Sx started a week and a half ago. Pt has been taking ibuprofen. Pt refuses covid multiplex testing at this time.

## 2024-05-22 NOTE — DISCHARGE INSTRUCTIONS
Augmentin as ordered  - Take entire course of antibiotic even if you start to feel better.  - Antibiotics can cause stomach upset including nausea and diarrhea. Read your bottle or ask the pharmacist if antibiotic can be taken with food to help prevent nausea. If you have symptoms of diarrhea you can take an over-the-counter probiotic and/or increase foods with probiotics such as yogurt, Cav[place], sauerkraut.    Benzonatate capsules as needed for cough    Alternate Tylenol and ibuprofen as needed for pain     Hydroxyzine as needed    Over-the-counter Flonase as needed for nasal congestion.    Follow-up with primary care provider or return to urgent care/ED with any worsening in condition or additional concerns.

## 2024-05-22 NOTE — ED PROVIDER NOTES
History     Chief Complaint   Patient presents with    Cough     HPI  Sarah Ugalde is a 39 year old female who presents urgent care today ambulatory with complaints of nasal congestion, cough and abdominal pain.  Nasal congestion and cough have been ongoing for the past 10 days.  No recent fever, chills, nausea, vomiting, diarrhea, shortness of breath or chest pain.  Staying hydrated.  No rashes.  Other family members sick in the household.  Patient had abdominal hysterectomy on 2024 and  delivery 9 months ago.  Denies any abdominal distention or constipation.  Patient states ibuprofen and hydroxyzine works well for pain, ran out of hydroxyzine.  No other concerns.    Allergies:  Allergies   Allergen Reactions    Amlodipine     Hydrochlorothiazide     Magnesium Other (See Comments)     Altered mental status after IV dosing    Zestril [Lisinopril] Fatigue    Losartan Difficulty breathing, Hives, Itching, Rash, Swelling and Visual Disturbance       Problem List:    Patient Active Problem List    Diagnosis Date Noted    Hospital discharge follow-up 2024     Priority: Medium    S/P abdominal hysterectomy 2024     Priority: Medium    Postpartum endometritis 2023     Priority: Medium    Pre-eclampsia, postpartum 2023     Priority: Medium    Pregnancy, supervision for, high-risk, third trimester 2023     Priority: Medium    AMA (advanced maternal age) multigravida 35+ 2023     Priority: Medium    IUGR (intrauterine growth restriction) affecting care of mother 2023     Priority: Medium    Hx of pre-eclampsia in prior pregnancy, currently pregnant 2023     Priority: Medium    Encounter for sterilization 2023     Priority: Medium    Hx of  section 2023     Priority: Medium    PARDEEP (generalized anxiety disorder) 2023     Priority: Medium    Poor fetal growth affecting management of mother in third trimester, fetus 1 of multiple  gestation 2023     Priority: Medium    Encounter for triage in pregnant patient 2023     Priority: Medium    Multigravida of advanced maternal age in third trimester 2022     Priority: Medium     AMA:  NIPT - declined  h/o WPW. BL ECG - nml  Zio Patch, Cards consult  H/o TTP/preeclampsia/HELLP/acute renal failure>  Baby asa 16 wks.  Nml BL labs.   level II - nml  H/o HUS  H/o MS  no flu or covid vax  Plan repeat /sterilization 23.  Sterilization forms signed.   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      HUS (hemolytic uremic syndrome) (H) 2015     Priority: Medium     Postpartum Thrombotic thrombocytopenic purpura (TTP) and 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      HELLP (hemolytic anemia/elev liver enzymes/low platelets in pregnancy) 2015     Priority: Medium     Severe preeclampsia with HELLP syndrome and acute renal failure      Detached retina, bilateral 2015     Priority: Medium     secondary to severe preeclampsia with HELLP syndroma and acute renal failure      WPW (Delaney-Parkinson-White syndrome) 2015     Priority: Medium     She has been evaluated for ablation, however, based on location this was not deemed safe by cardiology at the time      MS (multiple sclerosis) (H) 2015     Priority: Medium        Past Medical History:    Past Medical History:   Diagnosis Date    Detached retina, bilateral 2015    PARDEEP (generalized anxiety disorder) 2023    HELLP (hemolytic anemia/elev liver enzymes/low platelets in pregnancy) 2015    HUS (hemolytic uremic syndrome) (H) 2015    MS (multiple sclerosis) (H) 2015    Rh negative state in antepartum period 2023    TTP (thrombotic thrombocytopenic  "purpura) (H) 2015    WPW (Delaney-Parkinson-White syndrome) 2015       Past Surgical History:    Past Surgical History:   Procedure Laterality Date     SECTION  2015    Severe preeclampsia with HELLP syndrome and acute renal failure then developed postpartum Thrombotic thrombocytopenic purpura (TTP) and Hemolytic uremic syndrome (HUS)    COMBINED  SECTION, SALPINGECTOMY BILATERAL Bilateral 2023    Procedure:  SECTION, WITH BILATERAL SALPINGECTOMY, Viable baby girl born at 1851;  Surgeon: Naseem Cruz MD;  Location: [facility]    DILATION AND CURETTAGE, OPERATIVE HYSTEROSCOPY, COMBINED N/A 10/25/2023    Procedure: HYSTEROSCOPY, WITH DILATION AND CURETTAGE OF UTERUS;  Surgeon: Scout Grimaldo MD;  Location: [facility]    HYSTERECTOMY SUPRACERVICAL N/A 2024    Procedure: HYSTERECTOMY, SUPRACERVICAL, ABDOMINAL;  Surgeon: Scout Grimaldo MD;  Location: [facility]    TUBAL LIGATION         Family History:    Family History   Problem Relation Age of Onset    Hypertension Mother     Allergies Maternal Grandmother         dust pollen    Cancer - colorectal Paternal Grandmother 59    Allergies Maternal Aunt         \"    Cardiovascular Maternal Aunt     Breast Cancer Other 60        3 great aunts on moms side       Social History:  Marital Status:  Single [1]  Social History     Tobacco Use    Smoking status: Never     Passive exposure: Never    Smokeless tobacco: Never   Vaping Use    Vaping status: Never Used   Substance Use Topics    Alcohol use: Yes     Comment: occasional    Drug use: No        Medications:    amoxicillin-clavulanate (AUGMENTIN) 875-125 MG tablet  benzonatate (TESSALON) 100 MG capsule  hydrOXYzine HCl (ATARAX) 50 MG tablet  ibuprofen (ADVIL/MOTRIN) 800 MG tablet  labetalol (NORMODYNE) 100 MG tablet  spironolactone (ALDACTONE) 25 MG tablet      Review of Systems   Constitutional:  Negative for chills and fever.   HENT:  Positive for congestion. Negative for ear pain, " "rhinorrhea, sore throat and trouble swallowing.    Eyes:  Negative for discharge and redness.   Respiratory:  Positive for cough. Negative for shortness of breath.    Cardiovascular:  Negative for chest pain.   Gastrointestinal:  Positive for abdominal pain. Negative for diarrhea, nausea and vomiting.   Genitourinary:  Negative for decreased urine volume.   Musculoskeletal:  Negative for gait problem.   Skin:  Negative for rash.   Neurological:  Negative for headaches.   Psychiatric/Behavioral: Negative.       Physical Exam   BP: 157/83  Pulse: 90  Temp: 96.8  F (36  C)  Resp: 15  Height: 149.9 cm (4' 11\")  Weight: 57.5 kg (126 lb 12.2 oz)  SpO2: 100 %    Physical Exam  Vitals and nursing note reviewed.   Constitutional:       General: She is not in acute distress.     Appearance: Normal appearance. She is not ill-appearing or toxic-appearing.   HENT:      Right Ear: Tympanic membrane, ear canal and external ear normal.      Left Ear: Tympanic membrane, ear canal and external ear normal.      Nose: Congestion and rhinorrhea present.      Mouth/Throat:      Mouth: Mucous membranes are moist.      Pharynx: Oropharynx is clear. No oropharyngeal exudate or posterior oropharyngeal erythema.   Cardiovascular:      Rate and Rhythm: Normal rate and regular rhythm.      Pulses: Normal pulses.      Heart sounds: Normal heart sounds.   Pulmonary:      Effort: Pulmonary effort is normal.      Breath sounds: Normal breath sounds.   Abdominal:      General: Bowel sounds are normal. There is no distension.      Palpations: Abdomen is soft.      Tenderness: There is generalized abdominal tenderness. There is no right CVA tenderness or left CVA tenderness.      Comments: Low transverse incision healing well, no signs of infection.   Musculoskeletal:      Cervical back: Normal range of motion and neck supple.   Neurological:      Mental Status: She is alert.       ED Course     Procedures    No results found for this or any previous " visit (from the past 24 hour(s)).    Medications   ketorolac (TORADOL) injection 30 mg (30 mg Intramuscular $Given 5/22/24 2103)       Assessments & Plan (with Medical Decision Making)     I have reviewed the nursing notes.    I have reviewed the findings, diagnosis, plan and need for follow up with the patient.  (J20.9) Acute bronchitis with symptoms > 10 days  (R10.9) Abdominal pain  Plan:   Patient ambulatory with a nontoxic appearance.  Lungs clear throughout.  No signs of otitis media or strep.  Moderate amount of nasal congestion.  Staying hydrated.  No rashes.  Abdominal pain and tenderness on palpation.  Patient had hysterectomy on 4/18/2024.  Bowel sounds active, no constipation, BRBPR or diarrhea.  Patient has a frequent cough, increase in abdominal pain most likely caused due to coughing.  Did offer abdominal workup which included lab workup and CT abdomen pelvis with contrast and patient declines at this time.  Patient wants to monitor and follow-up as needed.  Will start patient on Augmentin given ongoing symptoms for the past 10 days.  Benzonatate capsules to help with the coughing.  Hydroxyzine was refilled as patient states it helps with the pain while when taking ibuprofen.  Patient to follow-up with primary care provider or return to urgent care/ED with any worsening in condition or additional concerns.  Patient in agreement treatment plan.    New Prescriptions    AMOXICILLIN-CLAVULANATE (AUGMENTIN) 875-125 MG TABLET    Take 1 tablet by mouth 2 times daily for 7 days    BENZONATATE (TESSALON) 100 MG CAPSULE    Take 1 capsule (100 mg) by mouth 2 times daily as needed for cough     Final diagnoses:   Acute bronchitis with symptoms > 10 days   Abdominal pain     5/22/2024   [facility]       Raquel Bernard NP  05/22/24 1484

## 2024-05-28 NOTE — PROGRESS NOTES
"    Assessment & Plan     (I10) Essential hypertension  (primary encounter diagnosis)  (R09.89) Labile blood pressure  Plan: Basic metabolic panel, Adult Nephrology          Referral, CANCELED: Adult Nephrology          Referral        BP currently controlled with Aldactone and labetalol, but she is rather young to have such high blood pressure. BP also labile. Has been high ever since she had TTP and HELP. Will therefore send to nephrology.     (F41.9) Anxiety  Comment: anxiety has worsened, but she denies thoughts of self harm  Plan: sertraline (ZOLOFT) 25 MG tablet        She has many allergies or intolerances. Will try low dose sertraline and reassess in 4 weeks. Sooner with new or worsening symptoms.      Encouraged discussion with trusted relative or friend to monitor for negative mood changes and change in behaviour during medication initiation and titration. Recommended immediate help if increased thoughts of suicide and call if significant side effects occur. Encouraged patient that this type of medication is not effective immediately, and to be consistant with taking the medication.    (R52,  L90.5) Painful scar  Comment: patient has tenderness with palpation over left side of hysterectomy scar  Plan: Will defer to Dr. Grimaldo-surgeon. No fevers. Still eating and drinking well. No labs therefore done today. No signs of acute abdomen.     The longitudinal plan of care for the diagnosis(es)/condition(s) as documented were addressed during this visit. Due to the added complexity in care, I will continue to support Sarah in the subsequent management and with ongoing continuity of care.      BMI  Estimated body mass index is 25.45 kg/m  as calculated from the following:    Height as of this encounter: 1.499 m (4' 11\").    Weight as of this encounter: 57.2 kg (126 lb).         Return in about 4 weeks (around 7/1/2024).    Paty Smith is a 39 year old, presenting for the following " health issues:  Hypertension, Anxiety, and pain    History of Present Illness       Hypertension: She presents for follow up of hypertension.  She does check blood pressure  regularly outside of the clinic. Outside blood pressures have been over 140/90. She follows a low salt diet.     Reason for visit:  Post surgical appt    She eats 4 or more servings of fruits and vegetables daily.She consumes 0 sweetened beverage(s) daily.She exercises with enough effort to increase her heart rate 30 to 60 minutes per day.  She exercises with enough effort to increase her heart rate 5 days per week.   She is taking medications regularly.     -  Hypertension Follow-up    Do you check your blood pressure regularly outside of the clinic? Yes   Are you following a low salt diet? No  Are your blood pressures ever more than 140 on the top number (systolic) OR more   than 90 on the bottom number (diastolic), for example 140/90? Yes  -Taking labetalol 50 mg BID with Aldactone 12.5 mg once daily. Allergy to losartan, lisinopril, hydrochlorothiazide, and amlodipine.   -Denies chest pain, shortness of breath, syncope, or palpitations. Occasional dizziness.    -She did see EP, Dr. Leigh for telemedicine consult regarding history of Delaney-Parkinson-White syndrome and recent syncopal episodes. Syncope likely related to orthostatism and hydration was encouraged.    -She does have a h/o severe preeclampsia with HELP syndrome, TTP, and acute renal failure. Never required dialysis.   -Transthoracic echocardiogram 5/2023 showed normal cardiac structure and functioning.  -Does not take ibuprofen regularly.   -Under a lot of stress. Having panic attacks with increased anxiety. Feels this has increased her blood pressure. Taking hydroxyzine before bed and feels it helps.     -Due for several vaccines. Declines.     Anxiety   How are you doing with your anxiety since your last visit? Worsened   Are you having other symptoms that might be associated  with anxiety? Panic attacks  Have you had a significant life event? No   Are you feeling depressed? No  Do you have any concerns with your use of alcohol or other drugs? No  She has taken many medications and reacted.   No thoughts of self harm.     Social History     Tobacco Use    Smoking status: Never     Passive exposure: Never    Smokeless tobacco: Never   Vaping Use    Vaping status: Never Used   Substance Use Topics    Alcohol use: Yes     Comment: occasional    Drug use: No         3/14/2023     8:18 AM 10/3/2023    10:26 AM 6/3/2024     1:18 PM   PARDEEP-7 SCORE   Total Score  5 (mild anxiety)    Total Score 2 5 21         3/14/2023     8:18 AM 8/7/2023     3:56 PM 10/3/2023    10:25 AM   PHQ   PHQ-9 Total Score 7 5 10   Q9: Thoughts of better off dead/self-harm past 2 weeks Not at all Not at all Not at all         10/3/2023    10:25 AM   Last PHQ-9   1.  Little interest or pleasure in doing things 0   2.  Feeling down, depressed, or hopeless 0   3.  Trouble falling or staying asleep, or sleeping too much 3   4.  Feeling tired or having little energy 3   5.  Poor appetite or overeating 2   6.  Feeling bad about yourself 1   7.  Trouble concentrating 1   8.  Moving slowly or restless 0   Q9: Thoughts of better off dead/self-harm past 2 weeks 0   PHQ-9 Total Score 10         6/3/2024     1:18 PM   PARDEEP-7    1. Feeling nervous, anxious, or on edge 3   2. Not being able to stop or control worrying 3   3. Worrying too much about different things 3   4. Trouble relaxing 3   5. Being so restless that it is hard to sit still 3   6. Becoming easily annoyed or irritable 3   7. Feeling afraid, as if something awful might happen 3   PARDEEP-7 Total Score 21   If you checked any problems, how difficult have they made it for you to do your work, take care of things at home, or get along with other people? Extremely difficult     Pain History:  When did you first notice your pain? Patient fell 2 weeks after hysterectomy, fell on  "right abdomen and has had pain over incision ever since; pain occurs when she bends forward or with certain movements  Have you seen this provider for your pain in the past? No   Where in your body do your have pain? Left lower abdomen at her incision site   Are you seeing anyone else for your pain? No  What makes your pain better? unknown  What makes your pain worse? Position changes  How has pain affected your ability to work? This pain as affect her ability to take care of her baby that is 10 months old  Who lives in your household? Self and her children   No fevers. Eating and drinking well. No nausea or vomiting.         3/14/2023     8:18 AM 8/7/2023     3:56 PM 10/3/2023    10:25 AM   PHQ-9 SCORE   PHQ-9 Total Score MyChart   10 (Moderate depression)   PHQ-9 Total Score 7 5 10           3/14/2023     8:18 AM 10/3/2023    10:26 AM 6/3/2024     1:18 PM   PARDEEP-7 SCORE   Total Score  5 (mild anxiety)    Total Score 2 5 21         Review of Systems  Constitutional, HEENT, cardiovascular, pulmonary, gi and gu systems are negative, except as otherwise noted.      Objective    /84 (BP Location: Left arm, Patient Position: Sitting, Cuff Size: Adult Regular)   Pulse 67   Temp 98.6  F (37  C) (Tympanic)   Ht 1.499 m (4' 11\")   Wt 57.2 kg (126 lb)   SpO2 97%   BMI 25.45 kg/m    Body mass index is 25.45 kg/m .  Physical Exam   GENERAL: alert and no distress  EYES: Eyes grossly normal to inspection, PERRL and conjunctivae and sclerae normal  HENT: ear canals and TM's normal, nose and mouth without ulcers or lesions  NECK: no adenopathy, no asymmetry, masses, or scars  RESP: lungs clear to auscultation - no rales, rhonchi or wheezes  CV: regular rate and rhythm, no murmur, click or rub, no peripheral edema  ABDOMEN: soft, no distension, she does have pain with palpation over the left side of her hysterectomy scar, no hepatosplenomegaly, no masses and bowel sounds normal  MS: no gross musculoskeletal defects " noted, no edema  NEURO: Normal strength and tone, mentation intact and speech normal  PSYCH: mentation appears normal, affect normal/bright    BMP in process        Signed Electronically by: Trisha Fonseca NP

## 2024-06-03 ENCOUNTER — OFFICE VISIT (OUTPATIENT)
Dept: FAMILY MEDICINE | Facility: OTHER | Age: 39
End: 2024-06-03
Attending: NURSE PRACTITIONER
Payer: MEDICAID

## 2024-06-03 VITALS
OXYGEN SATURATION: 97 % | HEART RATE: 67 BPM | SYSTOLIC BLOOD PRESSURE: 136 MMHG | TEMPERATURE: 98.6 F | BODY MASS INDEX: 25.4 KG/M2 | HEIGHT: 59 IN | WEIGHT: 126 LBS | DIASTOLIC BLOOD PRESSURE: 84 MMHG

## 2024-06-03 DIAGNOSIS — F41.9 ANXIETY: ICD-10-CM

## 2024-06-03 DIAGNOSIS — R52 PAINFUL SCAR: ICD-10-CM

## 2024-06-03 DIAGNOSIS — I10 ESSENTIAL HYPERTENSION: Primary | ICD-10-CM

## 2024-06-03 DIAGNOSIS — L90.5 PAINFUL SCAR: ICD-10-CM

## 2024-06-03 DIAGNOSIS — R09.89 LABILE BLOOD PRESSURE: ICD-10-CM

## 2024-06-03 LAB
ANION GAP SERPL CALCULATED.3IONS-SCNC: 12 MMOL/L (ref 7–15)
BUN SERPL-MCNC: 18.3 MG/DL (ref 6–20)
CALCIUM SERPL-MCNC: 9.6 MG/DL (ref 8.6–10)
CHLORIDE SERPL-SCNC: 101 MMOL/L (ref 98–107)
CREAT SERPL-MCNC: 1.01 MG/DL (ref 0.51–0.95)
DEPRECATED HCO3 PLAS-SCNC: 24 MMOL/L (ref 22–29)
EGFRCR SERPLBLD CKD-EPI 2021: 72 ML/MIN/1.73M2
GLUCOSE SERPL-MCNC: 83 MG/DL (ref 70–99)
POTASSIUM SERPL-SCNC: 3.7 MMOL/L (ref 3.4–5.3)
SODIUM SERPL-SCNC: 137 MMOL/L (ref 135–145)

## 2024-06-03 PROCEDURE — 36415 COLL VENOUS BLD VENIPUNCTURE: CPT | Mod: ZL | Performed by: NURSE PRACTITIONER

## 2024-06-03 PROCEDURE — G0463 HOSPITAL OUTPT CLINIC VISIT: HCPCS

## 2024-06-03 PROCEDURE — G2211 COMPLEX E/M VISIT ADD ON: HCPCS | Performed by: NURSE PRACTITIONER

## 2024-06-03 PROCEDURE — 80048 BASIC METABOLIC PNL TOTAL CA: CPT | Mod: ZL | Performed by: NURSE PRACTITIONER

## 2024-06-03 PROCEDURE — 99214 OFFICE O/P EST MOD 30 MIN: CPT | Mod: 24 | Performed by: NURSE PRACTITIONER

## 2024-06-03 RX ORDER — SERTRALINE HYDROCHLORIDE 25 MG/1
25 TABLET, FILM COATED ORAL DAILY
Qty: 30 TABLET | Refills: 0 | Status: ON HOLD | OUTPATIENT
Start: 2024-06-03 | End: 2024-07-02

## 2024-06-03 RX ORDER — HYDROXYZINE HYDROCHLORIDE 50 MG/1
50 TABLET, FILM COATED ORAL EVERY 8 HOURS PRN
Qty: 20 TABLET | Refills: 0 | Status: SHIPPED | OUTPATIENT
Start: 2024-06-03 | End: 2024-06-13

## 2024-06-03 ASSESSMENT — ANXIETY QUESTIONNAIRES
2. NOT BEING ABLE TO STOP OR CONTROL WORRYING: NEARLY EVERY DAY
IF YOU CHECKED OFF ANY PROBLEMS ON THIS QUESTIONNAIRE, HOW DIFFICULT HAVE THESE PROBLEMS MADE IT FOR YOU TO DO YOUR WORK, TAKE CARE OF THINGS AT HOME, OR GET ALONG WITH OTHER PEOPLE: EXTREMELY DIFFICULT
5. BEING SO RESTLESS THAT IT IS HARD TO SIT STILL: NEARLY EVERY DAY
GAD7 TOTAL SCORE: 21
1. FEELING NERVOUS, ANXIOUS, OR ON EDGE: NEARLY EVERY DAY
GAD7 TOTAL SCORE: 21
3. WORRYING TOO MUCH ABOUT DIFFERENT THINGS: NEARLY EVERY DAY
7. FEELING AFRAID AS IF SOMETHING AWFUL MIGHT HAPPEN: NEARLY EVERY DAY
6. BECOMING EASILY ANNOYED OR IRRITABLE: NEARLY EVERY DAY

## 2024-06-03 ASSESSMENT — PATIENT HEALTH QUESTIONNAIRE - PHQ9: 5. POOR APPETITE OR OVEREATING: NEARLY EVERY DAY

## 2024-06-03 ASSESSMENT — PAIN SCALES - GENERAL: PAINLEVEL: SEVERE PAIN (7)

## 2024-06-06 ENCOUNTER — OFFICE VISIT (OUTPATIENT)
Dept: OBGYN | Facility: OTHER | Age: 39
End: 2024-06-06
Attending: OBSTETRICS & GYNECOLOGY
Payer: MEDICAID

## 2024-06-06 ENCOUNTER — MYC MEDICAL ADVICE (OUTPATIENT)
Dept: FAMILY MEDICINE | Facility: OTHER | Age: 39
End: 2024-06-06

## 2024-06-06 VITALS
OXYGEN SATURATION: 98 % | HEIGHT: 59 IN | DIASTOLIC BLOOD PRESSURE: 90 MMHG | TEMPERATURE: 97.8 F | SYSTOLIC BLOOD PRESSURE: 140 MMHG | WEIGHT: 128.1 LBS | BODY MASS INDEX: 25.83 KG/M2 | HEART RATE: 89 BPM

## 2024-06-06 DIAGNOSIS — G58.9 NERVE ENTRAPMENT: ICD-10-CM

## 2024-06-06 DIAGNOSIS — G89.18 ACUTE POST-OPERATIVE PAIN: Primary | ICD-10-CM

## 2024-06-06 PROCEDURE — 99024 POSTOP FOLLOW-UP VISIT: CPT | Performed by: OBSTETRICS & GYNECOLOGY

## 2024-06-06 PROCEDURE — 96372 THER/PROPH/DIAG INJ SC/IM: CPT | Performed by: OBSTETRICS & GYNECOLOGY

## 2024-06-06 PROCEDURE — G0463 HOSPITAL OUTPT CLINIC VISIT: HCPCS | Mod: 25

## 2024-06-06 PROCEDURE — 20552 NJX 1/MLT TRIGGER POINT 1/2: CPT | Performed by: OBSTETRICS & GYNECOLOGY

## 2024-06-06 PROCEDURE — 250N000011 HC RX IP 250 OP 636: Performed by: OBSTETRICS & GYNECOLOGY

## 2024-06-06 RX ORDER — BUPIVACAINE HYDROCHLORIDE 5 MG/ML
5 INJECTION, SOLUTION EPIDURAL; INTRACAUDAL ONCE
Status: COMPLETED | OUTPATIENT
Start: 2024-06-06 | End: 2024-06-06

## 2024-06-06 RX ORDER — BETAMETHASONE SODIUM PHOSPHATE AND BETAMETHASONE ACETATE 3; 3 MG/ML; MG/ML
6 INJECTION, SUSPENSION INTRA-ARTICULAR; INTRALESIONAL; INTRAMUSCULAR; SOFT TISSUE ONCE
Status: COMPLETED | OUTPATIENT
Start: 2024-06-06 | End: 2024-06-06

## 2024-06-06 RX ADMIN — BUPIVACAINE HYDROCHLORIDE 25 MG: 5 INJECTION, SOLUTION EPIDURAL; INTRACAUDAL at 16:17

## 2024-06-06 RX ADMIN — BETAMETHASONE ACETATE AND BETAMETHASONE SODIUM PHOSPHATE 6 MG: 3; 3 INJECTION, SUSPENSION INTRA-ARTICULAR; INTRALESIONAL; INTRAMUSCULAR; SOFT TISSUE at 16:16

## 2024-06-06 ASSESSMENT — PAIN SCALES - GENERAL: PAINLEVEL: SEVERE PAIN (7)

## 2024-06-06 NOTE — PROGRESS NOTES
"MIMI Ugalde is a 39 year old female presents for post operative check. She is  7  week(s) status post abdominal hysterectomy.  She reports doing well except persistent intermittent sharp pain arising from lateral aspect of incision and shooting up her abdomen/flank on right.   Bowel and bladder function is satisfactory. Denies incisional problems.    O.  Blood pressure (!) 140/90, pulse 89, temperature 97.8  F (36.6  C), temperature source Tympanic, height 1.499 m (4' 11\"), weight 58.1 kg (128 lb 1.6 oz), SpO2 98%, not currently breastfeeding.    Abd: soft, non-distended. Incision clear, dry, and intact without evidence of infection.  + superficial abdominal wall point tenderness at left apex on incision around facial stitch.     A. /P.  PO pain, suspected nerve entrapment.     Expectant, medical (Gabapentin) or local trigger point injection with Celestone/Marcaine discussed.  R/B discussed. Pt elected to proceed with injection.  2cc Celestone/4cc 0.5% Marcaine injected in a sterile fashion. Pt tolerated well and noted relief after injection.  Will f/up in 2-3 weeks.  Possible repeat injection if not resolved at that time. Pt agrees with POC.     Scout Grimaldo MD   "

## 2024-06-13 DIAGNOSIS — R10.32 ABDOMINAL WALL PAIN IN BOTH LOWER QUADRANTS: Primary | ICD-10-CM

## 2024-06-13 DIAGNOSIS — R10.31 ABDOMINAL WALL PAIN IN BOTH LOWER QUADRANTS: Primary | ICD-10-CM

## 2024-06-28 ENCOUNTER — TELEPHONE (OUTPATIENT)
Dept: INTERVENTIONAL RADIOLOGY/VASCULAR | Facility: HOSPITAL | Age: 39
End: 2024-06-28

## 2024-06-28 NOTE — TELEPHONE ENCOUNTER
Called patient to remind them of their appt on 7/2. Also reminded patient to not take any antibiotics, steroids, or immunizations two weeks before and after this appt.  And they also need a .

## 2024-07-01 NOTE — PROGRESS NOTES
Assessment & Plan     (I10) Essential hypertension  (primary encounter diagnosis)  (R09.89) Labile blood pressure  Comment: BP still high at 138/88  Plan: Will check BMP. If potassium and kidney function look ok, will increase Aldactone to 50 mg from 25 mg and reassess in 4 weeks. Sooner with new or worsening symptoms.     She does have an appointment with nephrology as well.     (F41.9) Anxiety  Comment: tolerating sertraline 25 mg, still anxious, but no thoughts of self harm  Plan: sertraline (ZOLOFT) 50 MG tablet        Will increase the sertraline to 50 mg and reassess in 4 weeks. Sooner with new or worsening symptoms.     Encouraged discussion with trusted relative or friend to monitor for negative mood changes and change in behaviour during medication initiation and titration. Recommended immediate help if increased thoughts of suicide and call if significant side effects occur. Encouraged patient that this type of medication is not effective immediately, and to be consistant with taking the medication.    (G47.00) Insomnia, unspecified type  Comment: trouble sleeping, does not snore  Plan: traZODone (DESYREL) 50 MG tablet        Will try 25- 50 mg of trazodone before bed and reassess in 4 weeks, sooner with new or worsening symptoms.         No follow-ups on file.    Paty Smith is a 39 year old, presenting for the following health issues:  Anxiety, Hypertension, Depression, and insomina    History of Present Illness       Mental Health Follow-up:  Patient presents to follow-up on Anxiety.    Patient's anxiety since last visit has been:  No change  The patient is having other symptoms associated with anxiety.  Any significant life events: health concerns  Patient is feeling anxious or having panic attacks.  Patient has no concerns about alcohol or drug use.    Hypertension: She presents for follow up of hypertension.  She does check blood pressure  regularly outside of the clinic. Outside blood  pressures have been over 140/90. She follows a low salt diet.     Reason for visit:  Monthly follow up    She eats 4 or more servings of fruits and vegetables daily.She consumes 0 sweetened beverage(s) daily.She exercises with enough effort to increase her heart rate 30 to 60 minutes per day.  She exercises with enough effort to increase her heart rate 4 days per week.   She is taking medications regularly.       Hypertension Follow-up    Do you check your blood pressure regularly outside of the clinic? Yes   Are you following a low salt diet? Yes  Are your blood pressures ever more than 140 on the top number (systolic) OR more   than 90 on the bottom number (diastolic), for example 140/90? Yes  -Taking Aldactone 25 mg with labetalol 50 mg BID without side effects. Aldactone was recently increased.   Allergy to losartan, lisinopril, hydrochlorothiazide, and amlodipine.   -Denies chest pain, shortness of breath, dizziness, syncope, or palpitations.   -She did see EP, Dr. Leigh for telemedicine consult regarding history of Delnaey-Parkinson-White syndrome and recent syncopal episodes. Syncope likely related to orthostatism and hydration was encouraged.     -She does have a h/o severe preeclampsia with HELP syndrome, TTP, and acute renal failure. Never required dialysis.   -Transthoracic echocardiogram 5/2023 showed normal cardiac structure and functioning.  -Does not take ibuprofen regularly.   -Under a lot of stress. Having panic attacks with increased anxiety. Feels this has increased her blood pressure. Taking hydroxyzine before bed and feels it helps.     Depression and Anxiety     Last seen on 6/3/24; anxiety had worsened. Sertraline was started.     How are you doing with your depression since your last visit? Has not seen a lot of change   How are you doing with your anxiety since your last visit?  Has not really seen any change   Are you having other symptoms that might be associated with depression or anxiety?  No  Have you had a significant life event? No   Do you have any concerns with your use of alcohol or other drugs? No  No thoughts of self harm.     Social History     Tobacco Use    Smoking status: Never     Passive exposure: Never    Smokeless tobacco: Never   Vaping Use    Vaping status: Never Used   Substance Use Topics    Alcohol use: Yes     Comment: occasional    Drug use: No         8/7/2023     3:56 PM 10/3/2023    10:25 AM 7/2/2024     1:37 PM   PHQ   PHQ-9 Total Score 5 10 12   Q9: Thoughts of better off dead/self-harm past 2 weeks Not at all Not at all Not at all         10/3/2023    10:26 AM 6/3/2024     1:18 PM 7/2/2024     1:38 PM   PARDEEP-7 SCORE   Total Score 5 (mild anxiety)  16 (severe anxiety)   Total Score 5 21 16         7/2/2024     1:37 PM   Last PHQ-9   1.  Little interest or pleasure in doing things 1   2.  Feeling down, depressed, or hopeless 0   3.  Trouble falling or staying asleep, or sleeping too much 3   4.  Feeling tired or having little energy 3   5.  Poor appetite or overeating 0   6.  Feeling bad about yourself 1   7.  Trouble concentrating 2   8.  Moving slowly or restless 2   Q9: Thoughts of better off dead/self-harm past 2 weeks 0   PHQ-9 Total Score 12         7/2/2024     1:38 PM   PARDEEP-7    1. Feeling nervous, anxious, or on edge 3   2. Not being able to stop or control worrying 1   3. Worrying too much about different things 3   4. Trouble relaxing 3   5. Being so restless that it is hard to sit still 3   6. Becoming easily annoyed or irritable 1   7. Feeling afraid, as if something awful might happen 2   PARDEEP-7 Total Score 16   If you checked any problems, how difficult have they made it for you to do your work, take care of things at home, or get along with other people? Extremely difficult       }Insomnia  Onset: ongoing  Description:   Time to fall asleep (sleep latency): 1-2 hours   Middle of night awakening:  YES  Early morning awakening:  YES  Progression of Symptoms:   "worsening  Accompanying Signs & Symptoms:  Daytime sleepiness/napping: YES  Excessive snoring/apnea: no   Restless legs: YES  Frequent urination: YES  Chronic pain:  YES  History:  Prior Insomnia: YES  Precipitating factors:   New stressful situation: no   Caffeine intake: no   OTC decongestants: YES- sometimes does take benadryl at night   Any new medications: no   Alleviating factors:  Self medicating (alcohol, etc.):  No    Therapies Tried and outcome: about 12 years did try ambien to help sleep but has not been on anything since.             Review of Systems  Constitutional, HEENT, cardiovascular, pulmonary, gi and gu systems are negative, except as otherwise noted.      Objective    /88 (BP Location: Right arm, Patient Position: Sitting, Cuff Size: Adult Regular)   Pulse 104   Temp 98.7  F (37.1  C) (Tympanic)   Ht 1.499 m (4' 11\")   Wt 56.7 kg (125 lb)   SpO2 100%   BMI 25.25 kg/m    Body mass index is 25.25 kg/m .  Physical Exam   GENERAL: alert and no distress  NECK: no adenopathy, no asymmetry, masses, or scars  RESP: lungs clear to auscultation - no rales, rhonchi or wheezes  CV: regular rate and rhythm, no murmur, click or rub, no peripheral edema  ABDOMEN: soft, nontender, no hepatosplenomegaly, no masses and bowel sounds normal  MS: no gross musculoskeletal defects noted, no edema  NEURO: Normal strength and tone, mentation intact and speech normal  PSYCH: mentation appears normal, affect normal/bright    BMP in process.         Signed Electronically by: Trisha Fonseca NP    "

## 2024-07-02 ENCOUNTER — HOSPITAL ENCOUNTER (OUTPATIENT)
Dept: GENERAL RADIOLOGY | Facility: HOSPITAL | Age: 39
Discharge: HOME | End: 2024-07-02
Attending: OBSTETRICS & GYNECOLOGY
Payer: COMMERCIAL

## 2024-07-02 ENCOUNTER — HOSPITAL ENCOUNTER (OUTPATIENT)
Facility: HOSPITAL | Age: 39
Discharge: HOME | End: 2024-07-02
Attending: RADIOLOGY | Admitting: RADIOLOGY
Payer: COMMERCIAL

## 2024-07-02 ENCOUNTER — OFFICE VISIT (OUTPATIENT)
Dept: FAMILY MEDICINE | Facility: OTHER | Age: 39
End: 2024-07-02
Attending: NURSE PRACTITIONER
Payer: COMMERCIAL

## 2024-07-02 VITALS
OXYGEN SATURATION: 100 % | HEIGHT: 59 IN | SYSTOLIC BLOOD PRESSURE: 138 MMHG | BODY MASS INDEX: 25.2 KG/M2 | WEIGHT: 125 LBS | TEMPERATURE: 98.7 F | DIASTOLIC BLOOD PRESSURE: 88 MMHG | HEART RATE: 104 BPM

## 2024-07-02 DIAGNOSIS — I10 ESSENTIAL HYPERTENSION: Primary | ICD-10-CM

## 2024-07-02 DIAGNOSIS — R10.31 ABDOMINAL WALL PAIN IN BOTH LOWER QUADRANTS: ICD-10-CM

## 2024-07-02 DIAGNOSIS — F41.9 ANXIETY: ICD-10-CM

## 2024-07-02 DIAGNOSIS — R09.89 LABILE BLOOD PRESSURE: ICD-10-CM

## 2024-07-02 DIAGNOSIS — G47.00 INSOMNIA, UNSPECIFIED TYPE: ICD-10-CM

## 2024-07-02 DIAGNOSIS — R10.32 ABDOMINAL WALL PAIN IN BOTH LOWER QUADRANTS: ICD-10-CM

## 2024-07-02 LAB
ANION GAP SERPL CALCULATED.3IONS-SCNC: 11 MMOL/L (ref 7–15)
BUN SERPL-MCNC: 15.9 MG/DL (ref 6–20)
CALCIUM SERPL-MCNC: 9.1 MG/DL (ref 8.6–10)
CHLORIDE SERPL-SCNC: 103 MMOL/L (ref 98–107)
CREAT SERPL-MCNC: 1.11 MG/DL (ref 0.51–0.95)
DEPRECATED HCO3 PLAS-SCNC: 23 MMOL/L (ref 22–29)
EGFRCR SERPLBLD CKD-EPI 2021: 65 ML/MIN/1.73M2
GLUCOSE SERPL-MCNC: 116 MG/DL (ref 70–99)
POTASSIUM SERPL-SCNC: 4 MMOL/L (ref 3.4–5.3)
SODIUM SERPL-SCNC: 137 MMOL/L (ref 135–145)

## 2024-07-02 PROCEDURE — 250N000009 HC RX 250: Performed by: RADIOLOGY

## 2024-07-02 PROCEDURE — 82374 ASSAY BLOOD CARBON DIOXIDE: CPT | Mod: ZL | Performed by: NURSE PRACTITIONER

## 2024-07-02 PROCEDURE — 36415 COLL VENOUS BLD VENIPUNCTURE: CPT | Mod: ZL | Performed by: NURSE PRACTITIONER

## 2024-07-02 PROCEDURE — 99214 OFFICE O/P EST MOD 30 MIN: CPT | Mod: 24 | Performed by: NURSE PRACTITIONER

## 2024-07-02 PROCEDURE — 96372 THER/PROPH/DIAG INJ SC/IM: CPT | Mod: 59 | Performed by: RADIOLOGY

## 2024-07-02 PROCEDURE — G0463 HOSPITAL OUTPT CLINIC VISIT: HCPCS | Mod: 25

## 2024-07-02 PROCEDURE — 250N000011 HC RX IP 250 OP 636: Performed by: RADIOLOGY

## 2024-07-02 PROCEDURE — 77002 NEEDLE LOCALIZATION BY XRAY: CPT

## 2024-07-02 RX ORDER — LIDOCAINE HYDROCHLORIDE 10 MG/ML
10 INJECTION, SOLUTION EPIDURAL; INFILTRATION; INTRACAUDAL; PERINEURAL ONCE
Status: COMPLETED | OUTPATIENT
Start: 2024-07-02 | End: 2024-07-02

## 2024-07-02 RX ORDER — TRAZODONE HYDROCHLORIDE 50 MG/1
50 TABLET, FILM COATED ORAL AT BEDTIME
Qty: 90 TABLET | Refills: 0 | Status: SHIPPED | OUTPATIENT
Start: 2024-07-02 | End: 2024-09-20

## 2024-07-02 RX ORDER — TRIAMCINOLONE ACETONIDE 40 MG/ML
40 INJECTION, SUSPENSION INTRA-ARTICULAR; INTRAMUSCULAR ONCE
Status: COMPLETED | OUTPATIENT
Start: 2024-07-02 | End: 2024-07-02

## 2024-07-02 RX ADMIN — TRIAMCINOLONE ACETONIDE 40 MG: 40 INJECTION, SUSPENSION INTRA-ARTICULAR; INTRAMUSCULAR at 11:53

## 2024-07-02 RX ADMIN — LIDOCAINE HYDROCHLORIDE 8 ML: 10 INJECTION, SOLUTION EPIDURAL; INFILTRATION; INTRACAUDAL; PERINEURAL at 11:54

## 2024-07-02 ASSESSMENT — ANXIETY QUESTIONNAIRES
6. BECOMING EASILY ANNOYED OR IRRITABLE: SEVERAL DAYS
GAD7 TOTAL SCORE: 16
3. WORRYING TOO MUCH ABOUT DIFFERENT THINGS: NEARLY EVERY DAY
GAD7 TOTAL SCORE: 16
GAD7 TOTAL SCORE: 16
7. FEELING AFRAID AS IF SOMETHING AWFUL MIGHT HAPPEN: MORE THAN HALF THE DAYS
7. FEELING AFRAID AS IF SOMETHING AWFUL MIGHT HAPPEN: MORE THAN HALF THE DAYS
4. TROUBLE RELAXING: NEARLY EVERY DAY
8. IF YOU CHECKED OFF ANY PROBLEMS, HOW DIFFICULT HAVE THESE MADE IT FOR YOU TO DO YOUR WORK, TAKE CARE OF THINGS AT HOME, OR GET ALONG WITH OTHER PEOPLE?: EXTREMELY DIFFICULT
IF YOU CHECKED OFF ANY PROBLEMS ON THIS QUESTIONNAIRE, HOW DIFFICULT HAVE THESE PROBLEMS MADE IT FOR YOU TO DO YOUR WORK, TAKE CARE OF THINGS AT HOME, OR GET ALONG WITH OTHER PEOPLE: EXTREMELY DIFFICULT
1. FEELING NERVOUS, ANXIOUS, OR ON EDGE: NEARLY EVERY DAY
5. BEING SO RESTLESS THAT IT IS HARD TO SIT STILL: NEARLY EVERY DAY
2. NOT BEING ABLE TO STOP OR CONTROL WORRYING: SEVERAL DAYS

## 2024-07-02 ASSESSMENT — ACTIVITIES OF DAILY LIVING (ADL)
ADLS_ACUITY_SCORE: 35

## 2024-07-02 ASSESSMENT — PATIENT HEALTH QUESTIONNAIRE - PHQ9
SUM OF ALL RESPONSES TO PHQ QUESTIONS 1-9: 12
SUM OF ALL RESPONSES TO PHQ QUESTIONS 1-9: 12
10. IF YOU CHECKED OFF ANY PROBLEMS, HOW DIFFICULT HAVE THESE PROBLEMS MADE IT FOR YOU TO DO YOUR WORK, TAKE CARE OF THINGS AT HOME, OR GET ALONG WITH OTHER PEOPLE: VERY DIFFICULT

## 2024-07-02 ASSESSMENT — PAIN SCALES - GENERAL: PAINLEVEL: SEVERE PAIN (7)

## 2024-07-02 NOTE — DISCHARGE INSTRUCTIONS
Home number on file [phone] (home)  Is it ok to leave a message at this number(s)? Yes    Dr Moreno completed your procedure on 7/2/2024.    Current Pain Level (0-10 Scale): 7/10  Post Pain Level (0-10):  5/10    Radiology Discharge instructions for Steroid Injection    Activity Level:     Do not do any heavy activity or exercise for 24 hours.   Do not drive for 4 hours after your injection.  Diet:   Return to your normal diet.  Medications:   If you have stopped taking your Aspirin, Coumadin/Warfarin, Ibuprofen, or any   other blood thinner for this procedure you may resume in the morning unless   your primary care provider has given you other instructions.    Diabetics may see an increase in blood sugar after steroid injections. If you are concerned about your blood sugar, please contact your family doctor.    Site Care:  Remove the bandage and bathe or shower the morning after the procedure.      Please allow two weeks to experience improvement in your pain.  If you have any further issues, please contact your provider.    Call your Primary Care Provider if you have the following (if your primary care provider is not available please seek emergency care):   Nausea with vomiting   Severe headache   Drowsiness or confusion   Redness or drainage at the injection or puncture site   Temperature over 101 degrees F   Other concerns   Worsening back pain   Stiff neck

## 2024-07-03 DIAGNOSIS — I10 ESSENTIAL HYPERTENSION: Primary | ICD-10-CM

## 2024-07-03 RX ORDER — LABETALOL 100 MG/1
100 TABLET, FILM COATED ORAL 2 TIMES DAILY
Qty: 60 TABLET | Refills: 2 | Status: SHIPPED | OUTPATIENT
Start: 2024-07-03

## 2024-07-03 ASSESSMENT — ACTIVITIES OF DAILY LIVING (ADL)
ADLS_ACUITY_SCORE: 35

## 2024-07-11 ENCOUNTER — MYC REFILL (OUTPATIENT)
Dept: OBGYN | Facility: OTHER | Age: 39
End: 2024-07-11

## 2024-07-11 ENCOUNTER — MYC MEDICAL ADVICE (OUTPATIENT)
Dept: FAMILY MEDICINE | Facility: OTHER | Age: 39
End: 2024-07-11

## 2024-07-11 DIAGNOSIS — G89.18 ACUTE POST-OPERATIVE PAIN: ICD-10-CM

## 2024-07-12 NOTE — TELEPHONE ENCOUNTER
hydrOXYzine HCl (ATARAX) 50 MG tablet 40 tablet 1 6/13/2024     Last Office Visit: 07/02/2024  Future Office visit:    Next 5 appointments (look out 90 days)      Jul 29, 2024 3:00 PM  (Arrive by 2:45 PM)  Provider Visit with Trisha Fonseca NP  [facility] [place] ([facility] [place] ) [address]  [place] [address]  [phone]             Routing refill request to provider for review/approval because:

## 2024-07-15 RX ORDER — HYDROXYZINE HYDROCHLORIDE 50 MG/1
50 TABLET, FILM COATED ORAL EVERY 8 HOURS PRN
Qty: 40 TABLET | Refills: 1 | Status: SHIPPED | OUTPATIENT
Start: 2024-07-15 | End: 2024-08-30

## 2024-07-16 ENCOUNTER — OFFICE VISIT (OUTPATIENT)
Dept: OBGYN | Facility: OTHER | Age: 39
End: 2024-07-16
Attending: OBSTETRICS & GYNECOLOGY
Payer: COMMERCIAL

## 2024-07-16 ENCOUNTER — TELEPHONE (OUTPATIENT)
Dept: INTERVENTIONAL RADIOLOGY/VASCULAR | Facility: HOSPITAL | Age: 39
End: 2024-07-16

## 2024-07-16 VITALS
BODY MASS INDEX: 25.44 KG/M2 | WEIGHT: 126.2 LBS | HEIGHT: 59 IN | OXYGEN SATURATION: 99 % | SYSTOLIC BLOOD PRESSURE: 128 MMHG | HEART RATE: 82 BPM | DIASTOLIC BLOOD PRESSURE: 88 MMHG

## 2024-07-16 DIAGNOSIS — R10.32 ABDOMINAL WALL PAIN IN BOTH LOWER QUADRANTS: ICD-10-CM

## 2024-07-16 DIAGNOSIS — M54.50 MIDLINE LOW BACK PAIN WITHOUT SCIATICA, UNSPECIFIED CHRONICITY: ICD-10-CM

## 2024-07-16 DIAGNOSIS — N76.0 BV (BACTERIAL VAGINOSIS): ICD-10-CM

## 2024-07-16 DIAGNOSIS — B37.31 YEAST INFECTION OF THE VAGINA: ICD-10-CM

## 2024-07-16 DIAGNOSIS — R10.31 ABDOMINAL WALL PAIN IN BOTH LOWER QUADRANTS: ICD-10-CM

## 2024-07-16 DIAGNOSIS — G89.18 ACUTE POST-OPERATIVE PAIN: ICD-10-CM

## 2024-07-16 DIAGNOSIS — B96.89 BV (BACTERIAL VAGINOSIS): ICD-10-CM

## 2024-07-16 DIAGNOSIS — R10.84 ABDOMINAL PAIN, GENERALIZED: Primary | ICD-10-CM

## 2024-07-16 LAB
ALBUMIN UR-MCNC: NEGATIVE MG/DL
APPEARANCE UR: ABNORMAL
BACTERIAL VAGINOSIS VAG-IMP: POSITIVE
BILIRUB UR QL STRIP: NEGATIVE
CANDIDA DNA VAG QL NAA+PROBE: DETECTED
CANDIDA GLABRATA / CANDIDA KRUSEI DNA: NOT DETECTED
COLOR UR AUTO: ABNORMAL
GLUCOSE UR STRIP-MCNC: NEGATIVE MG/DL
HGB UR QL STRIP: NEGATIVE
KETONES UR STRIP-MCNC: NEGATIVE MG/DL
LEUKOCYTE ESTERASE UR QL STRIP: ABNORMAL
MUCOUS THREADS #/AREA URNS LPF: PRESENT /LPF
NITRATE UR QL: NEGATIVE
PH UR STRIP: 6.5 [PH] (ref 4.7–8)
RBC URINE: 1 /HPF
SP GR UR STRIP: 1.02 (ref 1–1.03)
SQUAMOUS EPITHELIAL: 11 /HPF
T VAGINALIS DNA VAG QL NAA+PROBE: NOT DETECTED
UROBILINOGEN UR STRIP-MCNC: NORMAL MG/DL
WBC URINE: 2 /HPF

## 2024-07-16 PROCEDURE — 99214 OFFICE O/P EST MOD 30 MIN: CPT | Mod: 24 | Performed by: OBSTETRICS & GYNECOLOGY

## 2024-07-16 PROCEDURE — G0463 HOSPITAL OUTPT CLINIC VISIT: HCPCS

## 2024-07-16 PROCEDURE — 0352U MULTIPLEX VAGINAL PANEL BY PCR: CPT | Mod: ZL | Performed by: OBSTETRICS & GYNECOLOGY

## 2024-07-16 PROCEDURE — 81001 URINALYSIS AUTO W/SCOPE: CPT | Mod: ZL | Performed by: OBSTETRICS & GYNECOLOGY

## 2024-07-16 RX ORDER — FLUCONAZOLE 150 MG/1
150 TABLET ORAL ONCE
Qty: 1 TABLET | Refills: 0 | Status: SHIPPED | OUTPATIENT
Start: 2024-07-16 | End: 2024-07-16

## 2024-07-16 RX ORDER — METRONIDAZOLE 500 MG/1
500 TABLET ORAL 2 TIMES DAILY
Qty: 10 TABLET | Refills: 0 | Status: SHIPPED | OUTPATIENT
Start: 2024-07-16 | End: 2024-07-21

## 2024-07-16 ASSESSMENT — PAIN SCALES - GENERAL: PAINLEVEL: EXTREME PAIN (8)

## 2024-07-16 NOTE — TELEPHONE ENCOUNTER
INJECTION POST CALL    Was this an IR Referral? YES    Procedure: TRIGGER POINT INJECTION  Radiologist(s): Dr. John Moreno  Date of Procedure: 7/2/24    The patient was not available by telephone.    Left a message for patient to call IR department back    Linnette Nava

## 2024-07-17 NOTE — PROGRESS NOTES
CC:  Continued PO LLQ pain, vaginal bleeding  40 yo 8 wks s/p abdominal supracervical hyst.    She has had LLQ abdominal wall pain not relieved by trigger point injection x 2.  Pain now radiates across incision and to upper abd. + low back/pelvic discomfort.  Denies flank pian, fever , incisional problems.  She did have some vaginal bleeding with clots that has since resolved.          Patient Active Problem List   Diagnosis    WPW (Delaney-Parkinson-White syndrome)    MS (multiple sclerosis) (H)    Multigravida of advanced maternal age in third trimester    Encounter for triage in pregnant patient    TTP (thrombotic thrombocytopenic purpura) (H)    HUS (hemolytic uremic syndrome) (H)    HELLP (hemolytic anemia/elev liver enzymes/low platelets in pregnancy)    Detached retina, bilateral    Poor fetal growth affecting management of mother in third trimester, fetus 1 of multiple gestation    Pregnancy, supervision for, high-risk, third trimester    AMA (advanced maternal age) multigravida 35+    IUGR (intrauterine growth restriction) affecting care of mother    Hx of pre-eclampsia in prior pregnancy, currently pregnant    Encounter for sterilization    Hx of  section    PARDEEP (generalized anxiety disorder)    Pre-eclampsia, postpartum    Postpartum endometritis    S/P abdominal hysterectomy    Hospital discharge follow-up            Past Medical History:   Diagnosis Date    Detached retina, bilateral 2015    secondary to severe preeclampsia with HELLP syndroma and acute renal failure    PARDEEP (generalized anxiety disorder) 2023    HELLP (hemolytic anemia/elev liver enzymes/low platelets in pregnancy) 2015    Severe preeclampsia with HELLP syndrome and acute renal failure    History of blood transfusion 2015    HUS (hemolytic uremic syndrome) (H) 2015    Postpartum Thrombotic thrombocytopenic purpura (TTP) and 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    MS (multiple  "sclerosis) (H) 2015    Rh negative state in antepartum period 2023    TTP (thrombotic thrombocytopenic purpura) (H) 2015    Postpartum Thrombotic thrombocytopenic purpura (TTP) and 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    Uncomplicated asthma     Exercise enduced    WPW (Delaney-Parkinson-White syndrome) 2015    She has been evaluated for ablation, however, based on location this was not deemed safe by cardiology at the time            Past Surgical History:   Procedure Laterality Date     SECTION  2015    Severe preeclampsia with HELLP syndrome and acute renal failure then developed postpartum Thrombotic thrombocytopenic purpura (TTP) and Hemolytic uremic syndrome (HUS)    COMBINED  SECTION, SALPINGECTOMY BILATERAL Bilateral 2023    Procedure:  SECTION, WITH BILATERAL SALPINGECTOMY, Viable baby girl born at 1851;  Surgeon: Naseem Cruz MD;  Location: [facility] OR    DILATION AND CURETTAGE, OPERATIVE HYSTEROSCOPY, COMBINED N/A 10/25/2023    Procedure: HYSTEROSCOPY, WITH DILATION AND CURETTAGE OF UTERUS;  Surgeon: Scout Grimaldo MD;  Location: [facility] OR    GENITOURINARY SURGERY      Tubes removed    HYSTERECTOMY SUPRACERVICAL N/A 2024    Procedure: HYSTERECTOMY, SUPRACERVICAL, ABDOMINAL;  Surgeon: Scout Grimaldo MD;  Location: [facility] OR    IR TRIGGER POINT INJ 1 OR 2 MUSCLES  2024    TUBAL LIGATION              Social History     Tobacco Use    Smoking status: Never     Passive exposure: Never    Smokeless tobacco: Never   Substance Use Topics    Alcohol use: Yes     Comment: occasional            Family History   Problem Relation Age of Onset    Hypertension Mother     Hypertension Father     Allergies Maternal Grandmother         dust pollen    Cancer - colorectal Paternal Grandmother 59    Allergies Maternal Aunt         \"    Cardiovascular Maternal Aunt     Breast Cancer Other 60        3 great aunts on moms side             " "  Allergies   Allergen Reactions    Amlodipine     Cymbalta [Duloxetine Hcl]     Hydrochlorothiazide     Magnesium Other (See Comments)     Altered mental status after IV dosing    Prozac [Fluoxetine]     Zestril [Lisinopril] Fatigue    Losartan Difficulty breathing, Hives, Itching, Rash, Swelling and Visual Disturbance            Current Outpatient Medications   Medication Sig Dispense Refill    hydrOXYzine HCl (ATARAX) 50 MG tablet Take 1 tablet (50 mg) by mouth every 8 hours as needed for other or anxiety (adjuvant pain) 40 tablet 1    labetalol (NORMODYNE) 100 MG tablet Take 1 tablet (100 mg) by mouth 2 times daily 60 tablet 2    metroNIDAZOLE (FLAGYL) 500 MG tablet Take 1 tablet (500 mg) by mouth 2 times daily for 5 days 10 tablet 0    sertraline (ZOLOFT) 50 MG tablet Take 1 tablet (50 mg) by mouth daily 90 tablet 0    spironolactone (ALDACTONE) 25 MG tablet Take 1 tablet (25 mg) by mouth daily 30 tablet 0    traZODone (DESYREL) 50 MG tablet Take 1 tablet (50 mg) by mouth at bedtime 90 tablet 0          Review Of Systems  Constitutional:  Denies fever  GI/ negative except as noted per hpi    O:   /88 (BP Location: Left arm, Patient Position: Sitting, Cuff Size: Adult Regular)   Pulse 82   Ht 1.499 m (4' 11\")   Wt 57.2 kg (126 lb 3.2 oz)   SpO2 99%   BMI 25.49 kg/m    Gen:  NAD, A and O  Abd soft,  ND  tender to superficial palpation LLQ near apex of incision.  Incision:  Clean, dry, intact.  No erythema or drainage.     Lymphadenopathy:  neg  Vulva: No lesions, erythema, BUS wnl  Vagina: Pink, moist mucosa,no lesions  Cervix: No lesions, no CMT.  No bleeding  Pelvis.  No masses/tenderness.   Anus without lesions  Ext.  neg      A:  LLQ abdominal pain.  Likely neuropathic/abdominal wall but she has not responded to trigger point injections.  Vagional bleeding has resolved. Will obtain pelvis/abdominal CT to r/o intraabdominal pathology.  If negative consider anesthesia or pain clinic referral for " possible block.    Wet prep, UA ordered to r/o infection.    Multiplex returned + for yeast and BV,  Diflucan/Flagyl Rx.

## 2024-07-23 ENCOUNTER — TELEPHONE (OUTPATIENT)
Dept: INTERVENTIONAL RADIOLOGY/VASCULAR | Facility: HOSPITAL | Age: 39
End: 2024-07-23

## 2024-07-23 ENCOUNTER — HOSPITAL ENCOUNTER (OUTPATIENT)
Dept: CT IMAGING | Facility: HOSPITAL | Age: 39
Discharge: HOME | End: 2024-07-23
Attending: OBSTETRICS & GYNECOLOGY | Admitting: OBSTETRICS & GYNECOLOGY
Payer: COMMERCIAL

## 2024-07-23 DIAGNOSIS — G89.18 ACUTE POST-OPERATIVE PAIN: ICD-10-CM

## 2024-07-23 DIAGNOSIS — R10.32 ABDOMINAL WALL PAIN IN BOTH LOWER QUADRANTS: ICD-10-CM

## 2024-07-23 DIAGNOSIS — R10.31 ABDOMINAL WALL PAIN IN BOTH LOWER QUADRANTS: ICD-10-CM

## 2024-07-23 PROCEDURE — 74177 CT ABD & PELVIS W/CONTRAST: CPT

## 2024-07-23 PROCEDURE — 250N000011 HC RX IP 250 OP 636: Performed by: RADIOLOGY

## 2024-07-23 RX ORDER — IOPAMIDOL 755 MG/ML
62 INJECTION, SOLUTION INTRAVASCULAR ONCE
Status: COMPLETED | OUTPATIENT
Start: 2024-07-23 | End: 2024-07-23

## 2024-07-23 RX ADMIN — IOPAMIDOL 62 ML: 755 INJECTION, SOLUTION INTRAVENOUS at 14:13

## 2024-07-23 NOTE — TELEPHONE ENCOUNTER
INJECTION POST CALL    Was this an IR Referral? YES    Procedure: TRIGGER POINT INJECTION  Radiologist(s): Dr. John Moreno  Date of Procedure: 7/2/24    The patient was not available by telephone.    2nd attempt, left message for patient to call IR department back. Will mail out a post card    Linnette Nava

## 2024-07-26 NOTE — PROGRESS NOTES
"  Assessment & Plan     (R51.9) Worst headache of life  (primary encounter diagnosis)  (H53.9) Abnormal vision  Plan: Patient presented for today HTN follow-up, but complained of the \"worst headache of her life.\" Lost vision in right eye several days ago, but now notes that her vision is blurry. H/O MS and does not take anything, Also has h/o bilateral detached retina. She was therefore sent to the ER. Most likely needs head imaging.       (I10) Essential hypertension  (R09.89) Labile blood pressure  Comment: Blood pressure controlled today; kidney function stable  Plan: Has follow-up with nephrology. Will keep appointment. Will continue current medications.     (R10.9) Abdominal wall pain  (R52,  L90.5) Painful scar  (Z90.710) S/P abdominal hysterectomy  Comment: h/o hysterectomy and , now has pain over scar, recent abdominal CT without acute abnormality, she did get trigger point injections, but they did not help  Plan: Will send to general surgery. May need surgical intervention over scar.         Paty Smith is a 39 year old, presenting for the following health issues:  Hypertension and Anxiety    History of Present Illness       Mental Health Follow-up:  Patient presents to follow-up on Anxiety.    Patient's anxiety since last visit has been:  Medium  The patient is having other symptoms associated with anxiety.  Any significant life events: health concerns  Patient is feeling anxious or having panic attacks.  Patient has no concerns about alcohol or drug use.    Hypertension: She presents for follow up of hypertension.  She does check blood pressure  regularly outside of the clinic. Outside blood pressures have been over 140/90. She does not follow a low salt diet.     Headaches:   Since the patient's last clinic visit, headaches are: worsened  The patient is getting headaches:  Almost wvery day  She is not able to do normal daily activities when she has a migraine.  The patient is taking the " "following rescue/relief medications:  No rescue/relief medications   Patient states \"I get no relief\" from the rescue/relief medications.   The patient is taking the following medications to prevent migraines:  No medications to prevent migraines  In the past 4 weeks, the patient has gone to an Urgent Care or Emergency Room 0 times times due to headaches.    Reason for visit:  Follow up from last appt    She eats 4 or more servings of fruits and vegetables daily.She consumes 0 sweetened beverage(s) daily.She exercises with enough effort to increase her heart rate 30 to 60 minutes per day.  She exercises with enough effort to increase her heart rate 4 days per week.   She is taking medications regularly.     Headache; patient states that she has had a headache for over a week. Started after getting CT contrast. Rates it at a 9/10 today. Worst headache of her life. No history of migraines. Lost vision in right eye several days ago, now vision comes and goes. Also has severe neck pain. H/O MS; does not take anything due to a history of reactions. H/O bilateral retinal detachments. H/O HELP with HUS.     Hypertension Follow-up    Last seen on 7/2/24. Blood pressure was 138/88. Aldactone was increased to 50 mg from 25 mg. Also taking labetalol.      Do you check your blood pressure regularly outside of the clinic? Yes   Are you following a low salt diet? Yes  Are your blood pressures ever more than 140 on the top number (systolic) OR more   than 90 on the bottom number (diastolic), for example 140/90? Yes  -Denies chest pain, shortness of breath, dizziness, syncope, or palpitations. Complains of a severe headache-see above.    -She has also been referred to nephrology.   Allergy to losartan, lisinopril, hydrochlorothiazide, and amlodipine.   -She did see EP, Dr. Leigh for telemedicine consult regarding history of Delaney-Parkinson-White syndrome and syncopal episodes. Syncope likely related to orthostatism and hydration " was encouraged.     -She does have a h/o severe preeclampsia with HELP syndrome, TTP, and acute renal failure. Never required dialysis.   -Transthoracic echocardiogram 2023 showed normal cardiac structure and functioning.    She also has significant abdominal wall tenderness. Worst pain over  scar. Trigger point injections were done, but did not help. Recently had abdominal CT-no acute abnormality was seen, but 11 mm hepatic hemangioma was noted. MRI was recommended.     Social History     Tobacco Use    Smoking status: Never     Passive exposure: Never    Smokeless tobacco: Never   Vaping Use    Vaping status: Never Used   Substance Use Topics    Alcohol use: Yes     Comment: occasional    Drug use: No         2023     3:56 PM 10/3/2023    10:25 AM 2024     1:37 PM   PHQ   PHQ-9 Total Score 5 10 12   Q9: Thoughts of better off dead/self-harm past 2 weeks Not at all Not at all Not at all         6/3/2024     1:18 PM 2024     1:38 PM 2024    10:21 AM   PARDEEP-7 SCORE   Total Score  16 (severe anxiety) 17 (severe anxiety)   Total Score 21 16 17         2024     1:37 PM   Last PHQ-9   1.  Little interest or pleasure in doing things 1   2.  Feeling down, depressed, or hopeless 0   3.  Trouble falling or staying asleep, or sleeping too much 3   4.  Feeling tired or having little energy 3   5.  Poor appetite or overeating 0   6.  Feeling bad about yourself 1   7.  Trouble concentrating 2   8.  Moving slowly or restless 2   Q9: Thoughts of better off dead/self-harm past 2 weeks 0   PHQ-9 Total Score 12         2024    10:21 AM   PARDEEP-7    1. Feeling nervous, anxious, or on edge 3   2. Not being able to stop or control worrying 3   3. Worrying too much about different things 2   4. Trouble relaxing 3   5. Being so restless that it is hard to sit still 1   6. Becoming easily annoyed or irritable 3   7. Feeling afraid, as if something awful might happen 2   PARDEEP-7 Total Score 17   If you  "checked any problems, how difficult have they made it for you to do your work, take care of things at home, or get along with other people? Very difficult             Objective    /82 (BP Location: Right arm, Patient Position: Sitting, Cuff Size: Adult Regular)   Pulse 70   Temp 98.2  F (36.8  C) (Tympanic)   Ht 1.499 m (4' 11\")   Wt 58.9 kg (129 lb 12.8 oz)   SpO2 99%   BMI 26.22 kg/m    Body mass index is 26.22 kg/m .  Physical Exam   Exam:  Constitutional: healthy, alert, and no distress  Psychiatric: mentation appears normal and affect normal/bright  Further exam not done-was sent to ER-see assessment/plan.          Signed Electronically by: Trisha Fonseca NP    "

## 2024-07-29 ENCOUNTER — APPOINTMENT (OUTPATIENT)
Dept: CT IMAGING | Facility: HOSPITAL | Age: 39
End: 2024-07-29
Attending: PHYSICIAN ASSISTANT
Payer: COMMERCIAL

## 2024-07-29 ENCOUNTER — HOSPITAL ENCOUNTER (EMERGENCY)
Facility: HOSPITAL | Age: 39
Discharge: HOME | End: 2024-07-29
Attending: PHYSICIAN ASSISTANT | Admitting: PHYSICIAN ASSISTANT
Payer: COMMERCIAL

## 2024-07-29 ENCOUNTER — OFFICE VISIT (OUTPATIENT)
Dept: FAMILY MEDICINE | Facility: OTHER | Age: 39
End: 2024-07-29
Attending: NURSE PRACTITIONER
Payer: COMMERCIAL

## 2024-07-29 VITALS
HEART RATE: 58 BPM | BODY MASS INDEX: 23.01 KG/M2 | HEIGHT: 63 IN | DIASTOLIC BLOOD PRESSURE: 85 MMHG | TEMPERATURE: 97.9 F | WEIGHT: 129.85 LBS | SYSTOLIC BLOOD PRESSURE: 140 MMHG | OXYGEN SATURATION: 99 % | RESPIRATION RATE: 16 BRPM

## 2024-07-29 VITALS
HEIGHT: 59 IN | SYSTOLIC BLOOD PRESSURE: 118 MMHG | TEMPERATURE: 98.2 F | OXYGEN SATURATION: 99 % | WEIGHT: 129.8 LBS | DIASTOLIC BLOOD PRESSURE: 82 MMHG | HEART RATE: 70 BPM | BODY MASS INDEX: 26.17 KG/M2

## 2024-07-29 DIAGNOSIS — Z90.710 S/P ABDOMINAL HYSTERECTOMY: ICD-10-CM

## 2024-07-29 DIAGNOSIS — I10 ESSENTIAL HYPERTENSION: ICD-10-CM

## 2024-07-29 DIAGNOSIS — R51.9 WORST HEADACHE OF LIFE: Primary | ICD-10-CM

## 2024-07-29 DIAGNOSIS — R09.89 LABILE BLOOD PRESSURE: ICD-10-CM

## 2024-07-29 DIAGNOSIS — L90.5 PAINFUL SCAR: ICD-10-CM

## 2024-07-29 DIAGNOSIS — R10.9 ABDOMINAL WALL PAIN: ICD-10-CM

## 2024-07-29 DIAGNOSIS — H53.9 ABNORMAL VISION: ICD-10-CM

## 2024-07-29 DIAGNOSIS — R52 PAINFUL SCAR: ICD-10-CM

## 2024-07-29 DIAGNOSIS — R51.9 ACUTE INTRACTABLE HEADACHE, UNSPECIFIED HEADACHE TYPE: ICD-10-CM

## 2024-07-29 LAB
ANION GAP SERPL CALCULATED.3IONS-SCNC: 12 MMOL/L (ref 7–15)
BASOPHILS # BLD AUTO: 0.1 10E3/UL (ref 0–0.2)
BASOPHILS NFR BLD AUTO: 1 %
BUN SERPL-MCNC: 17.4 MG/DL (ref 6–20)
CALCIUM SERPL-MCNC: 9.3 MG/DL (ref 8.8–10.4)
CHLORIDE SERPL-SCNC: 105 MMOL/L (ref 98–107)
CREAT SERPL-MCNC: 0.99 MG/DL (ref 0.51–0.95)
CRP SERPL-MCNC: <3 MG/L
EGFRCR SERPLBLD CKD-EPI 2021: 74 ML/MIN/1.73M2
EOSINOPHIL # BLD AUTO: 0.2 10E3/UL (ref 0–0.7)
EOSINOPHIL NFR BLD AUTO: 3 %
ERYTHROCYTE [DISTWIDTH] IN BLOOD BY AUTOMATED COUNT: 15.3 % (ref 10–15)
ERYTHROCYTE [SEDIMENTATION RATE] IN BLOOD BY WESTERGREN METHOD: 6 MM/HR (ref 0–20)
GLUCOSE SERPL-MCNC: 86 MG/DL (ref 70–99)
HCO3 SERPL-SCNC: 23 MMOL/L (ref 22–29)
HCT VFR BLD AUTO: 40.4 % (ref 35–47)
HGB BLD-MCNC: 13.4 G/DL (ref 11.7–15.7)
HOLD SPECIMEN: NORMAL
IMM GRANULOCYTES # BLD: 0 10E3/UL
IMM GRANULOCYTES NFR BLD: 0 %
LYMPHOCYTES # BLD AUTO: 1.5 10E3/UL (ref 0.8–5.3)
LYMPHOCYTES NFR BLD AUTO: 24 %
MCH RBC QN AUTO: 30.3 PG (ref 26.5–33)
MCHC RBC AUTO-ENTMCNC: 33.2 G/DL (ref 31.5–36.5)
MCV RBC AUTO: 91 FL (ref 78–100)
MONOCYTES # BLD AUTO: 0.4 10E3/UL (ref 0–1.3)
MONOCYTES NFR BLD AUTO: 7 %
NEUTROPHILS # BLD AUTO: 4.2 10E3/UL (ref 1.6–8.3)
NEUTROPHILS NFR BLD AUTO: 65 %
NRBC # BLD AUTO: 0 10E3/UL
NRBC BLD AUTO-RTO: 0 /100
PLATELET # BLD AUTO: 284 10E3/UL (ref 150–450)
POTASSIUM SERPL-SCNC: 4.7 MMOL/L (ref 3.4–5.3)
RBC # BLD AUTO: 4.42 10E6/UL (ref 3.8–5.2)
SODIUM SERPL-SCNC: 140 MMOL/L (ref 135–145)
WBC # BLD AUTO: 6.4 10E3/UL (ref 4–11)

## 2024-07-29 PROCEDURE — 70450 CT HEAD/BRAIN W/O DYE: CPT

## 2024-07-29 PROCEDURE — 36415 COLL VENOUS BLD VENIPUNCTURE: CPT | Performed by: PHYSICIAN ASSISTANT

## 2024-07-29 PROCEDURE — 80048 BASIC METABOLIC PNL TOTAL CA: CPT | Performed by: PHYSICIAN ASSISTANT

## 2024-07-29 PROCEDURE — 99284 EMERGENCY DEPT VISIT MOD MDM: CPT | Performed by: PHYSICIAN ASSISTANT

## 2024-07-29 PROCEDURE — 96374 THER/PROPH/DIAG INJ IV PUSH: CPT | Mod: XU

## 2024-07-29 PROCEDURE — 70496 CT ANGIOGRAPHY HEAD: CPT

## 2024-07-29 PROCEDURE — 85004 AUTOMATED DIFF WBC COUNT: CPT | Performed by: PHYSICIAN ASSISTANT

## 2024-07-29 PROCEDURE — 86140 C-REACTIVE PROTEIN: CPT | Performed by: PHYSICIAN ASSISTANT

## 2024-07-29 PROCEDURE — 250N000011 HC RX IP 250 OP 636: Performed by: PHYSICIAN ASSISTANT

## 2024-07-29 PROCEDURE — G0463 HOSPITAL OUTPT CLINIC VISIT: HCPCS

## 2024-07-29 PROCEDURE — 96375 TX/PRO/DX INJ NEW DRUG ADDON: CPT

## 2024-07-29 PROCEDURE — 85652 RBC SED RATE AUTOMATED: CPT | Performed by: PHYSICIAN ASSISTANT

## 2024-07-29 PROCEDURE — 99214 OFFICE O/P EST MOD 30 MIN: CPT | Performed by: NURSE PRACTITIONER

## 2024-07-29 PROCEDURE — 99285 EMERGENCY DEPT VISIT HI MDM: CPT | Mod: 25

## 2024-07-29 RX ORDER — DIPHENHYDRAMINE HYDROCHLORIDE 50 MG/ML
25 INJECTION INTRAMUSCULAR; INTRAVENOUS ONCE
Status: COMPLETED | OUTPATIENT
Start: 2024-07-29 | End: 2024-07-29

## 2024-07-29 RX ORDER — IOPAMIDOL 755 MG/ML
67 INJECTION, SOLUTION INTRAVASCULAR ONCE
Status: COMPLETED | OUTPATIENT
Start: 2024-07-29 | End: 2024-07-29

## 2024-07-29 RX ORDER — KETOROLAC TROMETHAMINE 30 MG/ML
30 INJECTION, SOLUTION INTRAMUSCULAR; INTRAVENOUS ONCE
Status: COMPLETED | OUTPATIENT
Start: 2024-07-29 | End: 2024-07-29

## 2024-07-29 RX ADMIN — DIPHENHYDRAMINE HYDROCHLORIDE 25 MG: 50 INJECTION, SOLUTION INTRAMUSCULAR; INTRAVENOUS at 17:48

## 2024-07-29 RX ADMIN — IOPAMIDOL 67 ML: 755 INJECTION, SOLUTION INTRAVENOUS at 18:14

## 2024-07-29 RX ADMIN — PROCHLORPERAZINE EDISYLATE 10 MG: 5 INJECTION INTRAMUSCULAR; INTRAVENOUS at 17:49

## 2024-07-29 RX ADMIN — KETOROLAC TROMETHAMINE 30 MG: 30 INJECTION, SOLUTION INTRAMUSCULAR at 17:51

## 2024-07-29 ASSESSMENT — COLUMBIA-SUICIDE SEVERITY RATING SCALE - C-SSRS
1. IN THE PAST MONTH, HAVE YOU WISHED YOU WERE DEAD OR WISHED YOU COULD GO TO SLEEP AND NOT WAKE UP?: NO
6. HAVE YOU EVER DONE ANYTHING, STARTED TO DO ANYTHING, OR PREPARED TO DO ANYTHING TO END YOUR LIFE?: NO
2. HAVE YOU ACTUALLY HAD ANY THOUGHTS OF KILLING YOURSELF IN THE PAST MONTH?: NO
6. HAVE YOU EVER DONE ANYTHING, STARTED TO DO ANYTHING, OR PREPARED TO DO ANYTHING TO END YOUR LIFE?: NO
1. IN THE PAST MONTH, HAVE YOU WISHED YOU WERE DEAD OR WISHED YOU COULD GO TO SLEEP AND NOT WAKE UP?: NO
2. HAVE YOU ACTUALLY HAD ANY THOUGHTS OF KILLING YOURSELF IN THE PAST MONTH?: NO

## 2024-07-29 ASSESSMENT — ANXIETY QUESTIONNAIRES
IF YOU CHECKED OFF ANY PROBLEMS ON THIS QUESTIONNAIRE, HOW DIFFICULT HAVE THESE PROBLEMS MADE IT FOR YOU TO DO YOUR WORK, TAKE CARE OF THINGS AT HOME, OR GET ALONG WITH OTHER PEOPLE: VERY DIFFICULT
1. FEELING NERVOUS, ANXIOUS, OR ON EDGE: NEARLY EVERY DAY
3. WORRYING TOO MUCH ABOUT DIFFERENT THINGS: MORE THAN HALF THE DAYS
5. BEING SO RESTLESS THAT IT IS HARD TO SIT STILL: SEVERAL DAYS
7. FEELING AFRAID AS IF SOMETHING AWFUL MIGHT HAPPEN: MORE THAN HALF THE DAYS
8. IF YOU CHECKED OFF ANY PROBLEMS, HOW DIFFICULT HAVE THESE MADE IT FOR YOU TO DO YOUR WORK, TAKE CARE OF THINGS AT HOME, OR GET ALONG WITH OTHER PEOPLE?: VERY DIFFICULT
2. NOT BEING ABLE TO STOP OR CONTROL WORRYING: NEARLY EVERY DAY
6. BECOMING EASILY ANNOYED OR IRRITABLE: NEARLY EVERY DAY
4. TROUBLE RELAXING: NEARLY EVERY DAY
GAD7 TOTAL SCORE: 17
7. FEELING AFRAID AS IF SOMETHING AWFUL MIGHT HAPPEN: MORE THAN HALF THE DAYS
GAD7 TOTAL SCORE: 17

## 2024-07-29 ASSESSMENT — ENCOUNTER SYMPTOMS
FEVER: 0
ABDOMINAL PAIN: 0
BACK PAIN: 0

## 2024-07-29 ASSESSMENT — ACTIVITIES OF DAILY LIVING (ADL): ADLS_ACUITY_SCORE: 35

## 2024-07-29 ASSESSMENT — VISUAL ACUITY: OS: 20/20

## 2024-07-29 ASSESSMENT — PAIN SCALES - GENERAL: PAINLEVEL: EXTREME PAIN (9)

## 2024-07-29 NOTE — ED NOTES
Patient in room 2, settled on cot and call light with in reach.    Has had a headache for about a week now. States it is all behind her right eye to the back of her neck. Denies nausea or vomiting. States that she has had vision problems from blurred vision to almost gone in the right eye.

## 2024-07-29 NOTE — ED PROVIDER NOTES
History     Chief Complaint   Patient presents with    Headache     The history is provided by the patient.     Sarah Ugalde is a 39 year old female who presented to the emergency department ambulatory for evaluation of persistent right-sided headache for 1 week.  Abrupt in onset.  Worse headache of her life.  Some intermittent right-sided visual concerns as well.  Currently denies.  No difficulty speaking or walking.    Allergies:  Allergies   Allergen Reactions    Amlodipine     Cymbalta [Duloxetine Hcl]     Hydrochlorothiazide     Magnesium Other (See Comments)     Altered mental status after IV dosing    Prozac [Fluoxetine]     Zestril [Lisinopril] Fatigue    Losartan Difficulty breathing, Hives, Itching, Rash, Swelling and Visual Disturbance       Problem List:    Patient Active Problem List    Diagnosis Date Noted    Hospital discharge follow-up 2024     Priority: Medium    S/P abdominal hysterectomy 2024     Priority: Medium    Postpartum endometritis 2023     Priority: Medium    Pre-eclampsia, postpartum 2023     Priority: Medium    Pregnancy, supervision for, high-risk, third trimester 2023     Priority: Medium    AMA (advanced maternal age) multigravida 35+ 2023     Priority: Medium    IUGR (intrauterine growth restriction) affecting care of mother 2023     Priority: Medium    Hx of pre-eclampsia in prior pregnancy, currently pregnant 2023     Priority: Medium    Encounter for sterilization 2023     Priority: Medium    Hx of  section 2023     Priority: Medium    PARDEEP (generalized anxiety disorder) 2023     Priority: Medium    Poor fetal growth affecting management of mother in third trimester, fetus 1 of multiple gestation 2023     Priority: Medium    Encounter for triage in pregnant patient 2023     Priority: Medium    Multigravida of advanced maternal age in third trimester 2022     Priority: Medium      AMA:  NIPT - declined  h/o WPW. BL ECG - nml  Zio Patch, Cards consult  H/o TTP/preeclampsia/HELLP/acute renal failure>  Baby asa 16 wks.  Nml BL labs.   level II - nml  H/o HUS  H/o MS  no flu or covid vax  Plan repeat /sterilization 23.  Sterilization forms signed.   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      HUS (hemolytic uremic syndrome) (H) 2015     Priority: Medium     Postpartum Thrombotic thrombocytopenic purpura (TTP) and 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      HELLP (hemolytic anemia/elev liver enzymes/low platelets in pregnancy) 2015     Priority: Medium     Severe preeclampsia with HELLP syndrome and acute renal failure      Detached retina, bilateral 2015     Priority: Medium     secondary to severe preeclampsia with HELLP syndroma and acute renal failure      WPW (Delaney-Parkinson-White syndrome) 2015     Priority: Medium     She has been evaluated for ablation, however, based on location this was not deemed safe by cardiology at the time      MS (multiple sclerosis) (H) 2015     Priority: Medium        Past Medical History:    Past Medical History:   Diagnosis Date    Detached retina, bilateral 2015    PARDEEP (generalized anxiety disorder) 2023    HELLP (hemolytic anemia/elev liver enzymes/low platelets in pregnancy) 2015    History of blood transfusion 2015    HUS (hemolytic uremic syndrome) (H) 2015    MS (multiple sclerosis) (H) 2015    Rh negative state in antepartum period 2023    TTP (thrombotic thrombocytopenic purpura) (H) 2015    Uncomplicated asthma 1999    WPW (Delaney-Parkinson-White syndrome) 2015       Past Surgical History:    Past Surgical History:   Procedure  "Laterality Date     SECTION  2015    Severe preeclampsia with HELLP syndrome and acute renal failure then developed postpartum Thrombotic thrombocytopenic purpura (TTP) and Hemolytic uremic syndrome (HUS)    COMBINED  SECTION, SALPINGECTOMY BILATERAL Bilateral 2023    Procedure:  SECTION, WITH BILATERAL SALPINGECTOMY, Viable baby girl born at 1851;  Surgeon: Naseem Cruz MD;  Location: [facility] OR    DILATION AND CURETTAGE, OPERATIVE HYSTEROSCOPY, COMBINED N/A 10/25/2023    Procedure: HYSTEROSCOPY, WITH DILATION AND CURETTAGE OF UTERUS;  Surgeon: Scout Grimaldo MD;  Location: [facility] OR    GENITOURINARY SURGERY      Tubes removed    HYSTERECTOMY SUPRACERVICAL N/A 2024    Procedure: HYSTERECTOMY, SUPRACERVICAL, ABDOMINAL;  Surgeon: Scout Grimaldo MD;  Location: [facility] OR    IR TRIGGER POINT INJ 1 OR 2 MUSCLES  2024    TUBAL LIGATION         Family History:    Family History   Problem Relation Age of Onset    Hypertension Mother     Hypertension Father     Allergies Maternal Grandmother         dust pollen    Cancer - colorectal Paternal Grandmother 59    Allergies Maternal Aunt         \"    Cardiovascular Maternal Aunt     Breast Cancer Other 60        3 great aunts on moms side       Social History:  Marital Status:  Single [1]  Social History     Tobacco Use    Smoking status: Never     Passive exposure: Never    Smokeless tobacco: Never   Vaping Use    Vaping status: Never Used   Substance Use Topics    Alcohol use: Yes     Comment: occasional    Drug use: No        Medications:    hydrOXYzine HCl (ATARAX) 50 MG tablet  labetalol (NORMODYNE) 100 MG tablet  sertraline (ZOLOFT) 50 MG tablet  spironolactone (ALDACTONE) 25 MG tablet  traZODone (DESYREL) 50 MG tablet          Review of Systems   Constitutional:  Negative for fever.   Eyes:         See HPI   Cardiovascular:  Negative for chest pain.   Gastrointestinal:  Negative for abdominal pain.   Musculoskeletal:  Negative for " "back pain.        Right-sided posterior neck pain   Skin: Negative.    Neurological:         See HPI       Physical Exam   BP: 137/92  Pulse: 68  Temp: 97.9  F (36.6  C)  Resp: 16  Height: 160 cm (5' 3\")  Weight: 58.9 kg (129 lb 13.6 oz)  SpO2: 99 %      Physical Exam  Vitals and nursing note reviewed.   Constitutional:       General: She is not in acute distress.     Appearance: Normal appearance. She is normal weight. She is not ill-appearing, toxic-appearing or diaphoretic.      Comments: Pleasant and talkative 39-year-old female found semireclined in no distress   Eyes:      Extraocular Movements: Extraocular movements intact.      Conjunctiva/sclera: Conjunctivae normal.      Pupils: Pupils are equal, round, and reactive to light.   Cardiovascular:      Rate and Rhythm: Normal rate and regular rhythm.   Pulmonary:      Effort: Pulmonary effort is normal.   Skin:     General: Skin is warm and dry.      Capillary Refill: Capillary refill takes less than 2 seconds.   Neurological:      General: No focal deficit present.      Mental Status: She is alert and oriented to person, place, and time.      Comments: Cranial nerve examination: revealed that for cranial nerve   II: the pupils were reactive and the visual field were full  III, IV, and VI, the extraocular movements were full.    V: facial sensation intact bilateral   VII: facial movements are symmetric  VIII: hearing intact to voice  IX & X: the soft palate rises symmetrically   XI: shoulder movements are symmetric  XII: tongue is midline     Neurological examination:  That the patient was awake and alert, the attention, orientation, concentration, language, memory and fund of knowledge were all normal.  The patient had no neglect or apraxia.     Normal speech  Normal gait   Psychiatric:         Mood and Affect: Mood normal.         Behavior: Behavior normal.         ED Course     ED Course as of 07/29/24 1825 Mon Jul 29, 2024   4064 Broad differential " diagnosis is considered and includes but is not limited to acute migraine headache, cluster headache, tension headache, temporal arteritis, subarachnoid hemorrhage, epidural hematoma, vertebral artery dissection.   1735 Given the subjective and objective findings, plan will be for basic laboratory evaluation including inflammatory markers, migraine cocktail, and cross-sectional imaging of the brain including angiography.   1755 WBC: 6.4   1755 Platelet Count: 284   1823 Unfortunately Sarah has to leave the emergency department due to family issues.  Given the patient's chronicity of the headache as well as current examination, this is reasonable.  I do not believe that she needs to be signed out AGAINST MEDICAL ADVICE.  She understands the risks.     Procedures              Critical Care time:  none               Results for orders placed or performed during the hospital encounter of 07/29/24 (from the past 24 hour(s))   CBC with platelets differential    Narrative    The following orders were created for panel order CBC with platelets differential.  Procedure                               Abnormality         Status                     ---------                               -----------         ------                     CBC with platelets and d...[[id number]]  Abnormal            Final result                 Please view results for these tests on the individual orders.   Basic metabolic panel   Result Value Ref Range    Sodium 140 135 - 145 mmol/L    Potassium 4.7 3.4 - 5.3 mmol/L    Chloride 105 98 - 107 mmol/L    Carbon Dioxide (CO2) 23 22 - 29 mmol/L    Anion Gap 12 7 - 15 mmol/L    Urea Nitrogen 17.4 6.0 - 20.0 mg/dL    Creatinine 0.99 (H) 0.51 - 0.95 mg/dL    GFR Estimate 74 >60 mL/min/1.73m2    Calcium 9.3 8.8 - 10.4 mg/dL    Glucose 86 70 - 99 mg/dL   [facility] Draw    Narrative    The following orders were created for panel order [facility] Draw.  Procedure                               Abnormality          Status                     ---------                               -----------         ------                     Extra Blue Top Tube[[id number]]                              In process                 Extra Red Top Tube[[id number]]                               In process                 Extra Heparinized Syringe[[id number]]                        In process                   Please view results for these tests on the individual orders.   CRP inflammation   Result Value Ref Range    CRP Inflammation <3.00 <5.00 mg/L   CBC with platelets and differential   Result Value Ref Range    WBC Count 6.4 4.0 - 11.0 10e3/uL    RBC Count 4.42 3.80 - 5.20 10e6/uL    Hemoglobin 13.4 11.7 - 15.7 g/dL    Hematocrit 40.4 35.0 - 47.0 %    MCV 91 78 - 100 fL    MCH 30.3 26.5 - 33.0 pg    MCHC 33.2 31.5 - 36.5 g/dL    RDW 15.3 (H) 10.0 - 15.0 %    Platelet Count 284 150 - 450 10e3/uL    % Neutrophils 65 %    % Lymphocytes 24 %    % Monocytes 7 %    % Eosinophils 3 %    % Basophils 1 %    % Immature Granulocytes 0 %    NRBCs per 100 WBC 0 <1 /100    Absolute Neutrophils 4.2 1.6 - 8.3 10e3/uL    Absolute Lymphocytes 1.5 0.8 - 5.3 10e3/uL    Absolute Monocytes 0.4 0.0 - 1.3 10e3/uL    Absolute Eosinophils 0.2 0.0 - 0.7 10e3/uL    Absolute Basophils 0.1 0.0 - 0.2 10e3/uL    Absolute Immature Granulocytes 0.0 <=0.4 10e3/uL    Absolute NRBCs 0.0 10e3/uL       Medications   prochlorperazine (COMPAZINE) injection 10 mg (10 mg Intravenous $Given 7/29/24 1749)   diphenhydrAMINE (BENADRYL) injection 25 mg (25 mg Intravenous $Given 7/29/24 1748)   ketorolac (TORADOL) injection 30 mg (30 mg Intravenous $Given 7/29/24 1751)   iopamidol (ISOVUE-370) solution 67 mL (67 mLs Intravenous $Given 7/29/24 1814)   sodium chloride (PF) 0.9% PF flush 100 mL (100 mLs Intravenous $Given 7/29/24 1814)       Assessments & Plan (with Medical Decision Making)   This is a 39-year-old female sent from the clinic for evaluation of intractable right-sided headache.   Laboratory evaluation as above.  She underwent serum testing as well as cross-sectional imaging of the brain including CT angiogram.  Unfortunately the patient had to leave the emergency department prior to results.  Sed rate and imaging results were pending.  She voiced complete understanding of the risks of this.  However, she has urgent family matters to attend to.  She will return to this emergency department for any changes in her condition whatsoever.  She told me she will monitor her results on MyChart.  Broad differential was considered.  Obviously keeping the patient in the emergency department will be tantamount to medical battery.    Sarah has also agreed to schedule a follow up appointment with her primary provider for re-evaluation and further management. She verbalized an understanding of the results of our workup and agree with the complete discharge plan including instructions for return and follow up.  There is no indication for further investigation or treatment on an emergent basis.  There is no reasonably foreseeable injury that would be associated with discharge and close outpatient follow-up.      This document was prepared using a combination of typing and voice generated software.  While every attempt was made for accuracy, spelling and grammatical errors may exist.     I have reviewed the nursing notes.    I have reviewed the findings, diagnosis, plan and need for follow up with the patient.           Medical Decision Making  The patient's presentation was of moderate complexity (an undiagnosed new problem with uncertain prognosis).    The patient's evaluation involved:  ordering and/or review of 3+ test(s) in this encounter (labs and imaging)    The patient's management necessitated moderate risk (prescription drug management including medications given in the ED) and moderate risk (IV contrast administration).        New Prescriptions    No medications on file       Final diagnoses:    Acute intractable headache, unspecified headache type       7/29/2024   [facility]       Bailey Simon PA-C  07/29/24 [id number]

## 2024-07-31 ENCOUNTER — PREP FOR PROCEDURE (OUTPATIENT)
Dept: OTOLARYNGOLOGY | Facility: OTHER | Age: 39
End: 2024-07-31

## 2024-07-31 ENCOUNTER — OFFICE VISIT (OUTPATIENT)
Dept: SURGERY | Facility: OTHER | Age: 39
End: 2024-07-31
Attending: NURSE PRACTITIONER
Payer: COMMERCIAL

## 2024-07-31 VITALS
OXYGEN SATURATION: 100 % | SYSTOLIC BLOOD PRESSURE: 112 MMHG | HEART RATE: 79 BPM | BODY MASS INDEX: 25.8 KG/M2 | HEIGHT: 59 IN | DIASTOLIC BLOOD PRESSURE: 68 MMHG | WEIGHT: 128 LBS | RESPIRATION RATE: 16 BRPM

## 2024-07-31 DIAGNOSIS — R10.9 ABDOMINAL WALL PAIN: Primary | ICD-10-CM

## 2024-07-31 DIAGNOSIS — R52 PAINFUL SCAR: ICD-10-CM

## 2024-07-31 DIAGNOSIS — Z90.710 S/P ABDOMINAL HYSTERECTOMY: ICD-10-CM

## 2024-07-31 DIAGNOSIS — R10.9 ABDOMINAL WALL PAIN: ICD-10-CM

## 2024-07-31 DIAGNOSIS — L90.5 PAINFUL SCAR: ICD-10-CM

## 2024-07-31 DIAGNOSIS — Z90.710 STATUS POST HYSTERECTOMY: Primary | ICD-10-CM

## 2024-07-31 PROCEDURE — 99203 OFFICE O/P NEW LOW 30 MIN: CPT | Performed by: NURSE PRACTITIONER

## 2024-07-31 PROCEDURE — G0463 HOSPITAL OUTPT CLINIC VISIT: HCPCS

## 2024-07-31 ASSESSMENT — PAIN SCALES - GENERAL: PAINLEVEL: SEVERE PAIN (7)

## 2024-07-31 NOTE — PROGRESS NOTES
CLINIC NOTE - CONSULT  2024    Patient:Sarah ATKINSON [facility]    Referring Physician:  Trisha Fonseca NP    Reason for Referral: Abdominal Pain    This is a 39 year old female with months of abdominal pain. The pain is to her lower abdomen in her incisional site scar from OB/GYN surgery .  The pain is specifically to the left corner of her incisional site.      Past Medical History:  Past Medical History:   Diagnosis Date    Detached retina, bilateral 2015    secondary to severe preeclampsia with HELLP syndroma and acute renal failure    PARDEEP (generalized anxiety disorder) 2023    HELLP (hemolytic anemia/elev liver enzymes/low platelets in pregnancy) 2015    Severe preeclampsia with HELLP syndrome and acute renal failure    History of blood transfusion 2015    HUS (hemolytic uremic syndrome) (H) 2015    Postpartum Thrombotic thrombocytopenic purpura (TTP) and 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    MS (multiple sclerosis) (H) 2015    Rh negative state in antepartum period 2023    TTP (thrombotic thrombocytopenic purpura) (H) 2015    Postpartum Thrombotic thrombocytopenic purpura (TTP) and 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    Uncomplicated asthma     Exercise enduced    WPW (Delaney-Parkinson-White syndrome) 2015    She has been evaluated for ablation, however, based on location this was not deemed safe by cardiology at the time       Past Surgical History:  Past Surgical History:   Procedure Laterality Date     SECTION  2015    Severe preeclampsia with HELLP syndrome and acute renal failure then developed postpartum Thrombotic thrombocytopenic purpura (TTP) and Hemolytic uremic syndrome (HUS)    COMBINED  SECTION, SALPINGECTOMY BILATERAL Bilateral 2023    Procedure:  SECTION, WITH BILATERAL SALPINGECTOMY, Viable baby girl born at 1851;  Surgeon: Naseem Cruz MD;  Location: [facility]  "OR    DILATION AND CURETTAGE, OPERATIVE HYSTEROSCOPY, COMBINED N/A 10/25/2023    Procedure: HYSTEROSCOPY, WITH DILATION AND CURETTAGE OF UTERUS;  Surgeon: Scout Grimaldo MD;  Location: [facility] OR    GENITOURINARY SURGERY  2023    Tubes removed    HYSTERECTOMY SUPRACERVICAL N/A 04/18/2024    Procedure: HYSTERECTOMY, SUPRACERVICAL, ABDOMINAL;  Surgeon: Scout Grimaldo MD;  Location: [facility] OR    IR TRIGGER POINT INJ 1 OR 2 MUSCLES  7/2/2024    TUBAL LIGATION         Family History History:  Family History   Problem Relation Age of Onset    Hypertension Mother     Hypertension Father     Allergies Maternal Grandmother         dust pollen    Cancer - colorectal Paternal Grandmother 59    Allergies Maternal Aunt         \"    Cardiovascular Maternal Aunt     Breast Cancer Other 60        3 great aunts on moms side       History of Tobacco Use:  History   Smoking Status    Never   Smokeless Tobacco    Never       Current Medications:  Current Outpatient Medications   Medication Sig Dispense Refill    hydrOXYzine HCl (ATARAX) 50 MG tablet Take 1 tablet (50 mg) by mouth every 8 hours as needed for other or anxiety (adjuvant pain) 40 tablet 1    labetalol (NORMODYNE) 100 MG tablet Take 1 tablet (100 mg) by mouth 2 times daily 60 tablet 2    sertraline (ZOLOFT) 50 MG tablet Take 1 tablet (50 mg) by mouth daily 90 tablet 0    spironolactone (ALDACTONE) 25 MG tablet Take 1 tablet (25 mg) by mouth daily 30 tablet 0    traZODone (DESYREL) 50 MG tablet Take 1 tablet (50 mg) by mouth at bedtime 90 tablet 0       Allergies:  Allergies   Allergen Reactions    Amlodipine     Cymbalta [Duloxetine Hcl]     Hydrochlorothiazide     Magnesium Other (See Comments)     Altered mental status after IV dosing    Prozac [Fluoxetine]     Zestril [Lisinopril] Fatigue    Losartan Difficulty breathing, Hives, Itching, Rash, Swelling and Visual Disturbance       PHYSICAL EXAM:     Vital signs: /68 (BP Location: Right arm, Cuff Size: Adult Regular)   " "Pulse 79   Resp 16   Ht 1.499 m (4' 11\")   Wt 58.1 kg (128 lb)   SpO2 100%   BMI 25.85 kg/m     BMI: Body mass index is 25.85 kg/m .   General: Normal, healthy, cooperative, in no acute distress, alert   Skin: no rashes   Lungs: respirations are non-labored   Abdominal: non-distended, healed lower abdominal incisional site noted with significant tenderness noted to palpation of left edge of incision.   Extremities: No cyanosis, clubbing or edema noted bilaterally in Upper and Lower Extremities   Neurological: without deficit    LABORATORY:  CBC RESULTS:   Recent Labs   Lab Test 07/29/24  1739   WBC 6.4   RBC 4.42   HGB 13.4   HCT 40.4   MCV 91   MCH 30.3   MCHC 33.2   RDW 15.3*          Last Comprehensive Metabolic Panel:  Sodium   Date Value Ref Range Status   07/29/2024 140 135 - 145 mmol/L Final   10/29/2018 141 133 - 144 mmol/L Final     Potassium   Date Value Ref Range Status   07/29/2024 4.7 3.4 - 5.3 mmol/L Final   10/29/2018 3.9 3.4 - 5.3 mmol/L Final     Chloride   Date Value Ref Range Status   07/29/2024 105 98 - 107 mmol/L Final   10/29/2018 113 (H) 94 - 109 mmol/L Final     Carbon Dioxide   Date Value Ref Range Status   10/29/2018 21 20 - 32 mmol/L Final     Carbon Dioxide (CO2)   Date Value Ref Range Status   07/29/2024 23 22 - 29 mmol/L Final     Anion Gap   Date Value Ref Range Status   07/29/2024 12 7 - 15 mmol/L Final   10/29/2018 7 3 - 14 mmol/L Final     Glucose   Date Value Ref Range Status   07/29/2024 86 70 - 99 mg/dL Final   10/29/2018 85 70 - 99 mg/dL Final     GLUCOSE BY METER POCT   Date Value Ref Range Status   04/18/2024 86 70 - 99 mg/dL Final     Urea Nitrogen   Date Value Ref Range Status   07/29/2024 17.4 6.0 - 20.0 mg/dL Final   10/29/2018 13 7 - 30 mg/dL Final     Creatinine   Date Value Ref Range Status   07/29/2024 0.99 (H) 0.51 - 0.95 mg/dL Final   10/29/2018 1.01 0.52 - 1.04 mg/dL Final     GFR Estimate   Date Value Ref Range Status   07/29/2024 74 >60 mL/min/1.73m2 " Final     Comment:     eGFR calculated using 2021 CKD-EPI equation.   10/29/2018 63 >60 mL/min/1.7m2 Final     Comment:     Non  GFR Calc     Calcium   Date Value Ref Range Status   07/29/2024 9.3 8.8 - 10.4 mg/dL Final     Comment:     Reference intervals for this test were updated on 7/16/2024 to reflect our healthy population more accurately. There may be differences in the flagging of prior results with similar values performed with this method. Those prior results can be interpreted in the context of the updated reference intervals.   10/29/2018 8.7 8.5 - 10.1 mg/dL Final     Bilirubin Total   Date Value Ref Range Status   04/12/2024 0.5 <=1.2 mg/dL Final   10/29/2018 0.2 0.2 - 1.3 mg/dL Final     Alkaline Phosphatase   Date Value Ref Range Status   04/12/2024 96 40 - 150 U/L Final     Comment:     Reference intervals for this test were updated on 11/14/2023 to more accurately reflect our healthy population. There may be differences in the flagging of prior results with similar values performed with this method. Interpretation of those prior results can be made in the context of the updated reference intervals.   10/29/2018 69 40 - 150 U/L Final     ALT   Date Value Ref Range Status   04/12/2024 19 0 - 50 U/L Final     Comment:     Reference intervals for this test were updated on 6/12/2023 to more accurately reflect our healthy population. There may be differences in the flagging of prior results with similar values performed with this method. Interpretation of those prior results can be made in the context of the updated reference intervals.     10/29/2018 25 0 - 50 U/L Final     AST   Date Value Ref Range Status   04/12/2024 24 0 - 45 U/L Final     Comment:     Reference intervals for this test were updated on 6/12/2023 to more accurately reflect our healthy population. There may be differences in the flagging of prior results with similar values performed with this method. Interpretation of  those prior results can be made in the context of the updated reference intervals.   10/29/2018 17 0 - 45 U/L Final       ASSESSMENT:   39 year old female with abdominal pain.      PLAN:   Will plan on wound exploration of incisional site in the OR for patient's symptoms.

## 2024-08-02 ENCOUNTER — ANESTHESIA EVENT (OUTPATIENT)
Dept: ANESTHESIOLOGY | Facility: HOSPITAL | Age: 39
End: 2024-08-02

## 2024-08-02 ENCOUNTER — DOCUMENTATION ONLY (OUTPATIENT)
Facility: HOSPITAL | Age: 39
End: 2024-08-02

## 2024-08-02 NOTE — CONSULTS
"Thank you Dr Grimaldo for the consult on Sarah.  I was able to do a phone consult today.  She had a c section last July 2023 and then a hysterectomy in April 2024.  Since then she has had pain on the left side of her incision, that will radiate from the apex of the lateral part of her incision to \"the middle\" then almost up to the umbilicus, in that whole triangle region.  Trigger injections were attempted in the office and did not relieve the pain, scans have been done and do not show anything abnormal.  This pain does limit her almost daily, no pattern to the pain either.  Describes as sharp and burning-worse as the day goes along and she has to hold her child or activity.  Will set her up for an outpatient injection via Ultrasound.   "
no

## 2024-08-06 ENCOUNTER — MYC MEDICAL ADVICE (OUTPATIENT)
Dept: FAMILY MEDICINE | Facility: OTHER | Age: 39
End: 2024-08-06

## 2024-08-06 DIAGNOSIS — H33.20 RETINAL DETACHMENT, UNSPECIFIED LATERALITY: ICD-10-CM

## 2024-08-06 DIAGNOSIS — R51.9 WORST HEADACHE OF LIFE: Primary | ICD-10-CM

## 2024-08-06 RX ORDER — HYDROCODONE BITARTRATE AND ACETAMINOPHEN 5; 325 MG/1; MG/1
1 TABLET ORAL EVERY 12 HOURS PRN
Qty: 10 TABLET | Refills: 0 | Status: SHIPPED | OUTPATIENT
Start: 2024-08-06 | End: 2024-08-07

## 2024-08-07 RX ORDER — OXYCODONE HYDROCHLORIDE 5 MG/1
5 TABLET ORAL EVERY 12 HOURS PRN
Qty: 10 TABLET | Refills: 0 | Status: SHIPPED | OUTPATIENT
Start: 2024-08-07 | End: 2024-08-10

## 2024-08-07 NOTE — TELEPHONE ENCOUNTER
Patient requesting medication change as Norco was not covered by her insurance. Was previously prescribed Oxycodone 5 mg and this was covered.

## 2024-08-09 ENCOUNTER — ANESTHESIA (OUTPATIENT)
Dept: SURGERY | Facility: HOSPITAL | Age: 39
End: 2024-08-09
Payer: COMMERCIAL

## 2024-08-09 ENCOUNTER — HOSPITAL ENCOUNTER (OUTPATIENT)
Facility: HOSPITAL | Age: 39
Discharge: HOME | End: 2024-08-09
Attending: OBSTETRICS & GYNECOLOGY | Admitting: OBSTETRICS & GYNECOLOGY
Payer: COMMERCIAL

## 2024-08-09 ENCOUNTER — DOCUMENTATION ONLY (OUTPATIENT)
Facility: HOSPITAL | Age: 39
End: 2024-08-09

## 2024-08-09 ENCOUNTER — APPOINTMENT (OUTPATIENT)
Dept: ULTRASOUND IMAGING | Facility: HOSPITAL | Age: 39
End: 2024-08-09
Attending: NURSE ANESTHETIST, CERTIFIED REGISTERED
Payer: COMMERCIAL

## 2024-08-09 ENCOUNTER — ANESTHESIA EVENT (OUTPATIENT)
Dept: SURGERY | Facility: HOSPITAL | Age: 39
End: 2024-08-09
Payer: COMMERCIAL

## 2024-08-09 VITALS
HEART RATE: 72 BPM | HEIGHT: 59 IN | OXYGEN SATURATION: 98 % | DIASTOLIC BLOOD PRESSURE: 108 MMHG | SYSTOLIC BLOOD PRESSURE: 145 MMHG | BODY MASS INDEX: 25.4 KG/M2 | WEIGHT: 126 LBS | TEMPERATURE: 97.1 F | RESPIRATION RATE: 16 BRPM

## 2024-08-09 PROCEDURE — 999N000141 HC STATISTIC PRE-PROCEDURE NURSING ASSESSMENT

## 2024-08-09 RX ORDER — BUPIVACAINE HYDROCHLORIDE 2.5 MG/ML
INJECTION, SOLUTION EPIDURAL; INFILTRATION; INTRACAUDAL
Status: DISCONTINUED
Start: 2024-08-09 | End: 2024-08-09 | Stop reason: HOSPADM

## 2024-08-09 RX ORDER — TRIAMCINOLONE ACETONIDE 40 MG/ML
INJECTION, SUSPENSION INTRA-ARTICULAR; INTRAMUSCULAR
Status: DISCONTINUED
Start: 2024-08-09 | End: 2024-08-09 | Stop reason: HOSPADM

## 2024-08-09 ASSESSMENT — ACTIVITIES OF DAILY LIVING (ADL)
ADLS_ACUITY_SCORE: 18
ADLS_ACUITY_SCORE: 16

## 2024-08-09 NOTE — PROCEDURES
"Thank you Dr Grimaldo for the consult on Sarah.  She has had a long history of chronic and now acute pain.  About 9 years ago she had an emergent per her c section in [place].  Since that time she has had a triangle area above her bladder and pubis the goes towards her umbilicus pain.  She stated it was there after her first c section and then she just thought it would go away and then forgot about it.  July 2023 she had another c section, that same pain did return.  She tried and failed gabapentin at that time.  She then underwent an open abdominal hysterectomy in April 2024.  Since then she has now had left lateral pain starting at the end point of her surgery scar, radiates to her lateral aspect of her illiac crest, and then down the lateral aspect of her left thigh.  You can pinpoint this area and cause that pain.  Nothing relieves it.  When she holds her infant child it increases with pain.  Again gabapentin was attempted and did not work, she did PT, that did not work.  An injection in the office was attempted, did not work, then a trigger point injection was attempted in DI, that did not work either.  She was then referred to anesthesia to see if we could help her out.  I did explain this may help, may not help, or could make it worse being a closed area. I first scanned the area with the ultrasound to see if I could identify any areas of compression or scar areas.  I did note and pictured an area that I could pin point in the illioinguinal area/fascial illiac area-it did elicit that pain she described above.  I then scanned the pubis area and noted a thickened area.  Consent was then obtained, the area was marked, the area was prepped with chloroprep.  A 22g 4\" stimuplex needle was inserted under US guidance to the area in the illioinguinal/fascial illiac area.  I could feel thickened tissue as I inserted the needle.  Images were taken with the US, I then started with aspiration and injections under the US.  " "I could see areas that were tight open up with the fluid.  A mixture of 40mg kenalog, 10ml sodium chloride, and 19mL 0.25% marcaine was injected for a total volume of 30mL, 21ml in the inguinal area/lateral aspect of the surgery scar and 9mL along the pubis area.  The patient stated she could feel small \"pops\" as I was attempting to break up those thickened areas.  She tolerated the procedure well.  Instructions were given.  I will follow up with her next week.  When she came in her pain was 7/10, when she left she stated her pubis area was better, her inguinal area was numb and she couldn't feel that pinpoint pain at the moment, but she still had some lateral pain.  I explained the local will make it feel like that and it will wear off in several hours.    "

## 2024-08-09 NOTE — DISCHARGE INSTRUCTIONS
"  Nerve Block    Today you received a block to numb the nerves near your surgery site.    This is a block using local anesthetic or \"numbing\" medication injected around the nerves to anesthetize or \"numb\" the area supplied by those nerves. This block is injected into the muscle layer near your surgical site.      Diet: There are no diet restrictions, but you should drink plenty of fluids, unless you are on a fluid-restricted diet.     Activity: If your surgical site is an arm or leg you should be careful with your affected limb, since it is possible to injure your limb without being aware of it due to the numbing. Until full feeling returns, you should guard against bumping or hitting your limb, and avoid extreme hot or cold temperatures on the skin.    After you go home: As the block wears off, the feeling will return as a tingling or prickly sensation near your surgical site. This may also feels like pins and needles. This is a normal sensation. You will experience more discomfort from your incisions as the feeling returns. You may want to take a pain pill (a narcotic or Tylenol if this was prescribed by your surgeon) when you start to experience mild pain, before the pain becomes more severe. It is important to stay on top of your pain, so when the block wears off, your pain will be easier to control. This may mean needing to schedule medication throughout the night. If your pain medications do not control your pain, you should notify your surgeon. If you are taking narcotics for pain management, do not drink alcohol, drive a car, or perform hazardous activities.  If you have questions or concerns you may call your surgeon at the number provided with your discharge instructions.     Call if you have any of these signs of infection:  Fever of 101 F (38 C) or higher, or as directed  Bleeding or swelling that increases  A red, hard, hot, or painful area around the injection site.  Shortness of breath  Chest " pain    Call your surgeon if you experience blurry vision, ringing in the ears or metallic taste in your mouth.

## 2024-08-19 ENCOUNTER — ANESTHESIA EVENT (OUTPATIENT)
Dept: ANESTHESIOLOGY | Facility: HOSPITAL | Age: 39
End: 2024-08-19

## 2024-08-19 ENCOUNTER — DOCUMENTATION ONLY (OUTPATIENT)
Facility: HOSPITAL | Age: 39
End: 2024-08-19

## 2024-08-19 NOTE — CONSULTS
I originally saw Sarah and did 2 separate injections on 8/9/24 for pain.  I followed up on 8/12/24 and she was doing ok, both areas were a little better, the left sided area where I had injected was doing best, but still had the lateral pain, while the midline area was only slightly improved.  I reached out to Sarah again today and she stated that her midline pain is now gone, doing great.  However her lateral pain is back to where it was, not doing any better.  She told me she sees Dr Cruz on Sept 19 2024.  She is happy I attempted to help her out and at least one is gone.  Will update Dr Grimaldo and Dr Cruz

## 2024-08-22 ENCOUNTER — MYC MEDICAL ADVICE (OUTPATIENT)
Dept: FAMILY MEDICINE | Facility: OTHER | Age: 39
End: 2024-08-22

## 2024-08-22 DIAGNOSIS — F41.9 ANXIETY: Primary | ICD-10-CM

## 2024-08-22 NOTE — TELEPHONE ENCOUNTER
Patient see by Dr. Alvarado with [facility] today, 08/22/24. PCP to advise on medication change.

## 2024-08-23 ENCOUNTER — MEDICAL CORRESPONDENCE (OUTPATIENT)
Dept: ULTRASOUND IMAGING | Facility: HOSPITAL | Age: 39
End: 2024-08-23

## 2024-08-23 RX ORDER — ALPRAZOLAM 0.25 MG
0.25 TABLET ORAL DAILY PRN
Qty: 15 TABLET | Refills: 0 | Status: SHIPPED | OUTPATIENT
Start: 2024-08-23 | End: 2024-09-11

## 2024-08-27 ENCOUNTER — MEDICAL CORRESPONDENCE (OUTPATIENT)
Dept: ULTRASOUND IMAGING | Facility: HOSPITAL | Age: 39
End: 2024-08-27

## 2024-08-28 ENCOUNTER — HOSPITAL ENCOUNTER (OUTPATIENT)
Dept: ULTRASOUND IMAGING | Facility: HOSPITAL | Age: 39
Discharge: HOME | End: 2024-08-28
Attending: INTERNAL MEDICINE | Admitting: INTERNAL MEDICINE
Payer: COMMERCIAL

## 2024-08-28 DIAGNOSIS — I10 PRIMARY HYPERTENSION: ICD-10-CM

## 2024-08-28 PROCEDURE — 93975 VASCULAR STUDY: CPT

## 2024-08-30 ENCOUNTER — MYC REFILL (OUTPATIENT)
Dept: OBGYN | Facility: OTHER | Age: 39
End: 2024-08-30

## 2024-08-30 DIAGNOSIS — G89.18 ACUTE POST-OPERATIVE PAIN: ICD-10-CM

## 2024-08-30 RX ORDER — HYDROXYZINE HYDROCHLORIDE 50 MG/1
50 TABLET, FILM COATED ORAL EVERY 8 HOURS PRN
Qty: 40 TABLET | Refills: 1 | Status: SHIPPED | OUTPATIENT
Start: 2024-08-30 | End: 2024-09-23

## 2024-08-30 NOTE — TELEPHONE ENCOUNTER
Hydroxyzine  Last Written Prescription Date: 7/15/24  Last Fill Quantity: 40 # of Refills: 1  Last Office Visit: 7/16/24

## 2024-09-04 ENCOUNTER — MYC MEDICAL ADVICE (OUTPATIENT)
Dept: FAMILY MEDICINE | Facility: OTHER | Age: 39
End: 2024-09-04

## 2024-09-04 ENCOUNTER — NURSE TRIAGE (OUTPATIENT)
Dept: CARE COORDINATION | Facility: OTHER | Age: 39
End: 2024-09-04

## 2024-09-04 DIAGNOSIS — D18.03 HEPATIC HEMANGIOMA: Primary | ICD-10-CM

## 2024-09-04 DIAGNOSIS — I45.6 WPW (WOLFF-PARKINSON-WHITE SYNDROME): ICD-10-CM

## 2024-09-04 DIAGNOSIS — R09.89 LABILE BLOOD PRESSURE: ICD-10-CM

## 2024-09-04 RX ORDER — CLONIDINE HYDROCHLORIDE 0.1 MG/1
0.1 TABLET ORAL AT BEDTIME
COMMUNITY
Start: 2024-09-04

## 2024-09-04 RX ORDER — FUROSEMIDE 20 MG
20 TABLET ORAL DAILY
COMMUNITY
Start: 2024-09-04

## 2024-09-04 NOTE — PROGRESS NOTES
Talked with patient. Was having right flank pain that has since subsided. Will order liver MRI to follow-up on the hemangioma.     Nephrology also wanted labs, but Kasi told her that they were not ordered. I did review his note and see what labs he wanted. Ordered. Once back, will have her let him know that they are available in [facility].     Also has WPW. Overdue for cardiology follow-up. Referral placed.

## 2024-09-04 NOTE — TELEPHONE ENCOUNTER
Patient sent [employer] message in regards to back pain and elevated blood pressure.  Writer spoke with patient. Patient has a history of kidney and blood pressure issues. Patient states right flank pain has increased in the past couple days with elevated blood pressure readings. Patient reports blood pressure 135-150/110-121. Patient reports blurred vision, headache and spontaneous sweating. Patient denies chest pain shortness of breath, fever, urination urgency or frequency. Patient messaged Nephrologist but has not yet hear back. Patient advised per protocol to be evaluated in ED. Patient declined multiple times. Patient asking for message to be sent to PCP to advise.    Reason for Disposition   Systolic BP >= 160 OR Diastolic >= 100, and any cardiac (e.g., breathing difficulty, chest pain) or neurologic symptoms (e.g., new-onset blurred or double vision)    Additional Information   Negative: Sounds like a life-threatening emergency to the triager   Negative: Symptom is main concern (e.g., headache, chest pain)   Negative: Low blood pressure is main concern    Protocols used: Blood Pressure - High-A-OH

## 2024-09-10 NOTE — PROGRESS NOTES
Preoperative Evaluation  [facility] [place]  [address]  [place] [address]  Phone: [phone]  Primary Provider: Trisha Fonseca NP  Pre-op Performing Provider: Trisha Fonseca NP  Sep 11, 2024           9/11/2024   Surgical Information   What procedure is being done? Exploratory Lap   Facility or Hospital where procedure/surgery will be performed: [facility]   Who is doing the procedure / surgery? Dr. Cruz   Date of surgery / procedure: Sept 19th, 2024   Time of surgery / procedure: Tbd   Where do you plan to recover after surgery? at home alone        Fax number for surgical facility: Note does not need to be faxed, will be available electronically in Epic.    Assessment & Plan     The proposed surgical procedure is considered INTERMEDIATE risk.    (Z01.818) Preop general physical exam  (primary encounter diagnosis)  (L76.82) Incisional pain  (Z90.710) S/P hysterectomy  Plan: No concerns noted. Cleared for surgery. Blood work unremarkable. EKG without concerns. MT interval is shortened-I did review with cardiology and no concerns were noted.     (I45.6) WPW (Delaney-Parkinson-White syndrome)  Plan: Stable. HR 73. EKG with NSR. MT slightly shortened, but cardiology was not concerned. Cleared for surgery.     (R09.89) Labile blood pressure  Plan: controlled    (F41.9) Anxiety  Plan: controlled    (G35) MS (multiple sclerosis) (H)  Plan: stable.     (H33.20) Retinal detachment, unspecified laterality  Plan: H/O detached retina, healing, no current issues    ((D22.9) Atypical nevi  Comment: left mid back, family h/o skin cancer  Plan: Adult Dermatology  Referral        Derm referral placed. Ok to proceed with surgery.     (N18.2) CKD (chronic kidney disease) stage 2, GFR 60-89 ml/min  Comment: kidney function mildly decreased, but stable  Plan: Cleared for surgery. Monitor fluid status.       Risks and Recommendations  The patient has the following additional risks and  recommendations for perioperative complications:  Pulmonary:    - Incentive spirometry post-op      Will hold all medications the morning of surgery except her labetalol and Xanax. I am afraid her anxiety will be too high if she does not take her Xanax.     Recommendation  Approval given to proceed with proposed procedure, without further diagnostic evaluation.    Sarah Ugalde with a functional capacity of greater than four MET's. There are no obvious contraindications to proceeding with surgery at this time. Recommend that the surgeon review risks and benefits of the procedure prior to proceeding.     Was recommended to avoid eating and drinking anything 12 hours prior to surgery unless his surgeon tells him otherwise.         Subjective   Sarah is a 39 year old, presenting for the following:  Pre-Op Exam (Abdominal Wound Exploration)      HPI related to upcoming procedure: Patient with chronic pain around hysterectomy incision. Plans to have wound exploration.          9/11/2024   Pre-Op Questionnaire   Have you ever had a heart attack or stroke? No   Have you ever had surgery on your heart or blood vessels, such as a stent placement, a coronary artery bypass, or surgery on an artery in your head, neck, heart, or legs? No   Do you have chest pain with activity? No   Do you have a history of heart failure? No   Do you currently have a cold, bronchitis or symptoms of other infection? No   Do you have a cough, shortness of breath, or wheezing? No   Do you or anyone in your family have previous history of blood clots? Grandfather had DVT after surgery for cancer; father also had PE-unsure cause   Do you or does anyone in your family have a serious bleeding problem such as prolonged bleeding following surgeries or cuts? No   Have you ever had problems with anemia or been told to take iron pills? No   Have you had any abnormal blood loss such as black, tarry or bloody stools, or abnormal vaginal bleeding? No  "  Have you ever had a blood transfusion? (!) YES, after pregnancy    Have you ever had a transfusion reaction? No   Are you willing to have a blood transfusion if it is medically needed before, during, or after your surgery? Yes   Have you or any of your relatives ever had problems with anesthesia? No   Do you have sleep apnea, excessive snoring or daytime drowsiness? No   Do you have any artifical heart valves or other implanted medical devices like a pacemaker, defibrillator, or continuous glucose monitor? No   Do you have artificial joints? No   Are you allergic to latex? No        Health Care Directive  Patient does not have a Health Care Directive or Living Will: Discussed advance care planning with patient; however, patient declined at this time.    Preoperative Review of    reviewed - controlled substances reflected in medication list.      Status of Chronic Conditions:  HYPERTENSION - Patient has longstanding history of HTN , currently denies any symptoms referable to elevated blood pressure. Specifically denies chest pain, palpitations, dyspnea, orthopnea, PND or peripheral edema. Blood pressure readings have been in normal range. Current medication regimen is as listed below-taking clonidine, lasix, labetalol, and spironolactone. Patient denies any side effects of medication.     Mets greater than 4, able to walk several miles.     MS; stable. Has not tolerated any medications used to help with MS. Follows with neurology.     She does have anxiety. Currently taking PRN Xanax. Uses the Xanax once nightly. Has not tolerated SNRIs/SSRIs.     She does have a history of WPW. HR has been controlled. She recently has had several surgeries without complications. Denies chest pain, shortness of breath, dizziness, syncope, or palpitations. She has had a syncopal episode in the past, but cardiology felt it was r/t orthostatism and hydration. She did wear Zio patch in 4/2023.            \"Underlying rhythm was " "sinus.   Hrt rate ranged from 52 bpm, maximum heart rate of 190 bmp, averaging 75 bmp.   No significant bradycardia, pauses, Mobitz type II or 3rd degree heart block.   No atrial fibrillation on this study.   x21 triggered events and x0 diary entries.  These corresponded to SVT, NSR, sinus tachycardia, SVEs and VEs.   x0 runs of VT.     x48 runs of SVT longest lasting 12.3 sec's with a maximum heart rate of 190 bmp.   Rare, <1% of PAC's, atrial couplets, atrial triplets.   Occasional PVCs at 1.3% (79726)   + episodes of ventricular bigeminy lasting up to 4.8 sec's.   + episodes of ventricular trigeminy lasting up to 1 m 56 sec's.\"          She also has an atypical nevi on left mid back. Growing. Has bled in the past. She has spent a lot of time in the sun. Family history of skin cancer. Unsure what type.                         Patient Active Problem List    Diagnosis Date Noted    Hospital discharge follow-up 2024     Priority: Medium    S/P abdominal hysterectomy 2024     Priority: Medium    Postpartum endometritis 2023     Priority: Medium    Pre-eclampsia, postpartum 2023     Priority: Medium    Pregnancy, supervision for, high-risk, third trimester 2023     Priority: Medium    AMA (advanced maternal age) multigravida 35+ 2023     Priority: Medium    IUGR (intrauterine growth restriction) affecting care of mother 2023     Priority: Medium    Hx of pre-eclampsia in prior pregnancy, currently pregnant 2023     Priority: Medium    Encounter for sterilization 2023     Priority: Medium    Hx of  section 2023     Priority: Medium    PARDEEP (generalized anxiety disorder) 2023     Priority: Medium    Poor fetal growth affecting management of mother in third trimester, fetus 1 of multiple gestation 2023     Priority: Medium    Encounter for triage in pregnant patient 2023     Priority: Medium    Multigravida of advanced maternal age in " third trimester 2022     Priority: Medium     AMA:  NIPT - declined  h/o WPW. BL ECG - nml  Zio Patch, Cards consult  H/o TTP/preeclampsia/HELLP/acute renal failure>  Baby asa 16 wks.  Nml BL labs.   level II - nml  H/o HUS  H/o MS  no flu or covid vax  Plan repeat /sterilization 23.  Sterilization forms signed.   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      HUS (hemolytic uremic syndrome) (H) 2015     Priority: Medium     Postpartum Thrombotic thrombocytopenic purpura (TTP) and 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      HELLP (hemolytic anemia/elev liver enzymes/low platelets in pregnancy) 2015     Priority: Medium     Severe preeclampsia with HELLP syndrome and acute renal failure      Detached retina, bilateral 2015     Priority: Medium     secondary to severe preeclampsia with HELLP syndroma and acute renal failure      WPW (Delaney-Parkinson-White syndrome) 2015     Priority: Medium     She has been evaluated for ablation, however, based on location this was not deemed safe by cardiology at the time      MS (multiple sclerosis) (H) 2015     Priority: Medium      Past Medical History:   Diagnosis Date    Detached retina, bilateral 2015    secondary to severe preeclampsia with HELLP syndroma and acute renal failure    PARDEEP (generalized anxiety disorder) 2023    HELLP (hemolytic anemia/elev liver enzymes/low platelets in pregnancy) 2015    Severe preeclampsia with HELLP syndrome and acute renal failure    History of blood transfusion 2015    HUS (hemolytic uremic syndrome) (H) 2015    Postpartum Thrombotic thrombocytopenic purpura (TTP) and 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    MS (multiple  sclerosis) (H) 2015    Rh negative state in antepartum period 2023    TTP (thrombotic thrombocytopenic purpura) (H) 2015    Postpartum Thrombotic thrombocytopenic purpura (TTP) and 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    Uncomplicated asthma     Exercise enduced    WPW (Delaney-Parkinson-White syndrome) 2015    She has been evaluated for ablation, however, based on location this was not deemed safe by cardiology at the time     Past Surgical History:   Procedure Laterality Date     SECTION  2015    Severe preeclampsia with HELLP syndrome and acute renal failure then developed postpartum Thrombotic thrombocytopenic purpura (TTP) and Hemolytic uremic syndrome (HUS)    COMBINED  SECTION, SALPINGECTOMY BILATERAL Bilateral 2023    Procedure:  SECTION, WITH BILATERAL SALPINGECTOMY, Viable baby girl born at 1851;  Surgeon: Naseem Cruz MD;  Location: [facility]    DILATION AND CURETTAGE, OPERATIVE HYSTEROSCOPY, COMBINED N/A 10/25/2023    Procedure: HYSTEROSCOPY, WITH DILATION AND CURETTAGE OF UTERUS;  Surgeon: Scout Grimaldo MD;  Location: [facility]    GENITOURINARY SURGERY      Tubes removed    HYSTERECTOMY SUPRACERVICAL N/A 2024    Procedure: HYSTERECTOMY, SUPRACERVICAL, ABDOMINAL;  Surgeon: Scout Grimaldo MD;  Location: [facility]    IR TRIGGER POINT INJ 1 OR 2 MUSCLES  2024    TUBAL LIGATION       Current Outpatient Medications   Medication Sig Dispense Refill    ALPRAZolam (XANAX) 0.25 MG tablet Take 1 tablet (0.25 mg) by mouth daily as needed for anxiety. 15 tablet 0    cloNIDine (CATAPRES) 0.1 MG tablet Take 1 tablet (0.1 mg) by mouth at bedtime.      furosemide (LASIX) 20 MG tablet Take 1 tablet (20 mg) by mouth daily.      hydrOXYzine HCl (ATARAX) 50 MG tablet Take 1 tablet (50 mg) by mouth every 8 hours as needed for other or anxiety (adjuvant pain). 40 tablet 1    labetalol (NORMODYNE) 100 MG tablet Take 1 tablet (100 mg) by  "mouth 2 times daily 60 tablet 2    spironolactone (ALDACTONE) 25 MG tablet Take 1 tablet (25 mg) by mouth daily 30 tablet 0    traZODone (DESYREL) 50 MG tablet Take 1 tablet (50 mg) by mouth at bedtime 90 tablet 0    sertraline (ZOLOFT) 50 MG tablet Take 1 tablet (50 mg) by mouth daily 90 tablet 0       Allergies   Allergen Reactions    Amlodipine     Cymbalta [Duloxetine Hcl]     Hydrochlorothiazide     Magnesium Other (See Comments)     Altered mental status after IV dosing    Prozac [Fluoxetine]     Zestril [Lisinopril] Fatigue    Losartan Difficulty breathing, Hives, Itching, Rash, Swelling and Visual Disturbance        Social History     Tobacco Use    Smoking status: Never     Passive exposure: Never    Smokeless tobacco: Never   Substance Use Topics    Alcohol use: Yes     Comment: occasional     Family History   Problem Relation Age of Onset    Hypertension Mother     Hypertension Father     Allergies Maternal Grandmother         dust pollen    Cancer - colorectal Paternal Grandmother 59    Allergies Maternal Aunt         \"    Cardiovascular Maternal Aunt     Breast Cancer Other 60        3 great aunts on moms side     History   Drug Use No             Review of Systems  CONSTITUTIONAL: NEGATIVE for fever, chills, change in weight  INTEGUMENTARY/SKIN: NEGATIVE for worrisome rashes, moles or lesions  EYES: NEGATIVE for vision changes or irritation  ENT/MOUTH: NEGATIVE for ear, mouth and throat problems  RESP: NEGATIVE for significant cough or SOB  BREAST: NEGATIVE for masses, tenderness or discharge  CV: NEGATIVE for chest pain, palpitations or peripheral edema  GI: pain over hysterectomy scar  : NEGATIVE for frequency, dysuria, or hematuria  MUSCULOSKELETAL: NEGATIVE for significant arthralgias or myalgia  NEURO: NEGATIVE for weakness, dizziness or paresthesias  ENDOCRINE: NEGATIVE for temperature intolerance, skin/hair changes  HEME: NEGATIVE for bleeding problems  PSYCHIATRIC: NEGATIVE for changes in " "mood or affect    Objective    /80   Pulse 73   Temp 97.2  F (36.2  C) (Tympanic)   Resp 16   Ht 1.499 m (4' 11\")   Wt 58.3 kg (128 lb 8 oz)   SpO2 100%   BMI 25.95 kg/m     Estimated body mass index is 25.95 kg/m  as calculated from the following:    Height as of this encounter: 1.499 m (4' 11\").    Weight as of this encounter: 58.3 kg (128 lb 8 oz).  Physical Exam  GENERAL: alert and no distress   EYES: Eyes grossly normal to inspection, PERRL and conjunctivae and sclerae normal  HENT: ear canals and TM's normal, nose and mouth without ulcers or lesions  NECK: no adenopathy, no asymmetry, masses, or scars  RESP: lungs clear to auscultation - no rales, rhonchi or wheezes  CV: regular rate and rhythm, no murmur, click or rub, no peripheral edema  ABDOMEN: soft, tenderness over hysterectomy incision, no hepatosplenomegaly, no masses and bowel sounds normal  MS: no gross musculoskeletal defects noted, no edema  NEURO: Normal strength and tone, mentation intact and speech normal  PSYCH: mentation appears normal, affect normal/bright  SKIN: No rashes. She does have 5 mm multicolored nevi left mid back. No scab. Area is not bleeding.     Recent Labs   Lab Test 07/29/24  1739 07/02/24  1523 06/03/24  1421 04/19/24  0524   HGB 13.4  --   --  12.2     --   --  264    137   < >  --    POTASSIUM 4.7 4.0   < >  --    CR 0.99* 1.11*   < >  --     < > = values in this interval not displayed.        Diagnostics  Recent Results (from the past 24 hour(s))   EKG 12-lead complete w/read - (Clinic Performed)    Collection Time: 09/11/24  8:36 AM   Result Value Ref [facility]    Systolic Blood Pressure  mmHg    Diastolic Blood Pressure  mmHg    Ventricular Rate 61 BPM    Atrial Rate 61 BPM    WY Interval 106 ms    QRS Duration 82 ms     ms    QTc 414 ms    P Axis 40 degrees    R AXIS 40 degrees    T Axis 3 degrees    Interpretation ECG       Sinus rhythm with short WY  Otherwise normal ECG  When compared with " ECG of 16-Apr-2024 10:33,  No significant change was found     T4, free    Collection Time: 09/11/24  9:09 AM   Result Value Ref [facility]    Free T4 1.00 0.90 - 1.70 ng/dL   Basic metabolic panel    Collection Time: 09/11/24  9:09 AM   Result Value Ref [facility]    Sodium 140 135 - 145 mmol/L    Potassium 3.9 3.4 - 5.3 mmol/L    Chloride 107 98 - 107 mmol/L    Carbon Dioxide (CO2) 24 22 - 29 mmol/L    Anion Gap 9 7 - 15 mmol/L    Urea Nitrogen 15.2 6.0 - 20.0 mg/dL    Creatinine 1.10 (H) 0.51 - 0.95 mg/dL    GFR Estimate 65 >60 mL/min/1.73m2    Calcium 9.1 8.8 - 10.4 mg/dL    Glucose 87 70 - 99 mg/dL   CBC with platelets and differential    Collection Time: 09/11/24  9:09 AM   Result Value Ref [facility]    WBC Count 5.7 4.0 - 11.0 10e3/uL    RBC Count 4.26 3.80 - 5.20 10e6/uL    Hemoglobin 13.0 11.7 - 15.7 g/dL    Hematocrit 39.8 35.0 - 47.0 %    MCV 93 78 - 100 fL    MCH 30.5 26.5 - 33.0 pg    MCHC 32.7 31.5 - 36.5 g/dL    RDW 14.2 10.0 - 15.0 %    Platelet Count 287 150 - 450 10e3/uL    % Neutrophils 61 %    % Lymphocytes 30 %    % Monocytes 6 %    % Eosinophils 2 %    % Basophils 1 %    % Immature Granulocytes 0 %    NRBCs per 100 WBC 0 <1 /100    Absolute Neutrophils 3.5 1.6 - 8.3 10e3/uL    Absolute Lymphocytes 1.7 0.8 - 5.3 10e3/uL    Absolute Monocytes 0.4 0.0 - 1.3 10e3/uL    Absolute Eosinophils 0.1 0.0 - 0.7 10e3/uL    Absolute Basophils 0.1 0.0 - 0.2 10e3/uL    Absolute Immature Granulocytes 0.0 <=0.4 10e3/uL    Absolute NRBCs 0.0 10e3/uL   TSH    Collection Time: 09/11/24  9:09 AM   Result Value Ref [facility]    TSH 2.40 0.30 - 4.20 uIU/mL        EKG: appears normal, NSR, no acute ST changes, DC slightly shortened, asymptomatic, cardiology did reviewed and was not concerned    Revised Cardiac Risk Index (RCRI)  The patient has the following serious cardiovascular risks for perioperative complications:   - No serious cardiac risks = 0 points     RCRI Interpretation: 0 points: Class I (very low risk - 0.4% complication  rate)         Signed Electronically by: Trisha Fonseca NP  A copy of this evaluation report is provided to the requesting physician.

## 2024-09-11 ENCOUNTER — OFFICE VISIT (OUTPATIENT)
Dept: FAMILY MEDICINE | Facility: OTHER | Age: 39
End: 2024-09-11
Attending: NURSE PRACTITIONER
Payer: COMMERCIAL

## 2024-09-11 VITALS
DIASTOLIC BLOOD PRESSURE: 80 MMHG | HEART RATE: 73 BPM | BODY MASS INDEX: 25.91 KG/M2 | SYSTOLIC BLOOD PRESSURE: 114 MMHG | RESPIRATION RATE: 16 BRPM | HEIGHT: 59 IN | OXYGEN SATURATION: 100 % | TEMPERATURE: 97.2 F | WEIGHT: 128.5 LBS

## 2024-09-11 DIAGNOSIS — I45.6 WPW (WOLFF-PARKINSON-WHITE SYNDROME): ICD-10-CM

## 2024-09-11 DIAGNOSIS — L76.82 INCISIONAL PAIN: ICD-10-CM

## 2024-09-11 DIAGNOSIS — R09.89 LABILE BLOOD PRESSURE: ICD-10-CM

## 2024-09-11 DIAGNOSIS — G35 MS (MULTIPLE SCLEROSIS) (H): ICD-10-CM

## 2024-09-11 DIAGNOSIS — Z90.710 S/P HYSTERECTOMY: ICD-10-CM

## 2024-09-11 DIAGNOSIS — N18.2 CKD (CHRONIC KIDNEY DISEASE) STAGE 2, GFR 60-89 ML/MIN: ICD-10-CM

## 2024-09-11 DIAGNOSIS — F41.9 ANXIETY: ICD-10-CM

## 2024-09-11 DIAGNOSIS — D22.9 ATYPICAL NEVI: ICD-10-CM

## 2024-09-11 DIAGNOSIS — Z01.818 PREOP GENERAL PHYSICAL EXAM: Primary | ICD-10-CM

## 2024-09-11 DIAGNOSIS — H33.20 RETINAL DETACHMENT, UNSPECIFIED LATERALITY: ICD-10-CM

## 2024-09-11 LAB
ANION GAP SERPL CALCULATED.3IONS-SCNC: 9 MMOL/L (ref 7–15)
ATRIAL RATE - MUSE: 61 BPM
BASOPHILS # BLD AUTO: 0.1 10E3/UL (ref 0–0.2)
BASOPHILS NFR BLD AUTO: 1 %
BUN SERPL-MCNC: 15.2 MG/DL (ref 6–20)
CALCIUM SERPL-MCNC: 9.1 MG/DL (ref 8.8–10.4)
CHLORIDE SERPL-SCNC: 107 MMOL/L (ref 98–107)
CORTIS SERPL-MCNC: 18 UG/DL
CREAT SERPL-MCNC: 1.1 MG/DL (ref 0.51–0.95)
DIASTOLIC BLOOD PRESSURE - MUSE: NORMAL MMHG
EGFRCR SERPLBLD CKD-EPI 2021: 65 ML/MIN/1.73M2
EOSINOPHIL # BLD AUTO: 0.1 10E3/UL (ref 0–0.7)
EOSINOPHIL NFR BLD AUTO: 2 %
ERYTHROCYTE [DISTWIDTH] IN BLOOD BY AUTOMATED COUNT: 14.2 % (ref 10–15)
GLUCOSE SERPL-MCNC: 87 MG/DL (ref 70–99)
HCO3 SERPL-SCNC: 24 MMOL/L (ref 22–29)
HCT VFR BLD AUTO: 39.8 % (ref 35–47)
HGB BLD-MCNC: 13 G/DL (ref 11.7–15.7)
IMM GRANULOCYTES # BLD: 0 10E3/UL
IMM GRANULOCYTES NFR BLD: 0 %
INTERPRETATION ECG - MUSE: NORMAL
LYMPHOCYTES # BLD AUTO: 1.7 10E3/UL (ref 0.8–5.3)
LYMPHOCYTES NFR BLD AUTO: 30 %
MCH RBC QN AUTO: 30.5 PG (ref 26.5–33)
MCHC RBC AUTO-ENTMCNC: 32.7 G/DL (ref 31.5–36.5)
MCV RBC AUTO: 93 FL (ref 78–100)
MONOCYTES # BLD AUTO: 0.4 10E3/UL (ref 0–1.3)
MONOCYTES NFR BLD AUTO: 6 %
NEUTROPHILS # BLD AUTO: 3.5 10E3/UL (ref 1.6–8.3)
NEUTROPHILS NFR BLD AUTO: 61 %
NRBC # BLD AUTO: 0 10E3/UL
NRBC BLD AUTO-RTO: 0 /100
P AXIS - MUSE: 40 DEGREES
PLATELET # BLD AUTO: 287 10E3/UL (ref 150–450)
POTASSIUM SERPL-SCNC: 3.9 MMOL/L (ref 3.4–5.3)
PR INTERVAL - MUSE: 106 MS
QRS DURATION - MUSE: 82 MS
QT - MUSE: 412 MS
QTC - MUSE: 414 MS
R AXIS - MUSE: 40 DEGREES
RBC # BLD AUTO: 4.26 10E6/UL (ref 3.8–5.2)
SODIUM SERPL-SCNC: 140 MMOL/L (ref 135–145)
SYSTOLIC BLOOD PRESSURE - MUSE: NORMAL MMHG
T AXIS - MUSE: 3 DEGREES
T4 FREE SERPL-MCNC: 1 NG/DL (ref 0.9–1.7)
TSH SERPL DL<=0.005 MIU/L-ACNC: 2.4 UIU/ML (ref 0.3–4.2)
VENTRICULAR RATE- MUSE: 61 BPM
WBC # BLD AUTO: 5.7 10E3/UL (ref 4–11)

## 2024-09-11 PROCEDURE — 85049 AUTOMATED PLATELET COUNT: CPT | Mod: ZL | Performed by: NURSE PRACTITIONER

## 2024-09-11 PROCEDURE — G0463 HOSPITAL OUTPT CLINIC VISIT: HCPCS

## 2024-09-11 PROCEDURE — 93010 ELECTROCARDIOGRAM REPORT: CPT | Performed by: INTERNAL MEDICINE

## 2024-09-11 PROCEDURE — 84443 ASSAY THYROID STIM HORMONE: CPT | Mod: ZL | Performed by: NURSE PRACTITIONER

## 2024-09-11 PROCEDURE — 82533 TOTAL CORTISOL: CPT | Mod: ZL | Performed by: NURSE PRACTITIONER

## 2024-09-11 PROCEDURE — 99214 OFFICE O/P EST MOD 30 MIN: CPT | Performed by: NURSE PRACTITIONER

## 2024-09-11 PROCEDURE — 84439 ASSAY OF FREE THYROXINE: CPT | Mod: ZL | Performed by: NURSE PRACTITIONER

## 2024-09-11 PROCEDURE — 93005 ELECTROCARDIOGRAM TRACING: CPT | Performed by: NURSE PRACTITIONER

## 2024-09-11 PROCEDURE — 84244 ASSAY OF RENIN: CPT | Mod: ZL | Performed by: NURSE PRACTITIONER

## 2024-09-11 PROCEDURE — 86038 ANTINUCLEAR ANTIBODIES: CPT | Mod: ZL | Performed by: NURSE PRACTITIONER

## 2024-09-11 PROCEDURE — 36415 COLL VENOUS BLD VENIPUNCTURE: CPT | Mod: ZL | Performed by: NURSE PRACTITIONER

## 2024-09-11 PROCEDURE — G2211 COMPLEX E/M VISIT ADD ON: HCPCS | Performed by: NURSE PRACTITIONER

## 2024-09-11 PROCEDURE — 82374 ASSAY BLOOD CARBON DIOXIDE: CPT | Mod: ZL | Performed by: NURSE PRACTITIONER

## 2024-09-11 PROCEDURE — 82088 ASSAY OF ALDOSTERONE: CPT | Mod: ZL | Performed by: NURSE PRACTITIONER

## 2024-09-11 RX ORDER — ALPRAZOLAM 0.25 MG
0.25 TABLET ORAL DAILY PRN
Qty: 15 TABLET | Refills: 0 | Status: SHIPPED | OUTPATIENT
Start: 2024-09-11

## 2024-09-11 ASSESSMENT — PAIN SCALES - GENERAL: PAINLEVEL: SEVERE PAIN (7)

## 2024-09-11 NOTE — PATIENT INSTRUCTIONS
Hold all medications the morning of surgery except her labetalol and Xanax.  Patient Education   Preparing for Your Surgery  Getting started  A nurse will call you to review your health history and instructions. They will give you an arrival time based on your scheduled surgery time. Please be ready to share:  Your doctor's clinic name and phone number  Your medical, surgical, and anesthesia history  A list of allergies and sensitivities  A list of medicines, including herbal treatments and over-the-counter drugs  Whether the patient has a legal guardian (ask how to send us the papers in advance)  Please tell us if you're pregnant--or if there's any chance you might be pregnant. Some surgeries may injure a fetus (unborn baby), so they require a pregnancy test. Surgeries that are safe for a fetus don't always need a test, and you can choose whether to have one.   If you have a child who's having surgery, please ask for a copy of Preparing for Your Child's Surgery.    Preparing for surgery  Within 10 to 30 days of surgery: Have a pre-op exam (sometimes called an H&P, or History and Physical). This can be done at a clinic or pre-operative center.  If you're having a , you may not need this exam. Talk to your care team.  At your pre-op exam, talk to your care team about all medicines you take. If you need to stop any medicines before surgery, ask when to start taking them again.  We do this for your safety. Many medicines can make you bleed too much during surgery. Some change how well surgery (anesthesia) drugs work.  Call your insurance company to let them know you're having surgery. (If you don't have insurance, call [phone].)  Call your clinic if there's any change in your health. This includes signs of a cold or flu (sore throat, runny nose, cough, rash, fever). It also includes a scrape or scratch near the surgery site.  If you have questions on the day of surgery, call your hospital or surgery  center.  Eating and drinking guidelines  For your safety: Unless your surgeon tells you otherwise, follow the guidelines below.  Eat and drink as usual until 8 hours before you arrive for surgery. After that, no food or milk.  Drink clear liquids until 2 hours before you arrive. These are liquids you can see through, like water, Gatorade, and Propel Water. They also include plain black coffee and tea (no cream or milk), candy, and breath mints. You can spit out gum when you arrive.  If you drink alcohol: Stop drinking it the night before surgery.  If your care team tells you to take medicine on the morning of surgery, it's okay to take it with a sip of water.  Preventing infection  Shower or bathe the night before and morning of your surgery. Follow the instructions your clinic gave you. (If no instructions, use regular soap.)  Don't shave or clip hair near your surgery site. We'll remove the hair if needed.  Don't smoke or vape the morning of surgery. You may chew nicotine gum up to 2 hours before surgery. A nicotine patch is okay.  Note: Some surgeries require you to completely quit smoking and nicotine. Check with your surgeon.  Your care team will make every effort to keep you safe from infection. We will:  Clean our hands often with soap and water (or an alcohol-based hand rub).  Clean the skin at your surgery site with a special soap that kills germs.  Give you a special gown to keep you warm. (Cold raises the risk of infection.)  Wear special hair covers, masks, gowns and gloves during surgery.  Give antibiotic medicine, if prescribed. Not all surgeries need antibiotics.  What to bring on the day of surgery  Photo ID and insurance card  Copy of your health care directive, if you have one  Glasses and hearing aids (bring cases)  You can't wear contacts during surgery  Inhaler and eye drops, if you use them (tell us about these when you arrive)  CPAP machine or breathing device, if you use them  A few personal  items, if spending the night  If you have . . .  A pacemaker, ICD (cardiac defibrillator) or other implant: Bring the ID card.  An implanted stimulator: Bring the remote control.  A legal guardian: Bring a copy of the certified (court-stamped) guardianship papers.  Please remove any jewelry, including body piercings. Leave jewelry and other valuables at home.  If you're going home the day of surgery  You must have a responsible adult drive you home. They should stay with you overnight as well.  If you don't have someone to stay with you, and you aren't safe to go home alone, we may keep you overnight. Insurance often won't pay for this.  After surgery  If it's hard to control your pain or you need more pain medicine, please call your surgeon's office.  Questions?   If you have any questions for your care team, list them here: _________________________________________________________________________________________________________________________________________________________________________ ____________________________________ ____________________________________ ____________________________________  For informational purposes only. Not to replace the advice of your health care provider. Copyright   2003, 2019 [employer]. All rights reserved. Clinically reviewed by Gretta Riuz MD. [employer] [id number] - REV 12/22.

## 2024-09-12 ENCOUNTER — ANESTHESIA EVENT (OUTPATIENT)
Dept: SURGERY | Facility: HOSPITAL | Age: 39
End: 2024-09-12
Payer: COMMERCIAL

## 2024-09-12 LAB — ANA SER QL IF: NEGATIVE

## 2024-09-12 ASSESSMENT — ENCOUNTER SYMPTOMS: DYSRHYTHMIAS: 1

## 2024-09-12 NOTE — ANESTHESIA PREPROCEDURE EVALUATION
Anesthesia Pre-Procedure Evaluation    Patient: Sarah ATKINSON [facility]   MRN: [id number] : 1985        Procedure : Procedure(s):  Abdominal Wound Exploration          Past Medical History:   Diagnosis Date     Detached retina, bilateral 2015    secondary to severe preeclampsia with HELLP syndroma and acute renal failure     PARDEEP (generalized anxiety disorder) 2023     HELLP (hemolytic anemia/elev liver enzymes/low platelets in pregnancy) 2015    Severe preeclampsia with HELLP syndrome and acute renal failure     History of blood transfusion 2015     HUS (hemolytic uremic syndrome) (H) 2015    Postpartum Thrombotic thrombocytopenic purpura (TTP) and 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     MS (multiple sclerosis) (H) 2015     Rh negative state in antepartum period 2023     TTP (thrombotic thrombocytopenic purpura) (H) 2015    Postpartum Thrombotic thrombocytopenic purpura (TTP) and 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     Uncomplicated asthma     Exercise enduced     WPW (Delaney-Parkinson-White syndrome) 2015    She has been evaluated for ablation, however, based on location this was not deemed safe by cardiology at the time      Past Surgical History:   Procedure Laterality Date      SECTION  2015    Severe preeclampsia with HELLP syndrome and acute renal failure then developed postpartum Thrombotic thrombocytopenic purpura (TTP) and Hemolytic uremic syndrome (HUS)     COMBINED  SECTION, SALPINGECTOMY BILATERAL Bilateral 2023    Procedure:  SECTION, WITH BILATERAL SALPINGECTOMY, Viable baby girl born at 1851;  Surgeon: Naseem Cruz MD;  Location: [facility] OR     DILATION AND CURETTAGE, OPERATIVE HYSTEROSCOPY, COMBINED N/A 10/25/2023    Procedure: HYSTEROSCOPY, WITH DILATION AND CURETTAGE OF UTERUS;  Surgeon: Scout Grimaldo MD;  Location: [facility] OR     GENITOURINARY SURGERY       "Tubes removed     HYSTERECTOMY SUPRACERVICAL N/A 04/18/2024    Procedure: HYSTERECTOMY, SUPRACERVICAL, ABDOMINAL;  Surgeon: Scout Grimaldo MD;  Location: [facility] OR     IR TRIGGER POINT INJ 1 OR 2 MUSCLES  7/2/2024     TUBAL LIGATION        Allergies   Allergen Reactions     Amlodipine      Cymbalta [Duloxetine Hcl]      Hydrochlorothiazide      Magnesium Other (See Comments)     Altered mental status after IV dosing     Prozac [Fluoxetine]      Zestril [Lisinopril] Fatigue     Losartan Difficulty breathing, Hives, Itching, Rash, Swelling and Visual Disturbance      Social History     Tobacco Use     Smoking status: Never     Passive exposure: Never     Smokeless tobacco: Never   Substance Use Topics     Alcohol use: Yes     Comment: occasional      Wt Readings from Last 1 Encounters:   09/11/24 58.3 kg (128 lb 8 oz)        Anesthesia Evaluation   Pt has had prior anesthetic. Type: Regional, General and MAC.    No history of anesthetic complications       ROS/MED HX  ENT/Pulmonary: Comment: Exercise induced asthma     (+)     SEFERINO risk factors,  hypertension,               asthma (no current meds)  Treatment: Inhaler prn,                 Neurologic: Comment: Last MS flare up was April of this year before hysterectomy     (+)                   Multiple Sclerosis, limitations: stable. Has not tolerated any medications used to help with MS. Follows with neurology. (9/11/24).            Cardiovascular: Comment: Per 9/11/24 HP:  She does have a history of WPW. HR has been controlled. She recently has had several surgeries without complications. Denies chest pain, shortness of breath, dizziness, syncope, or palpitations. She has had a syncopal episode in the past, but cardiology felt it was r/t orthostatism and hydration. She did wear Zio patch in 4/2023.            \"Underlying rhythm was sinus.       Last cards f/up 9/13/23      WPW: follows with cardiology.  Reviewed cardiology note from 1/9/24 normal echo and zio patch did " not have any concerns.  She does have some symptoms of palpitations with history of short runs of PSVT - she will be following up for repeat EP study - to check for WPW - no current symptoms; not yet scheduled; specialist in [place] told pt that he believes it is probably not WPW--wants to do EP study once other issues are taken care of/no urgency to having work-up     (+)  hypertension-range: 114/80 (9/11/24)/ -   -  - -                        dysrhythmias, WPW,        Previous cardiac testing   Echo: Date: 5/31/24 Results:  Global and regional left ventricular function is normal with an EF of 60-65%.  Left ventricular wall thickness is normal. Left ventricular size is normal.  Left ventricular diastolic function is normal. No regional wall motion  abnormalities are seen.  Right ventricular function, chamber size, wall motion, and thickness are  normal.  Pulmonary artery systolic pressure is normal.  The inferior vena cava is normal.  No pericardial effusion is present.  There is no prior study for direct comparison.    Stress Test:  Date: Results:    ECG Reviewed:  Date: 9/11/24 Results:  HR 61, sinus with short IA    Per 9/11/24 [facility] note: IA interval is shortened-I did review with cardiology and no concerns were noted.   Cath:  Date: Results:      METS/Exercise Tolerance: >4 METS    Hematologic: Comments: HELLP with pregnancy  thrombotic thrombocytopenic purpura    (+)       history of blood transfusion (after pregnancy), no previous transfusion reaction,        Musculoskeletal:  - neg musculoskeletal ROS     GI/Hepatic: Comment: Status post hysterectomy   Abdominal wall pain   Painful scar         Renal/Genitourinary: Comment: AUB dysmenorrhea    (+) renal disease (stage 2), type: CRI,            Endo:  - neg endo ROS     Psychiatric/Substance Use: Comment: Etoh 2 times/week 1-2 beers     (+) psychiatric history anxiety       Infectious Disease:  - neg infectious disease ROS     Malignancy:  - neg  malignancy ROS     Other:  - neg other ROS   (-) Any chance pregnant       Physical Exam    Airway        Mallampati: II   TM distance: > 3 FB   Neck ROM: full   Mouth opening: > 3 cm    Respiratory Devices and Support         Dental       (+) Minor Abnormalities - some fillings, tiny chips      Cardiovascular   cardiovascular exam normal          Pulmonary   pulmonary exam normal            OUTSIDE LABS:  CBC:   Lab Results   Component Value Date    WBC 5.7 09/11/2024    WBC 6.4 07/29/2024    HGB 13.0 09/11/2024    HGB 13.4 07/29/2024    HCT 39.8 09/11/2024    HCT 40.4 07/29/2024     09/11/2024     07/29/2024     BMP:   Lab Results   Component Value Date     09/11/2024     07/29/2024    POTASSIUM 3.9 09/11/2024    POTASSIUM 4.7 07/29/2024    CHLORIDE 107 09/11/2024    CHLORIDE 105 07/29/2024    CO2 24 09/11/2024    CO2 23 07/29/2024    BUN 15.2 09/11/2024    BUN 17.4 07/29/2024    CR 1.10 (H) 09/11/2024    CR 0.99 (H) 07/29/2024    GLC 87 09/11/2024    GLC 86 07/29/2024     COAGS:   Lab Results   Component Value Date    PTT 28 07/23/2023    INR 0.89 07/23/2023     POC:   Lab Results   Component Value Date    HCG Negative 12/05/2022     HEPATIC:   Lab Results   Component Value Date    ALBUMIN 4.7 04/12/2024    PROTTOTAL 7.6 04/12/2024    ALT 19 04/12/2024    AST 24 04/12/2024    ALKPHOS 96 04/12/2024    BILITOTAL 0.5 04/12/2024     OTHER:   Lab Results   Component Value Date    LACT 4.5 (H) 01/10/2016    MARVIN 9.1 09/11/2024    MAG 3.2 (H) 07/29/2023    TSH 2.40 09/11/2024    T4 1.00 09/11/2024    CRP <2.9 10/29/2018    SED 6 07/29/2024       Anesthesia Plan    ASA Status:  3    NPO Status:  NPO Appropriate    Anesthesia Type: General.     - Airway: ETT   Induction: Intravenous, Propofol.   Maintenance: Balanced.        Consents    Anesthesia Plan(s) and associated risks, benefits, and realistic alternatives discussed. Questions answered and patient/representative(s) expressed  "understanding.     - Discussed: Risks, Benefits and Alternatives for BOTH SEDATION and the PROCEDURE were discussed     - Discussed with:  Patient      - Extended Intubation/Ventilatory Support Discussed: No.      - Patient is DNR/DNI Status: No     Use of blood products discussed: No .     Postoperative Care    Pain management: Peripheral nerve block (Single Shot), IV analgesics.   PONV prophylaxis: Ondansetron (or other 5HT-3), Dexamethasone or Solumedrol     Comments:    Other Comments: Discussed risks and benefits with patient for general anesthesia including sore throat, nausea, vomiting, aspiration, dental damage, loss of airway, CV complications, stroke, MI, death. Pt wishes to proceed. Reviewed 9/11/24 THERESA Briggs, EVERARDO BURRIS    I have reviewed the pertinent notes and labs in the chart from the past 30 days. Any updates or changes from those notes are reflected in this note.              # Overweight: Estimated body mass index is 25.95 kg/m  as calculated from the following:    Height as of 9/11/24: 1.499 m (4' 11\").    Weight as of 9/11/24: 58.3 kg (128 lb 8 oz).      "

## 2024-09-13 LAB — ALDOST SERPL-MCNC: 9.8 NG/DL (ref 0–31)

## 2024-09-15 LAB — RENIN PLAS-CCNC: 4.1 NG/ML/HR

## 2024-09-16 ENCOUNTER — HOSPITAL ENCOUNTER (OUTPATIENT)
Dept: MRI IMAGING | Facility: HOSPITAL | Age: 39
Discharge: HOME | End: 2024-09-16
Attending: NURSE PRACTITIONER | Admitting: NURSE PRACTITIONER
Payer: COMMERCIAL

## 2024-09-16 DIAGNOSIS — D18.03 HEPATIC HEMANGIOMA: ICD-10-CM

## 2024-09-16 LAB — ALDOST/RENIN PLAS-RTO: 2.4 {RATIO} (ref 0–25)

## 2024-09-16 PROCEDURE — 74183 MRI ABD W/O CNTR FLWD CNTR: CPT

## 2024-09-16 PROCEDURE — 255N000002 HC RX 255 OP 636: Performed by: RADIOLOGY

## 2024-09-16 PROCEDURE — A9585 GADOBUTROL INJECTION: HCPCS | Performed by: RADIOLOGY

## 2024-09-16 RX ORDER — GADOBUTROL 604.72 MG/ML
7.5 INJECTION INTRAVENOUS ONCE
Status: COMPLETED | OUTPATIENT
Start: 2024-09-16 | End: 2024-09-16

## 2024-09-16 RX ADMIN — GADOBUTROL 7.5 ML: 604.72 INJECTION INTRAVENOUS at 16:04

## 2024-09-17 ENCOUNTER — MYC MEDICAL ADVICE (OUTPATIENT)
Dept: FAMILY MEDICINE | Facility: OTHER | Age: 39
End: 2024-09-17

## 2024-09-17 DIAGNOSIS — D18.03 HEPATIC HEMANGIOMA: Primary | ICD-10-CM

## 2024-09-17 DIAGNOSIS — R10.11 RIGHT UPPER QUADRANT PAIN: ICD-10-CM

## 2024-09-17 NOTE — TELEPHONE ENCOUNTER
Fco Smith,     Your MRI shows a benign or non-cancerous liver lesion. These are actually very common and we rarely do anything about them. If you started having worsening pain in your right upper quadrant, we can send you to the liver team. Otherwise, we do not do anything else.     Let me know if you have questions, Trisha

## 2024-09-18 NOTE — DISCHARGE INSTRUCTIONS
Wound Care Instructions  For Surgical and Non-surgical Patients  Overview  People have wounds that need care for many reasons. You may have a cut that needs care after surgery. You may have a cut, scrape, or puncture wound from an accident. Or you may have a wound because of a boil, abscess, or a condition like diabetes.  Whatever the cause of your wound, there are things you can do to care for it at home.  Your care team may also want you to come back for a wound check. The wound check lets the care team know how your wound is healing and if you need more treatment.  Follow-up care is a key part of your treatment and safety. Be sure to make and go to all appointments, and call your care team if you are having problems. It's also a good idea to know your test results and keep a list of the medicines you take.  How can you care for yourself at home?  Wash your hands with soap and water before changing your bandage or touching your wound.  If your care team told you how to care for your wound, follow their instructions. If you did not get instructions, follow this general advice for minor wounds:  Leave the wound open to air (no bandage), unless it's leaking fluid.  If your wound is leaking fluid: Place a dry bandage (gauze) over the wound. Change it daily.  If the bandage sticks to the wound: You may cover the wound with a thin layer of petroleum jelly, such as Vaseline, then add the bandage. Or use a nonstick bandage (like Vaseline Gauze).  Avoid using an antibiotic ointment unless your care team recommends it.  Keep the wound dry for the first 24 to 48 hours. After this, you can shower if your care team okays it. Pat the wound dry with a fresh piece of gauze. (Do not soak in a bath, pool, lake, or other water until the wound has fully healed and closed.)  If your care team prescribed antibiotics, take them as directed. Do not stop taking them just because you feel better. You need to take the full course of  "antibiotics.  If you have stitches, do not remove them on your own. Your care team will tell you when to come back to have them removed.  If you have tape strips, leave them on until they fall off. If they don't fall off on their own, you can remove them after 1 week.  If you have medical glue (\"liquid stitches\") over your wound:  Do not put any kind of ointment, cream, or lotion on the skin around the wound. This can make the glue fall off too soon.  Leave the glue on your skin until it falls off on its own. This may take 5 to 10 days.  Do not scratch, rub, or pick at the glue.  Do not put the sticky part of a bandage directly on the glue.  If possible, prop up the injured area on a pillow anytime you sit or lie down during the next 3 days. Try to keep it above the level of your heart. This will help reduce swelling.  When should you call for help?  Call your care team now or seek immediate medical care if:  You have new pain, or the pain gets worse.  The skin near the wound is cold or pale or changes color.  You have tingling, weakness, or numbness near the wound.  The wound starts to bleed, and blood soaks through the bandage. Oozing small amounts of blood is normal.  You have symptoms of infection, such as:  Increased pain, swelling, warmth, or redness.  Red streaks leading from the wound.  Pus draining from the wound.  A fever.  Watch closely for changes in your health, and be sure to contact your care team if:  You do not get better as expected.  After Anesthesia (Sleep Medicine)  What should I do after anesthesia?  You should rest and relax for the next 24 hours. Avoid risky or difficult (strenuous) activity. A responsible adult should stay with you overnight.  Don't drive or use any heavy equipment for 24 hours. Even if you feel normal, your reactions may be affected by the sleep medicine given to you.  Don't drink alcohol or make any important decisions for 24 hours.  Slowly get back to your regular diet, " as you feel able.  How should I expect to feel?  It's normal to feel dizzy, light-headed, or faint for up to a full day after anesthesia or while taking pain medicine. If this happens:   Sit down for a few minutes before standing.  Have someone help you when you get up to walk or use the bathroom.  If you have nausea (feel sick to your stomach) or vomit (throw up):   Drink clear liquids (such as apple juice, ginger ale, broth, or 7UP) until you feel better.  If you feel sick to your stomach, or you keep vomiting for 24 hours, please call the doctor.  What else should I know?  You might have a dry mouth, sore throat, muscle aches, or trouble sleeping. These should go away after 24 hours.  Please contact your doctor if you have any other symptoms that concern you, such as fever, pain, bleeding, fluid drainage, swelling, or headache, or if it's been over 8 to 10 hours and you still aren't able to pee (urinate).  If you have a history of sleep apnea, it's very important to use your CPAP machine for the next 24 hours when you nap or sleep.   For informational purposes only. Not to replace the advice of your health care provider. Copyright   2023 [facility]. All rights reserved. Clinically reviewed by Job Mccabe MD. [email or web address] [id number] - REV 09/23.

## 2024-09-18 NOTE — OR NURSING
Patient upset as she was told she would be admitted.  Apologized for miscommunciation about being admitted.  Advised patient after speaking with surgeon and anesthesia that she is planned for same day discharge home. Patient advised she will need to have someone to care for her, currently she arranged for  and she is going to try and arrange for them to care for her as well.   She wants to stay on the schedule.   Advised that there is always the possibility she will need to stay the night if complications arise and that if she doesn't have someone to care for her or her blood pressure is not controlled she will have to reschedule, patient verbalized understanding.

## 2024-09-19 ENCOUNTER — ANESTHESIA (OUTPATIENT)
Dept: SURGERY | Facility: HOSPITAL | Age: 39
End: 2024-09-19
Payer: COMMERCIAL

## 2024-09-19 ENCOUNTER — HOSPITAL ENCOUNTER (OUTPATIENT)
Facility: HOSPITAL | Age: 39
Discharge: HOME | End: 2024-09-19
Attending: SURGERY | Admitting: SURGERY
Payer: COMMERCIAL

## 2024-09-19 ENCOUNTER — APPOINTMENT (OUTPATIENT)
Dept: ULTRASOUND IMAGING | Facility: HOSPITAL | Age: 39
End: 2024-09-19
Attending: NURSE ANESTHETIST, CERTIFIED REGISTERED
Payer: COMMERCIAL

## 2024-09-19 VITALS
SYSTOLIC BLOOD PRESSURE: 124 MMHG | RESPIRATION RATE: 18 BRPM | TEMPERATURE: 97 F | WEIGHT: 125.8 LBS | OXYGEN SATURATION: 98 % | HEART RATE: 83 BPM | HEIGHT: 59 IN | DIASTOLIC BLOOD PRESSURE: 97 MMHG | BODY MASS INDEX: 25.36 KG/M2

## 2024-09-19 DIAGNOSIS — Z90.710 S/P ABDOMINAL HYSTERECTOMY: Primary | ICD-10-CM

## 2024-09-19 PROCEDURE — 999N000141 HC STATISTIC PRE-PROCEDURE NURSING ASSESSMENT: Performed by: SURGERY

## 2024-09-19 PROCEDURE — 250N000009 HC RX 250: Performed by: NURSE ANESTHETIST, CERTIFIED REGISTERED

## 2024-09-19 PROCEDURE — 64486 TAP BLOCK UNIL BY INJECTION: CPT | Mod: XU | Performed by: NURSE ANESTHETIST, CERTIFIED REGISTERED

## 2024-09-19 PROCEDURE — 88304 TISSUE EXAM BY PATHOLOGIST: CPT | Mod: TC | Performed by: SURGERY

## 2024-09-19 PROCEDURE — 360N000075 HC SURGERY LEVEL 2, PER MIN: Performed by: SURGERY

## 2024-09-19 PROCEDURE — 250N000011 HC RX IP 250 OP 636: Performed by: NURSE PRACTITIONER

## 2024-09-19 PROCEDURE — 250N000013 HC RX MED GY IP 250 OP 250 PS 637: Performed by: SURGERY

## 2024-09-19 PROCEDURE — 250N000011 HC RX IP 250 OP 636: Performed by: SURGERY

## 2024-09-19 PROCEDURE — C9290 INJ, BUPIVACAINE LIPOSOME: HCPCS | Performed by: NURSE ANESTHETIST, CERTIFIED REGISTERED

## 2024-09-19 PROCEDURE — 250N000025 HC SEVOFLURANE, PER MIN: Performed by: SURGERY

## 2024-09-19 PROCEDURE — 710N000010 HC RECOVERY PHASE 1, LEVEL 2, PER MIN: Performed by: SURGERY

## 2024-09-19 PROCEDURE — 370N000017 HC ANESTHESIA TECHNICAL FEE, PER MIN: Performed by: SURGERY

## 2024-09-19 PROCEDURE — 22902 EXC ABD LES SC < 3 CM: CPT | Performed by: SURGERY

## 2024-09-19 PROCEDURE — 250N000011 HC RX IP 250 OP 636: Performed by: NURSE ANESTHETIST, CERTIFIED REGISTERED

## 2024-09-19 PROCEDURE — 88304 TISSUE EXAM BY PATHOLOGIST: CPT | Mod: 26 | Performed by: PATHOLOGY

## 2024-09-19 PROCEDURE — 710N000012 HC RECOVERY PHASE 2, PER MINUTE: Performed by: SURGERY

## 2024-09-19 PROCEDURE — 250N000011 HC RX IP 250 OP 636

## 2024-09-19 PROCEDURE — 272N000001 HC OR GENERAL SUPPLY STERILE: Performed by: SURGERY

## 2024-09-19 PROCEDURE — 22902 EXC ABD LES SC < 3 CM: CPT | Performed by: NURSE ANESTHETIST, CERTIFIED REGISTERED

## 2024-09-19 RX ORDER — FENTANYL CITRATE 50 UG/ML
INJECTION, SOLUTION INTRAMUSCULAR; INTRAVENOUS PRN
Status: DISCONTINUED | OUTPATIENT
Start: 2024-09-19 | End: 2024-09-19

## 2024-09-19 RX ORDER — NALOXONE HYDROCHLORIDE 0.4 MG/ML
0.4 INJECTION, SOLUTION INTRAMUSCULAR; INTRAVENOUS; SUBCUTANEOUS
Status: DISCONTINUED | OUTPATIENT
Start: 2024-09-19 | End: 2024-09-19 | Stop reason: HOSPADM

## 2024-09-19 RX ORDER — ONDANSETRON 2 MG/ML
INJECTION INTRAMUSCULAR; INTRAVENOUS PRN
Status: DISCONTINUED | OUTPATIENT
Start: 2024-09-19 | End: 2024-09-19

## 2024-09-19 RX ORDER — NALOXONE HYDROCHLORIDE 0.4 MG/ML
0.2 INJECTION, SOLUTION INTRAMUSCULAR; INTRAVENOUS; SUBCUTANEOUS
Status: DISCONTINUED | OUTPATIENT
Start: 2024-09-19 | End: 2024-09-19 | Stop reason: HOSPADM

## 2024-09-19 RX ORDER — SODIUM CHLORIDE, SODIUM LACTATE, POTASSIUM CHLORIDE, CALCIUM CHLORIDE 600; 310; 30; 20 MG/100ML; MG/100ML; MG/100ML; MG/100ML
INJECTION, SOLUTION INTRAVENOUS CONTINUOUS
Status: DISCONTINUED | OUTPATIENT
Start: 2024-09-19 | End: 2024-09-19 | Stop reason: HOSPADM

## 2024-09-19 RX ORDER — DEXAMETHASONE SODIUM PHOSPHATE 4 MG/ML
INJECTION, SOLUTION INTRA-ARTICULAR; INTRALESIONAL; INTRAMUSCULAR; INTRAVENOUS; SOFT TISSUE PRN
Status: DISCONTINUED | OUTPATIENT
Start: 2024-09-19 | End: 2024-09-19

## 2024-09-19 RX ORDER — OXYCODONE HYDROCHLORIDE 5 MG/1
5 TABLET ORAL
Status: COMPLETED | OUTPATIENT
Start: 2024-09-19 | End: 2024-09-19

## 2024-09-19 RX ORDER — BUPIVACAINE HYDROCHLORIDE 2.5 MG/ML
INJECTION, SOLUTION INFILTRATION; PERINEURAL PRN
Status: DISCONTINUED | OUTPATIENT
Start: 2024-09-19 | End: 2024-09-19 | Stop reason: HOSPADM

## 2024-09-19 RX ORDER — FENTANYL CITRATE 50 UG/ML
25 INJECTION, SOLUTION INTRAMUSCULAR; INTRAVENOUS EVERY 5 MIN PRN
Status: DISCONTINUED | OUTPATIENT
Start: 2024-09-19 | End: 2024-09-19 | Stop reason: HOSPADM

## 2024-09-19 RX ORDER — HYDROMORPHONE HYDROCHLORIDE 1 MG/ML
0.2 INJECTION, SOLUTION INTRAMUSCULAR; INTRAVENOUS; SUBCUTANEOUS EVERY 5 MIN PRN
Status: DISCONTINUED | OUTPATIENT
Start: 2024-09-19 | End: 2024-09-19 | Stop reason: HOSPADM

## 2024-09-19 RX ORDER — HYDROCODONE BITARTRATE AND ACETAMINOPHEN 5; 325 MG/1; MG/1
1 TABLET ORAL EVERY 6 HOURS PRN
Qty: 18 TABLET | Refills: 0 | Status: SHIPPED | OUTPATIENT
Start: 2024-09-19 | End: 2024-09-19

## 2024-09-19 RX ORDER — BUPIVACAINE HYDROCHLORIDE 5 MG/ML
INJECTION, SOLUTION EPIDURAL; INTRACAUDAL
Status: DISCONTINUED
Start: 2024-09-19 | End: 2024-09-19 | Stop reason: HOSPADM

## 2024-09-19 RX ORDER — PROPOFOL 10 MG/ML
INJECTION, EMULSION INTRAVENOUS CONTINUOUS PRN
Status: DISCONTINUED | OUTPATIENT
Start: 2024-09-19 | End: 2024-09-19

## 2024-09-19 RX ORDER — ONDANSETRON 2 MG/ML
4 INJECTION INTRAMUSCULAR; INTRAVENOUS EVERY 30 MIN PRN
Status: DISCONTINUED | OUTPATIENT
Start: 2024-09-19 | End: 2024-09-19 | Stop reason: HOSPADM

## 2024-09-19 RX ORDER — LIDOCAINE HYDROCHLORIDE 20 MG/ML
INJECTION, SOLUTION INFILTRATION; PERINEURAL PRN
Status: DISCONTINUED | OUTPATIENT
Start: 2024-09-19 | End: 2024-09-19

## 2024-09-19 RX ORDER — ONDANSETRON 4 MG/1
4 TABLET, ORALLY DISINTEGRATING ORAL EVERY 30 MIN PRN
Status: DISCONTINUED | OUTPATIENT
Start: 2024-09-19 | End: 2024-09-19 | Stop reason: HOSPADM

## 2024-09-19 RX ORDER — BUPIVACAINE HYDROCHLORIDE 2.5 MG/ML
INJECTION, SOLUTION EPIDURAL; INFILTRATION; INTRACAUDAL
Status: COMPLETED | OUTPATIENT
Start: 2024-09-19 | End: 2024-09-19

## 2024-09-19 RX ORDER — OXYCODONE HYDROCHLORIDE 5 MG/1
5 TABLET ORAL EVERY 6 HOURS PRN
Qty: 12 TABLET | Refills: 0 | Status: SHIPPED | OUTPATIENT
Start: 2024-09-19 | End: 2024-09-22

## 2024-09-19 RX ORDER — FENTANYL CITRATE 50 UG/ML
50 INJECTION, SOLUTION INTRAMUSCULAR; INTRAVENOUS EVERY 5 MIN PRN
Status: DISCONTINUED | OUTPATIENT
Start: 2024-09-19 | End: 2024-09-19 | Stop reason: HOSPADM

## 2024-09-19 RX ORDER — NALOXONE HYDROCHLORIDE 0.4 MG/ML
0.1 INJECTION, SOLUTION INTRAMUSCULAR; INTRAVENOUS; SUBCUTANEOUS
Status: DISCONTINUED | OUTPATIENT
Start: 2024-09-19 | End: 2024-09-19 | Stop reason: HOSPADM

## 2024-09-19 RX ORDER — LIDOCAINE 40 MG/G
CREAM TOPICAL
Status: DISCONTINUED | OUTPATIENT
Start: 2024-09-19 | End: 2024-09-19 | Stop reason: HOSPADM

## 2024-09-19 RX ORDER — CEFAZOLIN SODIUM/WATER 2 G/20 ML
2 SYRINGE (ML) INTRAVENOUS
Status: COMPLETED | OUTPATIENT
Start: 2024-09-19 | End: 2024-09-19

## 2024-09-19 RX ORDER — BUPIVACAINE HYDROCHLORIDE 2.5 MG/ML
INJECTION, SOLUTION EPIDURAL; INFILTRATION; INTRACAUDAL
Status: COMPLETED
Start: 2024-09-19 | End: 2024-09-19

## 2024-09-19 RX ORDER — PROPOFOL 10 MG/ML
INJECTION, EMULSION INTRAVENOUS PRN
Status: DISCONTINUED | OUTPATIENT
Start: 2024-09-19 | End: 2024-09-19

## 2024-09-19 RX ORDER — CEFAZOLIN SODIUM/WATER 2 G/20 ML
2 SYRINGE (ML) INTRAVENOUS SEE ADMIN INSTRUCTIONS
Status: DISCONTINUED | OUTPATIENT
Start: 2024-09-19 | End: 2024-09-19 | Stop reason: HOSPADM

## 2024-09-19 RX ORDER — HYDROMORPHONE HYDROCHLORIDE 1 MG/ML
0.4 INJECTION, SOLUTION INTRAMUSCULAR; INTRAVENOUS; SUBCUTANEOUS EVERY 5 MIN PRN
Status: DISCONTINUED | OUTPATIENT
Start: 2024-09-19 | End: 2024-09-19 | Stop reason: HOSPADM

## 2024-09-19 RX ORDER — BUPIVACAINE HYDROCHLORIDE 2.5 MG/ML
INJECTION, SOLUTION EPIDURAL; INFILTRATION; INTRACAUDAL
Status: DISCONTINUED
Start: 2024-09-19 | End: 2024-09-19 | Stop reason: HOSPADM

## 2024-09-19 RX ORDER — LIDOCAINE HYDROCHLORIDE 10 MG/ML
INJECTION, SOLUTION EPIDURAL; INFILTRATION; INTRACAUDAL; PERINEURAL
Status: DISCONTINUED
Start: 2024-09-19 | End: 2024-09-19 | Stop reason: HOSPADM

## 2024-09-19 RX ORDER — DEXAMETHASONE SODIUM PHOSPHATE 4 MG/ML
4 INJECTION, SOLUTION INTRA-ARTICULAR; INTRALESIONAL; INTRAMUSCULAR; INTRAVENOUS; SOFT TISSUE
Status: DISCONTINUED | OUTPATIENT
Start: 2024-09-19 | End: 2024-09-19 | Stop reason: HOSPADM

## 2024-09-19 RX ADMIN — BUPIVACAINE HYDROCHLORIDE 30 ML: 2.5 INJECTION, SOLUTION EPIDURAL; INFILTRATION; INTRACAUDAL at 08:40

## 2024-09-19 RX ADMIN — FENTANYL CITRATE 50 MCG: 50 INJECTION, SOLUTION INTRAMUSCULAR; INTRAVENOUS at 10:40

## 2024-09-19 RX ADMIN — MIDAZOLAM 2 MG: 1 INJECTION INTRAMUSCULAR; INTRAVENOUS at 08:30

## 2024-09-19 RX ADMIN — HYDROMORPHONE HYDROCHLORIDE 0.2 MG: 1 INJECTION, SOLUTION INTRAMUSCULAR; INTRAVENOUS; SUBCUTANEOUS at 11:14

## 2024-09-19 RX ADMIN — FENTANYL CITRATE 50 MCG: 50 INJECTION INTRAMUSCULAR; INTRAVENOUS at 08:30

## 2024-09-19 RX ADMIN — FENTANYL CITRATE 50 MCG: 50 INJECTION, SOLUTION INTRAMUSCULAR; INTRAVENOUS at 10:30

## 2024-09-19 RX ADMIN — SUGAMMADEX 200 MG: 100 INJECTION, SOLUTION INTRAVENOUS at 09:58

## 2024-09-19 RX ADMIN — Medication 2 G: at 08:52

## 2024-09-19 RX ADMIN — FENTANYL CITRATE 50 MCG: 50 INJECTION, SOLUTION INTRAMUSCULAR; INTRAVENOUS at 10:49

## 2024-09-19 RX ADMIN — ONDANSETRON 4 MG: 2 INJECTION INTRAMUSCULAR; INTRAVENOUS at 09:30

## 2024-09-19 RX ADMIN — FENTANYL CITRATE 50 MCG: 50 INJECTION, SOLUTION INTRAMUSCULAR; INTRAVENOUS at 10:59

## 2024-09-19 RX ADMIN — DEXAMETHASONE SODIUM PHOSPHATE 10 MG: 4 INJECTION, SOLUTION INTRA-ARTICULAR; INTRALESIONAL; INTRAMUSCULAR; INTRAVENOUS; SOFT TISSUE at 09:30

## 2024-09-19 RX ADMIN — FENTANYL CITRATE 50 MCG: 50 INJECTION INTRAMUSCULAR; INTRAVENOUS at 09:24

## 2024-09-19 RX ADMIN — OXYCODONE HYDROCHLORIDE 5 MG: 5 TABLET ORAL at 11:50

## 2024-09-19 RX ADMIN — FENTANYL CITRATE 50 MCG: 50 INJECTION INTRAMUSCULAR; INTRAVENOUS at 10:13

## 2024-09-19 RX ADMIN — LIDOCAINE HYDROCHLORIDE 40 MG: 20 INJECTION, SOLUTION INFILTRATION; PERINEURAL at 09:25

## 2024-09-19 RX ADMIN — PROPOFOL 50 MCG/KG/MIN: 10 INJECTION, EMULSION INTRAVENOUS at 09:30

## 2024-09-19 RX ADMIN — PROPOFOL 200 MG: 10 INJECTION, EMULSION INTRAVENOUS at 09:26

## 2024-09-19 RX ADMIN — ROCURONIUM BROMIDE 50 MG: 10 INJECTION INTRAVENOUS at 09:25

## 2024-09-19 RX ADMIN — BUPIVACAINE 10 ML: 13.3 INJECTION, SUSPENSION, LIPOSOMAL INFILTRATION at 08:40

## 2024-09-19 ASSESSMENT — ACTIVITIES OF DAILY LIVING (ADL)
ADLS_ACUITY_SCORE: 16
ADLS_ACUITY_SCORE: 18

## 2024-09-19 NOTE — ANESTHESIA PROCEDURE NOTES
TAP Procedure Note    Pre-Procedure   Staff -        Resident/Fellow: Lefty Perez       CRNA: Ha Babcock APRN CRNA       Performed By: CRNA and BRISEIDA       Location: pre-op       Procedure Start/Stop Times: 9/19/2024 8:30 AM and 9/19/2024 8:40 AM       Pre-Anesthestic Checklist: patient identified, IV checked, site marked, risks and benefits discussed, informed consent, monitors and equipment checked, pre-op evaluation, at physician/surgeon's request and post-op pain management  Timeout:       Correct Patient: Yes        Correct Procedure: Yes        Correct Site: Yes        Correct Position: Yes        Correct Laterality: Yes        Site Marked: Yes  Procedure Documentation  Procedure: TAP       Laterality: left       Patient Position: supine       Skin prep: Chloraprep       Needle Type: insulated and short bevel       Needle Gauge: 20.        Needle Length (Inches): 4        Ultrasound guided       1. Ultrasound was used to identify targeted nerve, plexus, vascular marker, or fascial plane and place a needle adjacent to it in real-time.       2. Ultrasound was used to visualize the spread of anesthetic in close proximity to the above referenced structure.       3. A permanent image is entered into the patient's record.       4. The visualized anatomic structures appeared normal.       5. There were no apparent abnormal pathologic findings.    Assessment/Narrative         The placement was negative for: blood aspirated, painful injection and site bleeding       Paresthesias: No.       Bolus given via needle. no blood aspirated via catheter.        Secured via.        Insertion/Infusion Method: Single Shot       Complications: none       Injection made incrementally with aspirations every 5 mL.    Medication(s) Administered   Bupivacaine 0.25% PF (Infiltration) - Infiltration   30 mL - 9/19/2024 8:40:00 AM  Bupivacaine liposome (Exparel) 1.3% LA inj susp (Infiltration) - Infiltration   10 mL - 9/19/2024  "8:40:00 AM  Medication Administration Time: 9/19/2024 8:30 AM      FOR [facility] ONLY:   Pain Team Contact information: please page the Pain Team Via MyLikes. Search \"Pain\". During daytime hours, please page the attending first. At night please page the resident first.      "

## 2024-09-19 NOTE — ANESTHESIA POSTPROCEDURE EVALUATION
Patient: Sarah Ugalde    Procedure: Procedure(s):  Abdominal Wound Exploration       Anesthesia Type:  General    Note:  Disposition: Outpatient   Postop Pain Control: Uneventful            Sign Out: Well controlled pain   PONV: No   Neuro/Psych: Uneventful            Sign Out: Acceptable/Baseline neuro status   Airway/Respiratory: Uneventful            Sign Out: Acceptable/Baseline resp. status   CV/Hemodynamics: Uneventful            Sign Out: Acceptable CV status; No obvious hypovolemia; No obvious fluid overload   Other NRE: NONE   DID A NON-ROUTINE EVENT OCCUR? No       Last vitals:  Vitals Value Taken Time   /104 09/19/24 1130   Temp 97  F (36.1  C) 09/19/24 1130   Pulse 72 09/19/24 1133   Resp 12 09/19/24 1133   SpO2 96 % 09/19/24 1134   Vitals shown include unfiled device data.    Electronically Signed By: EVERARDO Liang CRNA  September 19, 2024  12:11 PM

## 2024-09-19 NOTE — ANESTHESIA CARE TRANSFER NOTE
Patient: Sarah Ugalde    Procedure: Procedure(s):  Abdominal Wound Exploration       Diagnosis: Status post hysterectomy [Z90.710]  Abdominal wall pain [R10.9]  Painful scar [R52, L90.5]  Diagnosis Additional Information: No value filed.    Anesthesia Type:   General     Note:    Oropharynx: spontaneously breathing  Level of Consciousness: awake and drowsy  Oxygen Supplementation: nasal cannula    Independent Airway: airway patency satisfactory and stable  Dentition: dentition unchanged  Vital Signs Stable: post-procedure vital signs reviewed and stable  Report to RN Given: handoff report given  Patient transferred to: PACU    Handoff Report: Identifed the Patient, Identified the Reponsible Provider, Reviewed the pertinent medical history, Discussed the surgical course, Reviewed Intra-OP anesthesia mangement and issues during anesthesia, Set expectations for post-procedure period and Allowed opportunity for questions and acknowledgement of understanding      Vitals:  Vitals Value Taken Time   /104 09/19/24 1130   Temp 97  F (36.1  C) 09/19/24 1130   Pulse 72 09/19/24 1133   Resp 12 09/19/24 1133   SpO2 96 % 09/19/24 1134   Vitals shown include unfiled device data.    Electronically Signed By: EVERARDO Liang CRNA  September 19, 2024  12:12 PM

## 2024-09-19 NOTE — ANESTHESIA PROCEDURE NOTES
Airway       Patient location during procedure: OR       Procedure Start/Stop Times: 9/19/2024 9:28 AM  Staff -        Resident/Fellow: Lefty Perez       CRNA: aH Babcock APRN CRNA       Performed By: CRNA and SRNA  Consent for Airway        Urgency: elective  Indications and Patient Condition       Indications for airway management: lisa-procedural       Induction type:intravenous       Mask difficulty assessment: 1 - vent by mask    Final Airway Details       Final airway type: endotracheal airway       Successful airway: ETT - single  Endotracheal Airway Details        ETT size (mm): 6.5       Cuffed: yes       Successful intubation technique: video laryngoscopy       VL Blade Size: Glidescope 3       Grade View of Cords: 1       Adjucts: stylet       Position: Right       Measured from: gums/teeth       Secured at (cm): 16    Post intubation assessment        Placement verified by: capnometry, equal breath sounds and chest rise        Secured with: tape       Ease of procedure: easy       Dentition: Intact and Unchanged    Medication(s) Administered   Medication Administration Time: 9/19/2024 9:28 AM

## 2024-09-19 NOTE — OR NURSING
PACU Respiratory Event Documentation     1) Episodes of Apnea greater than or equal to 10 seconds: 0    2) Bradypnea - less than 8 breaths per minute: 0    3) Pain score on 0 to 10 scale: 3    4) Pain-sedation mismatch (yes or no): no    5) Repeated 02 desaturation less than 90% (yes or no): no    Anesthesia notified? (yes or no): na    Any of the above events occuring repeatedly in separate 30 minute intervals may be considered recurrent PACU respiratory events.    Pt reports pain @     3/10, nikos    10 /10, transferred to phase II report given to Laina DESAI rn

## 2024-09-19 NOTE — OP NOTE
REPORT OF OPERATION  DATE OF PROCEDURE: 2024    PATIENT: Sarah ATKINSON [facility]    SURGERY PERFORMED: Abdominal wall exploration    PREOPERATIVE DIAGNOSIS: Abdominal wall pain    POSTOPERATIVE DIAGNOSIS: Same    SURGEON: Terrance Cruz MD    ASSISTANTS: Dr. Ganga Liu.  Dr. Liu's assistance was required because the technical aspects of the case.    ANESTHESIA: General Endotracheal Anesthesia    COMPLICATIONS: None apparent    ESTIMATED BLOOD LOSS: 10 cc    TRANSFUSIONS: None    TISSUE TO PATHOLOGY: Abdominal wall tissue to pathology for pathological diagnosis    FINDINGS: Small nodule underlying the anterior fascia was removed.  No other nodules evidence of scar tissue or evidence of retained suture.    INDICATIONS: This is a 39 year old female who has point abdominal wall incisional tenderness along a previous  Pfannenstiel incision.  She has tried multiple modalities in the past.  Continues to have abdominal pain especially on the lateral aspect of the incision on the left.  After discussion with her we will take her to the operating room for a wound exploration to see if we can find any cause or reason for her tenderness.    DESCRIPTIONS OF PROCEDURE IN DETAIL: After consent was obtained the patient was taken to the operative suite and lavinia in the supine position.  The patient was identified and the correct patient was confirmed.  General Endotracheal Anesthesia was administered by anesthesia.  The patient was sterilely prepped and draped in the usual fashion.  A time out was performed verifying the correct patient and the correct procedure.  The entire operative team was in agreement.  All necessary equipment and supplies were in the room.    The skin was sharply entered dissection carried down to isolation of the fascia.  This was extended from almost the midline to the lateral aspect of the incision on the left.  At this point there was a small nodule noted underneath the fascia  and this was removed.  Thorough exploration along the incision both medially and laterally yielded no abnormalities.  I did not find any abnormal appearing scar tissue retained suture or other causes.  The procedure was halted at that point.  Because I did enter the anterior sheath of the fascia this was closed with a running 2-0 PDS.  The wound was then closed with deep dermal stitches of 3-0 Monocryl and skin was closed using a particular 4-0 Monocryl.  Sterile dressings were applied.    All needle,  instrument and sponge counts were correct x2.  The patient was awakened in the operating room and taken to the recovery room in stable condition tolerated procedure well.

## 2024-09-19 NOTE — OR NURSING
Ha crna here to assess pt informed of bp's, will keep medicating pt for pain and will notify Ha if BP remains high after pain is under control.

## 2024-09-20 ENCOUNTER — MYC REFILL (OUTPATIENT)
Dept: FAMILY MEDICINE | Facility: OTHER | Age: 39
End: 2024-09-20

## 2024-09-20 ENCOUNTER — MYC MEDICAL ADVICE (OUTPATIENT)
Dept: FAMILY MEDICINE | Facility: OTHER | Age: 39
End: 2024-09-20

## 2024-09-20 ENCOUNTER — MYC MEDICAL ADVICE (OUTPATIENT)
Dept: SURGERY | Facility: OTHER | Age: 39
End: 2024-09-20

## 2024-09-20 DIAGNOSIS — G47.00 INSOMNIA, UNSPECIFIED TYPE: ICD-10-CM

## 2024-09-20 DIAGNOSIS — Z90.710 S/P ABDOMINAL HYSTERECTOMY: ICD-10-CM

## 2024-09-20 RX ORDER — TRAZODONE HYDROCHLORIDE 50 MG/1
100 TABLET, FILM COATED ORAL AT BEDTIME
Qty: 180 TABLET | Refills: 0 | Status: SHIPPED | OUTPATIENT
Start: 2024-09-20

## 2024-09-20 RX ORDER — OXYCODONE HYDROCHLORIDE 5 MG/1
5 TABLET ORAL EVERY 6 HOURS PRN
Qty: 12 TABLET | Refills: 0 | OUTPATIENT
Start: 2024-09-20

## 2024-09-20 RX ORDER — OXYCODONE HYDROCHLORIDE 5 MG/1
5-10 TABLET ORAL EVERY 6 HOURS PRN
Qty: 12 TABLET | Refills: 0 | Status: SHIPPED | OUTPATIENT
Start: 2024-09-20

## 2024-09-20 NOTE — TELEPHONE ENCOUNTER
Fariha Hunt RN   to Me  Trisha Fonseca NP JA      9/20/24 10:40 AM  Hi there, we have been in contact with patient prior to her reaching out to your team. Dr. Cruz is off today and Noe is in. I reviewed the situation with him and he told me to advise patient to try increasing the dose to 10 mg every 6 hours to see if this helps. He is not willing to refill the Rx for her Oxycodone and advised her to reach out to primary or go to urgent care for further evaluation.    MICHELLE Fried

## 2024-09-20 NOTE — TELEPHONE ENCOUNTER
Patient ad an abdominal hysterectomy yesterday.  Pended med.      oxyCODONE (ROXICODONE) 5 MG tablet 12 tablet 0 9/19/2024 9/22/2024 No   Sig - Route: Take 1 tablet (5 mg) by mouth every 6 hours as needed for pain. - Oral   Sent to pharmacy as: oxyCODONE HCl 5 MG Oral Tablet (ROXICODONE)   Class: E-Prescribe   Earliest Fill Date: 9/19/2024   Order: [id number]   E-Prescribing Status: Receipt confirmed by pharmacy (9/19/2024 12:12 PM CDT)   No prior authorization was found for this prescription.   Found prior authorization for another prescription for the same medication: Closed

## 2024-09-20 NOTE — TELEPHONE ENCOUNTER
Trazodone 50 mg       Last Written Prescription Date:  7/2/24  Last Fill Quantity: 90,   # refills: 0  Last Office Visit: 9/11/24  Future Office visit:    Next 5 appointments (look out 90 days)      Sep 25, 2024 10:30 AM  (Arrive by 10:15 AM)  Return Visit with Nadine Plaza CNP  [facility] [place] ([facility] [place] ) [address]  [place] [address]  [phone]             Routing refill request to provider for review/approval because:    Patient comment: 50 isn t doing much so I had started taking 2 maybe up it to 100?

## 2024-09-23 ENCOUNTER — OFFICE VISIT (OUTPATIENT)
Dept: SURGERY | Facility: OTHER | Age: 39
End: 2024-09-23
Attending: NURSE PRACTITIONER
Payer: COMMERCIAL

## 2024-09-23 ENCOUNTER — MYC REFILL (OUTPATIENT)
Dept: OBGYN | Facility: OTHER | Age: 39
End: 2024-09-23

## 2024-09-23 VITALS
TEMPERATURE: 98.5 F | RESPIRATION RATE: 16 BRPM | OXYGEN SATURATION: 98 % | SYSTOLIC BLOOD PRESSURE: 130 MMHG | HEART RATE: 94 BPM | DIASTOLIC BLOOD PRESSURE: 87 MMHG

## 2024-09-23 DIAGNOSIS — Z98.890 POSTOPERATIVE STATE: Primary | ICD-10-CM

## 2024-09-23 DIAGNOSIS — G89.18 ACUTE POST-OPERATIVE PAIN: ICD-10-CM

## 2024-09-23 PROCEDURE — 99024 POSTOP FOLLOW-UP VISIT: CPT | Performed by: NURSE PRACTITIONER

## 2024-09-23 PROCEDURE — G0463 HOSPITAL OUTPT CLINIC VISIT: HCPCS

## 2024-09-23 RX ORDER — IBUPROFEN 800 MG/1
800 TABLET, FILM COATED ORAL DAILY PRN
Qty: 30 TABLET | Refills: 0 | Status: SHIPPED | OUTPATIENT
Start: 2024-09-23

## 2024-09-23 RX ORDER — HYDROXYZINE HYDROCHLORIDE 50 MG/1
50 TABLET, FILM COATED ORAL EVERY 8 HOURS PRN
Qty: 40 TABLET | Refills: 1 | Status: SHIPPED | OUTPATIENT
Start: 2024-09-23

## 2024-09-23 ASSESSMENT — PAIN SCALES - GENERAL: PAINLEVEL: SEVERE PAIN (7)

## 2024-09-23 NOTE — PROGRESS NOTES
CLINIC NOTE - POST-OP SURGERY  9/23/2024    Patient:Sarah Ugalde    Procedure: Abdominal wall exploration     This is a 39 year old female who is 4 days s/p Abdominal wall exploration.  The patient is having leakage from the incision site and is having 7/10 pain to the site.  She has 9 tablets of pain pills left at this time and is using 2 tabs at a time.     Current Medications:  Current Outpatient Medications   Medication Sig Dispense Refill    ALPRAZolam (XANAX) 0.25 MG tablet Take 1 tablet (0.25 mg) by mouth daily as needed for anxiety. 15 tablet 0    cloNIDine (CATAPRES) 0.1 MG tablet Take 1 tablet (0.1 mg) by mouth at bedtime.      furosemide (LASIX) 20 MG tablet Take 1 tablet (20 mg) by mouth daily.      hydrOXYzine HCl (ATARAX) 50 MG tablet Take 1 tablet (50 mg) by mouth every 8 hours as needed for other or anxiety (adjuvant pain). 40 tablet 1    labetalol (NORMODYNE) 100 MG tablet Take 1 tablet (100 mg) by mouth 2 times daily 60 tablet 2    oxyCODONE (ROXICODONE) 5 MG tablet Take 1-2 tablets (5-10 mg) by mouth every 6 hours as needed for pain. 12 tablet 0    spironolactone (ALDACTONE) 25 MG tablet Take 1 tablet (25 mg) by mouth daily 30 tablet 0    traZODone (DESYREL) 50 MG tablet Take 2 tablets (100 mg) by mouth at bedtime. 180 tablet 0       Allergies:  Allergies   Allergen Reactions    Amlodipine     Cymbalta [Duloxetine Hcl]     Hydrochlorothiazide     Magnesium Other (See Comments)     Altered mental status after IV dosing    Prozac [Fluoxetine]     Zestril [Lisinopril] Fatigue    Losartan Difficulty breathing, Hives, Itching, Rash, Swelling and Visual Disturbance       PHYSICAL EXAM:   Vital signs: /87   Pulse 94   Temp 98.5  F (36.9  C) (Tympanic)   Resp 16   SpO2 98%    BMI: There is no height or weight on file to calculate BMI.   General: Normal, healthy, cooperative, in no acute distress, alert   Lungs: respirations are non-labored   Abdominal: non-distended   Wounds:  Incisional  site to the right most corner is open and there is some serosanguineous drainage from the area.      PATHOLOGY:  Case Report   Date Value Ref Range Status   09/19/2024   Final    Surgical Pathology Report                         Case: [id number]                                  Authorizing Provider:  Terrance Cruz MD  Collected:           09/19/2024 09:54 AM          Ordering Location:     [facility] Main Operating Room     Received:            09/19/2024 10:52 AM          Pathologist:           Simon Cherry DO                                                         Specimen:    Abdomen, abdominal wall tissue                                                              Final Diagnosis   Date Value Ref Range Status   09/19/2024   Final    A.  Soft tissue, abdominal wall, wound, exploration:  -Soft tissues with fibrosis and few foci of foreign body reaction.          ASSESSMENT:    39 year old female who is 4 days s/p Abdominal wall exploration.      PLAN:   Treat incisional site with a mepilex border pad changed every 7 days or as needed due to soiling/saturation.     Follow-up at previously scheduled post-op appointment.  Sooner with problems/concerns.      Restrictions : Will continue lifting restrictions of no more than 10 pounds until 6 weeks after surgery    Will discuss pain management for the patient with Dr. Terrance Cruz and patient's PCP.

## 2024-09-23 NOTE — TELEPHONE ENCOUNTER
Atarax      Last Written Prescription Date:  8/30/2024  Last Fill Quantity: 40,   # refills: 1  Last Office Visit: 9/11/2024  Future Office visit:    Next 5 appointments (look out 90 days)      Sep 25, 2024 10:30 AM  (Arrive by 10:15 AM)  Return Visit with Nadine Plaza CNP  [facility] [place] ([facility] [place] ) [address]  [place] [address]  [phone]             Routing refill request to provider for review/approval because:   Medication indicated for associated diagnosis

## 2024-09-25 ENCOUNTER — OFFICE VISIT (OUTPATIENT)
Dept: CARDIOLOGY | Facility: OTHER | Age: 39
End: 2024-09-25
Attending: NURSE PRACTITIONER
Payer: COMMERCIAL

## 2024-09-25 VITALS
HEART RATE: 90 BPM | WEIGHT: 128 LBS | DIASTOLIC BLOOD PRESSURE: 92 MMHG | OXYGEN SATURATION: 100 % | BODY MASS INDEX: 25.8 KG/M2 | SYSTOLIC BLOOD PRESSURE: 147 MMHG | HEIGHT: 59 IN | RESPIRATION RATE: 16 BRPM

## 2024-09-25 DIAGNOSIS — I45.6 WPW (WOLFF-PARKINSON-WHITE SYNDROME): Primary | ICD-10-CM

## 2024-09-25 DIAGNOSIS — I10 ESSENTIAL HYPERTENSION: ICD-10-CM

## 2024-09-25 DIAGNOSIS — R00.2 PALPITATIONS: ICD-10-CM

## 2024-09-25 PROCEDURE — 99214 OFFICE O/P EST MOD 30 MIN: CPT | Performed by: NURSE PRACTITIONER

## 2024-09-25 PROCEDURE — G0463 HOSPITAL OUTPT CLINIC VISIT: HCPCS

## 2024-09-25 RX ORDER — RAMIPRIL 2.5 MG/1
2.5 CAPSULE ORAL DAILY
Qty: 90 CAPSULE | Refills: 1 | Status: SHIPPED | OUTPATIENT
Start: 2024-09-25 | End: 2024-10-02

## 2024-09-25 ASSESSMENT — PAIN SCALES - GENERAL: PAINLEVEL: SEVERE PAIN (7)

## 2024-09-25 NOTE — PATIENT INSTRUCTIONS
Thank you for allowing Nadine Plaza CNP and our  team to participate in your care. Please call our office at [phone] with scheduling questions or if you need to cancel or change your appointment. With any other questions or concerns you may call cardiology nurse at  [phone].       If you experience chest pain, chest pressure, chest tightness, shortness of breath, fainting, lightheadedness, nausea, vomiting, or other concerning symptoms, please report to the Emergency Department or call 911. These symptoms may be emergent, and best treated in the Emergency Department.

## 2024-10-02 ENCOUNTER — MYC MEDICAL ADVICE (OUTPATIENT)
Dept: FAMILY MEDICINE | Facility: OTHER | Age: 39
End: 2024-10-02

## 2024-10-02 DIAGNOSIS — I10 ESSENTIAL HYPERTENSION: Primary | ICD-10-CM

## 2024-10-02 RX ORDER — ISOSORBIDE MONONITRATE 30 MG/1
30 TABLET, EXTENDED RELEASE ORAL EVERY EVENING
Qty: 30 TABLET | Refills: 0 | Status: SHIPPED | OUTPATIENT
Start: 2024-10-02 | End: 2024-10-15

## 2024-10-08 ENCOUNTER — OFFICE VISIT (OUTPATIENT)
Dept: SURGERY | Facility: OTHER | Age: 39
End: 2024-10-08
Attending: NURSE PRACTITIONER
Payer: COMMERCIAL

## 2024-10-08 VITALS
RESPIRATION RATE: 16 BRPM | TEMPERATURE: 97.2 F | OXYGEN SATURATION: 99 % | HEART RATE: 68 BPM | WEIGHT: 128 LBS | BODY MASS INDEX: 25.8 KG/M2 | HEIGHT: 59 IN | SYSTOLIC BLOOD PRESSURE: 106 MMHG | DIASTOLIC BLOOD PRESSURE: 74 MMHG

## 2024-10-08 DIAGNOSIS — R52 PAINFUL SCAR: ICD-10-CM

## 2024-10-08 DIAGNOSIS — L90.5 PAINFUL SCAR: ICD-10-CM

## 2024-10-08 DIAGNOSIS — Z90.710 S/P ABDOMINAL HYSTERECTOMY: Primary | ICD-10-CM

## 2024-10-08 DIAGNOSIS — Z98.890 POSTOPERATIVE STATE: ICD-10-CM

## 2024-10-08 PROCEDURE — 99024 POSTOP FOLLOW-UP VISIT: CPT | Performed by: NURSE PRACTITIONER

## 2024-10-08 PROCEDURE — G0463 HOSPITAL OUTPT CLINIC VISIT: HCPCS

## 2024-10-08 ASSESSMENT — PAIN SCALES - GENERAL: PAINLEVEL: SEVERE PAIN (6)

## 2024-10-08 NOTE — PROGRESS NOTES
"CLINIC NOTE - POST-OP SURGERY  10/8/2024    Patient:Sarah Ugalde    Procedure: Abdominal wall exploration     This is a 39 year old female who is 3 weeks s/p Abdominal wall exploration.  The patient continues to note open wounds to her incisional site.  She is still having the pain to the incisional site she had prior to surgery.     Current Medications:  Current Outpatient Medications   Medication Sig Dispense Refill    ALPRAZolam (XANAX) 0.25 MG tablet Take 1 tablet (0.25 mg) by mouth daily as needed for anxiety. 15 tablet 0    cloNIDine (CATAPRES) 0.1 MG tablet Take 1 tablet (0.1 mg) by mouth at bedtime.      furosemide (LASIX) 20 MG tablet Take 1 tablet (20 mg) by mouth daily.      hydrOXYzine HCl (ATARAX) 50 MG tablet Take 1 tablet (50 mg) by mouth every 8 hours as needed for other or anxiety (adjuvant pain). 40 tablet 1    ibuprofen (ADVIL/MOTRIN) 800 MG tablet Take 1 tablet (800 mg) by mouth daily as needed for moderate pain. 30 tablet 0    isosorbide mononitrate (IMDUR) 30 MG 24 hr tablet Take 1 tablet (30 mg) by mouth every evening. 30 tablet 0    labetalol (NORMODYNE) 100 MG tablet Take 1 tablet (100 mg) by mouth 2 times daily 60 tablet 2    traZODone (DESYREL) 50 MG tablet Take 2 tablets (100 mg) by mouth at bedtime. 180 tablet 0       Allergies:  Allergies   Allergen Reactions    Zestril [Lisinopril] Anaphylaxis and Fatigue    Amlodipine Swelling    Cymbalta [Duloxetine Hcl]     Hydrochlorothiazide     Magnesium Other (See Comments)     Altered mental status after IV dosing    Other Environmental Allergy     Prozac [Fluoxetine]     Losartan Difficulty breathing, Hives, Itching, Rash, Swelling and Visual Disturbance       PHYSICAL EXAM:   Vital signs: /74 (BP Location: Left arm, Cuff Size: Adult Regular)   Pulse 68   Temp 97.2  F (36.2  C) (Tympanic)   Resp 16   Ht 1.499 m (4' 11\")   Wt 58.1 kg (128 lb)   SpO2 99%   BMI 25.85 kg/m     BMI: Body mass index is 25.85 kg/m .   General: " Normal, healthy, cooperative, in no acute distress, alert   Lungs: respirations are non-labored   Abdominal: non-distended   Wounds:  Incisional site there are two small open wounds noted without signs/symptoms of infection.     PATHOLOGY:  Case Report   Date Value Ref Range Status   09/19/2024   Final    Surgical Pathology Report                         Case: [id number]                                  Authorizing Provider:  Terrance Cruz MD  Collected:           09/19/2024 09:54 AM          Ordering Location:     [facility] Operating Room     Received:            09/19/2024 10:52 AM          Pathologist:           Simon Cherry DO                                                         Specimen:    Abdomen, abdominal wall tissue                                                              Final Diagnosis   Date Value Ref Range Status   09/19/2024   Final    A.  Soft tissue, abdominal wall, wound, exploration:  -Soft tissues with fibrosis and few foci of foreign body reaction.          ASSESSMENT:    39 year old female who is 3 weeks s/p Abdominal wall exploration.      PLAN:   Treat incisional site with a mepilex border pad changed every 7 days or as needed due to soiling/saturation.  Follow up with Dr. Cruz next week for possible expelling of stitches.  Referral to plastic surgery made for continued incisional site pain.

## 2024-10-15 ENCOUNTER — OFFICE VISIT (OUTPATIENT)
Dept: SURGERY | Facility: OTHER | Age: 39
End: 2024-10-15
Attending: SURGERY
Payer: COMMERCIAL

## 2024-10-15 ENCOUNTER — PREP FOR PROCEDURE (OUTPATIENT)
Dept: SURGERY | Facility: OTHER | Age: 39
End: 2024-10-15

## 2024-10-15 ENCOUNTER — OFFICE VISIT (OUTPATIENT)
Dept: OBGYN | Facility: OTHER | Age: 39
End: 2024-10-15
Attending: OBSTETRICS & GYNECOLOGY
Payer: COMMERCIAL

## 2024-10-15 ENCOUNTER — OFFICE VISIT (OUTPATIENT)
Dept: FAMILY MEDICINE | Facility: OTHER | Age: 39
End: 2024-10-15
Attending: SURGERY
Payer: COMMERCIAL

## 2024-10-15 ENCOUNTER — TELEPHONE (OUTPATIENT)
Dept: SURGERY | Facility: OTHER | Age: 39
End: 2024-10-15

## 2024-10-15 VITALS
OXYGEN SATURATION: 99 % | DIASTOLIC BLOOD PRESSURE: 84 MMHG | HEART RATE: 72 BPM | RESPIRATION RATE: 18 BRPM | TEMPERATURE: 98.4 F | SYSTOLIC BLOOD PRESSURE: 126 MMHG

## 2024-10-15 VITALS
HEIGHT: 59 IN | HEART RATE: 72 BPM | SYSTOLIC BLOOD PRESSURE: 138 MMHG | DIASTOLIC BLOOD PRESSURE: 90 MMHG | RESPIRATION RATE: 18 BRPM | WEIGHT: 134.3 LBS | BODY MASS INDEX: 27.08 KG/M2

## 2024-10-15 VITALS
HEART RATE: 82 BPM | HEIGHT: 59 IN | DIASTOLIC BLOOD PRESSURE: 82 MMHG | RESPIRATION RATE: 16 BRPM | WEIGHT: 134 LBS | OXYGEN SATURATION: 98 % | TEMPERATURE: 98.5 F | BODY MASS INDEX: 27.01 KG/M2 | SYSTOLIC BLOOD PRESSURE: 122 MMHG

## 2024-10-15 DIAGNOSIS — L90.5 PAINFUL SCAR: ICD-10-CM

## 2024-10-15 DIAGNOSIS — R10.9 ABDOMINAL WALL PAIN: ICD-10-CM

## 2024-10-15 DIAGNOSIS — G35 MS (MULTIPLE SCLEROSIS) (H): ICD-10-CM

## 2024-10-15 DIAGNOSIS — Z01.818 PREOP GENERAL PHYSICAL EXAM: ICD-10-CM

## 2024-10-15 DIAGNOSIS — R52 PAINFUL SCAR: ICD-10-CM

## 2024-10-15 DIAGNOSIS — T14.8XXA OPEN WOUND: Primary | ICD-10-CM

## 2024-10-15 DIAGNOSIS — F41.9 ANXIETY: ICD-10-CM

## 2024-10-15 DIAGNOSIS — Z01.818 PREOPERATIVE EXAMINATION: Primary | ICD-10-CM

## 2024-10-15 DIAGNOSIS — I45.6 WPW (WOLFF-PARKINSON-WHITE SYNDROME): ICD-10-CM

## 2024-10-15 DIAGNOSIS — N18.2 CKD (CHRONIC KIDNEY DISEASE) STAGE 2, GFR 60-89 ML/MIN: ICD-10-CM

## 2024-10-15 DIAGNOSIS — G89.18 ACUTE POST-OPERATIVE PAIN: ICD-10-CM

## 2024-10-15 DIAGNOSIS — R52 PAINFUL SCAR: Primary | ICD-10-CM

## 2024-10-15 DIAGNOSIS — I10 ESSENTIAL HYPERTENSION: ICD-10-CM

## 2024-10-15 DIAGNOSIS — L90.5 PAINFUL SCAR: Primary | ICD-10-CM

## 2024-10-15 DIAGNOSIS — Z98.890 POSTOPERATIVE STATE: ICD-10-CM

## 2024-10-15 DIAGNOSIS — Z80.41 FAMILY HISTORY OF MALIGNANT NEOPLASM OF OVARY: Primary | ICD-10-CM

## 2024-10-15 LAB
ANION GAP SERPL CALCULATED.3IONS-SCNC: 13 MMOL/L (ref 7–15)
BASOPHILS # BLD AUTO: 0.1 10E3/UL (ref 0–0.2)
BASOPHILS NFR BLD AUTO: 1 %
BUN SERPL-MCNC: 16.1 MG/DL (ref 6–20)
CALCIUM SERPL-MCNC: 9.3 MG/DL (ref 8.8–10.4)
CHLORIDE SERPL-SCNC: 102 MMOL/L (ref 98–107)
CREAT SERPL-MCNC: 1.03 MG/DL (ref 0.51–0.95)
EGFRCR SERPLBLD CKD-EPI 2021: 71 ML/MIN/1.73M2
EOSINOPHIL # BLD AUTO: 0.2 10E3/UL (ref 0–0.7)
EOSINOPHIL NFR BLD AUTO: 2 %
ERYTHROCYTE [DISTWIDTH] IN BLOOD BY AUTOMATED COUNT: 13.3 % (ref 10–15)
GLUCOSE SERPL-MCNC: 89 MG/DL (ref 70–99)
HCO3 SERPL-SCNC: 21 MMOL/L (ref 22–29)
HCT VFR BLD AUTO: 41.6 % (ref 35–47)
HGB BLD-MCNC: 13.6 G/DL (ref 11.7–15.7)
IMM GRANULOCYTES # BLD: 0 10E3/UL
IMM GRANULOCYTES NFR BLD: 0 %
LYMPHOCYTES # BLD AUTO: 2 10E3/UL (ref 0.8–5.3)
LYMPHOCYTES NFR BLD AUTO: 27 %
MCH RBC QN AUTO: 30.4 PG (ref 26.5–33)
MCHC RBC AUTO-ENTMCNC: 32.7 G/DL (ref 31.5–36.5)
MCV RBC AUTO: 93 FL (ref 78–100)
MONOCYTES # BLD AUTO: 0.5 10E3/UL (ref 0–1.3)
MONOCYTES NFR BLD AUTO: 6 %
NEUTROPHILS # BLD AUTO: 4.7 10E3/UL (ref 1.6–8.3)
NEUTROPHILS NFR BLD AUTO: 63 %
NRBC # BLD AUTO: 0 10E3/UL
NRBC BLD AUTO-RTO: 0 /100
PLATELET # BLD AUTO: 301 10E3/UL (ref 150–450)
POTASSIUM SERPL-SCNC: 4 MMOL/L (ref 3.4–5.3)
RBC # BLD AUTO: 4.48 10E6/UL (ref 3.8–5.2)
SODIUM SERPL-SCNC: 136 MMOL/L (ref 135–145)
WBC # BLD AUTO: 7.4 10E3/UL (ref 4–11)

## 2024-10-15 PROCEDURE — 99214 OFFICE O/P EST MOD 30 MIN: CPT | Mod: 24 | Performed by: NURSE PRACTITIONER

## 2024-10-15 PROCEDURE — 85004 AUTOMATED DIFF WBC COUNT: CPT | Mod: ZL | Performed by: NURSE PRACTITIONER

## 2024-10-15 PROCEDURE — 99213 OFFICE O/P EST LOW 20 MIN: CPT | Mod: 24 | Performed by: OBSTETRICS & GYNECOLOGY

## 2024-10-15 PROCEDURE — 85018 HEMOGLOBIN: CPT | Mod: ZL | Performed by: NURSE PRACTITIONER

## 2024-10-15 PROCEDURE — 99213 OFFICE O/P EST LOW 20 MIN: CPT | Mod: 24 | Performed by: SURGERY

## 2024-10-15 PROCEDURE — G0463 HOSPITAL OUTPT CLINIC VISIT: HCPCS | Mod: 27

## 2024-10-15 PROCEDURE — G2211 COMPLEX E/M VISIT ADD ON: HCPCS | Performed by: NURSE PRACTITIONER

## 2024-10-15 PROCEDURE — G0463 HOSPITAL OUTPT CLINIC VISIT: HCPCS

## 2024-10-15 PROCEDURE — 36415 COLL VENOUS BLD VENIPUNCTURE: CPT | Mod: ZL | Performed by: NURSE PRACTITIONER

## 2024-10-15 PROCEDURE — G0463 HOSPITAL OUTPT CLINIC VISIT: HCPCS | Mod: 25

## 2024-10-15 PROCEDURE — 80048 BASIC METABOLIC PNL TOTAL CA: CPT | Mod: ZL | Performed by: NURSE PRACTITIONER

## 2024-10-15 RX ORDER — HYDROXYZINE HYDROCHLORIDE 50 MG/1
50 TABLET, FILM COATED ORAL EVERY 8 HOURS PRN
Qty: 40 TABLET | Refills: 1 | Status: SHIPPED | OUTPATIENT
Start: 2024-10-15 | End: 2024-11-12

## 2024-10-15 ASSESSMENT — PAIN SCALES - GENERAL
PAINLEVEL: SEVERE PAIN (7)
PAINLEVEL: SEVERE PAIN (6)
PAINLEVEL: SEVERE PAIN (7)

## 2024-10-15 NOTE — TELEPHONE ENCOUNTER
Per Dr. Cruz, patient to disregard referral for plastic surgery at this time as she was scheduled for surgery.     Fariha Hunt RN on 10/15/2024 at 12:01 PM

## 2024-10-15 NOTE — TELEPHONE ENCOUNTER
----- Message from Fariha MADRIGAL sent at 10/8/2024 11:09 AM CDT -----    ----- Message -----  From: Marlene Chinchilla NP  Sent: 10/8/2024  10:52 AM CDT  To: Fariha Hunt RN    Can you please check with her in a week or two that she gets an appointment with plastic surgery?  Thank you.

## 2024-10-15 NOTE — ED TRIAGE NOTES
Patient has had a migraine for a week. Right forehead, temporal area and down in to the right side of the neck. Patient states right eye has spots right now ans on occasion it gets blurry. Sent over by primary for an MRI        
PAST MEDICAL HISTORY:  CAD (coronary artery disease)     Chronic kidney disease, unspecified CKD stage     Diabetes mellitus     Essential hypertension

## 2024-10-15 NOTE — PROGRESS NOTES
CC:  Family history of ovarian Ca.    40 yo f who's maternal aunt was just diagnosed with ovarian Ca.  She also has a cousin, paternal aunt  with ovarian ca and great aunts with breast  Ca. She is desiring prophylactic oophorectomy.  She has not had genetic counseling or testing nor is she aware of any affected relatives having it.  She has no sisters and mother has no h/o Ca.  She has had hysterectomy and salpingectomy.    Cardiovascular risks include a h/o preeclampsia/HTN.  She has upcoming appt with GI for lever lesion.  She is still recovering from her last surgery (wound exploration for persistent pain).        Patient Active Problem List   Diagnosis    WPW (Delaney-Parkinson-White syndrome)    MS (multiple sclerosis) (H)    Multigravida of advanced maternal age in third trimester    Encounter for triage in pregnant patient    TTP (thrombotic thrombocytopenic purpura) (H)    HUS (hemolytic uremic syndrome) (H)    HELLP (hemolytic anemia/elev liver enzymes/low platelets in pregnancy)    Detached retina, bilateral    Poor fetal growth affecting management of mother in third trimester, fetus 1 of multiple gestation    Pregnancy, supervision for, high-risk, third trimester    AMA (advanced maternal age) multigravida 35+    IUGR (intrauterine growth restriction) affecting care of mother    Hx of pre-eclampsia in prior pregnancy, currently pregnant    Encounter for sterilization    Hx of  section    PARDEEP (generalized anxiety disorder)    Pre-eclampsia, postpartum    Postpartum endometritis    S/P abdominal hysterectomy    Hospital discharge follow-up            Past Medical History:   Diagnosis Date    Detached retina, bilateral 2015    secondary to severe preeclampsia with HELLP syndroma and acute renal failure    PARDEEP (generalized anxiety disorder) 2023    HELLP (hemolytic anemia/elev liver enzymes/low platelets in pregnancy) 2015    Severe preeclampsia with HELLP syndrome and acute renal  failure    History of blood transfusion 2015    HUS (hemolytic uremic syndrome) (H) 2015    Postpartum Thrombotic thrombocytopenic purpura (TTP) and 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    MS (multiple sclerosis) (H) 2015    Rh negative state in antepartum period 2023    TTP (thrombotic thrombocytopenic purpura) (H) 2015    Postpartum Thrombotic thrombocytopenic purpura (TTP) and 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    Uncomplicated asthma     Exercise enduced    WPW (Delaney-Parkinson-White syndrome) 2015    She has been evaluated for ablation, however, based on location this was not deemed safe by cardiology at the time            Past Surgical History:   Procedure Laterality Date     SECTION  2015    Severe preeclampsia with HELLP syndrome and acute renal failure then developed postpartum Thrombotic thrombocytopenic purpura (TTP) and Hemolytic uremic syndrome (HUS)    COMBINED  SECTION, SALPINGECTOMY BILATERAL Bilateral 2023    Procedure:  SECTION, WITH BILATERAL SALPINGECTOMY, Viable baby girl born at 1851;  Surgeon: Naseem Cruz MD;  Location: [facility] OR    DILATION AND CURETTAGE, OPERATIVE HYSTEROSCOPY, COMBINED N/A 10/25/2023    Procedure: HYSTEROSCOPY, WITH DILATION AND CURETTAGE OF UTERUS;  Surgeon: Scout Grimaldo MD;  Location: [facility] OR    GENITOURINARY SURGERY      Tubes removed    HYSTERECTOMY SUPRACERVICAL N/A 2024    Procedure: HYSTERECTOMY, SUPRACERVICAL, ABDOMINAL;  Surgeon: Scout Grimaldo MD;  Location: [facility] OR    INCISION AND DRAINAGE ABDOMEN WASHOUT, COMBINED N/A 2024    Procedure: Abdominal Wound Exploration;  Surgeon: Terrance Cruz MD;  Location: [facility] OR    IR TRIGGER POINT INJ 1 OR 2 MUSCLES  2024    TUBAL LIGATION              Social History     Tobacco Use    Smoking status: Never     Passive exposure: Never    Smokeless tobacco: Never   Substance Use Topics  "   Alcohol use: Yes     Comment: 1-2 beers a week            Family History   Problem Relation Age of Onset    Hypertension Mother     Hypertension Father     Allergies Maternal Grandmother         dust pollen    Cancer - colorectal Paternal Grandmother 59    Allergies Maternal Aunt         \"    Cardiovascular Maternal Aunt     Breast Cancer Other 60        3 great aunts on moms side               Allergies   Allergen Reactions    Zestril [Lisinopril] Anaphylaxis and Fatigue    Amlodipine Swelling    Cymbalta [Duloxetine Hcl]     Hydrochlorothiazide     Magnesium Other (See Comments)     Altered mental status after IV dosing    Other Environmental Allergy     Prozac [Fluoxetine]     Losartan Difficulty breathing, Hives, Itching, Rash, Swelling and Visual Disturbance            Current Outpatient Medications   Medication Sig Dispense Refill    ALPRAZolam (XANAX) 0.25 MG tablet Take 1 tablet (0.25 mg) by mouth daily as needed for anxiety. 15 tablet 0    cloNIDine (CATAPRES) 0.1 MG tablet Take 1 tablet (0.1 mg) by mouth at bedtime.      furosemide (LASIX) 20 MG tablet Take 1 tablet (20 mg) by mouth daily.      isosorbide mononitrate (IMDUR) 30 MG 24 hr tablet Take 1 tablet (30 mg) by mouth every evening. 30 tablet 0    labetalol (NORMODYNE) 100 MG tablet Take 1 tablet (100 mg) by mouth 2 times daily 60 tablet 2    traZODone (DESYREL) 50 MG tablet Take 2 tablets (100 mg) by mouth at bedtime. 180 tablet 0    hydrOXYzine HCl (ATARAX) 50 MG tablet Take 1 tablet (50 mg) by mouth every 8 hours as needed for other or anxiety (adjuvant pain). (Patient not taking: Reported on 10/15/2024) 40 tablet 1    ibuprofen (ADVIL/MOTRIN) 800 MG tablet Take 1 tablet (800 mg) by mouth daily as needed for moderate pain. (Patient not taking: Reported on 10/15/2024) 30 tablet 0          Review Of Systems  Constitutional:  Denies fever  GI/ negative except as noted per hpi    O:   BP (!) 138/90   Pulse 72   Resp 18   Ht 1.499 m (4' 11\")   " Wt 60.9 kg (134 lb 4.8 oz)   BMI 27.13 kg/m    Gen:  NAD, A and O           A:  Family history of ovarian/breast CA.    P:  Recommend genetic counseling/testing for additional information to clarify her risks.  We discussed premature surgical menopause is associated with adverse health measures including increased risk of osteoporosis, heart diseases/increased mortality.  We discussed some risks may be mitigated by HRT but would need to clarify her breast ca risk and liver lesion in regards to her being a candidate for HRT..  She has had a salpingectomy which in studies lowers risk for ovarian ca. Pt agrees with POC and genetic counseling referral placed.  I will see her back for f/up appt afterwards.   She also has GI and cardiology appts in the interim.

## 2024-10-15 NOTE — PROGRESS NOTES
CLINIC NOTE - FOLLOW UP  10/15/2024    Patient:Sarah Ugalde    Reason for Visit: Follow up from recent surgery for abdominal wall mass    This is a 39 year old female here for follow up from a prior clinic visit for abdominal wall mass. Her prior medical records were reviewed.       She does have a complaint of spitting suture.  Does feel that there is an inflammatory process going deep along her incision.  She was seen by Marlene approximately a week ago and at that time felt to be reacting to suture placed at the time of surgery.  No fever.  No chills.    Current Medications:  Current Outpatient Medications   Medication Sig Dispense Refill    ALPRAZolam (XANAX) 0.25 MG tablet Take 1 tablet (0.25 mg) by mouth daily as needed for anxiety. 15 tablet 0    cloNIDine (CATAPRES) 0.1 MG tablet Take 1 tablet (0.1 mg) by mouth at bedtime.      furosemide (LASIX) 20 MG tablet Take 1 tablet (20 mg) by mouth daily.      isosorbide mononitrate (IMDUR) 30 MG 24 hr tablet Take 1 tablet (30 mg) by mouth every evening. 30 tablet 0    labetalol (NORMODYNE) 100 MG tablet Take 1 tablet (100 mg) by mouth 2 times daily 60 tablet 2    traZODone (DESYREL) 50 MG tablet Take 2 tablets (100 mg) by mouth at bedtime. 180 tablet 0    hydrOXYzine HCl (ATARAX) 50 MG tablet Take 1 tablet (50 mg) by mouth every 8 hours as needed for other or anxiety (adjuvant pain). (Patient not taking: Reported on 10/15/2024) 40 tablet 1    ibuprofen (ADVIL/MOTRIN) 800 MG tablet Take 1 tablet (800 mg) by mouth daily as needed for moderate pain. (Patient not taking: Reported on 10/15/2024) 30 tablet 0       Allergies:  Allergies   Allergen Reactions    Zestril [Lisinopril] Anaphylaxis and Fatigue    Amlodipine Swelling    Cymbalta [Duloxetine Hcl]     Hydrochlorothiazide     Magnesium Other (See Comments)     Altered mental status after IV dosing    Other Environmental Allergy     Prozac [Fluoxetine]     Losartan Difficulty breathing, Hives, Itching, Rash,  Swelling and Visual Disturbance     PHYSICAL EXAM:     Vital signs: /84   Pulse 72   Temp 98.4  F (36.9  C)   Resp 18   SpO2 99%    Weight: [unfilled]   BMI: There is no height or weight on file to calculate BMI.   General: Normal, healthy, cooperative, in no acute distress, alert   Skin: no jaundice   HEENT: PERRLA and EOMI   Neck: supple   Abdominal:Along her lower incision on the left-hand side there is a small open wound with suture in place.  I was able to remove 1 small suture.  She does have other areas that seem to be amatory in nature but not open at this time.    Neurological: without deficit    ASSESSMENT:  39 year old female here as follow up from a prior clinic visit for probable inflammatory process secondary to sutures.    PLAN: Had a long discussion with her.  The time of surgery she was found to have a nodule underlying her fascia which was removed which did turn out to be a foreign body reaction.  She does feel that she continues to have reaction to sutures placed and would like to have those removed if at all possible.  I did state that we could go in and remove suture leave the wound open and place a wound VAC for closure and at the end of a long discussion she desires to proceed with that.  Will go ahead and schedule her for a wound exploration and placement of wound VAC.    The risks, benefits, and alternatives to the planned procedure were fully discussed with the patient and/or the patient's representative(s). The risks of bleeding, infection, death, missing pathology, the need for additional procedures intra-operatively, the possible need for intra-operative consults, the possible need for transfusion therapy, cardiopulmonary compromise, the possible need for additional surgery for a complication were discussed with the patient and/or the patient's representative(s). The patient's and/or patient's representative(s) questions were addressed and answered. Informed consent was obtained  from the patient and/or the patient's representative(s). The patient and/or the patient's representative(s) consent to proceed.

## 2024-10-15 NOTE — PATIENT INSTRUCTIONS
Hold all medications the morning of surgery except the labetalol.   Patient Education   Preparing for Your Surgery  For Adults  Getting started  In most cases, a nurse will call to review your health history and instructions. They will give you an arrival time based on your scheduled surgery time. Please be ready to share:  Your doctor's clinic name and phone number  Your medical, surgical, and anesthesia history  A list of allergies and sensitivities  A list of medicines, including herbal treatments and over-the-counter drugs  Whether the patient has a legal guardian (ask how to send us the papers in advance)  Note: You may not receive a call if you were seen at our PAC (Preoperative Assessment Center).  Please tell us if you're pregnant--or if there's any chance you might be pregnant. Some surgeries may injure a fetus (unborn baby), so they require a pregnancy test. Surgeries that are safe for a fetus don't always need a test, and you can choose whether to have one.   Preparing for surgery  Within 10 to 30 days of surgery: Have a pre-op exam (sometimes called an H&P, or History and Physical). This can be done at a clinic or pre-operative center.  If you're having a , you may not need this exam. Talk to your care team.  At your pre-op exam, talk to your care team about all medicines you take. (This includes CBD oil and any drugs, such as THC, marijuana, and other forms of cannabis.) If you need to stop any medicine before surgery, ask when to start taking it again.  This is for your safety. Many medicines and drugs can make you bleed too much during surgery. Some change how well surgery (anesthesia) drugs work.  Call your insurance company to let them know you're having surgery. (If you don't have insurance, call [phone].)  Call your clinic if there's any change in your health. This includes a scrape or scratch near the surgery site, or any signs of a cold (sore throat, runny nose, cough, rash,  fever).  Eating and drinking guidelines  For your safety: Unless your surgeon tells you otherwise, follow the guidelines below.  Eat and drink as normal until 8 hours before you arrive for surgery. After that, no food or milk. You can spit out gum when you arrive.  Drink clear liquids until 2 hours before you arrive. These are liquids you can see through, like water, Gatorade, and Propel Water. They also include plain black coffee and tea (no cream or milk).  No alcohol for 24 hours before you arrive. The night before surgery, stop any drinks that contain THC.  If your care team tells you to take medicine on the morning of surgery, it's okay to take it with a sip of water. No other medicines or drugs are allowed (including CBD oil)--follow your care team's instructions.  If you have questions the day of surgery, call your hospital or surgery center.   Preventing infection  Shower or bathe the night before and the morning of surgery. Follow the instructions your clinic gave you. (If no instructions, use regular soap.)  Don't shave or clip hair near your surgery site. We'll remove the hair if needed.  Don't smoke or vape the morning of surgery. No chewing tobacco for 6 hours before you arrive. A nicotine patch is okay. You may spit out nicotine gum when you arrive.  For some surgeries, the surgeon will tell you to fully quit smoking and nicotine.  We will make every effort to keep you safe from infection. We will:  Clean our hands often with soap and water (or an alcohol-based hand rub).  Clean the skin at your surgery site with a special soap that kills germs.  Give you a special gown to keep you warm. (Cold raises the risk of infection.)  Wear hair covers, masks, gowns, and gloves during surgery.  Give antibiotic medicine, if prescribed. Not all surgeries need this medicine.  What to bring on the day of surgery  Photo ID and insurance card  Copy of your health care directive, if you have one  Glasses and hearing  aids (bring cases)  You can't wear contacts during surgery  Inhaler and eye drops, if you use them (tell us about these when you arrive)  CPAP machine or breathing device, if you use them  A few personal items, if spending the night  If you have . . .  A pacemaker, ICD (cardiac defibrillator), or other implant: Bring the ID card.  An implanted stimulator: Bring the remote control.  A legal guardian: Bring a copy of the certified (court-stamped) guardianship papers.  Please remove any jewelry, including body piercings. Leave jewelry and other valuables at home.  If you're going home the day of surgery  You must have a responsible adult drive you home. They should stay with you overnight as well.  If you don't have someone to stay with you, and you aren't safe to go home alone, we may keep you overnight. Insurance often won't pay for this.  After surgery  If it's hard to control your pain or you need more pain medicine, please call your surgeon's office.  Questions?   If you have any questions for your care team, list them here:   ____________________________________________________________________________________________________________________________________________________________________________________________________________________________________________________________  For informational purposes only. Not to replace the advice of your health care provider. Copyright   2003, 2019 [facility]. All rights reserved. Clinically reviewed by Job Mccabe MD. [employer] [id number] - REV 08/24.

## 2024-10-15 NOTE — PROGRESS NOTES
Preoperative Evaluation  [facility] [place]  [address]  [place] [address]  Phone: [phone]  Primary Provider: Trisha Fonseca NP  Pre-op Performing Provider: Trisha Fonseca NP  Oct 15, 2024           10/15/2024   Surgical Information   What procedure is being done? Wound Exploration   Facility or Hospital where procedure/surgery will be performed: [facility]   Who is doing the procedure / surgery? Dr. Cruz   Date of surgery / procedure: 11/11/24   Time of surgery / procedure: Tbd   Where do you plan to recover after surgery? at home with family        Fax number for surgical facility: Note does not need to be faxed, will be available electronically in Epic.    Assessment & Plan     The proposed surgical procedure is considered INTERMEDIATE risk.    (Z01.818) Preoperative examination  (primary encounter diagnosis)  (R52,  L90.5) Painful scar  (R10.9) Abdominal wall pain  Plan: Recent EKG without acute issues. CBC and BMP done today and were unremarkable. Cleared for surgery.     (I10) Essential hypertension  Plan: Controlled    (I45.6) WPW (Delaney-Parkinson-White syndrome)  Plan: Stable. HR 82. Previous EKG with NSR. Cleared for surgery.     (F41.9) Anxiety  Plan: Stable.     (G35) MS (multiple sclerosis) (H)  Plan: Stable    (N18.2) CKD (chronic kidney disease) stage 2, GFR 60-89 ml/min  Plan: Kidney function stable. Monitor fluid status.       Risks and Recommendations  The patient has the following additional risks and recommendations for perioperative complications:  Pulmonary:    - Incentive spirometry post-op    Hold all medications the morning of surgery except the labetalol.     Recommendation  Approval given to proceed with proposed procedure, without further diagnostic evaluation.    [facility] with a functional capacity of greater than four MET's. There are no obvious contraindications to proceeding with surgery at this time. Recommend that the surgeon review risks  and benefits of the procedure prior to proceeding.     Was recommended to avoid eating and drinking anything 12 hours prior to surgery unless his surgeon tells him otherwise.     The longitudinal plan of care for the diagnosis(es)/condition(s) as documented were addressed during this visit. Due to the added complexity in care, I will continue to support Sarah in the subsequent management and with ongoing continuity of care.    Paty Smith is a 39 year old, presenting for the following:  Pre-Op Exam (Abdominal wound exploration  with placement of wound vac)        HPI related to upcoming procedure: Patient had abdominal wall pain over previous abdominal hysterectomy scar. She had wound exploration done on 9/19/24 and is still having pain. Possible allergic reaction to internal sutures. Plan to perform another wound exploration.         10/15/2024   Pre-Op Questionnaire   Have you ever had a heart attack or stroke? No   Have you ever had surgery on your heart or blood vessels, such as a stent placement, a coronary artery bypass, or surgery on an artery in your head, neck, heart, or legs? No   Do you have chest pain with activity? No   Do you have a history of heart failure? No   Do you currently have a cold, bronchitis or symptoms of other infection? No   Do you have a cough, shortness of breath, or wheezing? No   Do you or anyone in your family have previous history of blood clots? Grandfather had DVT after surgery for cancer; father also had PE-unsure cause    Do you or does anyone in your family have a serious bleeding problem such as prolonged bleeding following surgeries or cuts? No   Have you ever had problems with anemia or been told to take iron pills? No   Have you had any abnormal blood loss such as black, tarry or bloody stools, or abnormal vaginal bleeding? No   Have you ever had a blood transfusion? (!) YES, s/p vaginal delivery    Have you ever had a transfusion reaction? No   Are you willing  "to have a blood transfusion if it is medically needed before, during, or after your surgery? Yes   Have you or any of your relatives ever had problems with anesthesia? No   Do you have sleep apnea, excessive snoring or daytime drowsiness? No   Do you have any artifical heart valves or other implanted medical devices like a pacemaker, defibrillator, or continuous glucose monitor? No   Do you have artificial joints? No   Are you allergic to latex? No        Health Care Directive  Patient does not have a Health Care Directive or Living Will: Discussed advance care planning with patient; however, patient declined at this time.    Preoperative Review of    reviewed - controlled substances reflected in medication list.      Status of Chronic Conditions:  HYPERTENSION - Patient has longstanding history of HTN , currently denies any symptoms referable to elevated blood pressure. Specifically denies chest pain, palpitations, dyspnea, orthopnea, PND or peripheral edema. Blood pressure readings have been in normal range. Current medication regimen is as listed below-taking clonidine, lasix, and labetalol. Never started the Imdur. Patient denies any side effects of medication.      Mets greater than 4, able to walk several miles.      MS; stable. Has not tolerated any medications used to help with MS. Follows with neurology.      She does have anxiety. Currently taking PRN Xanax. Uses the Xanax rarely. Using more hydroxyzine. Has not tolerated SNRIs/SSRIs.      She does have a history of WPW. HR has been controlled. She recently has had several surgeries without complications. Denies chest pain, shortness of breath, dizziness, syncope, or palpitations. She has had a syncopal episode in the past, but cardiology felt it was r/t orthostatism and hydration. She did wear Zio patch in 4/2023.              \"Underlying rhythm was sinus.   Hrt rate ranged from 52 bpm, maximum heart rate of 190 bmp, averaging 75 bmp.   No " "significant bradycardia, pauses, Mobitz type II or 3rd degree heart block.   No atrial fibrillation on this study.   x21 triggered events and x0 diary entries.  These corresponded to SVT, NSR, sinus tachycardia, SVEs and VEs.   x0 runs of VT.     x48 runs of SVT longest lasting 12.3 sec's with a maximum heart rate of 190 bmp.   Rare, <1% of PAC's, atrial couplets, atrial triplets.   Occasional PVCs at 1.3% (35527)   + episodes of ventricular bigeminy lasting up to 4.8 sec's.   + episodes of ventricular trigeminy lasting up to 1 m 56 sec's.\"    Patient Active Problem List    Diagnosis Date Noted    Hospital discharge follow-up 2024     Priority: Medium    S/P abdominal hysterectomy 2024     Priority: Medium    Postpartum endometritis 2023     Priority: Medium    Pre-eclampsia, postpartum 2023     Priority: Medium    Pregnancy, supervision for, high-risk, third trimester 2023     Priority: Medium    AMA (advanced maternal age) multigravida 35+ 2023     Priority: Medium    IUGR (intrauterine growth restriction) affecting care of mother 2023     Priority: Medium    Hx of pre-eclampsia in prior pregnancy, currently pregnant 2023     Priority: Medium    Encounter for sterilization 2023     Priority: Medium    Hx of  section 2023     Priority: Medium    PARDEEP (generalized anxiety disorder) 2023     Priority: Medium    Poor fetal growth affecting management of mother in third trimester, fetus 1 of multiple gestation 2023     Priority: Medium    Encounter for triage in pregnant patient 2023     Priority: Medium    Multigravida of advanced maternal age in third trimester 2022     Priority: Medium     AMA:  NIPT - declined  h/o WPW. BL ECG - nml  Zio Patch, Cards consult  H/o TTP/preeclampsia/HELLP/acute renal failure>  Baby asa 16 wks.  Nml BL labs.   level II - nml  H/o HUS  H/o MS  no flu or covid vax  Plan repeat " /sterilization 23.  Sterilization forms signed.   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      HUS (hemolytic uremic syndrome) (H) 2015     Priority: Medium     Postpartum Thrombotic thrombocytopenic purpura (TTP) and 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      HELLP (hemolytic anemia/elev liver enzymes/low platelets in pregnancy) 2015     Priority: Medium     Severe preeclampsia with HELLP syndrome and acute renal failure      Detached retina, bilateral 2015     Priority: Medium     secondary to severe preeclampsia with HELLP syndroma and acute renal failure      WPW (Delaney-Parkinson-White syndrome) 2015     Priority: Medium     She has been evaluated for ablation, however, based on location this was not deemed safe by cardiology at the time      MS (multiple sclerosis) (H) 2015     Priority: Medium      Past Medical History:   Diagnosis Date    Detached retina, bilateral 2015    secondary to severe preeclampsia with HELLP syndroma and acute renal failure    PARDEEP (generalized anxiety disorder) 2023    HELLP (hemolytic anemia/elev liver enzymes/low platelets in pregnancy) 2015    Severe preeclampsia with HELLP syndrome and acute renal failure    History of blood transfusion 2015    HUS (hemolytic uremic syndrome) (H) 2015    Postpartum Thrombotic thrombocytopenic purpura (TTP) and 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    MS (multiple sclerosis) (H) 2015    Rh negative state in antepartum period 2023    TTP (thrombotic thrombocytopenic purpura) (H) 2015    Postpartum Thrombotic thrombocytopenic purpura (TTP) and 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     Uncomplicated asthma 1999    Exercise enduced    WPW (Delaney-Parkinson-White syndrome) 2015    She has been evaluated for ablation, however, based on location this was not deemed safe by cardiology at the time     Past Surgical History:   Procedure Laterality Date     SECTION  2015    Severe preeclampsia with HELLP syndrome and acute renal failure then developed postpartum Thrombotic thrombocytopenic purpura (TTP) and Hemolytic uremic syndrome (HUS)    COMBINED  SECTION, SALPINGECTOMY BILATERAL Bilateral 2023    Procedure:  SECTION, WITH BILATERAL SALPINGECTOMY, Viable baby girl born at 1851;  Surgeon: Naseem Cruz MD;  Location: [facility] OR    DILATION AND CURETTAGE, OPERATIVE HYSTEROSCOPY, COMBINED N/A 10/25/2023    Procedure: HYSTEROSCOPY, WITH DILATION AND CURETTAGE OF UTERUS;  Surgeon: Scout Grimaldo MD;  Location: [facility] OR    GENITOURINARY SURGERY      Tubes removed    HYSTERECTOMY SUPRACERVICAL N/A 2024    Procedure: HYSTERECTOMY, SUPRACERVICAL, ABDOMINAL;  Surgeon: Scout Grimaldo MD;  Location: [facility]    INCISION AND DRAINAGE ABDOMEN WASHOUT, COMBINED N/A 2024    Procedure: Abdominal Wound Exploration;  Surgeon: Terrance Cruz MD;  Location: [facility] OR    IR TRIGGER POINT INJ 1 OR 2 MUSCLES  2024    TUBAL LIGATION       Current Outpatient Medications   Medication Sig Dispense Refill    ALPRAZolam (XANAX) 0.25 MG tablet Take 1 tablet (0.25 mg) by mouth daily as needed for anxiety. 15 tablet 0    cloNIDine (CATAPRES) 0.1 MG tablet Take 1 tablet (0.1 mg) by mouth at bedtime.      furosemide (LASIX) 20 MG tablet Take 1 tablet (20 mg) by mouth daily.      isosorbide mononitrate (IMDUR) 30 MG 24 hr tablet Take 1 tablet (30 mg) by mouth every evening. 30 tablet 0    labetalol (NORMODYNE) 100 MG tablet Take 1 tablet (100 mg) by mouth 2 times daily 60 tablet 2    traZODone (DESYREL) 50 MG tablet Take 2 tablets (100 mg) by mouth at bedtime. 180 tablet 0  "      Allergies   Allergen Reactions    Zestril [Lisinopril] Anaphylaxis and Fatigue    Amlodipine Swelling    Cymbalta [Duloxetine Hcl]     Hydrochlorothiazide     Magnesium Other (See Comments)     Altered mental status after IV dosing    Other Environmental Allergy     Prozac [Fluoxetine]     Losartan Difficulty breathing, Hives, Itching, Rash, Swelling and Visual Disturbance        Social History     Tobacco Use    Smoking status: Never     Passive exposure: Never    Smokeless tobacco: Never   Substance Use Topics    Alcohol use: Yes     Comment: 1-2 beers a week     Family History   Problem Relation Age of Onset    Hypertension Mother     Hypertension Father     Allergies Maternal Grandmother         dust pollen    Cancer - colorectal Paternal Grandmother 59    Allergies Maternal Aunt         \"    Cardiovascular Maternal Aunt     Breast Cancer Other 60        3 great aunts on moms side     History   Drug Use No             Review of Systems  CONSTITUTIONAL: NEGATIVE for fever, chills, change in weight  INTEGUMENTARY/SKIN: NEGATIVE for worrisome rashes, moles or lesions  EYES: NEGATIVE for vision changes or irritation  ENT/MOUTH: NEGATIVE for ear, mouth and throat problems  RESP: NEGATIVE for significant cough or SOB  BREAST: NEGATIVE for masses, tenderness or discharge  CV: NEGATIVE for chest pain, palpitations or peripheral edema  GI: NEGATIVE for nausea, heartburn, or change in bowel habits; some pain over her abdominal incision  : NEGATIVE for frequency, dysuria, or hematuria  MUSCULOSKELETAL: NEGATIVE for significant arthralgias or myalgia  NEURO: NEGATIVE for weakness, dizziness or paresthesias  ENDOCRINE: NEGATIVE for temperature intolerance, skin/hair changes  HEME: NEGATIVE for bleeding problems  PSYCHIATRIC: NEGATIVE for changes in mood or affect    Objective    /82   Pulse 82   Temp 98.5  F (36.9  C) (Tympanic)   Resp 16   Ht 1.499 m (4' 11\")   Wt 60.8 kg (134 lb)   SpO2 98%   BMI 27.06 " "kg/m     Estimated body mass index is 27.06 kg/m  as calculated from the following:    Height as of this encounter: 1.499 m (4' 11\").    Weight as of this encounter: 60.8 kg (134 lb).  Physical Exam  GENERAL: alert and no distress  EYES: Eyes grossly normal to inspection, PERRL and conjunctivae and sclerae normal  HENT: ear canals and TM's normal, nose and mouth without ulcers or lesions  NECK: no adenopathy, no asymmetry, masses, or scars  RESP: lungs clear to auscultation - no rales, rhonchi or wheezes  CV: regular rate and rhythm, normal S1 S2, no S3 or S4, no murmur, click or rub, no peripheral edema  ABDOMEN: soft, some tenderness over previous incision, no masses and bowel sounds normal  MS: no gross musculoskeletal defects noted, no edema  NEURO: Normal strength and tone, mentation intact and speech normal  PSYCH: mentation appears normal, affect normal/bright    Recent Labs   Lab Test 09/11/24  0909 07/29/24  1739   HGB 13.0 13.4    284    140   POTASSIUM 3.9 4.7   CR 1.10* 0.99*        Diagnostics  Recent Results (from the past 24 hour(s))   Basic metabolic panel    Collection Time: 10/15/24  3:20 PM   Result Value Ref Range    Sodium 136 135 - 145 mmol/L    Potassium 4.0 3.4 - 5.3 mmol/L    Chloride 102 98 - 107 mmol/L    Carbon Dioxide (CO2) 21 (L) 22 - 29 mmol/L    Anion Gap 13 7 - 15 mmol/L    Urea Nitrogen 16.1 6.0 - 20.0 mg/dL    Creatinine 1.03 (H) 0.51 - 0.95 mg/dL    GFR Estimate 71 >60 mL/min/1.73m2    Calcium 9.3 8.8 - 10.4 mg/dL    Glucose 89 70 - 99 mg/dL   CBC with platelets and differential    Collection Time: 10/15/24  3:20 PM   Result Value Ref Range    WBC Count 7.4 4.0 - 11.0 10e3/uL    RBC Count 4.48 3.80 - 5.20 10e6/uL    Hemoglobin 13.6 11.7 - 15.7 g/dL    Hematocrit 41.6 35.0 - 47.0 %    MCV 93 78 - 100 fL    MCH 30.4 26.5 - 33.0 pg    MCHC 32.7 31.5 - 36.5 g/dL    RDW 13.3 10.0 - 15.0 %    Platelet Count 301 150 - 450 10e3/uL    % Neutrophils 63 %    % Lymphocytes 27 % "    % Monocytes 6 %    % Eosinophils 2 %    % Basophils 1 %    % Immature Granulocytes 0 %    NRBCs per 100 WBC 0 <1 /100    Absolute Neutrophils 4.7 1.6 - 8.3 10e3/uL    Absolute Lymphocytes 2.0 0.8 - 5.3 10e3/uL    Absolute Monocytes 0.5 0.0 - 1.3 10e3/uL    Absolute Eosinophils 0.2 0.0 - 0.7 10e3/uL    Absolute Basophils 0.1 0.0 - 0.2 10e3/uL    Absolute Immature Granulocytes 0.0 <=0.4 10e3/uL    Absolute NRBCs 0.0 10e3/uL        No EKG this visit, completed in the last 90 days.     EKG done on 9/11/24; sinus rhythm with short CA was seen, VR was 61.    Revised Cardiac Risk Index (RCRI)  The patient has the following serious cardiovascular risks for perioperative complications:   - No serious cardiac risks = 0 points     RCRI Interpretation: 0 points: Class I (very low risk - 0.4% complication rate)         Signed Electronically by: Trisha Fonseca NP  A copy of this evaluation report is provided to the requesting physician.

## 2024-11-02 ENCOUNTER — ANESTHESIA EVENT (OUTPATIENT)
Dept: SURGERY | Facility: HOSPITAL | Age: 39
End: 2024-11-02
Payer: COMMERCIAL

## 2024-11-02 ASSESSMENT — ENCOUNTER SYMPTOMS: DYSRHYTHMIAS: 1

## 2024-11-02 NOTE — ANESTHESIA PREPROCEDURE EVALUATION
Anesthesia Pre-Procedure Evaluation    Patient: Sarah ATKINSON [facility]   MRN: [id number] : 1985        Procedure : Procedure(s):  Abdominal  Wound exploration with placement of wound vac          Past Medical History:   Diagnosis Date     Detached retina, bilateral 2015    secondary to severe preeclampsia with HELLP syndroma and acute renal failure     PARDEEP (generalized anxiety disorder) 2023     HELLP (hemolytic anemia/elev liver enzymes/low platelets in pregnancy) 2015    Severe preeclampsia with HELLP syndrome and acute renal failure     History of blood transfusion 2015     HUS (hemolytic uremic syndrome) (H) 2015    Postpartum Thrombotic thrombocytopenic purpura (TTP) and 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     MS (multiple sclerosis) (H) 2015     Rh negative state in antepartum period 2023     TTP (thrombotic thrombocytopenic purpura) (H) 2015    Postpartum Thrombotic thrombocytopenic purpura (TTP) and 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     Uncomplicated asthma     Exercise enduced     WPW (Delaney-Parkinson-White syndrome) 2015    She has been evaluated for ablation, however, based on location this was not deemed safe by cardiology at the time      Past Surgical History:   Procedure Laterality Date      SECTION  2015    Severe preeclampsia with HELLP syndrome and acute renal failure then developed postpartum Thrombotic thrombocytopenic purpura (TTP) and Hemolytic uremic syndrome (HUS)     COMBINED  SECTION, SALPINGECTOMY BILATERAL Bilateral 2023    Procedure:  SECTION, WITH BILATERAL SALPINGECTOMY, Viable baby girl born at 1851;  Surgeon: Naseem Cruz MD;  Location: [facility] OR     DILATION AND CURETTAGE, OPERATIVE HYSTEROSCOPY, COMBINED N/A 10/25/2023    Procedure: HYSTEROSCOPY, WITH DILATION AND CURETTAGE OF UTERUS;  Surgeon: Scout Grimaldo MD;  Location: [facility] OR      GENITOURINARY SURGERY  2023    Tubes removed     HYSTERECTOMY SUPRACERVICAL N/A 04/18/2024    Procedure: HYSTERECTOMY, SUPRACERVICAL, ABDOMINAL;  Surgeon: Scout Grimaldo MD;  Location: [facility]     INCISION AND DRAINAGE ABDOMEN WASHOUT, COMBINED N/A 9/19/2024    Procedure: Abdominal Wound Exploration;  Surgeon: Terrance Cruz MD;  Location: [facility] OR     IR TRIGGER POINT INJ 1 OR 2 MUSCLES  7/2/2024     TUBAL LIGATION        Allergies   Allergen Reactions     Zestril [Lisinopril] Anaphylaxis and Fatigue     Amlodipine Swelling     Cymbalta [Duloxetine Hcl]      Hydrochlorothiazide      Magnesium Other (See Comments)     Altered mental status after IV dosing     Other Environmental Allergy      Prozac [Fluoxetine]      Losartan Difficulty breathing, Hives, Itching, Rash, Swelling and Visual Disturbance      Social History     Tobacco Use     Smoking status: Never     Passive exposure: Never     Smokeless tobacco: Never   Substance Use Topics     Alcohol use: Yes     Comment: 1-2 beers a week      Wt Readings from Last 1 Encounters:   10/15/24 60.8 kg (134 lb)        Anesthesia Evaluation   Pt has had prior anesthetic. Type: Regional, General and MAC.    No history of anesthetic complications       ROS/MED HX  ENT/Pulmonary: Comment: Exercise induced asthma     (+)     SEFERINO risk factors,  hypertension,               asthma (no current meds)  Treatment: Inhaler prn,                 Neurologic: Comment: Last MS flare up was April of this year before hysterectomy     (+)                   Multiple Sclerosis, limitations: stable. Has not tolerated any medications used to help with MS. Follows with neurology. (9/11/24).            Cardiovascular: Comment: WPW?    4/2023 Ziopatch - underlying rhythm was sinus    1/9/24 cardiology note - normal echo and zio patch did not have any concerns.  She does have some symptoms of palpitations with history of short runs of PSVT - she will be following up for repeat EP study - to check  for WPW - no current symptoms; not yet scheduled; specialist in [place] told pt that he believes it is probably not WPW--wants to do EP study once other issues are taken care of/no urgency to having work-up     Per 9/11/24 HP:  She does have a history of WPW. HR has been controlled. She recently has had several surgeries without complications. Denies chest pain, shortness of breath, dizziness, syncope, or palpitations. She has had a syncopal episode in the past, but cardiology felt it was r/t orthostatism and hydration.     9/25/24 Cardiology f/up - recommend re-try of ACE inhibitor as Bp's still elevated              (+)  hypertension-range: 114/80 (9/11/24)/ -   -  - -                        dysrhythmias, WPW,        Previous cardiac testing   Echo: Date: 5/31/24 Results:  Global and regional left ventricular function is normal with an EF of 60-65%.  Left ventricular wall thickness is normal. Left ventricular size is normal.  Left ventricular diastolic function is normal. No regional wall motion  abnormalities are seen.  Right ventricular function, chamber size, wall motion, and thickness are  normal.  Pulmonary artery systolic pressure is normal.  The inferior vena cava is normal.  No pericardial effusion is present.  There is no prior study for direct comparison.    Stress Test:  Date: Results:    ECG Reviewed:  Date: 9/11/24 Results:  HR 61, sinus with short AK    Per 9/11/24 [facility]le note: AK interval is shortened-I did review with cardiology and no concerns were noted.   Cath:  Date: Results:      METS/Exercise Tolerance: >4 METS    Hematologic: Comments: HELLP with pregnancy  thrombotic thrombocytopenic purpura    (+)       history of blood transfusion (after pregnancy), no previous transfusion reaction,        Musculoskeletal:  - neg musculoskeletal ROS     GI/Hepatic: Comment: Status post hysterectomy   Abdominal wall pain   Painful scar         Renal/Genitourinary: Comment: AUB dysmenorrhea    (+) renal  disease (stage 2), type: CRI,            Endo:  - neg endo ROS     Psychiatric/Substance Use: Comment: Etoh 2 times/week 1-2 beers     (+) psychiatric history anxiety       Infectious Disease:  - neg infectious disease ROS     Malignancy:  - neg malignancy ROS     Other:  - neg other ROS   (-) Any chance pregnant       Physical Exam    Airway        Mallampati: I   TM distance: > 3 FB   Neck ROM: full   Mouth opening: > 3 cm    Respiratory Devices and Support         Dental       (+) Completely normal teeth      Cardiovascular   cardiovascular exam normal       Rhythm and rate: regular and normal     Pulmonary   pulmonary exam normal        breath sounds clear to auscultation       OUTSIDE LABS:  CBC:   Lab Results   Component Value Date    WBC 7.4 10/15/2024    WBC 5.7 09/11/2024    HGB 13.6 10/15/2024    HGB 13.0 09/11/2024    HCT 41.6 10/15/2024    HCT 39.8 09/11/2024     10/15/2024     09/11/2024     BMP:   Lab Results   Component Value Date     10/15/2024     09/11/2024    POTASSIUM 4.0 10/15/2024    POTASSIUM 3.9 09/11/2024    CHLORIDE 102 10/15/2024    CHLORIDE 107 09/11/2024    CO2 21 (L) 10/15/2024    CO2 24 09/11/2024    BUN 16.1 10/15/2024    BUN 15.2 09/11/2024    CR 1.03 (H) 10/15/2024    CR 1.10 (H) 09/11/2024    GLC 89 10/15/2024    GLC 87 09/11/2024     COAGS:   Lab Results   Component Value Date    PTT 28 07/23/2023    INR 0.89 07/23/2023     POC:   Lab Results   Component Value Date    HCG Negative 12/05/2022     HEPATIC:   Lab Results   Component Value Date    ALBUMIN 4.7 04/12/2024    PROTTOTAL 7.6 04/12/2024    ALT 19 04/12/2024    AST 24 04/12/2024    ALKPHOS 96 04/12/2024    BILITOTAL 0.5 04/12/2024     OTHER:   Lab Results   Component Value Date    LACT 4.5 (H) 01/10/2016    MARVIN 9.3 10/15/2024    MAG 3.2 (H) 07/29/2023    TSH 2.40 09/11/2024    T4 1.00 09/11/2024    CRP <2.9 10/29/2018    SED 6 07/29/2024       Anesthesia Plan    ASA Status:  2    NPO Status:  NPO  "Appropriate    Anesthesia Type: MAC.              Consents    Anesthesia Plan(s) and associated risks, benefits, and realistic alternatives discussed. Questions answered and patient/representative(s) expressed understanding.     - Discussed:     - Discussed with:  Patient            Postoperative Care            Comments:    Other Comments: Reviewed 10/15 THERESA dickson    Risks and benefits of MAC anesthetic discussed including dental damage, aspiration, loss of airway, conversion to general anesthetic, CV complications, MI, stroke, death. Pt wishes to proceed.     Risks and benefits of left sided TAP discussed including infection and vascular injection, patient wishes to proceed.            Janay Briggs, APRN CNP    I have reviewed the pertinent notes and labs in the chart from the past 30 days.  Any updates or changes from those notes are reflected in this note.               # Hypertension: Noted on problem list         # Overweight: Estimated body mass index is 27.06 kg/m  as calculated from the following:    Height as of 10/15/24: 1.499 m (4' 11\").    Weight as of 10/15/24: 60.8 kg (134 lb).             "

## 2024-11-09 ENCOUNTER — HEALTH MAINTENANCE LETTER (OUTPATIENT)
Age: 39
End: 2024-11-09

## 2024-11-11 ENCOUNTER — ANESTHESIA (OUTPATIENT)
Dept: SURGERY | Facility: HOSPITAL | Age: 39
End: 2024-11-11
Payer: COMMERCIAL

## 2024-11-11 ENCOUNTER — APPOINTMENT (OUTPATIENT)
Dept: ULTRASOUND IMAGING | Facility: HOSPITAL | Age: 39
End: 2024-11-11
Attending: NURSE ANESTHETIST, CERTIFIED REGISTERED
Payer: COMMERCIAL

## 2024-11-11 ENCOUNTER — HOSPITAL ENCOUNTER (OUTPATIENT)
Facility: HOSPITAL | Age: 39
Discharge: HOME | End: 2024-11-11
Attending: SURGERY | Admitting: SURGERY
Payer: COMMERCIAL

## 2024-11-11 VITALS
TEMPERATURE: 98.1 F | HEIGHT: 59 IN | RESPIRATION RATE: 16 BRPM | BODY MASS INDEX: 26.41 KG/M2 | HEART RATE: 18 BPM | DIASTOLIC BLOOD PRESSURE: 99 MMHG | SYSTOLIC BLOOD PRESSURE: 153 MMHG | WEIGHT: 131 LBS | OXYGEN SATURATION: 99 %

## 2024-11-11 DIAGNOSIS — Z98.891 HX OF CESAREAN SECTION: Primary | ICD-10-CM

## 2024-11-11 PROCEDURE — 360N000075 HC SURGERY LEVEL 2, PER MIN: Performed by: SURGERY

## 2024-11-11 PROCEDURE — 250N000011 HC RX IP 250 OP 636: Performed by: NURSE ANESTHETIST, CERTIFIED REGISTERED

## 2024-11-11 PROCEDURE — 250N000011 HC RX IP 250 OP 636: Performed by: NURSE PRACTITIONER

## 2024-11-11 PROCEDURE — 710N000012 HC RECOVERY PHASE 2, PER MINUTE: Performed by: SURGERY

## 2024-11-11 PROCEDURE — 250N000011 HC RX IP 250 OP 636

## 2024-11-11 PROCEDURE — 370N000017 HC ANESTHESIA TECHNICAL FEE, PER MIN: Performed by: SURGERY

## 2024-11-11 PROCEDURE — 258N000003 HC RX IP 258 OP 636: Performed by: NURSE PRACTITIONER

## 2024-11-11 PROCEDURE — 20103 EXPL PENTRG WOUND EXTREMITY: CPT | Performed by: SURGERY

## 2024-11-11 PROCEDURE — 250N000013 HC RX MED GY IP 250 OP 250 PS 637: Performed by: SURGERY

## 2024-11-11 PROCEDURE — C9290 INJ, BUPIVACAINE LIPOSOME: HCPCS | Performed by: NURSE ANESTHETIST, CERTIFIED REGISTERED

## 2024-11-11 PROCEDURE — 272N000001 HC OR GENERAL SUPPLY STERILE: Performed by: SURGERY

## 2024-11-11 PROCEDURE — 999N000141 HC STATISTIC PRE-PROCEDURE NURSING ASSESSMENT: Performed by: SURGERY

## 2024-11-11 PROCEDURE — 250N000009 HC RX 250: Performed by: NURSE ANESTHETIST, CERTIFIED REGISTERED

## 2024-11-11 PROCEDURE — 250N000011 HC RX IP 250 OP 636: Performed by: SURGERY

## 2024-11-11 PROCEDURE — 258N000003 HC RX IP 258 OP 636: Performed by: NURSE ANESTHETIST, CERTIFIED REGISTERED

## 2024-11-11 RX ORDER — DEXAMETHASONE SODIUM PHOSPHATE 10 MG/ML
INJECTION, SOLUTION INTRAMUSCULAR; INTRAVENOUS
Status: DISCONTINUED
Start: 2024-11-11 | End: 2024-11-11 | Stop reason: HOSPADM

## 2024-11-11 RX ORDER — SODIUM CHLORIDE, SODIUM LACTATE, POTASSIUM CHLORIDE, CALCIUM CHLORIDE 600; 310; 30; 20 MG/100ML; MG/100ML; MG/100ML; MG/100ML
INJECTION, SOLUTION INTRAVENOUS CONTINUOUS
Status: DISCONTINUED | OUTPATIENT
Start: 2024-11-11 | End: 2024-11-11 | Stop reason: HOSPADM

## 2024-11-11 RX ORDER — IBUPROFEN 800 MG/1
800 TABLET, FILM COATED ORAL EVERY 6 HOURS PRN
Qty: 60 TABLET | Refills: 1 | Status: SHIPPED | OUTPATIENT
Start: 2024-11-11

## 2024-11-11 RX ORDER — DEXAMETHASONE SODIUM PHOSPHATE 10 MG/ML
4 INJECTION, SOLUTION INTRAMUSCULAR; INTRAVENOUS
Status: DISCONTINUED | OUTPATIENT
Start: 2024-11-11 | End: 2024-11-11 | Stop reason: HOSPADM

## 2024-11-11 RX ORDER — CEFAZOLIN SODIUM/WATER 2 G/20 ML
2 SYRINGE (ML) INTRAVENOUS SEE ADMIN INSTRUCTIONS
Status: DISCONTINUED | OUTPATIENT
Start: 2024-11-11 | End: 2024-11-11 | Stop reason: HOSPADM

## 2024-11-11 RX ORDER — NALOXONE HYDROCHLORIDE 0.4 MG/ML
0.1 INJECTION, SOLUTION INTRAMUSCULAR; INTRAVENOUS; SUBCUTANEOUS
Status: DISCONTINUED | OUTPATIENT
Start: 2024-11-11 | End: 2024-11-11 | Stop reason: HOSPADM

## 2024-11-11 RX ORDER — PROPOFOL 10 MG/ML
INJECTION, EMULSION INTRAVENOUS CONTINUOUS PRN
Status: DISCONTINUED | OUTPATIENT
Start: 2024-11-11 | End: 2024-11-11

## 2024-11-11 RX ORDER — BUPIVACAINE HYDROCHLORIDE 5 MG/ML
INJECTION, SOLUTION EPIDURAL; INTRACAUDAL
Status: DISCONTINUED
Start: 2024-11-11 | End: 2024-11-11 | Stop reason: HOSPADM

## 2024-11-11 RX ORDER — FENTANYL CITRATE 50 UG/ML
INJECTION, SOLUTION INTRAMUSCULAR; INTRAVENOUS PRN
Status: DISCONTINUED | OUTPATIENT
Start: 2024-11-11 | End: 2024-11-11

## 2024-11-11 RX ORDER — ONDANSETRON 4 MG/1
4 TABLET, ORALLY DISINTEGRATING ORAL EVERY 30 MIN PRN
Status: DISCONTINUED | OUTPATIENT
Start: 2024-11-11 | End: 2024-11-11 | Stop reason: HOSPADM

## 2024-11-11 RX ORDER — KETAMINE HYDROCHLORIDE 10 MG/ML
INJECTION INTRAMUSCULAR; INTRAVENOUS PRN
Status: DISCONTINUED | OUTPATIENT
Start: 2024-11-11 | End: 2024-11-11

## 2024-11-11 RX ORDER — HYDROCODONE BITARTRATE AND ACETAMINOPHEN 5; 325 MG/1; MG/1
1 TABLET ORAL EVERY 6 HOURS PRN
Qty: 18 TABLET | Refills: 0 | Status: SHIPPED | OUTPATIENT
Start: 2024-11-11 | End: 2024-11-11

## 2024-11-11 RX ORDER — BUPIVACAINE HYDROCHLORIDE 5 MG/ML
INJECTION, SOLUTION EPIDURAL; INTRACAUDAL
Status: COMPLETED | OUTPATIENT
Start: 2024-11-11 | End: 2024-11-11

## 2024-11-11 RX ORDER — CEFAZOLIN SODIUM/WATER 2 G/20 ML
2 SYRINGE (ML) INTRAVENOUS
Status: COMPLETED | OUTPATIENT
Start: 2024-11-11 | End: 2024-11-11

## 2024-11-11 RX ORDER — DEXAMETHASONE SODIUM PHOSPHATE 10 MG/ML
INJECTION, SOLUTION INTRAMUSCULAR; INTRAVENOUS
Status: COMPLETED | OUTPATIENT
Start: 2024-11-11 | End: 2024-11-11

## 2024-11-11 RX ORDER — FENTANYL CITRATE 50 UG/ML
25 INJECTION, SOLUTION INTRAMUSCULAR; INTRAVENOUS EVERY 5 MIN PRN
Status: DISCONTINUED | OUTPATIENT
Start: 2024-11-11 | End: 2024-11-11 | Stop reason: HOSPADM

## 2024-11-11 RX ORDER — BUPIVACAINE HYDROCHLORIDE 2.5 MG/ML
INJECTION, SOLUTION EPIDURAL; INFILTRATION; INTRACAUDAL
Status: DISCONTINUED
Start: 2024-11-11 | End: 2024-11-11 | Stop reason: HOSPADM

## 2024-11-11 RX ORDER — ONDANSETRON 2 MG/ML
INJECTION INTRAMUSCULAR; INTRAVENOUS PRN
Status: DISCONTINUED | OUTPATIENT
Start: 2024-11-11 | End: 2024-11-11

## 2024-11-11 RX ORDER — SODIUM CHLORIDE, SODIUM LACTATE, POTASSIUM CHLORIDE, CALCIUM CHLORIDE 600; 310; 30; 20 MG/100ML; MG/100ML; MG/100ML; MG/100ML
INJECTION, SOLUTION INTRAVENOUS CONTINUOUS PRN
Status: DISCONTINUED | OUTPATIENT
Start: 2024-11-11 | End: 2024-11-11

## 2024-11-11 RX ORDER — OXYCODONE HYDROCHLORIDE 5 MG/1
5 TABLET ORAL EVERY 6 HOURS PRN
Qty: 12 TABLET | Refills: 0 | Status: SHIPPED | OUTPATIENT
Start: 2024-11-11 | End: 2024-11-14

## 2024-11-11 RX ORDER — FENTANYL CITRATE 50 UG/ML
INJECTION, SOLUTION INTRAMUSCULAR; INTRAVENOUS
Status: COMPLETED
Start: 2024-11-11 | End: 2024-11-11

## 2024-11-11 RX ORDER — HYDROMORPHONE HYDROCHLORIDE 1 MG/ML
0.2 INJECTION, SOLUTION INTRAMUSCULAR; INTRAVENOUS; SUBCUTANEOUS EVERY 5 MIN PRN
Status: DISCONTINUED | OUTPATIENT
Start: 2024-11-11 | End: 2024-11-11 | Stop reason: HOSPADM

## 2024-11-11 RX ORDER — PROPOFOL 10 MG/ML
INJECTION, EMULSION INTRAVENOUS PRN
Status: DISCONTINUED | OUTPATIENT
Start: 2024-11-11 | End: 2024-11-11

## 2024-11-11 RX ORDER — LIDOCAINE 40 MG/G
CREAM TOPICAL
Status: DISCONTINUED | OUTPATIENT
Start: 2024-11-11 | End: 2024-11-11 | Stop reason: HOSPADM

## 2024-11-11 RX ORDER — ONDANSETRON 2 MG/ML
4 INJECTION INTRAMUSCULAR; INTRAVENOUS EVERY 30 MIN PRN
Status: DISCONTINUED | OUTPATIENT
Start: 2024-11-11 | End: 2024-11-11 | Stop reason: HOSPADM

## 2024-11-11 RX ORDER — LIDOCAINE HYDROCHLORIDE 20 MG/ML
INJECTION, SOLUTION INFILTRATION; PERINEURAL PRN
Status: DISCONTINUED | OUTPATIENT
Start: 2024-11-11 | End: 2024-11-11

## 2024-11-11 RX ORDER — OXYCODONE HYDROCHLORIDE 5 MG/1
5 TABLET ORAL
Status: COMPLETED | OUTPATIENT
Start: 2024-11-11 | End: 2024-11-11

## 2024-11-11 RX ORDER — HYDROMORPHONE HYDROCHLORIDE 1 MG/ML
0.4 INJECTION, SOLUTION INTRAMUSCULAR; INTRAVENOUS; SUBCUTANEOUS EVERY 5 MIN PRN
Status: DISCONTINUED | OUTPATIENT
Start: 2024-11-11 | End: 2024-11-11 | Stop reason: HOSPADM

## 2024-11-11 RX ORDER — BUPIVACAINE HYDROCHLORIDE 2.5 MG/ML
INJECTION, SOLUTION INFILTRATION; PERINEURAL PRN
Status: DISCONTINUED | OUTPATIENT
Start: 2024-11-11 | End: 2024-11-11 | Stop reason: HOSPADM

## 2024-11-11 RX ORDER — FENTANYL CITRATE 50 UG/ML
50 INJECTION, SOLUTION INTRAMUSCULAR; INTRAVENOUS EVERY 5 MIN PRN
Status: DISCONTINUED | OUTPATIENT
Start: 2024-11-11 | End: 2024-11-11 | Stop reason: HOSPADM

## 2024-11-11 RX ADMIN — PROPOFOL 50 MG: 10 INJECTION, EMULSION INTRAVENOUS at 10:41

## 2024-11-11 RX ADMIN — Medication 20 MG: at 10:55

## 2024-11-11 RX ADMIN — PROPOFOL 20 MG: 10 INJECTION, EMULSION INTRAVENOUS at 10:46

## 2024-11-11 RX ADMIN — DEXAMETHASONE SODIUM PHOSPHATE 10 MG: 10 INJECTION, SOLUTION INTRAMUSCULAR; INTRAVENOUS at 10:20

## 2024-11-11 RX ADMIN — OXYCODONE HYDROCHLORIDE 5 MG: 5 TABLET ORAL at 12:26

## 2024-11-11 RX ADMIN — LIDOCAINE HYDROCHLORIDE 20 MG: 20 INJECTION, SOLUTION INFILTRATION; PERINEURAL at 10:38

## 2024-11-11 RX ADMIN — FENTANYL CITRATE 100 MCG: 50 INJECTION INTRAMUSCULAR; INTRAVENOUS at 10:36

## 2024-11-11 RX ADMIN — Medication 2 G: at 10:28

## 2024-11-11 RX ADMIN — FENTANYL CITRATE 50 MCG: 50 INJECTION INTRAMUSCULAR; INTRAVENOUS at 11:25

## 2024-11-11 RX ADMIN — BUPIVACAINE 10 ML: 13.3 INJECTION, SUSPENSION, LIPOSOMAL INFILTRATION at 10:20

## 2024-11-11 RX ADMIN — PROPOFOL 30 MG: 10 INJECTION, EMULSION INTRAVENOUS at 10:54

## 2024-11-11 RX ADMIN — BUPIVACAINE HYDROCHLORIDE 20 ML: 5 INJECTION, SOLUTION EPIDURAL; INTRACAUDAL at 10:20

## 2024-11-11 RX ADMIN — ONDANSETRON 4 MG: 2 INJECTION INTRAMUSCULAR; INTRAVENOUS at 10:51

## 2024-11-11 RX ADMIN — PROPOFOL 100 MCG/KG/MIN: 10 INJECTION, EMULSION INTRAVENOUS at 10:42

## 2024-11-11 RX ADMIN — FENTANYL CITRATE 50 MCG: 50 INJECTION INTRAMUSCULAR; INTRAVENOUS at 12:02

## 2024-11-11 RX ADMIN — SODIUM CHLORIDE, POTASSIUM CHLORIDE, SODIUM LACTATE AND CALCIUM CHLORIDE: 600; 310; 30; 20 INJECTION, SOLUTION INTRAVENOUS at 10:37

## 2024-11-11 RX ADMIN — MIDAZOLAM 2 MG: 1 INJECTION INTRAMUSCULAR; INTRAVENOUS at 10:36

## 2024-11-11 RX ADMIN — SODIUM CHLORIDE, POTASSIUM CHLORIDE, SODIUM LACTATE AND CALCIUM CHLORIDE: 600; 310; 30; 20 INJECTION, SOLUTION INTRAVENOUS at 10:15

## 2024-11-11 ASSESSMENT — ACTIVITIES OF DAILY LIVING (ADL)
ADLS_ACUITY_SCORE: 0

## 2024-11-11 NOTE — ANESTHESIA POSTPROCEDURE EVALUATION
Patient: Sarah Ugalde    Procedure: Procedure(s):  Abdominal  Wound exploration       Anesthesia Type:  MAC    Note:  Disposition: Outpatient   Postop Pain Control: Uneventful            Sign Out: Well controlled pain   PONV: No   Neuro/Psych: Uneventful            Sign Out: Acceptable/Baseline neuro status   Airway/Respiratory: Uneventful            Sign Out: Acceptable/Baseline resp. status   CV/Hemodynamics: Uneventful            Sign Out: Acceptable CV status; No obvious hypovolemia; No obvious fluid overload   Other NRE: NONE   DID A NON-ROUTINE EVENT OCCUR? No           Last vitals:  Vitals Value Taken Time   /99 11/11/24 1226   Temp 98.1  F (36.7  C) 11/11/24 1228   Pulse 18 11/11/24 1228   Resp 16 11/11/24 1226   SpO2 99 % 11/11/24 1230   Vitals shown include unfiled device data.    Electronically Signed By: EVERARDO Gill CRNA  November 11, 2024  1:21 PM

## 2024-11-11 NOTE — ANESTHESIA CARE TRANSFER NOTE
Patient: Sarah Ugalde    Procedure: Procedure(s):  Abdominal  Wound exploration       Diagnosis: Open wound [T14.8XXA]  Diagnosis Additional Information: No value filed.    Anesthesia Type:   MAC     Note:    Oropharynx: oropharynx clear of all foreign objects and spontaneously breathing  Level of Consciousness: awake  Oxygen Supplementation: room air    Independent Airway: airway patency satisfactory and stable  Dentition: dentition unchanged  Vital Signs Stable: post-procedure vital signs reviewed and stable  Report to RN Given: handoff report given  Patient transferred to: Phase II    Handoff Report: Identifed the Patient, Identified the Reponsible Provider, Reviewed the pertinent medical history, Discussed the surgical course, Reviewed Intra-OP anesthesia mangement and issues during anesthesia, Set expectations for post-procedure period and Allowed opportunity for questions and acknowledgement of understanding      Vitals:  Vitals Value Taken Time   BP     Temp     Pulse     Resp     SpO2 99 % 11/11/24 1115   Vitals shown include unfiled device data.    Electronically Signed By: Mariangel Gonzalez  November 11, 2024  11:17 AM

## 2024-11-11 NOTE — OR NURSING
Patient and responsible adult given discharge instructions with no questions regarding instructions. Pablo score 19/20. Pain level 5/10.  Discharged from unit via ambulation. Patient discharged to home with friend (Echo).

## 2024-11-11 NOTE — ANESTHESIA PROCEDURE NOTES
TAP Procedure Note    Pre-Procedure   Staff -        CRNA: Ella Li APRN CRNA       Other Anesthesia Staff: Mariangel Gonzalez       Performed By: CRNA       Location: pre-op       Procedure Start/Stop Times: 11/11/2024 10:20 AM and 11/11/2024 10:25 AM       Pre-Anesthestic Checklist: patient identified, IV checked, site marked, risks and benefits discussed, informed consent, monitors and equipment checked, pre-op evaluation, at physician/surgeon's request and post-op pain management  Timeout:       Correct Patient: Yes        Correct Procedure: Yes        Correct Site: Yes        Correct Position: Yes        Correct Laterality: Yes        Site Marked: Yes  Procedure Documentation  Procedure: TAP       Laterality: left       Patient Position: supine       Skin prep: Chloraprep       Needle Type: insulated       Needle Gauge: 20.        Needle Length (Inches): 4        Ultrasound guided       1. Ultrasound was used to identify targeted nerve, plexus, vascular marker, or fascial plane and place a needle adjacent to it in real-time.       2. Ultrasound was used to visualize the spread of anesthetic in close proximity to the above referenced structure.       3. A permanent image is entered into the patient's record.       4. The visualized anatomic structures appeared normal.       5. There were no apparent abnormal pathologic findings.    Assessment/Narrative         The placement was negative for: blood aspirated, painful injection and site bleeding       Paresthesias: No.       Insertion/Infusion Method: Single Shot       Complications: none       Injection made incrementally with aspirations every 5 mL.    Medication(s) Administered   Bupivacaine 0.5% PF (Infiltration) - Infiltration   20 mL - 11/11/2024 10:20:00 AM  Bupivacaine liposome (Exparel) 1.3% LA inj susp (Infiltration) - Infiltration   10 mL - 11/11/2024 10:20:00 AM  Dexamethasone 10 mg/mL PF (Perineural) - Perineural   10 mg - 11/11/2024  "10:20:00 AM  Medication Administration Time: 11/11/2024 10:20 AM      FOR [facility]) ONLY:   Pain Team Contact information: please page the Pain Team Via [address]. Search \"Pain\". During daytime hours, please page the attending first. At night please page the resident first.      "

## 2024-11-11 NOTE — OP NOTE
REPORT OF OPERATION  DATE OF PROCEDURE: 11/11/2024    PATIENT: Sarah ATKINSON [facility]    SURGERY PERFORMED: Wound exploration    PREOPERATIVE DIAGNOSIS: Recurrent pain along Pfannenstiel incision    POSTOPERATIVE DIAGNOSIS: Same.  Retained stitch along the deep tissue    SURGEON: Terrance Cruz MD    ASSISTANTS: Marlene Chinchilla CNP, Her assistance was required because of the technical aspects of the case    ANESTHESIA: Monitored Anesthesia Care    COMPLICATIONS: None apparent    ESTIMATED BLOOD LOSS: 5 mL     TRANSFUSIONS: None    TISSUE TO PATHOLOGY: None    FINDINGS: Retained stitch along the deep tissue    INDICATIONS: This is a 39 year old female who several months ago was taken to the operating room for pain and retained sutures along her Fenistil incision.  At that time PDS suture was used.  She now comes in complaining of feeling that there is a stitch down in there that is causing her pain again.  After discussion with her we will take to the operating room for a wound incision, removal of any stitches and possible placement of wound VAC.    DESCRIPTIONS OF PROCEDURE IN DETAIL: After consent was obtained the patient was taken to the operative suite and lavinia in the supine position.  The patient was identified and the correct patient was confirmed.  Monitored Anesthesia Care was administered by anesthesia.  The patient was sterilely prepped and draped in the usual fashion.  A time out was performed verifying the correct patient and the correct procedure.  The entire operative team was in agreement.  All necessary equipment and supplies were in the room.    Through her previous incision the skin was sharply entered and dissection was carried down to isolation of the fascia.  At this point a stitch was noted.  The stitch was removed and appeared to be the previously placed PDS stitch.  Wound was investigated and no other stitches or abnormalities were noted.  Hemostasis was assured.  The wound was then  closed by closing the skin with a running 3-0 nylon suture.  Sterile dressings were applied.    All needle,  instrument and sponge counts were correct x2.  The patient was awakened in the operating room and taken to the recovery room in stable condition tolerated procedure well.

## 2024-11-11 NOTE — DISCHARGE INSTRUCTIONS
** Please call [facility]  to schedule follow up with Marlene Chinchilla at [phone] within 17-21 days. **

## 2024-11-12 ENCOUNTER — MYC REFILL (OUTPATIENT)
Dept: FAMILY MEDICINE | Facility: OTHER | Age: 39
End: 2024-11-12

## 2024-11-12 DIAGNOSIS — G89.18 ACUTE POST-OPERATIVE PAIN: ICD-10-CM

## 2024-11-12 RX ORDER — HYDROXYZINE HYDROCHLORIDE 50 MG/1
50 TABLET, FILM COATED ORAL EVERY 8 HOURS PRN
Qty: 40 TABLET | Refills: 1 | Status: SHIPPED | OUTPATIENT
Start: 2024-11-12

## 2024-11-12 NOTE — TELEPHONE ENCOUNTER
hydrOXYzine HCl (ATARAX) 50 MG tablet         Last Written Prescription Date:  10/15/24  Last Fill Quantity: 40,   # refills: 1  Last Office Visit: 10/15/24  Future Office visit:       Routing refill request to provider for review/approval because:  Antihistamines Protocol Failed      Medication indicated for associated diagnosis    The medication is associated with one or more of the following diagnoses:  Allergies  Rhinitis  Upper respiratory tract allergy  Urticaria  Itching

## 2024-12-08 ENCOUNTER — MYC REFILL (OUTPATIENT)
Dept: FAMILY MEDICINE | Facility: OTHER | Age: 39
End: 2024-12-08

## 2024-12-08 DIAGNOSIS — G89.18 ACUTE POST-OPERATIVE PAIN: ICD-10-CM

## 2024-12-08 DIAGNOSIS — I10 ESSENTIAL HYPERTENSION: ICD-10-CM

## 2024-12-08 DIAGNOSIS — G47.00 INSOMNIA, UNSPECIFIED TYPE: ICD-10-CM

## 2024-12-09 ENCOUNTER — MYC MEDICAL ADVICE (OUTPATIENT)
Dept: SURGERY | Facility: OTHER | Age: 39
End: 2024-12-09

## 2024-12-09 RX ORDER — TRAZODONE HYDROCHLORIDE 50 MG/1
100 TABLET, FILM COATED ORAL AT BEDTIME
Qty: 180 TABLET | Refills: 0 | Status: SHIPPED | OUTPATIENT
Start: 2024-12-09

## 2024-12-09 RX ORDER — HYDROXYZINE HYDROCHLORIDE 50 MG/1
50 TABLET, FILM COATED ORAL EVERY 8 HOURS PRN
Qty: 40 TABLET | Refills: 1 | Status: SHIPPED | OUTPATIENT
Start: 2024-12-09

## 2024-12-09 RX ORDER — LABETALOL 100 MG/1
100 TABLET, FILM COATED ORAL 2 TIMES DAILY
Qty: 60 TABLET | Refills: 2 | Status: SHIPPED | OUTPATIENT
Start: 2024-12-09

## 2024-12-09 NOTE — TELEPHONE ENCOUNTER
labetalol (NORMODYNE) 100 MG tablet       Last Written Prescription Date:  7/3/24  Last Fill Quantity: 60,   # refills: 2  Last Office Visit: 10/15/24  Future Office visit:       Routing refill request to provider for review/approval because:  Beta-Blockers Protocol Failed      Most recent blood pressure under 140/90 in past 12 months        BP Readings from Last 3 Encounters:   11/11/24 153/99   10/15/24 122/82   10/15/24 126/84

## 2024-12-10 ENCOUNTER — OFFICE VISIT (OUTPATIENT)
Dept: SURGERY | Facility: OTHER | Age: 39
End: 2024-12-10
Attending: SURGERY
Payer: COMMERCIAL

## 2024-12-10 VITALS
RESPIRATION RATE: 14 BRPM | SYSTOLIC BLOOD PRESSURE: 102 MMHG | TEMPERATURE: 99 F | OXYGEN SATURATION: 99 % | HEART RATE: 77 BPM | DIASTOLIC BLOOD PRESSURE: 66 MMHG

## 2024-12-10 DIAGNOSIS — Z90.710 S/P ABDOMINAL HYSTERECTOMY: Primary | ICD-10-CM

## 2024-12-10 DIAGNOSIS — T14.8XXA OPEN WOUND: ICD-10-CM

## 2024-12-10 PROCEDURE — G0463 HOSPITAL OUTPT CLINIC VISIT: HCPCS

## 2024-12-10 ASSESSMENT — PAIN SCALES - GENERAL: PAINLEVEL_OUTOF10: NO PAIN (0)

## 2024-12-10 NOTE — PROGRESS NOTES
CLINIC NOTE - POST-OP SURGERY  12/10/2024    Patient:Sarah Ugalde    Procedure:  Wound exploration    This is a 39 year old female who is 4 weeks s/p  wound exploration .  The patient has no complaints today.  Still has sutures in place    Current Medications:  Current Outpatient Medications   Medication Sig Dispense Refill    cloNIDine (CATAPRES) 0.1 MG tablet Take 1 tablet (0.1 mg) by mouth at bedtime.      furosemide (LASIX) 20 MG tablet Take 1 tablet (20 mg) by mouth daily.      hydrOXYzine HCl (ATARAX) 50 MG tablet Take 1 tablet (50 mg) by mouth every 8 hours as needed for other or anxiety (adjuvant pain). 40 tablet 1    ibuprofen (ADVIL/MOTRIN) 800 MG tablet Take 1 tablet (800 mg) by mouth every 6 hours as needed for moderate pain. 60 tablet 1    labetalol (NORMODYNE) 100 MG tablet Take 1 tablet (100 mg) by mouth 2 times daily. 60 tablet 2    traZODone (DESYREL) 50 MG tablet Take 2 tablets (100 mg) by mouth at bedtime. 180 tablet 0       Allergies:  Allergies   Allergen Reactions    Zestril [Lisinopril] Anaphylaxis and Fatigue    Amlodipine Swelling    Cymbalta [Duloxetine Hcl]     Hydrochlorothiazide     Magnesium Other (See Comments)     Altered mental status after IV dosing    Other Environmental Allergy     Prozac [Fluoxetine]     Losartan Difficulty breathing, Hives, Itching, Rash, Swelling and Visual Disturbance       PHYSICAL EXAM:   Vital signs: /66 (BP Location: Right arm, Cuff Size: Adult Regular)   Pulse 77   Temp 99  F (37.2  C) (Tympanic)   Resp 14   SpO2 99%    Weight: [unfilled]   BMI: There is no height or weight on file to calculate BMI.   General: Normal, healthy, cooperative, in no acute distress, alert   Lungs: non labored   Abdominal: abdomen is soft without significant tenderness, masses, organomegaly or guarding   Wounds:  Well healed surgical scars consistent with her operation.     ASSESSMENT:    39 year old female who is 4 weeks s/p  wound exploration .  Doing well.      PLAN:   Will remove the staples/sutures today.  This was done uneventfully.    Follow-up PRN      Restrictions : None

## 2024-12-18 ENCOUNTER — MYC MEDICAL ADVICE (OUTPATIENT)
Dept: FAMILY MEDICINE | Facility: OTHER | Age: 39
End: 2024-12-18

## 2024-12-30 ENCOUNTER — MYC REFILL (OUTPATIENT)
Dept: FAMILY MEDICINE | Facility: OTHER | Age: 39
End: 2024-12-30

## 2024-12-30 DIAGNOSIS — Z98.891 HX OF CESAREAN SECTION: ICD-10-CM

## 2025-01-02 RX ORDER — IBUPROFEN 800 MG/1
800 TABLET, FILM COATED ORAL EVERY 6 HOURS PRN
Qty: 60 TABLET | Refills: 1 | Status: SHIPPED | OUTPATIENT
Start: 2025-01-02

## 2025-01-02 NOTE — TELEPHONE ENCOUNTER
ibuprofen (ADVIL/MOTRIN) 800 MG tablet 60 tablet 1 11/11/2024     Last Office Visit: 12/10/2024  Future Office visit:       Routing refill request to provider for review/approval because:

## 2025-01-07 ENCOUNTER — MYC REFILL (OUTPATIENT)
Dept: FAMILY MEDICINE | Facility: OTHER | Age: 40
End: 2025-01-07

## 2025-01-07 DIAGNOSIS — G89.18 ACUTE POST-OPERATIVE PAIN: ICD-10-CM

## 2025-01-07 RX ORDER — HYDROXYZINE HYDROCHLORIDE 50 MG/1
50 TABLET, FILM COATED ORAL EVERY 8 HOURS PRN
Qty: 40 TABLET | Refills: 1 | Status: SHIPPED | OUTPATIENT
Start: 2025-01-07

## 2025-01-07 NOTE — TELEPHONE ENCOUNTER
hydrOXYzine HCl (ATARAX) 50 MG tablet       Last Written Prescription Date:  12/9/24  Last Fill Quantity: 40,   # refills: 1  Last Office Visit: 9/11/24  Future Office visit:       Routing refill request to provider for review/approval because:  Antihistamines Protocol Failed      Medication indicated for associated diagnosis    The medication is associated with one or more of the following diagnoses:  Allergies  Rhinitis  Upper respiratory tract allergy  Urticaria  Itching  Cystic Fibrosis  Bronchiectasis

## 2025-01-14 ENCOUNTER — OFFICE VISIT (OUTPATIENT)
Dept: SURGERY | Facility: OTHER | Age: 40
End: 2025-01-14
Attending: SURGERY
Payer: COMMERCIAL

## 2025-01-14 ENCOUNTER — PREP FOR PROCEDURE (OUTPATIENT)
Dept: SURGERY | Facility: OTHER | Age: 40
End: 2025-01-14

## 2025-01-14 VITALS
DIASTOLIC BLOOD PRESSURE: 70 MMHG | HEART RATE: 80 BPM | SYSTOLIC BLOOD PRESSURE: 124 MMHG | TEMPERATURE: 98.1 F | RESPIRATION RATE: 16 BRPM | OXYGEN SATURATION: 100 %

## 2025-01-14 DIAGNOSIS — R10.13 EPIGASTRIC PAIN: ICD-10-CM

## 2025-01-14 DIAGNOSIS — R10.13 EPIGASTRIC PAIN: Primary | ICD-10-CM

## 2025-01-14 DIAGNOSIS — Z80.0 FAMILY HISTORY OF COLON CANCER: ICD-10-CM

## 2025-01-14 DIAGNOSIS — K62.5 RECTAL BLEEDING: Primary | ICD-10-CM

## 2025-01-14 DIAGNOSIS — K62.5 RECTAL BLEEDING: ICD-10-CM

## 2025-01-14 PROCEDURE — G0463 HOSPITAL OUTPT CLINIC VISIT: HCPCS

## 2025-01-14 ASSESSMENT — PAIN SCALES - GENERAL: PAINLEVEL_OUTOF10: SEVERE PAIN (6)

## 2025-01-14 NOTE — PATIENT INSTRUCTIONS
Thank you for allowing Dr Terrance Cruz and our surgical team to participate in your care. Please call our health unit coordinator at [phone] with scheduling questions or the nurse at [phone] with any other questions or concerns.      You have been scheduled for: UPPER ENDOSCOPY AND COLONOSCOPY with Dr. Terrance Cruz on   THURSDAY FEBRUARY 6TH.     You will be notified the weekday before the procedure for your check in time, you can also call admitting at  after 5:00PM    You will need to have and adult  to bring you home.    Surgery Education nurse (PAT phone call) to call one week prior to procedure, If you do not hear from them you can call them at this number    JAN 30TH 11:30    You will use MIRALAX bowel prep.    Please see handout for additional instruction.    You WILL NOT need a pre-operative appointment with your primary care provider.    You may call [phone] or [phone] with any questions.

## 2025-01-14 NOTE — PROGRESS NOTES
CLINIC NOTE - CONSULT  1/14/2025    Patient : Sarah ATKINSON [facility]  Referring Physician :  Trisha Fonseca    Reason for Referral : Upper and lower endoscopy    This is a 39 year old female with a need for an upper and lower endoscopy.  Upper endoscopy is needed for Abdominal Pain.  Lower endoscopy is needed for Rectal Bleeding, Family History of Colon Cancer, and Family History of Colon Polyps.      Last EGD : Never  History of GERD : NO  History of PUD : NO  On PPI's : NO   Drug and Dose : N/A  History of dysphagia : NO   Dysphagia to solids greater than liquids : YES  Hematemesis : NO  Melena : NO    Last colonoscopy : Never  Family history of colon cancer : YES  Family history of colon polyps : YES  Personal history of colon cancer : NO  Personal history of colon polyps : NO  Rectal bleeding : YES  Changes in bowel habits :NO  Personal history of inflammatory bowel disease : NO    Past Medical History:  Past Medical History:   Diagnosis Date    Detached retina, bilateral 06/23/2015    secondary to severe preeclampsia with HELLP syndroma and acute renal failure    PARDEEP (generalized anxiety disorder) 07/23/2023    HELLP (hemolytic anemia/elev liver enzymes/low platelets in pregnancy) 06/23/2015    Severe preeclampsia with HELLP syndrome and acute renal failure    History of blood transfusion June 2015    HUS (hemolytic uremic syndrome) (H) 06/25/2015    Postpartum Thrombotic thrombocytopenic purpura (TTP) and 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    MS (multiple sclerosis) (H) 01/01/2015    Rh negative state in antepartum period 07/23/2023    TTP (thrombotic thrombocytopenic purpura) (H) 06/25/2015    Postpartum Thrombotic thrombocytopenic purpura (TTP) and 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    Uncomplicated asthma 1999    Exercise enduced    WPW (Delaney-Parkinson-White syndrome) 01/01/2015    She has been evaluated for ablation, however, based on location this was not  "deemed safe by cardiology at the time       Past Surgical History:  Past Surgical History:   Procedure Laterality Date     SECTION  2015    Severe preeclampsia with HELLP syndrome and acute renal failure then developed postpartum Thrombotic thrombocytopenic purpura (TTP) and Hemolytic uremic syndrome (HUS)    COMBINED  SECTION, SALPINGECTOMY BILATERAL Bilateral 2023    Procedure:  SECTION, WITH BILATERAL SALPINGECTOMY, Viable baby girl born at 1851;  Surgeon: Naseem Cruz MD;  [facility]    DILATION AND CURETTAGE, OPERATIVE HYSTEROSCOPY, COMBINED N/A 10/25/2023    Procedure: HYSTEROSCOPY, WITH DILATION AND CURETTAGE OF UTERUS;  Surgeon: Scout Grimaldo MD;  Location: [facility]    GENITOURINARY SURGERY      Tubes removed    HYSTERECTOMY SUPRACERVICAL N/A 2024    Procedure: HYSTERECTOMY, SUPRACERVICAL, ABDOMINAL;  Surgeon: Scout Grimaldo MD;  [facility]    INCISION AND DRAINAGE ABDOMEN WASHOUT, COMBINED N/A 2024    Procedure: Abdominal Wound Exploration;  Surgeon: Terrance Cruz MD;  [facility]    INCISION AND DRAINAGE ABDOMEN WASHOUT, COMBINED N/A 2024    Procedure: Abdominal  Wound exploration;  Surgeon: Terrance Cruz MD;  Location: [facility]    IR TRIGGER POINT INJ 1 OR 2 MUSCLES  2024    TUBAL LIGATION         Family History History:  Family History   Problem Relation Age of Onset    Hypertension Mother     Hypertension Father     Allergies Maternal Grandmother         dust pollen    Cancer - colorectal Paternal Grandmother 59    Allergies Maternal Aunt         \"    Cardiovascular Maternal Aunt     Breast Cancer Other 60        3 great aunts on moms side       History of Tobacco Use:  History   Smoking Status    Never   Smokeless Tobacco    Never       Current Medications:  Current Outpatient Medications   Medication Sig Dispense Refill    cloNIDine (CATAPRES) 0.1 MG tablet Take 1 tablet (0.1 mg) by mouth at bedtime.      " furosemide (LASIX) 20 MG tablet Take 1 tablet (20 mg) by mouth daily.      hydrOXYzine HCl (ATARAX) 50 MG tablet Take 1 tablet (50 mg) by mouth every 8 hours as needed for other or anxiety (adjuvant pain). 40 tablet 1    ibuprofen (ADVIL/MOTRIN) 800 MG tablet Take 1 tablet (800 mg) by mouth every 6 hours as needed for moderate pain. 60 tablet 1    labetalol (NORMODYNE) 100 MG tablet Take 1 tablet (100 mg) by mouth 2 times daily. 60 tablet 2    traZODone (DESYREL) 50 MG tablet Take 2 tablets (100 mg) by mouth at bedtime. 180 tablet 0       Allergies:  Allergies   Allergen Reactions    Zestril [Lisinopril] Anaphylaxis and Fatigue    Amlodipine Swelling    Cymbalta [Duloxetine Hcl]     Hydrochlorothiazide     Magnesium Other (See Comments)     Altered mental status after IV dosing    Other Environmental Allergy     Prozac [Fluoxetine]     Losartan Difficulty breathing, Hives, Itching, Rash, Swelling and Visual Disturbance       ROS:  Pertinent items are noted in HPI.  All other systems are negative.    PHYSICAL EXAM:     Vital signs: /70 (BP Location: Left arm, Cuff Size: Adult Regular)   Pulse 80   Temp 98.1  F (36.7  C) (Tympanic)   Resp 16   SpO2 100%    Weight: [unfilled]   BMI: There is no height or weight on file to calculate BMI.   General: Normal, healthy, cooperative, in no acute distress, alert   Skin: no jaundice   HEENT: PERRLA and EOMI   Neck: supple   Lungs: Non labored   Abdominal: abdomen is soft without significant tenderness, masses, organomegaly or guarding   Extremities: No cyanosis, clubbing or edema noted bilaterally in Upper and Lower Extremities   Neurological: without deficit    Assessment:   39 year old female with need for upper endoscopy for Abdominal Pain and lower endoscopy for Rectal Bleeding, Family History of Colon Cancer, and Family History of Colon Polyps:    Plan:   Will schedule an esophagogastroduodenoscopy and colonoscopy.  The procedures with their risks, benefits and  alternatives were explained.  Risks include but are not limited to bleeding, perforation, missing lesions, need for additional procedures, reaction to anesthesia.  All the patients questions were answered.  The patient consents to proceed.  The procedures will be scheduled.

## 2025-01-28 ENCOUNTER — OFFICE VISIT (OUTPATIENT)
Dept: FAMILY MEDICINE | Facility: OTHER | Age: 40
End: 2025-01-28
Attending: NURSE PRACTITIONER
Payer: COMMERCIAL

## 2025-01-28 ENCOUNTER — MYC MEDICAL ADVICE (OUTPATIENT)
Dept: FAMILY MEDICINE | Facility: OTHER | Age: 40
End: 2025-01-28

## 2025-01-28 ENCOUNTER — ANCILLARY PROCEDURE (OUTPATIENT)
Dept: GENERAL RADIOLOGY | Facility: OTHER | Age: 40
End: 2025-01-28
Attending: NURSE PRACTITIONER
Payer: COMMERCIAL

## 2025-01-28 VITALS
HEART RATE: 62 BPM | HEIGHT: 59 IN | OXYGEN SATURATION: 97 % | BODY MASS INDEX: 28.35 KG/M2 | WEIGHT: 140.6 LBS | SYSTOLIC BLOOD PRESSURE: 104 MMHG | TEMPERATURE: 98.1 F | DIASTOLIC BLOOD PRESSURE: 68 MMHG | RESPIRATION RATE: 16 BRPM

## 2025-01-28 DIAGNOSIS — J06.9 ACUTE URI: Primary | ICD-10-CM

## 2025-01-28 DIAGNOSIS — G89.18 ACUTE POST-OPERATIVE PAIN: ICD-10-CM

## 2025-01-28 DIAGNOSIS — J06.9 ACUTE URI: ICD-10-CM

## 2025-01-28 PROCEDURE — 71046 X-RAY EXAM CHEST 2 VIEWS: CPT | Mod: TC

## 2025-01-28 PROCEDURE — G0463 HOSPITAL OUTPT CLINIC VISIT: HCPCS | Mod: 25

## 2025-01-28 RX ORDER — PREDNISONE 20 MG/1
40 TABLET ORAL DAILY
Qty: 10 TABLET | Refills: 0 | Status: SHIPPED | OUTPATIENT
Start: 2025-01-28 | End: 2025-02-02

## 2025-01-28 RX ORDER — HYDROXYZINE HYDROCHLORIDE 50 MG/1
50 TABLET, FILM COATED ORAL EVERY 8 HOURS PRN
Qty: 40 TABLET | Refills: 1 | Status: SHIPPED | OUTPATIENT
Start: 2025-01-28

## 2025-01-28 RX ORDER — DOXYCYCLINE 100 MG/1
100 CAPSULE ORAL 2 TIMES DAILY
Qty: 20 CAPSULE | Refills: 0 | Status: SHIPPED | OUTPATIENT
Start: 2025-01-28 | End: 2025-02-07

## 2025-01-28 ASSESSMENT — PAIN SCALES - GENERAL: PAINLEVEL_OUTOF10: NO PAIN (0)

## 2025-01-28 NOTE — PROGRESS NOTES
"  Assessment & Plan     (J06.9) Acute URI  (primary encounter diagnosis)  Comment: does not appear in distress, oxygen sats 97 percent, CXR clear  Plan: Although CXR is clear, symptoms had improved and then worsened. Will therefore treat with antibiotics. Doxycycline ordered. She was made aware of the side effects. Will also order prednisone burst due to the wheezes. She was made aware of the side effects. Symptomatic cares also encouraged.  The patient will follow up with new or worsening symptoms.           BMI  Estimated body mass index is 28.4 kg/m  as calculated from the following:    Height as of this encounter: 1.499 m (4' 11\").    Weight as of this encounter: 63.8 kg (140 lb 9.6 oz).       The longitudinal plan of care for the diagnosis(es)/condition(s) as documented were addressed during this visit. Due to the added complexity in care, I will continue to support Sarah in the subsequent management and with ongoing continuity of care.    Paty Smith is a 39 year old, presenting for the following health issues:  URI    History of Present Illness       Reason for visit:  Congrstion, trouble breathing  Symptom onset:  3-7 days ago  Symptoms include:  Congestion, cough, body aches, ans tighness in chest  Symptom intensity:  Moderate  Symptom progression:  Staying the same  Had these symptoms before:  No  What makes it worse:  Exercise  What makes it better:  Steam and sleep   She is taking medications regularly.       Acute Illness  Acute illness concerns: chest heaviness/tightness; dry cough, wheezes, low grade fevers  Onset/Duration: 2 weeks ago; had improved, but then worsened on 1/26/2025  Symptoms:  Fever: YES low-grade  Chills/Sweats: YES  Headache (location?): YES back center  Sinus Pressure: YES  Conjunctivitis:  No  Ear Pain: no  Rhinorrhea: YES  Congestion: YES  Sore Throat: No  Cough: YES  Wheeze: YES- mild  Decreased Appetite: YES  Nausea: YES- slight  Vomiting: No  Diarrhea: " "YES  Dysuria/Freq.: No  Dysuria or Hematuria: No  Fatigue/Achiness: YES  Sick/Strep Exposure: YES- exposed to RSV 2 weeks ago  Therapies tried and outcome: home remedies, steam showers, OTC medications   She does not smoke.   No known asthma or COPD.       Review of Systems  Constitutional, HEENT, cardiovascular, pulmonary, gi and gu systems are negative, except as otherwise noted.      Objective    /68 (BP Location: Left arm, Patient Position: Sitting, Cuff Size: Adult Regular)   Pulse 62   Temp 98.1  F (36.7  C) (Tympanic)   Resp 16   Ht 1.499 m (4' 11\")   Wt 63.8 kg (140 lb 9.6 oz)   SpO2 97%   BMI 28.40 kg/m    Body mass index is 28.4 kg/m .  Physical Exam   GENERAL: alert and no distress  EYES: Eyes grossly normal to inspection, PERRL and conjunctivae and sclerae normal  HENT: ear canals and TM's normal, nose and mouth without ulcers or lesions  NECK: no adenopathy, no asymmetry, masses, or scars  RESP: lungs clear to auscultation - no rales, rhonchi or wheezes, diminished in the bases  CV: regular rate and rhythm, no murmur, click or rub, no peripheral edema  ABDOMEN: soft, nontender, no hepatosplenomegaly, no masses and bowel sounds normal  MS: no gross musculoskeletal defects noted, no edema  NEURO: Normal strength and tone, mentation intact and speech normal  PSYCH: mentation appears normal, affect normal/bright            Signed Electronically by: Trisha Fonseca NP    "

## 2025-01-28 NOTE — TELEPHONE ENCOUNTER
"Called and spoke with patient. She reports her chest feels \"tight\" but that she is able to breathe. Will not go in to ED/UC to be evaluated as she has her baby with her.   Reports this is not an emergency and she does know enough to go in to the ER if she truly could not breathe.   Reports it is just hard to get a full deep breath in. Also has nasal congestion. No other symptoms. No fevers.   Would like to come in to see PCP to get checked for possible pneumonia.   "

## 2025-01-29 ENCOUNTER — ANESTHESIA EVENT (OUTPATIENT)
Dept: SURGERY | Facility: HOSPITAL | Age: 40
End: 2025-01-29
Payer: COMMERCIAL

## 2025-01-29 ENCOUNTER — MYC MEDICAL ADVICE (OUTPATIENT)
Dept: FAMILY MEDICINE | Facility: OTHER | Age: 40
End: 2025-01-29

## 2025-01-29 ENCOUNTER — TELEPHONE (OUTPATIENT)
Dept: SURGERY | Facility: OTHER | Age: 40
End: 2025-01-29

## 2025-01-29 NOTE — OR NURSING
"Received a message back from CRNA pool; noted that patient does need to be asymptomatic x 2 weeks to have the procedure and she will need to be rescheduled.  When I called the patient to update her of this; she became upset and said that she was not going to let us reschedule her.  Patient informed that per her office visit she was \"actively sick\" yesterday and started on ABX and prednisone; patient noted that she didn't have to start that unless she was getting worse; and that per Trisha she was \"fine\" and if she would clear her for surgery.  Patient noted that she was going to call Trisha; This writer also sent Trisha Fonseca a message encouraging her to reach out to CRNA pool if she felt like the patient was safe to proceed with the procedure.  "

## 2025-01-29 NOTE — TELEPHONE ENCOUNTER
Patient scheduled with Dr. Cruz on 2/6/25 for EGD and colonoscopy; she is sick and needs to be rescheduled (has to be asymptomatic x 2 weeks)    Thank you,  Janay MARTINEZ  PAT *[id number]

## 2025-02-06 ENCOUNTER — ANESTHESIA (OUTPATIENT)
Dept: SURGERY | Facility: HOSPITAL | Age: 40
End: 2025-02-06
Payer: COMMERCIAL

## 2025-02-07 ASSESSMENT — ENCOUNTER SYMPTOMS: DYSRHYTHMIAS: 1

## 2025-02-07 NOTE — ANESTHESIA PREPROCEDURE EVALUATION
Anesthesia Pre-Procedure Evaluation    Patient: Sarah ATKINSON [facility]   MRN: [id number] : 1985        Procedure : Procedure(s):  COLONOSCOPY, WITH UPPER GASTROINTESTINAL TRACT ENDOSCOPY          Past Medical History:   Diagnosis Date     Detached retina, bilateral 2015    secondary to severe preeclampsia with HELLP syndroma and acute renal failure     PARDEEP (generalized anxiety disorder) 2023     HELLP (hemolytic anemia/elev liver enzymes/low platelets in pregnancy) 2015    Severe preeclampsia with HELLP syndrome and acute renal failure     History of blood transfusion 2015     HUS (hemolytic uremic syndrome) (H) 2015    Postpartum Thrombotic thrombocytopenic purpura (TTP) and 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     MS (multiple sclerosis) (H) 2015     Rh negative state in antepartum period 2023     TTP (thrombotic thrombocytopenic purpura) (H) 2015    Postpartum Thrombotic thrombocytopenic purpura (TTP) and 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     Uncomplicated asthma     Exercise enduced     WPW (Delaney-Parkinson-White syndrome) 2015    She has been evaluated for ablation, however, based on location this was not deemed safe by cardiology at the time      Past Surgical History:   Procedure Laterality Date      SECTION  2015    Severe preeclampsia with HELLP syndrome and acute renal failure then developed postpartum Thrombotic thrombocytopenic purpura (TTP) and Hemolytic uremic syndrome (HUS)     COMBINED  SECTION, SALPINGECTOMY BILATERAL Bilateral 2023    Procedure:  SECTION, WITH BILATERAL SALPINGECTOMY, Viable baby girl born at 1851;  Surgeon: Naseem Cruz MD;  [facility]: [facility]     DILATION AND CURETTAGE, OPERATIVE HYSTEROSCOPY, COMBINED N/A 10/25/2023    Procedure: HYSTEROSCOPY, WITH DILATION AND CURETTAGE OF UTERUS;  Surgeon: Scout Grimaldo MD;  Location: [facility]      GENITOURINARY SURGERY  2023    Tubes removed     HYSTERECTOMY SUPRACERVICAL N/A 04/18/2024    Procedure: HYSTERECTOMY, SUPRACERVICAL, ABDOMINAL;  Surgeon: Scout Grimaldo MD;  [facility]     INCISION AND DRAINAGE ABDOMEN WASHOUT, COMBINED N/A 9/19/2024    Procedure: Abdominal Wound Exploration;  Surgeon: Terrance Cruz MD;  [facility]: [facility]     INCISION AND DRAINAGE ABDOMEN WASHOUT, COMBINED N/A 11/11/2024    Procedure: Abdominal  Wound exploration;  Surgeon: Terrance Cruz MD;  Location: [facility]     IR TRIGGER POINT INJ 1 OR 2 MUSCLES  7/2/2024     TUBAL LIGATION        Allergies   Allergen Reactions     Zestril [Lisinopril] Anaphylaxis and Fatigue     Amlodipine Swelling     Cymbalta [Duloxetine Hcl]      Hydrochlorothiazide      Magnesium Other (See Comments)     Altered mental status after IV dosing     Other Environmental Allergy      Prozac [Fluoxetine]      Losartan Difficulty breathing, Hives, Itching, Rash, Swelling and Visual Disturbance      Social History     Tobacco Use     Smoking status: Never     Passive exposure: Never     Smokeless tobacco: Never   Substance Use Topics     Alcohol use: Yes     Comment: 1-2 beers a week      Wt Readings from Last 1 Encounters:   01/28/25 63.8 kg (140 lb 9.6 oz)        Anesthesia Evaluation   Pt has had prior anesthetic. Type: Regional, General and MAC.    No history of anesthetic complications       ROS/MED HX  ENT/Pulmonary: Comment: Exercise induced asthma     (+)     SEFERINO risk factors,  hypertension,               asthma (no current meds)  Treatment: Inhaler prn,       recent URI,  1/28/25 - CXR clear, symptoms had improved then worsened, so abx and prednisone  given:        Neurologic:     (+)                   Multiple Sclerosis, limitations: stable. Has not tolerated any medications used to help with MS. Follows with neurology. (9/11/24).            Cardiovascular: Comment: WPW    4/2023 Ziopatch - underlying rhythm was sinus    1/9/24  cardiology note - normal echo and zio patch did not have any concerns.  She does have some symptoms of palpitations with history of short runs of PSVT - she will be following up for repeat EP study - to check for WPW - no current symptoms; not yet scheduled; specialist in [place] told pt that he believes it is probably not WPW--wants to do EP study once other issues are taken care of/no urgency to having work-up     Per 9/11/24 HP:  She does have a history of WPW. HR has been controlled. She recently has had several surgeries without complications. Denies chest pain, shortness of breath, dizziness, syncope, or palpitations. She has had a syncopal episode in the past, but cardiology felt it was r/t orthostatism and hydration.     9/25/24 Cardiology f/up - recommend re-try of ACE inhibitor as Bp's still elevated              (+)  hypertension-range: lasix, labetolol/ -   -  - -                        dysrhythmias, WPW,        Previous cardiac testing   Echo: Date: 5/31/24 Results:  Global and regional left ventricular function is normal with an EF of 60-65%.  Left ventricular wall thickness is normal. Left ventricular size is normal.  Left ventricular diastolic function is normal. No regional wall motion  abnormalities are seen.  Right ventricular function, chamber size, wall motion, and thickness are  normal.  Pulmonary artery systolic pressure is normal.  The inferior vena cava is normal.  No pericardial effusion is present.  There is no prior study for direct comparison.    Stress Test:  Date: Results:    ECG Reviewed:  Date: 9/11/24 Results:  HR 61, sinus with short WA    Per 9/11/24 Harkartik note: WA interval is shortened-I did review with cardiology and no concerns were noted.   Cath:  Date: Results:      METS/Exercise Tolerance:     Hematologic: Comments: HELLP with pregnancy  thrombotic thrombocytopenic purpura    (+)       history of blood transfusion (after pregnancy), no previous transfusion reaction,         Musculoskeletal:  - neg musculoskeletal ROS     GI/Hepatic:     (+)        bowel prep,            Renal/Genitourinary: Comment: AUB dysmenorrhea    (+) renal disease (stage 2), type: CRI,            Endo:  - neg endo ROS     Psychiatric/Substance Use:     (+) psychiatric history anxiety       Infectious Disease:  - neg infectious disease ROS     Malignancy:  - neg malignancy ROS     Other:  - neg other ROS   (-) Any chance pregnant       Physical Exam    Airway        Mallampati: I   TM distance: > 3 FB   Neck ROM: full   Mouth opening: > 3 cm    Respiratory Devices and Support         Dental       (+) Completely normal teeth      Cardiovascular          Rhythm and rate: regular and normal     Pulmonary           breath sounds clear to auscultation       OUTSIDE LABS:  CBC:   Lab Results   Component Value Date    WBC 7.4 10/15/2024    WBC 5.7 09/11/2024    HGB 13.6 10/15/2024    HGB 13.0 09/11/2024    HCT 41.6 10/15/2024    HCT 39.8 09/11/2024     10/15/2024     09/11/2024     BMP:   Lab Results   Component Value Date     10/15/2024     09/11/2024    POTASSIUM 4.0 10/15/2024    POTASSIUM 3.9 09/11/2024    CHLORIDE 102 10/15/2024    CHLORIDE 107 09/11/2024    CO2 21 (L) 10/15/2024    CO2 24 09/11/2024    BUN 16.1 10/15/2024    BUN 15.2 09/11/2024    CR 1.03 (H) 10/15/2024    CR 1.10 (H) 09/11/2024    GLC 89 10/15/2024    GLC 87 09/11/2024     COAGS:   Lab Results   Component Value Date    PTT 28 07/23/2023    INR 0.89 07/23/2023     POC:   Lab Results   Component Value Date    HCG Negative 12/05/2022     HEPATIC:   Lab Results   Component Value Date    ALBUMIN 4.7 04/12/2024    PROTTOTAL 7.6 04/12/2024    ALT 19 04/12/2024    AST 24 04/12/2024    ALKPHOS 96 04/12/2024    BILITOTAL 0.5 04/12/2024     OTHER:   Lab Results   Component Value Date    LACT 4.5 (H) 01/10/2016    MARVIN 9.3 10/15/2024    MAG 3.2 (H) 07/29/2023    TSH 2.40 09/11/2024    T4 1.00 09/11/2024    CRP <2.9 10/29/2018    SED  "6 07/29/2024       Anesthesia Plan    ASA Status:  2       Anesthesia Type: MAC.   Induction: Propofol.   Maintenance: TIVA.        Consents    Anesthesia Plan(s) and associated risks, benefits, and realistic alternatives discussed. Questions answered and patient/representative(s) expressed understanding.     - Discussed: Risks, Benefits and Alternatives for BOTH SEDATION and the PROCEDURE were discussed     - Discussed with:  Patient      - Extended Intubation/Ventilatory Support Discussed: Yes.      - Patient is DNR/DNI Status: No     Use of blood products discussed: Yes.     - Discussed with: Patient.     Postoperative Care            Comments:    Other Comments: Chart reviewed hl - same day surg HP    1/28 tx for acute URI with prednisone and doxy (CXR clear, thought symptoms had worsened following improvement)           Janay Briggs APRN CNP    I have reviewed the pertinent notes and labs in the chart from the past 30 days.  Any updates or changes from those notes are reflected in this note.    Clinically Significant Risk Factors Present on Admission                   # Hypertension: Noted on problem list           # Overweight: Estimated body mass index is 28.4 kg/m  as calculated from the following:    Height as of 1/28/25: 1.499 m (4' 11\").    Weight as of 1/28/25: 63.8 kg (140 lb 9.6 oz).                "

## 2025-02-10 NOTE — OR NURSING
Regarding 1/28 visit with PCP, pt states she was trying amlodipine again per her cardiologist and had an allergic reaction to it.  States she is 100% better since taking a dose of benadryl. States she never took the atbx or steroid.

## 2025-02-17 ENCOUNTER — HOSPITAL ENCOUNTER (OUTPATIENT)
Facility: HOSPITAL | Age: 40
Discharge: HOME | End: 2025-02-17
Attending: SURGERY | Admitting: SURGERY
Payer: COMMERCIAL

## 2025-02-17 VITALS
DIASTOLIC BLOOD PRESSURE: 85 MMHG | HEART RATE: 78 BPM | HEIGHT: 59 IN | TEMPERATURE: 97.9 F | SYSTOLIC BLOOD PRESSURE: 129 MMHG | BODY MASS INDEX: 27.62 KG/M2 | OXYGEN SATURATION: 99 % | RESPIRATION RATE: 16 BRPM | WEIGHT: 137 LBS

## 2025-02-17 DIAGNOSIS — K27.9 PUD (PEPTIC ULCER DISEASE): Primary | ICD-10-CM

## 2025-02-17 PROCEDURE — 43239 EGD BIOPSY SINGLE/MULTIPLE: CPT | Performed by: SURGERY

## 2025-02-17 PROCEDURE — 370N000017 HC ANESTHESIA TECHNICAL FEE, PER MIN: Performed by: SURGERY

## 2025-02-17 PROCEDURE — 88305 TISSUE EXAM BY PATHOLOGIST: CPT | Mod: TC | Performed by: SURGERY

## 2025-02-17 PROCEDURE — 272N000001 HC OR GENERAL SUPPLY STERILE: Performed by: SURGERY

## 2025-02-17 PROCEDURE — 710N000012 HC RECOVERY PHASE 2, PER MINUTE: Performed by: SURGERY

## 2025-02-17 PROCEDURE — 250N000011 HC RX IP 250 OP 636: Performed by: NURSE ANESTHETIST, CERTIFIED REGISTERED

## 2025-02-17 PROCEDURE — 999N000141 HC STATISTIC PRE-PROCEDURE NURSING ASSESSMENT: Performed by: SURGERY

## 2025-02-17 PROCEDURE — 360N000075 HC SURGERY LEVEL 2, PER MIN: Performed by: SURGERY

## 2025-02-17 PROCEDURE — 45380 COLONOSCOPY AND BIOPSY: CPT | Performed by: NURSE ANESTHETIST, CERTIFIED REGISTERED

## 2025-02-17 PROCEDURE — 250N000009 HC RX 250: Performed by: NURSE ANESTHETIST, CERTIFIED REGISTERED

## 2025-02-17 PROCEDURE — 88305 TISSUE EXAM BY PATHOLOGIST: CPT | Mod: 26 | Performed by: PATHOLOGY

## 2025-02-17 PROCEDURE — 258N000003 HC RX IP 258 OP 636: Performed by: NURSE PRACTITIONER

## 2025-02-17 PROCEDURE — 45380 COLONOSCOPY AND BIOPSY: CPT | Performed by: SURGERY

## 2025-02-17 RX ORDER — OMEPRAZOLE 40 MG/1
40 CAPSULE, DELAYED RELEASE ORAL 2 TIMES DAILY
Qty: 60 CAPSULE | Refills: 3 | Status: SHIPPED | OUTPATIENT
Start: 2025-02-17

## 2025-02-17 RX ORDER — SODIUM CHLORIDE, SODIUM LACTATE, POTASSIUM CHLORIDE, CALCIUM CHLORIDE 600; 310; 30; 20 MG/100ML; MG/100ML; MG/100ML; MG/100ML
INJECTION, SOLUTION INTRAVENOUS CONTINUOUS
Status: DISCONTINUED | OUTPATIENT
Start: 2025-02-17 | End: 2025-02-17 | Stop reason: HOSPADM

## 2025-02-17 RX ORDER — DEXAMETHASONE SODIUM PHOSPHATE 10 MG/ML
4 INJECTION, SOLUTION INTRAMUSCULAR; INTRAVENOUS
Status: DISCONTINUED | OUTPATIENT
Start: 2025-02-17 | End: 2025-02-17 | Stop reason: HOSPADM

## 2025-02-17 RX ORDER — PROPOFOL 10 MG/ML
INJECTION, EMULSION INTRAVENOUS PRN
Status: DISCONTINUED | OUTPATIENT
Start: 2025-02-17 | End: 2025-02-17

## 2025-02-17 RX ORDER — LIDOCAINE 40 MG/G
CREAM TOPICAL
Status: DISCONTINUED | OUTPATIENT
Start: 2025-02-17 | End: 2025-02-17 | Stop reason: HOSPADM

## 2025-02-17 RX ORDER — SUCRALFATE 1 G/1
1 TABLET ORAL 4 TIMES DAILY
Qty: 120 TABLET | Refills: 3 | Status: SHIPPED | OUTPATIENT
Start: 2025-02-17

## 2025-02-17 RX ORDER — OXYCODONE HYDROCHLORIDE 5 MG/1
5 TABLET ORAL
Status: DISCONTINUED | OUTPATIENT
Start: 2025-02-17 | End: 2025-02-17 | Stop reason: HOSPADM

## 2025-02-17 RX ORDER — LIDOCAINE HYDROCHLORIDE 20 MG/ML
INJECTION, SOLUTION INFILTRATION; PERINEURAL PRN
Status: DISCONTINUED | OUTPATIENT
Start: 2025-02-17 | End: 2025-02-17

## 2025-02-17 RX ORDER — NALOXONE HYDROCHLORIDE 0.4 MG/ML
0.1 INJECTION, SOLUTION INTRAMUSCULAR; INTRAVENOUS; SUBCUTANEOUS
Status: DISCONTINUED | OUTPATIENT
Start: 2025-02-17 | End: 2025-02-17 | Stop reason: HOSPADM

## 2025-02-17 RX ORDER — ONDANSETRON 4 MG/1
4 TABLET, ORALLY DISINTEGRATING ORAL EVERY 30 MIN PRN
Status: DISCONTINUED | OUTPATIENT
Start: 2025-02-17 | End: 2025-02-17 | Stop reason: HOSPADM

## 2025-02-17 RX ORDER — ONDANSETRON 2 MG/ML
4 INJECTION INTRAMUSCULAR; INTRAVENOUS EVERY 30 MIN PRN
Status: DISCONTINUED | OUTPATIENT
Start: 2025-02-17 | End: 2025-02-17 | Stop reason: HOSPADM

## 2025-02-17 RX ADMIN — PROPOFOL 50 MG: 10 INJECTION, EMULSION INTRAVENOUS at 10:34

## 2025-02-17 RX ADMIN — PROPOFOL 50 MG: 10 INJECTION, EMULSION INTRAVENOUS at 10:23

## 2025-02-17 RX ADMIN — PROPOFOL 80 MG: 10 INJECTION, EMULSION INTRAVENOUS at 10:17

## 2025-02-17 RX ADMIN — LIDOCAINE HYDROCHLORIDE 60 MG: 20 INJECTION, SOLUTION INFILTRATION; PERINEURAL at 10:14

## 2025-02-17 RX ADMIN — PROPOFOL 50 MG: 10 INJECTION, EMULSION INTRAVENOUS at 10:26

## 2025-02-17 RX ADMIN — PROPOFOL 50 MG: 10 INJECTION, EMULSION INTRAVENOUS at 10:21

## 2025-02-17 RX ADMIN — SODIUM CHLORIDE, POTASSIUM CHLORIDE, SODIUM LACTATE AND CALCIUM CHLORIDE: 600; 310; 30; 20 INJECTION, SOLUTION INTRAVENOUS at 09:50

## 2025-02-17 RX ADMIN — PROPOFOL 70 MG: 10 INJECTION, EMULSION INTRAVENOUS at 10:14

## 2025-02-17 ASSESSMENT — ACTIVITIES OF DAILY LIVING (ADL)
ADLS_ACUITY_SCORE: 56

## 2025-02-17 NOTE — ANESTHESIA POSTPROCEDURE EVALUATION
Patient: Sarah Ugalde    Procedure: Procedure(s):  COLONOSCOPY WITH BIOPSY AND UPPER GASTROINTESTINAL TRACT ENDOSCOPY WITH BIOPSY       Anesthesia Type:  MAC    Note:  Disposition: Outpatient   Postop Pain Control: Uneventful            Sign Out: Well controlled pain   PONV: No   Neuro/Psych: Uneventful            Sign Out: Acceptable/Baseline neuro status   Airway/Respiratory: Uneventful            Sign Out: Acceptable/Baseline resp. status   CV/Hemodynamics: Uneventful            Sign Out: Acceptable CV status; No obvious hypovolemia; No obvious fluid overload   Other NRE: NONE   DID A NON-ROUTINE EVENT OCCUR? No           Last vitals:  Vitals Value Taken Time   /85 02/17/25 1130   Temp 97.9  F (36.6  C) 02/17/25 1043   Pulse 78 02/17/25 1130   Resp 16 02/17/25 1130   SpO2 99 % 02/17/25 1131   Vitals shown include unfiled device data.    Electronically Signed By: EVERARDO Gill CRNA  February 17, 2025  12:25 PM

## 2025-02-17 NOTE — H&P
HISTORY AND PHYSICAL - ENDOSCOPY   2025    Patient : Sarah ATKINSON [facility]    Planned Procedures : Endoscopy    This is a 39 year old female with a need for an upper and lower endoscopy.  Upper endoscopy is needed for Abdominal Pain and Gastroesohpageal Reflux.  Lower endoscopy is needed for Rectal Bleeding.      Past Medical History:  Past Medical History:   Diagnosis Date    Detached retina, bilateral 2015    secondary to severe preeclampsia with HELLP syndroma and acute renal failure    PARDEEP (generalized anxiety disorder) 2023    HELLP (hemolytic anemia/elev liver enzymes/low platelets in pregnancy) 2015    Severe preeclampsia with HELLP syndrome and acute renal failure    History of blood transfusion 2015    HUS (hemolytic uremic syndrome) (H) 2015    Postpartum Thrombotic thrombocytopenic purpura (TTP) and 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    MS (multiple sclerosis) (H) 2015    Rh negative state in antepartum period 2023    TTP (thrombotic thrombocytopenic purpura) (H) 2015    Postpartum Thrombotic thrombocytopenic purpura (TTP) and 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    Uncomplicated asthma     Exercise enduced    WPW (Delaney-Parkinson-White syndrome) 2015    She has been evaluated for ablation, however, based on location this was not deemed safe by cardiology at the time       Past Surgical History:  Past Surgical History:   Procedure Laterality Date     SECTION  2015    Severe preeclampsia with HELLP syndrome and acute renal failure then developed postpartum Thrombotic thrombocytopenic purpura (TTP) and Hemolytic uremic syndrome (HUS)    COMBINED  SECTION, SALPINGECTOMY BILATERAL Bilateral 2023    Procedure:  SECTION, WITH BILATERAL SALPINGECTOMY, Viable baby girl born at 1851;  Surgeon: Naseem Cruz MD;  Location: [facility] OR    DILATION AND CURETTAGE, OPERATIVE  "HYSTEROSCOPY, COMBINED N/A 10/25/2023    Procedure: HYSTEROSCOPY, WITH DILATION AND CURETTAGE OF UTERUS;  Surgeon: Scout Grimaldo MD;  Location: [facility] OR    GENITOURINARY SURGERY  2023    Tubes removed    HYSTERECTOMY SUPRACERVICAL N/A 04/18/2024    Procedure: HYSTERECTOMY, SUPRACERVICAL, ABDOMINAL;  Surgeon: Scout Grimaldo MD;  Location: [facility]    INCISION AND DRAINAGE ABDOMEN WASHOUT, COMBINED N/A 9/19/2024    Procedure: Abdominal Wound Exploration;  Surgeon: Terrance Cruz MD;  Location: [facility]    INCISION AND DRAINAGE ABDOMEN WASHOUT, COMBINED N/A 11/11/2024    Procedure: Abdominal  Wound exploration;  Surgeon: Terrance Cruz MD;  Location: [facility]    IR TRIGGER POINT INJ 1 OR 2 MUSCLES  7/2/2024    TUBAL LIGATION         Family History History:  Family History   Problem Relation Age of Onset    Hypertension Mother     Hypertension Father     Allergies Maternal Grandmother         dust pollen    Cancer - colorectal Paternal Grandmother 59    Allergies Maternal Aunt         \"    Cardiovascular Maternal Aunt     Breast Cancer Other 60        3 great aunts on moms side       History of Tobacco Use:  History   Smoking Status    Never   Smokeless Tobacco    Never       Current Medications:  No current outpatient medications on file.       Allergies:  Allergies   Allergen Reactions    Zestril [Lisinopril] Anaphylaxis and Fatigue    Amlodipine Swelling    Cymbalta [Duloxetine Hcl]     Hydrochlorothiazide     Magnesium Other (See Comments)     Altered mental status after IV dosing    Other Environmental Allergy     Prozac [Fluoxetine]     Losartan Difficulty breathing, Hives, Itching, Rash, Swelling and Visual Disturbance       ROS:  Pertinent items are noted in HPI.  All other systems are negative.    PHYSICAL EXAM:     Vital signs: BP (!) 132/94 (Cuff Size: Adult Regular)   Pulse 88   Temp 98  F (36.7  C) (Tympanic)   Resp 16   Ht 1.499 m (4' 11\")   Wt 62.1 kg (137 lb)   SpO2 97%   BMI 27.67 kg/m  "    Weight: [unfilled]   BMI: Body mass index is 27.67 kg/m .   General: Normal, healthy, cooperative, in no acute distress, alert   Skin: no jaundice   HEENT: PERRLA and EOMI   Neck: supple   Lungs: clear to auscultation   CV: Regular rate and rhythm without murmer   Abdominal: abdomen is soft without significant tenderness, masses, organomegaly or guarding   Extremities: No cyanosis, clubbing or edema noted bilaterally in Upper and Lower Extremities   Neurological: without deficit    Assessment:   39 year old female with need for upper endoscopy for Abdominal Pain and Gastroesohpageal Reflux and lower endoscopy for Rectal Bleeding:    Plan:   Will schedule an esophagogastroduodenoscopy and colonoscopy.      The risks, benefits, and alternatives to the planned procedure were fully discussed with the patient and/or the patient's representative(s). The risks of bleeding, infection, death, missing pathology, the need for additional procedures intra-operatively, the possible need for intra-operative consults, the possible need for transfusion therapy, cardiopulmonary compromise, the possible need for additional surgery for a complication were discussed with the patient and/or the patient's representative(s). The patient's and/or patient's representative(s) questions were addressed and answered. Informed consent was obtained from the patient and/or the patient's representative(s). The patient and/or the patient's representative(s) consent to proceed.    Specific risks:  Risks include but are not limited to bleeding, perforation, missing lesions, need for additional procedures, reaction to anesthesia.  All the patients questions were answered.  The patient consents to proceed.  The procedures will be scheduled.

## 2025-02-17 NOTE — OP NOTE
REPORT OF OPERATION  DATE OF PROCEDURE: 2/17/2025    PATIENT: Sarah ATKINSON [facility]    SURGERY PERFORMED:    Esophagogastroduodenoscopy with biopsies   Colonoscopy     with biopsy      PREOPERATIVE DIAGNOSIS:   Abdominal Pain and Gastroesohpageal Reflux   Rectal Bleeding    POSTOPERATIVE DIAGNOSIS:    Ulcerations in the antrum with old blood in the antrum.     Squamous columnar junction at 35   Questionable colitis in the rectum   Diverticulosis was not identified.   Hemorrhoids  were  identified.    SURGEON: Terrance Cruz MD    ASSISTANTS: None    ANESTHESIA: Monitored Anesthesia Care    COMPLICATIONS: None apparent    TRANSFUSIONS: None    TISSUE TO PATHOLOGY:    Duodenal, Antral, and Distal Esophageal   Biopsy from Rectum    FINDINGS:   Active peptic ulcer disease with ulcer base in the antrum with old blood within the stomach.   Questionable colitis in the rectum   Diverticulosis was not identified.   Hemorrhoids  were  identified.    INDICATIONS: This is a 39 year old female in need of an Esophagogastroduodenoscopy for Abdominal Pain and Gastroesohpageal Reflux and a colonoscopy for Rectal Bleeding.  The patient will be taken to the endoscopy suite for those procedures.    DESCRIPTIONS OF PROCEDURE IN DETAIL: After consent was obtained the patient was taken to the operative suite and lavinia in the left lateral decubitus position.  The patient was identified and the correct patient was confirmed. Monitored Anesthesia Care was given by anesthesia. A time out was performed verifying the correct patient and the correct procedure.  The entire operative team was in agreement.  All necessary equipment and supplies were in the room.    The endoscope was inserted into the mouth and passed without difficulty to the third portion of the duodenum.  Duodenal biopsies were taken.  The endoscope was then withdrawn through the duodenum, the duodenal bulb and pyloric channel and no abnormalities were noted.  The endoscope was  brought back into the stomach and antral biopsies were obtained.  Within the antrum of the stomach was old blood.  In the prepyloric region there was an ulcer base with no active bleeding.  There was evidence of inflammation around the ulcer base. The endoscope was straightened back out and brought into the distal esophagus and a well-defined squamocolumnar junction was identified at 35 cm. Biopsies of the distal esophagus were taken.  The endoscope was slowly withdrawn through the remaining esophagus no other abnormalities are seen,  The endoscope was withdrawn from the patient and the patient was positioned for colonoscopy.    Rectal exam was performed and no lesions of the anal canal were noted.  The colonoscope was inserted into the anus and passed without difficulty to the cecum.  The cecum was identified by the ileocecal valve, the coalescence of the tinea and the appendiceal orifice.  Upon withdrawal all walls of the colon were visualized.  No polyps are noted.  There was questionable colitis in the rectum and a biopsy was obtained.  No other abnormalities were noted. Diverticulosis was not seen.  Upon reaching the rectum the scope was retroflexed and internal hemorrhoids  were  seen.  The scope was straightened back out and removed from the patient.  The patient was then taken to the recovery room in stable condition tolerating the procedure well.      Prep: poor    Withdrawal time was 6 minutes.    It is recommended that the patient have another colonoscopy in 10 years.

## 2025-02-17 NOTE — OR NURSING
Patient and responsible adult given discharge instructions with no questions regarding instructions. Pablo score 19. Pain level 2/10.  Discharged from unit via ambulation. Patient discharged to home with s.o.

## 2025-03-11 ENCOUNTER — MYC REFILL (OUTPATIENT)
Dept: FAMILY MEDICINE | Facility: OTHER | Age: 40
End: 2025-03-11

## 2025-03-11 DIAGNOSIS — G47.00 INSOMNIA, UNSPECIFIED TYPE: ICD-10-CM

## 2025-03-11 DIAGNOSIS — G89.18 ACUTE POST-OPERATIVE PAIN: ICD-10-CM

## 2025-03-11 RX ORDER — HYDROXYZINE HYDROCHLORIDE 50 MG/1
50 TABLET, FILM COATED ORAL EVERY 8 HOURS PRN
Qty: 40 TABLET | Refills: 1 | Status: SHIPPED | OUTPATIENT
Start: 2025-03-11

## 2025-03-11 RX ORDER — TRAZODONE HYDROCHLORIDE 50 MG/1
100 TABLET ORAL AT BEDTIME
Qty: 180 TABLET | Refills: 1 | OUTPATIENT
Start: 2025-03-11

## 2025-04-06 ENCOUNTER — MYC REFILL (OUTPATIENT)
Dept: FAMILY MEDICINE | Facility: OTHER | Age: 40
End: 2025-04-06

## 2025-04-06 DIAGNOSIS — I10 ESSENTIAL HYPERTENSION: ICD-10-CM

## 2025-04-06 DIAGNOSIS — G89.18 ACUTE POST-OPERATIVE PAIN: ICD-10-CM

## 2025-04-07 RX ORDER — LABETALOL 100 MG/1
100 TABLET, FILM COATED ORAL 2 TIMES DAILY
Qty: 60 TABLET | Refills: 2 | Status: SHIPPED | OUTPATIENT
Start: 2025-04-07

## 2025-04-07 RX ORDER — HYDROXYZINE HYDROCHLORIDE 50 MG/1
50 TABLET, FILM COATED ORAL EVERY 8 HOURS PRN
Qty: 40 TABLET | Refills: 1 | Status: SHIPPED | OUTPATIENT
Start: 2025-04-07

## 2025-04-07 RX ORDER — CLONIDINE HYDROCHLORIDE 0.1 MG/1
0.1 TABLET ORAL AT BEDTIME
Qty: 90 TABLET | Refills: 3 | Status: SHIPPED | OUTPATIENT
Start: 2025-04-07

## 2025-04-07 RX ORDER — FUROSEMIDE 20 MG/1
20 TABLET ORAL DAILY
Qty: 90 TABLET | Refills: 3 | Status: SHIPPED | OUTPATIENT
Start: 2025-04-07

## 2025-04-07 NOTE — TELEPHONE ENCOUNTER
Clonidine 0.1 MG      Last Written Prescription Date:  historical- last filled 08/22/24 by Carlin Alvarado  Last Fill Quantity: n/a,   # refills: n/a  Last Office Visit: 01/28/25  Future Office visit:       Routing refill request to provider for review/approval because:  Medication is reported/historical        Furosemide 20 MG      Last Written Prescription Date:  historical-last filled 08/22/24 by Carlin Alvarado  Last Fill Quantity: na/,   # refills: n/a  Last Office Visit: 01/28/25  Future Office visit:       Routing refill request to provider for review/approval because:  Medication is reported/historical      Hydroxyzine HCl 50 MG      Last Written Prescription Date:  03/11/25  Last Fill Quantity: 40,   # refills: 1  Last Office Visit: 01/28/25  Future Office visit:       Routing refill request to provider for review/approval because:  Antihistamines Protocol Failed    Rerun Protocol (4/6/2025 8:24 PM)    Medication indicated for associated diagnosis    The medication is associated with one or more of the following diagnoses:  Allergies  Rhinitis  Upper respiratory tract allergy  Urticaria  Itching  Cystic Fibrosis  Bronchiectasis

## 2025-04-19 ENCOUNTER — HEALTH MAINTENANCE LETTER (OUTPATIENT)
Age: 40
End: 2025-04-19

## 2025-05-06 ENCOUNTER — PREP FOR PROCEDURE (OUTPATIENT)
Dept: OBGYN | Facility: OTHER | Age: 40
End: 2025-05-06

## 2025-05-06 ENCOUNTER — MYC MEDICAL ADVICE (OUTPATIENT)
Dept: OBGYN | Facility: OTHER | Age: 40
End: 2025-05-06

## 2025-05-06 DIAGNOSIS — N83.209 OVARIAN CYST: ICD-10-CM

## 2025-05-06 DIAGNOSIS — G89.29 CHRONIC PELVIC PAIN IN FEMALE: Primary | ICD-10-CM

## 2025-05-06 DIAGNOSIS — Z87.42 HX OF OVARIAN CYST: ICD-10-CM

## 2025-05-06 DIAGNOSIS — R10.2 CHRONIC PELVIC PAIN IN FEMALE: Primary | ICD-10-CM

## 2025-05-06 NOTE — TELEPHONE ENCOUNTER
OK procedure would be laparoscopic bilateral oophorectomy.  Would be day procedure where you go home same day and need someone to drive you home.  Recommend two weeks no heavy lifting, exercise, baths/soaking, sex.  Will need someone to drive you home.  OK to schedule:  Dx chronic peliv pain, recurrent ovarian cysts.  I will need to see her prior to surgery for appt and will need preop exam with PCP.    Please schedule.

## 2025-05-07 ENCOUNTER — MYC REFILL (OUTPATIENT)
Dept: FAMILY MEDICINE | Facility: OTHER | Age: 40
End: 2025-05-07

## 2025-05-07 DIAGNOSIS — G89.18 ACUTE POST-OPERATIVE PAIN: ICD-10-CM

## 2025-05-07 RX ORDER — HYDROXYZINE HYDROCHLORIDE 50 MG/1
50 TABLET, FILM COATED ORAL EVERY 8 HOURS PRN
Qty: 40 TABLET | Refills: 1 | Status: SHIPPED | OUTPATIENT
Start: 2025-05-07

## 2025-05-07 NOTE — TELEPHONE ENCOUNTER
hydrOXYzine HCl (ATARAX) 50 MG tablet         Last Written Prescription Date:  4/7/25  Last Fill Quantity: 40,   # refills: 1  Last Office Visit: 1/28/25  Future Office visit:    Next 5 appointments (look out 90 days)      May 13, 2025 10:30 AM  (Arrive by 10:15 AM)  Pre-Operative Physical with Trisha Fonseca NP  [facility] [place] ([facility] [place] ) [address]  [place] [address]  [phone]     May 16, 2025 3:45 PM  (Arrive by 3:30 PM)  SHORT with Scout Grimaldo MD  [facility] ([facility] [place] ) [address]  [place] [address]  [phone]             Routing refill request to provider for review/approval because:  Antihistamines Protocol Failed      Medication indicated for associated diagnosis    The medication is associated with one or more of the following diagnoses:  Allergies  Rhinitis  Upper respiratory tract allergy  Urticaria  Itching  Cystic Fibrosis  Bronchiectasis

## 2025-05-12 PROBLEM — Z36.89 ENCOUNTER FOR TRIAGE IN PREGNANT PATIENT: Status: RESOLVED | Noted: 2023-05-17 | Resolved: 2025-05-12

## 2025-05-12 PROBLEM — O09.93 PREGNANCY, SUPERVISION FOR, HIGH-RISK, THIRD TRIMESTER: Status: RESOLVED | Noted: 2023-07-23 | Resolved: 2025-05-12

## 2025-05-12 PROBLEM — Z30.2 ENCOUNTER FOR STERILIZATION: Status: RESOLVED | Noted: 2023-07-23 | Resolved: 2025-05-12

## 2025-05-12 PROBLEM — Z09 HOSPITAL DISCHARGE FOLLOW-UP: Status: RESOLVED | Noted: 2024-04-29 | Resolved: 2025-05-12

## 2025-05-12 NOTE — PROGRESS NOTES
Preoperative Evaluation  [facility] [place]  [address]  [place] [address]  Phone: [phone]  Primary Provider: Trisha Fonseca NP  Pre-op Performing Provider: Trisha Fonseca NP  May 13, 2025           5/13/2025   Surgical Information   What procedure is being done? Bilateral Laparoscopic Oophorectomy   Facility or Hospital where procedure/surgery will be performed: [facility]   Who is doing the procedure / surgery? Dr. Grimaldo   Date of surgery / procedure: 6/4/25   Time of surgery / procedure: TBD   Where do you plan to recover after surgery? at home with family     Fax number for surgical facility: Note does not need to be faxed, will be available electronically in Epic.    Assessment & Plan     The proposed surgical procedure is considered LOW risk.    (Z01.818) Preop general physical exam  (primary encounter diagnosis)  (R10.2,  G89.29) Chronic pelvic pain in female  Plan: CBC and CMP unremarkable. EKG unremarkable. MS slightly shortened, otherwise sinus rhythm, VR 60. Ok to proceed with surgery.     (I10) Essential hypertension  Plan: Controlled.     (I45.6) WPW (Delaney-Parkinson-White syndrome)  Plan: EKG stable.     (G35) MS (multiple sclerosis) (H)  Plan: Stable.     (N18.2) CKD (chronic kidney disease) stage 2, GFR 60-89 ml/min  Plan: kidney function stable.     (K21.00) Gastroesophageal reflux disease with esophagitis without hemorrhage  Plan: Controlled with omeprazole.       Risks and Recommendations  The patient has the following additional risks and recommendations for perioperative complications:  Pulmonary:    - Incentive spirometry post-op    Will hold all medications the morning of surgery except her omeprazole and labetalol.     Will avoid NSAID use 10 days before surgery.     The longitudinal plan of care for the diagnosis(es)/condition(s) as documented were addressed during this visit. Due to the added complexity in care, I will continue to support Sarah in the  subsequent management and with ongoing continuity of care.    Recommendation  Approval given to proceed with proposed procedure, without further diagnostic evaluation.    Sarah [facility] with a functional capacity of greater than four MET's. There are no obvious contraindications to proceeding with surgery at this time. Recommend that the surgeon review risks and benefits of the procedure prior to proceeding.     Was recommended to avoid eating and drinking anything 12 hours prior to surgery unless his surgeon tells him otherwise.     The longitudinal plan of care for the diagnosis(es)/condition(s) as documented were addressed during this visit. Due to the added complexity in care, I will continue to support Sarah in the subsequent management and with ongoing continuity of care.    Paty Smith is a 40 year old, presenting for the following:  Pre-Op Exam        HPI: Patient with chronic pelvic pain. Recurrent ovarian cysts. Plans to have bilateral laparoscopic oophorectomy.             5/13/2025   Pre-Op Questionnaire   Have you ever had a heart attack or stroke? No   Have you ever had surgery on your heart or blood vessels, such as a stent placement, a coronary artery bypass, or surgery on an artery in your head, neck, heart, or legs? No   Do you have chest pain with activity? No   Do you have a history of heart failure? No   Do you currently have a cold, bronchitis or symptoms of other infection? No   Do you have a cough, shortness of breath, or wheezing? No   Do you or anyone in your family have previous history of blood clots? (!) YES - grandpa after knee surgery, also had cancer   Do you or does anyone in your family have a serious bleeding problem such as prolonged bleeding following surgeries or cuts? No   Have you ever had problems with anemia or been told to take iron pills? No   Have you had any abnormal blood loss such as black, tarry or bloody stools, or abnormal vaginal bleeding? (!) YES,  "after child birth   Have you ever had a blood transfusion? (!) YES, after child birth   Have you ever had a transfusion reaction? No   Are you willing to have a blood transfusion if it is medically needed before, during, or after your surgery? Yes   Have you or any of your relatives ever had problems with anesthesia? No   Do you have sleep apnea, excessive snoring or daytime drowsiness? No   Do you have any artifical heart valves or other implanted medical devices like a pacemaker, defibrillator, or continuous glucose monitor? No   Do you have artificial joints? No   Are you allergic to latex? No     Advance Care Planning  Discussed advance care planning with patient; however, patient declined at this time.    Preoperative Review of    reviewed - no record of controlled substances prescribed.      Status of Chronic Conditions:  HYPERTENSION - Patient has longstanding history of HTN , currently denies any symptoms referable to elevated blood pressure. Specifically denies chest pain, palpitations, dyspnea, orthopnea, PND or peripheral edema. Blood pressure readings have been in normal range. Current medication regimen is as listed below-taking clonidine, lasix, and labetalol. Patient denies any side effects of medication.      Mets greater than 4, able to walk several miles.      MS; stable. Has not tolerated any medications used to help with MS in the past. Follows with neurology.      She does have anxiety. Currently controlled. Rare hydroxyzine use.     GERD; controlled with omeprazole.      She does have a history of WPW. HR has been controlled. She recently has had several surgeries without complications. Denies chest pain, shortness of breath, dizziness, syncope, or palpitations. She has had a syncopal episode in the past, but cardiology felt it was r/t orthostatism and hydration. She did wear Zio patch in 4/2023.               \"Underlying rhythm was sinus.   Hrt rate ranged from 52 bpm, maximum heart rate " "of 190 bmp, averaging 75 bmp.   No significant bradycardia, pauses, Mobitz type II or 3rd degree heart block.   No atrial fibrillation on this study.   x21 triggered events and x0 diary entries.  These corresponded to SVT, NSR, sinus tachycardia, SVEs and VEs.   x0 runs of VT.     x48 runs of SVT longest lasting 12.3 sec's with a maximum heart rate of 190 bmp.   Rare, <1% of PAC's, atrial couplets, atrial triplets.   Occasional PVCs at 1.3% (17817)   + episodes of ventricular bigeminy lasting up to 4.8 sec's.   + episodes of ventricular trigeminy lasting up to 1 m 56 sec's.\"    Due for a mammogram. Agreeable.     Patient Active Problem List    Diagnosis Date Noted    S/P abdominal hysterectomy 2024     Priority: Medium    Postpartum endometritis 2023     Priority: Medium    Pre-eclampsia, postpartum 2023     Priority: Medium    AMA (advanced maternal age) multigravida 35+ 2023     Priority: Medium    IUGR (intrauterine growth restriction) affecting care of mother 2023     Priority: Medium    Hx of pre-eclampsia in prior pregnancy, currently pregnant 2023     Priority: Medium    Hx of  section 2023     Priority: Medium    PARDEEP (generalized anxiety disorder) 2023     Priority: Medium    Poor fetal growth affecting management of mother in third trimester, fetus 1 of multiple gestation 2023     Priority: Medium    Multigravida of advanced maternal age in third trimester 2022     Priority: Medium     AMA:  NIPT - declined  h/o WPW. BL ECG - nml  Zio Patch, Cards consult  H/o TTP/preeclampsia/HELLP/acute renal failure>  Baby asa 16 wks.  Nml BL labs.   level II - nml  H/o HUS  H/o MS  no flu or covid vax  Plan repeat /sterilization 23.  Sterilization forms signed.   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      HUS (hemolytic uremic syndrome) (H) 06/25/2015     Priority: Medium     Postpartum Thrombotic thrombocytopenic purpura (TTP) and 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      HELLP (hemolytic anemia/elev liver enzymes/low platelets in pregnancy) 06/23/2015     Priority: Medium     Severe preeclampsia with HELLP syndrome and acute renal failure      Detached retina, bilateral 06/23/2015     Priority: Medium     secondary to severe preeclampsia with HELLP syndroma and acute renal failure      WPW (Delaney-Parkinson-White syndrome) 01/01/2015     Priority: Medium     She has been evaluated for ablation, however, based on location this was not deemed safe by cardiology at the time      MS (multiple sclerosis) (H) 01/01/2015     Priority: Medium      Past Medical History:   Diagnosis Date    Detached retina, bilateral 06/23/2015    secondary to severe preeclampsia with HELLP syndroma and acute renal failure    PARDEEP (generalized anxiety disorder) 07/23/2023    HELLP (hemolytic anemia/elev liver enzymes/low platelets in pregnancy) 06/23/2015    Severe preeclampsia with HELLP syndrome and acute renal failure    History of blood transfusion June 2015    HUS (hemolytic uremic syndrome) (H) 06/25/2015    Postpartum Thrombotic thrombocytopenic purpura (TTP) and 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    MS (multiple sclerosis) (H) 01/01/2015    Rh negative state in antepartum period 07/23/2023    TTP (thrombotic thrombocytopenic purpura) (H) 06/25/2015    Postpartum Thrombotic thrombocytopenic purpura (TTP) and 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    Uncomplicated asthma 1999    Exercise enduced    WPW (Delaney-Parkinson-White syndrome) 01/01/2015    She has been evaluated for ablation, however, based on location this was not deemed safe by cardiology at the time     Past Surgical History:   Procedure  Laterality Date     SECTION  2015    Severe preeclampsia with HELLP syndrome and acute renal failure then developed postpartum Thrombotic thrombocytopenic purpura (TTP) and Hemolytic uremic syndrome (HUS)    COMBINED  SECTION, SALPINGECTOMY BILATERAL Bilateral 2023    Procedure:  SECTION, WITH BILATERAL SALPINGECTOMY, Viable baby girl born at 1851;  Surgeon: Naseem Cruz MD;  Location: [facility]    DILATION AND CURETTAGE, OPERATIVE HYSTEROSCOPY, COMBINED N/A 10/25/2023    Procedure: HYSTEROSCOPY, WITH DILATION AND CURETTAGE OF UTERUS;  Surgeon: Scout Grimaldo MD;  Location: [facility]    ENDOSCOPY UPPER, COLONOSCOPY, COMBINED N/A 2025    Procedure: COLONOSCOPY WITH BIOPSY AND UPPER GASTROINTESTINAL TRACT ENDOSCOPY WITH BIOPSY;  Surgeon: Terrance Cruz MD;  Location: [facility]    GENITOURINARY SURGERY      Tubes removed    HYSTERECTOMY SUPRACERVICAL N/A 2024    Procedure: HYSTERECTOMY, SUPRACERVICAL, ABDOMINAL;  Surgeon: Scout Grimaldo MD;  Location: [facility]    INCISION AND DRAINAGE ABDOMEN WASHOUT, COMBINED N/A 2024    Procedure: Abdominal Wound Exploration;  Surgeon: Terrance Cruz MD;  Location: [facility]    INCISION AND DRAINAGE ABDOMEN WASHOUT, COMBINED N/A 2024    Procedure: Abdominal  Wound exploration;  Surgeon: Terrance Cruz MD;  Location: [facility]    IR TRIGGER POINT INJ 1 OR 2 MUSCLES  2024    TUBAL LIGATION       Current Outpatient Medications   Medication Sig Dispense Refill    cloNIDine (CATAPRES) 0.1 MG tablet Take 1 tablet (0.1 mg) by mouth at bedtime. 90 tablet 3    furosemide (LASIX) 20 MG tablet Take 1 tablet (20 mg) by mouth daily. 90 tablet 3    hydrOXYzine HCl (ATARAX) 50 MG tablet Take 1 tablet (50 mg) by mouth every 8 hours as needed for other or anxiety (adjuvant pain). 40 tablet 1    labetalol (NORMODYNE) 100 MG tablet Take 1 tablet (100 mg) by mouth 2 times daily. 60 tablet 2    omeprazole (PRILOSEC) 40 MG DR capsule  "Take 1 capsule (40 mg) by mouth 2 times daily. 60 capsule 3    traZODone (DESYREL) 50 MG tablet Take 2 tablets (100 mg) by mouth at bedtime. 180 tablet 1    sucralfate (CARAFATE) 1 GM tablet Take 1 tablet (1 g) by mouth 4 times daily. (Patient not taking: Reported on 5/13/2025) 120 tablet 3       Allergies   Allergen Reactions    Zestril [Lisinopril] Anaphylaxis and Fatigue    Amlodipine Swelling    Cymbalta [Duloxetine Hcl]     Hydrochlorothiazide     Magnesium Other (See Comments)     Altered mental status after IV dosing    Other Environmental Allergy     Prozac [Fluoxetine]     Losartan Difficulty breathing, Hives, Itching, Rash, Swelling and Visual Disturbance        Social History     Tobacco Use    Smoking status: Never     Passive exposure: Never    Smokeless tobacco: Never   Substance Use Topics    Alcohol use: Not Currently     Comment: 1-2 beers a week     Family History   Problem Relation Age of Onset    Hypertension Mother     Hypertension Father     Allergies Maternal Grandmother         dust pollen    Cancer - colorectal Paternal Grandmother 59    Allergies Maternal Aunt         \"    Cardiovascular Maternal Aunt     Breast Cancer Other 60        3 great aunts on moms side     History   Drug Use No             Review of Systems  CONSTITUTIONAL: NEGATIVE for fever, chills, change in weight  INTEGUMENTARY/SKIN: NEGATIVE for worrisome rashes, moles or lesions  EYES: NEGATIVE for vision changes or irritation  ENT/MOUTH: NEGATIVE for ear, mouth and throat problems  RESP: NEGATIVE for significant cough or SOB  BREAST: NEGATIVE for masses, tenderness or discharge  CV: NEGATIVE for chest pain, palpitations or peripheral edema  GI: NEGATIVE for nausea, chronic pelvic pain  : NEGATIVE for frequency, dysuria, or hematuria  MUSCULOSKELETAL: NEGATIVE for significant arthralgias or myalgia  NEURO: NEGATIVE for weakness, dizziness or paresthesias  ENDOCRINE: NEGATIVE for temperature intolerance, skin/hair " "changes  HEME: NEGATIVE for bleeding problems  PSYCHIATRIC: NEGATIVE for changes in mood or affect    Objective    /83 (BP Location: Right arm, Patient Position: Sitting, Cuff Size: Adult Large)   Pulse 81   Temp 98.3  F (36.8  C) (Tympanic)   Resp 16   Wt 64.5 kg (142 lb 4.8 oz)   SpO2 98%   BMI 28.74 kg/m     Estimated body mass index is 28.74 kg/m  as calculated from the following:    Height as of 2/17/25: 1.499 m (4' 11\").    Weight as of this encounter: 64.5 kg (142 lb 4.8 oz).  Physical Exam  GENERAL: alert and no distress  EYES: Eyes grossly normal to inspection, PERRL and conjunctivae and sclerae normal  HENT: ear canals and TM's normal, nose and mouth without ulcers or lesions  NECK: no adenopathy, no asymmetry, masses, or scars  RESP: lungs clear to auscultation - no rales, rhonchi or wheezes  CV: regular rate and rhythm, normal S1 S2, no S3 or S4, no murmur, click or rub, no peripheral edema  ABDOMEN: soft, nontender, no hepatosplenomegaly, no masses and bowel sounds normal  MS: no gross musculoskeletal defects noted, no edema  NEURO: Normal strength and tone, mentation intact and speech normal  PSYCH: mentation appears normal, affect normal/bright    Recent Labs   Lab Test 10/15/24  1520 09/11/24  0909   HGB 13.6 13.0    287    140   POTASSIUM 4.0 3.9   CR 1.03* 1.10*        Diagnostics  Recent Results (from the past 24 hours)   EKG 12-lead complete w/read - (Clinic Performed)    Collection Time: 05/13/25 10:55 AM   Result Value Ref Range    Systolic Blood Pressure  mmHg    Diastolic Blood Pressure  mmHg    Ventricular Rate 60 BPM    Atrial Rate 60 BPM    NV Interval 104 ms    QRS Duration 82 ms     ms    QTc 400 ms    P Axis 33 degrees    R AXIS 45 degrees    T Axis 16 degrees    Interpretation ECG       Sinus rhythm with short NV  Otherwise normal ECG  When compared with ECG of 11-Sep-2024 08:36,  No significant change was found     Basic metabolic panel    Collection " Time: 05/13/25 11:04 AM   Result Value Ref Range    Sodium 137 135 - 145 mmol/L    Potassium 3.9 3.4 - 5.3 mmol/L    Chloride 104 98 - 107 mmol/L    Carbon Dioxide (CO2) 22 22 - 29 mmol/L    Anion Gap 11 7 - 15 mmol/L    Urea Nitrogen 16.5 6.0 - 20.0 mg/dL    Creatinine 1.08 (H) 0.51 - 0.95 mg/dL    GFR Estimate 66 >60 mL/min/1.73m2    Calcium 9.6 8.8 - 10.4 mg/dL    Glucose 85 70 - 99 mg/dL   CBC with platelets and differential    Collection Time: 05/13/25 11:04 AM   Result Value Ref Range    WBC Count 5.6 4.0 - 11.0 10e3/uL    RBC Count 4.26 3.80 - 5.20 10e6/uL    Hemoglobin 13.0 11.7 - 15.7 g/dL    Hematocrit 39.3 35.0 - 47.0 %    MCV 92 78 - 100 fL    MCH 30.5 26.5 - 33.0 pg    MCHC 33.1 31.5 - 36.5 g/dL    RDW 13.5 10.0 - 15.0 %    Platelet Count 282 150 - 450 10e3/uL    % Neutrophils 56 %    % Lymphocytes 30 %    % Monocytes 7 %    % Eosinophils 7 %    % Basophils 1 %    % Immature Granulocytes 0 %    NRBCs per 100 WBC 0 <1 /100    Absolute Neutrophils 3.1 1.6 - 8.3 10e3/uL    Absolute Lymphocytes 1.7 0.8 - 5.3 10e3/uL    Absolute Monocytes 0.4 0.0 - 1.3 10e3/uL    Absolute Eosinophils 0.4 0.0 - 0.7 10e3/uL    Absolute Basophils 0.1 0.0 - 0.2 10e3/uL    Absolute Immature Granulocytes 0.0 <=0.4 10e3/uL    Absolute NRBCs 0.0 10e3/uL        EKG: Sinus Rhythm, MO slightly shortened, but unchanged from previous EKG, normal axis, normal intervals, no acute ST/T changes c/w ischemia, no LVH by voltage criteria, unchanged from previous tracings,      Revised Cardiac Risk Index (RCRI)  The patient has the following serious cardiovascular risks for perioperative complications:   - No serious cardiac risks = 0 points     RCRI Interpretation: 0 points: Class I (very low risk - 0.4% complication rate)         Signed Electronically by: Trisha Fonseca NP  A copy of this evaluation report is provided to the requesting physician.

## 2025-05-13 ENCOUNTER — OFFICE VISIT (OUTPATIENT)
Dept: FAMILY MEDICINE | Facility: OTHER | Age: 40
End: 2025-05-13
Attending: NURSE PRACTITIONER
Payer: COMMERCIAL

## 2025-05-13 ENCOUNTER — RESULTS FOLLOW-UP (OUTPATIENT)
Dept: FAMILY MEDICINE | Facility: OTHER | Age: 40
End: 2025-05-13

## 2025-05-13 VITALS
BODY MASS INDEX: 28.74 KG/M2 | SYSTOLIC BLOOD PRESSURE: 128 MMHG | TEMPERATURE: 98.3 F | WEIGHT: 142.3 LBS | DIASTOLIC BLOOD PRESSURE: 83 MMHG | OXYGEN SATURATION: 98 % | RESPIRATION RATE: 16 BRPM | HEART RATE: 81 BPM

## 2025-05-13 DIAGNOSIS — R10.2 CHRONIC PELVIC PAIN IN FEMALE: ICD-10-CM

## 2025-05-13 DIAGNOSIS — Z12.31 ENCOUNTER FOR SCREENING MAMMOGRAM FOR BREAST CANCER: ICD-10-CM

## 2025-05-13 DIAGNOSIS — K21.00 GASTROESOPHAGEAL REFLUX DISEASE WITH ESOPHAGITIS WITHOUT HEMORRHAGE: ICD-10-CM

## 2025-05-13 DIAGNOSIS — G89.29 CHRONIC PELVIC PAIN IN FEMALE: ICD-10-CM

## 2025-05-13 DIAGNOSIS — I45.6 WPW (WOLFF-PARKINSON-WHITE SYNDROME): ICD-10-CM

## 2025-05-13 DIAGNOSIS — G35 MS (MULTIPLE SCLEROSIS) (H): ICD-10-CM

## 2025-05-13 DIAGNOSIS — N18.2 CKD (CHRONIC KIDNEY DISEASE) STAGE 2, GFR 60-89 ML/MIN: ICD-10-CM

## 2025-05-13 DIAGNOSIS — I10 ESSENTIAL HYPERTENSION: ICD-10-CM

## 2025-05-13 DIAGNOSIS — Z01.818 PREOP GENERAL PHYSICAL EXAM: Primary | ICD-10-CM

## 2025-05-13 LAB
ANION GAP SERPL CALCULATED.3IONS-SCNC: 11 MMOL/L (ref 7–15)
ATRIAL RATE - MUSE: 60 BPM
BASOPHILS # BLD AUTO: 0.1 10E3/UL (ref 0–0.2)
BASOPHILS NFR BLD AUTO: 1 %
BUN SERPL-MCNC: 16.5 MG/DL (ref 6–20)
CALCIUM SERPL-MCNC: 9.6 MG/DL (ref 8.8–10.4)
CHLORIDE SERPL-SCNC: 104 MMOL/L (ref 98–107)
CREAT SERPL-MCNC: 1.08 MG/DL (ref 0.51–0.95)
DIASTOLIC BLOOD PRESSURE - MUSE: NORMAL MMHG
EGFRCR SERPLBLD CKD-EPI 2021: 66 ML/MIN/1.73M2
EOSINOPHIL # BLD AUTO: 0.4 10E3/UL (ref 0–0.7)
EOSINOPHIL NFR BLD AUTO: 7 %
ERYTHROCYTE [DISTWIDTH] IN BLOOD BY AUTOMATED COUNT: 13.5 % (ref 10–15)
GLUCOSE SERPL-MCNC: 85 MG/DL (ref 70–99)
HCO3 SERPL-SCNC: 22 MMOL/L (ref 22–29)
HCT VFR BLD AUTO: 39.3 % (ref 35–47)
HGB BLD-MCNC: 13 G/DL (ref 11.7–15.7)
IMM GRANULOCYTES # BLD: 0 10E3/UL
IMM GRANULOCYTES NFR BLD: 0 %
INTERPRETATION ECG - MUSE: NORMAL
LYMPHOCYTES # BLD AUTO: 1.7 10E3/UL (ref 0.8–5.3)
LYMPHOCYTES NFR BLD AUTO: 30 %
MCH RBC QN AUTO: 30.5 PG (ref 26.5–33)
MCHC RBC AUTO-ENTMCNC: 33.1 G/DL (ref 31.5–36.5)
MCV RBC AUTO: 92 FL (ref 78–100)
MONOCYTES # BLD AUTO: 0.4 10E3/UL (ref 0–1.3)
MONOCYTES NFR BLD AUTO: 7 %
NEUTROPHILS # BLD AUTO: 3.1 10E3/UL (ref 1.6–8.3)
NEUTROPHILS NFR BLD AUTO: 56 %
NRBC # BLD AUTO: 0 10E3/UL
NRBC BLD AUTO-RTO: 0 /100
P AXIS - MUSE: 33 DEGREES
PLATELET # BLD AUTO: 282 10E3/UL (ref 150–450)
POTASSIUM SERPL-SCNC: 3.9 MMOL/L (ref 3.4–5.3)
PR INTERVAL - MUSE: 104 MS
QRS DURATION - MUSE: 82 MS
QT - MUSE: 400 MS
QTC - MUSE: 400 MS
R AXIS - MUSE: 45 DEGREES
RBC # BLD AUTO: 4.26 10E6/UL (ref 3.8–5.2)
SODIUM SERPL-SCNC: 137 MMOL/L (ref 135–145)
SYSTOLIC BLOOD PRESSURE - MUSE: NORMAL MMHG
T AXIS - MUSE: 16 DEGREES
VENTRICULAR RATE- MUSE: 60 BPM
WBC # BLD AUTO: 5.6 10E3/UL (ref 4–11)

## 2025-05-13 PROCEDURE — 36415 COLL VENOUS BLD VENIPUNCTURE: CPT | Mod: ZL | Performed by: NURSE PRACTITIONER

## 2025-05-13 PROCEDURE — G0463 HOSPITAL OUTPT CLINIC VISIT: HCPCS

## 2025-05-13 PROCEDURE — 85025 COMPLETE CBC W/AUTO DIFF WBC: CPT | Mod: ZL | Performed by: NURSE PRACTITIONER

## 2025-05-13 PROCEDURE — 82565 ASSAY OF CREATININE: CPT | Mod: ZL | Performed by: NURSE PRACTITIONER

## 2025-05-13 ASSESSMENT — PAIN SCALES - GENERAL: PAINLEVEL_OUTOF10: MODERATE PAIN (5)

## 2025-05-13 NOTE — PATIENT INSTRUCTIONS
Will hold all medications the morning of surgery except her omeprazole and labetalol.     Will avoid NSAID use 10 days before surgery.

## 2025-05-19 LAB
ATRIAL RATE - MUSE: 60 BPM
DIASTOLIC BLOOD PRESSURE - MUSE: NORMAL MMHG
INTERPRETATION ECG - MUSE: NORMAL
P AXIS - MUSE: 33 DEGREES
PR INTERVAL - MUSE: 104 MS
QRS DURATION - MUSE: 82 MS
QT - MUSE: 400 MS
QTC - MUSE: 400 MS
R AXIS - MUSE: 45 DEGREES
SYSTOLIC BLOOD PRESSURE - MUSE: NORMAL MMHG
T AXIS - MUSE: 16 DEGREES
VENTRICULAR RATE- MUSE: 60 BPM

## 2025-05-29 ENCOUNTER — MYC REFILL (OUTPATIENT)
Dept: FAMILY MEDICINE | Facility: OTHER | Age: 40
End: 2025-05-29

## 2025-05-29 ENCOUNTER — ANESTHESIA EVENT (OUTPATIENT)
Dept: SURGERY | Facility: HOSPITAL | Age: 40
End: 2025-05-29
Payer: COMMERCIAL

## 2025-05-29 DIAGNOSIS — G47.00 INSOMNIA, UNSPECIFIED TYPE: ICD-10-CM

## 2025-05-29 DIAGNOSIS — G89.18 ACUTE POST-OPERATIVE PAIN: ICD-10-CM

## 2025-05-29 RX ORDER — HYDROXYZINE HYDROCHLORIDE 50 MG/1
50 TABLET, FILM COATED ORAL EVERY 8 HOURS PRN
Qty: 40 TABLET | Refills: 1 | Status: SHIPPED | OUTPATIENT
Start: 2025-05-29

## 2025-05-29 RX ORDER — TRAZODONE HYDROCHLORIDE 50 MG/1
100 TABLET ORAL AT BEDTIME
Qty: 180 TABLET | Refills: 1 | Status: SHIPPED | OUTPATIENT
Start: 2025-05-29

## 2025-05-29 ASSESSMENT — ENCOUNTER SYMPTOMS: DYSRHYTHMIAS: 1

## 2025-05-29 NOTE — ANESTHESIA PREPROCEDURE EVALUATION
Anesthesia Pre-Procedure Evaluation    Patient: Sarah ATKINSON [facility]   MRN: [id number] : 1985          Procedure : Procedure(s):  Bilateral Laparoscopic Oophorectomy         Past Medical History:   Diagnosis Date    Detached retina, bilateral 2015    secondary to severe preeclampsia with HELLP syndroma and acute renal failure    PARDEEP (generalized anxiety disorder) 2023    HELLP (hemolytic anemia/elev liver enzymes/low platelets in pregnancy) 2015    Severe preeclampsia with HELLP syndrome and acute renal failure    History of blood transfusion 2015    HUS (hemolytic uremic syndrome) (H) 2015    Postpartum Thrombotic thrombocytopenic purpura (TTP) and 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    MS (multiple sclerosis) (H) 2015    Rh negative state in antepartum period 2023    TTP (thrombotic thrombocytopenic purpura) (H) 2015    Postpartum Thrombotic thrombocytopenic purpura (TTP) and 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    Uncomplicated asthma     Exercise enduced    WPW (Delaney-Parkinson-White syndrome) 2015    She has been evaluated for ablation, however, based on location this was not deemed safe by cardiology at the time      Past Surgical History:   Procedure Laterality Date     SECTION  2015    Severe preeclampsia with HELLP syndrome and acute renal failure then developed postpartum Thrombotic thrombocytopenic purpura (TTP) and Hemolytic uremic syndrome (HUS)    COMBINED  SECTION, SALPINGECTOMY BILATERAL Bilateral 2023    Procedure:  SECTION, WITH BILATERAL SALPINGECTOMY, Viable baby girl born at 1851;  Surgeon: Naseem Cruz MD;  Location: [facility]    DILATION AND CURETTAGE, OPERATIVE HYSTEROSCOPY, COMBINED N/A 10/25/2023    Procedure: HYSTEROSCOPY, WITH DILATION AND CURETTAGE OF UTERUS;  Surgeon: Scout Grimaldo MD;  Location: [facility]    ENDOSCOPY UPPER, COLONOSCOPY,  COMBINED N/A 2/17/2025    Procedure: COLONOSCOPY WITH BIOPSY AND UPPER GASTROINTESTINAL TRACT ENDOSCOPY WITH BIOPSY;  Surgeon: Terrance Cruz MD;  Location: [facility] OR    GENITOURINARY SURGERY  2023    Tubes removed    HYSTERECTOMY SUPRACERVICAL N/A 04/18/2024    Procedure: HYSTERECTOMY, SUPRACERVICAL, ABDOMINAL;  Surgeon: Scout Grimaldo MD;  Location: [facility]    INCISION AND DRAINAGE ABDOMEN WASHOUT, COMBINED N/A 9/19/2024    Procedure: Abdominal Wound Exploration;  Surgeon: Terrance Cruz MD;  Location: [facility]    INCISION AND DRAINAGE ABDOMEN WASHOUT, COMBINED N/A 11/11/2024    Procedure: Abdominal  Wound exploration;  Surgeon: Terrance Cruz MD;  Location: [facility]    IR TRIGGER POINT INJ 1 OR 2 MUSCLES  7/2/2024    TUBAL LIGATION        Allergies   Allergen Reactions    Zestril [Lisinopril] Anaphylaxis and Fatigue    Amlodipine Swelling    Cymbalta [Duloxetine Hcl]     Hydrochlorothiazide     Magnesium Other (See Comments)     Altered mental status after IV dosing    Other Environmental Allergy     Prozac [Fluoxetine]     Losartan Difficulty breathing, Hives, Itching, Rash, Swelling and Visual Disturbance      Social History     Tobacco Use    Smoking status: Never     Passive exposure: Never    Smokeless tobacco: Never   Substance Use Topics    Alcohol use: Not Currently     Comment: 1-2 beers a week if that per pt      Wt Readings from Last 1 Encounters:   05/16/25 64.5 kg (142 lb 3.2 oz)        Anesthesia Evaluation   Pt has had prior anesthetic. Type: Regional, General and MAC.    No history of anesthetic complications       ROS/MED HX  ENT/Pulmonary: Comment: Exercise induced asthma     (+)     SEFERINO risk factors,  hypertension,               asthma (no current meds)  Treatment: Inhaler prn,                 Neurologic:     (+)                   Multiple Sclerosis, limitations: stable. Has not tolerated any medications used to help with MS in the past. Follows with neurology.  (5/13/25).             Cardiovascular: Comment: WPW    4/2023 Ziopatch - underlying rhythm was sinus    1/9/24 cardiology note - normal echo and zio patch did not have any concerns.  She does have some symptoms of palpitations with history of short runs of PSVT - she will be following up for repeat EP study - to check for WPW - no current symptoms; not yet scheduled; specialist in [place] told pt that he believes it is probably not WPW--wants to do EP study once other issues are taken care of/no urgency to having work-up     Per 9/11/24 HP:  She does have a history of WPW. HR has been controlled. She recently has had several surgeries without complications. Denies chest pain, shortness of breath, dizziness, syncope, or palpitations. She has had a syncopal episode in the past, but cardiology felt it was r/t orthostatism and hydration.     9/25/24 Cardiology f/up - recommend re-try of ACE inhibitor as Bp's still elevated              (+)  hypertension-range: lasix, labetolol (128/83 on 5/13/25)/ -   -  - -                        dysrhythmias, WPW,        Previous cardiac testing   Echo: Date: 5/31/24 Results:  Global and regional left ventricular function is normal with an EF of 60-65%.  Left ventricular wall thickness is normal. Left ventricular size is normal.  Left ventricular diastolic function is normal. No regional wall motion  abnormalities are seen.  Right ventricular function, chamber size, wall motion, and thickness are  normal.  Pulmonary artery systolic pressure is normal.  The inferior vena cava is normal.  No pericardial effusion is present.  There is no prior study for direct comparison.    Stress Test:  Date: Results:    ECG Reviewed:  Date: 5/13/25 Results:  HR 60  Sinus rhythm with short NM  Otherwise normal ECG  When compared with ECG of 11-Sep-2024 08:36,  No significant change was found    Cath:  Date: Results:      METS/Exercise Tolerance: >4 METS    Hematologic: Comments: HELLP with pregnancy  thrombotic  thrombocytopenic purpura    (+)       history of blood transfusion (after pregnancy), no previous transfusion reaction,        Musculoskeletal:  - neg musculoskeletal ROS     GI/Hepatic:     (+) GERD (omeprazole), Asymptomatic on medication,                  Renal/Genitourinary: Comment: AUB dysmenorrhea  Chronic pelvic pain    (+) renal disease (stage 2), type: CRI,            Endo:  - neg endo ROS     Psychiatric/Substance Use:     (+) psychiatric history anxiety       Infectious Disease:  - neg infectious disease ROS     Malignancy:  - neg malignancy ROS     Other:      (+)  LMP: s/p hysterectomy, ,           Physical Exam  Airway  Mallampati: I  TM distance: >3 FB  Neck ROM: full  Mouth opening: >= 4 cm    Cardiovascular   Rhythm: regular  Rate: normal rate  Comments: Hx WPW Comments: WPW    4/2023 Ziopatch - underlying rhythm was sinus    1/9/24 cardiology note - normal echo and zio patch did not have any concerns.  She does have some symptoms of palpitations with history of short runs of PSVT - she will be following up for repeat EP study - to check for WPW - no current symptoms; not yet scheduled; specialist in [place] told pt that he believes it is probably not WPW--wants to do EP study once other issues are taken care of/no urgency to having work-up     Per 9/11/24 HP:  She does have a history of WPW. HR has been controlled. She recently has had several surgeries without complications. Denies chest pain, shortness of breath, dizziness, syncope, or palpitations. She has had a syncopal episode in the past, but cardiology felt it was r/t orthostatism and hydration.     9/25/24 Cardiology f/up - recommend re-try of ACE inhibitor as Bp's still elevated            Dental   (+) Completely normal teeth      Pulmonary Breath sounds clear to auscultation        Neurological   She appears awake, alert and oriented x3.    Other Findings       OUTSIDE LABS:  CBC:   Lab Results   Component Value Date    WBC 5.6  05/13/2025    WBC 7.4 10/15/2024    HGB 13.0 05/13/2025    HGB 13.6 10/15/2024    HCT 39.3 05/13/2025    HCT 41.6 10/15/2024     05/13/2025     10/15/2024     BMP:   Lab Results   Component Value Date     05/13/2025     10/15/2024    POTASSIUM 3.9 05/13/2025    POTASSIUM 4.0 10/15/2024    CHLORIDE 104 05/13/2025    CHLORIDE 102 10/15/2024    CO2 22 05/13/2025    CO2 21 (L) 10/15/2024    BUN 16.5 05/13/2025    BUN 16.1 10/15/2024    CR 1.08 (H) 05/13/2025    CR 1.03 (H) 10/15/2024    GLC 85 05/13/2025    GLC 89 10/15/2024     COAGS:   Lab Results   Component Value Date    PTT 28 07/23/2023    INR 0.89 07/23/2023     POC:   Lab Results   Component Value Date    HCG Negative 12/05/2022     HEPATIC:   Lab Results   Component Value Date    ALBUMIN 4.7 04/12/2024    PROTTOTAL 7.6 04/12/2024    ALT 19 04/12/2024    AST 24 04/12/2024    ALKPHOS 96 04/12/2024    BILITOTAL 0.5 04/12/2024     OTHER:   Lab Results   Component Value Date    LACT 4.5 (H) 01/10/2016    MARVIN 9.6 05/13/2025    MAG 3.2 (H) 07/29/2023    TSH 2.40 09/11/2024    T4 1.00 09/11/2024    CRP <2.9 10/29/2018    SED 6 07/29/2024       Anesthesia Plan    ASA Status:  3      NPO Status: NPO Appropriate   Anesthesia Type: General.  Induction: intravenous.  Maintenance: TIVA.   Techniques and Equipment:       - Monitoring Plan: standard ASA monitoring     Consents    Anesthesia Plan(s) and associated risks, benefits, and realistic alternatives discussed. Questions answered and patient/representative(s) expressed understanding.     - Discussed: CRNA     - Discussed with:  Patient        - Pt is DNR/DNI Status: no DNR     Blood Consent:      - Discussed with: patient.     - Consented: consented to blood products     Postoperative Care    Pain management: non-narcotic analgesics, plan for postoperative opioid use.     Comments:    Other Comments: Reviewed 5/13/25 EVERARDO Hampton CNP    I have reviewed the  "pertinent notes and labs in the chart from the past 30 days and (re)examined the patient.  Any updates or changes from those notes are reflected in this note.    Clinically Significant Risk Factors Present on Admission                   # Hypertension: Noted on problem list           # Overweight: Estimated body mass index is 28.72 kg/m  as calculated from the following:    Height as of 5/16/25: 1.499 m (4' 11\").    Weight as of 5/16/25: 64.5 kg (142 lb 3.2 oz).                    "

## 2025-06-04 ENCOUNTER — HOSPITAL ENCOUNTER (OUTPATIENT)
Facility: HOSPITAL | Age: 40
Discharge: HOME | End: 2025-06-04
Attending: OBSTETRICS & GYNECOLOGY | Admitting: OBSTETRICS & GYNECOLOGY
Payer: COMMERCIAL

## 2025-06-04 ENCOUNTER — APPOINTMENT (OUTPATIENT)
Dept: ULTRASOUND IMAGING | Facility: HOSPITAL | Age: 40
End: 2025-06-04
Attending: NURSE ANESTHETIST, CERTIFIED REGISTERED
Payer: COMMERCIAL

## 2025-06-04 ENCOUNTER — ANESTHESIA (OUTPATIENT)
Dept: SURGERY | Facility: HOSPITAL | Age: 40
End: 2025-06-04
Payer: COMMERCIAL

## 2025-06-04 VITALS
TEMPERATURE: 97.5 F | HEIGHT: 59 IN | RESPIRATION RATE: 16 BRPM | DIASTOLIC BLOOD PRESSURE: 69 MMHG | OXYGEN SATURATION: 97 % | BODY MASS INDEX: 28.83 KG/M2 | HEART RATE: 61 BPM | WEIGHT: 143 LBS | SYSTOLIC BLOOD PRESSURE: 134 MMHG

## 2025-06-04 DIAGNOSIS — Z87.42 HX OF OVARIAN CYST: ICD-10-CM

## 2025-06-04 DIAGNOSIS — R10.2 CHRONIC PELVIC PAIN IN FEMALE: ICD-10-CM

## 2025-06-04 DIAGNOSIS — Z80.41 FAMILY HISTORY OF OVARIAN CANCER: Primary | ICD-10-CM

## 2025-06-04 DIAGNOSIS — G89.29 CHRONIC PELVIC PAIN IN FEMALE: ICD-10-CM

## 2025-06-04 DIAGNOSIS — Z98.890 POST-OPERATIVE STATE: Primary | ICD-10-CM

## 2025-06-04 PROCEDURE — 360N000076 HC SURGERY LEVEL 3, PER MIN: Performed by: OBSTETRICS & GYNECOLOGY

## 2025-06-04 PROCEDURE — 58661 LAPAROSCOPY REMOVE ADNEXA: CPT | Mod: 50 | Performed by: OBSTETRICS & GYNECOLOGY

## 2025-06-04 PROCEDURE — 250N000011 HC RX IP 250 OP 636: Performed by: NURSE PRACTITIONER

## 2025-06-04 PROCEDURE — 88305 TISSUE EXAM BY PATHOLOGIST: CPT | Mod: TC | Performed by: OBSTETRICS & GYNECOLOGY

## 2025-06-04 PROCEDURE — 258N000003 HC RX IP 258 OP 636: Performed by: NURSE ANESTHETIST, CERTIFIED REGISTERED

## 2025-06-04 PROCEDURE — 272N000001 HC OR GENERAL SUPPLY STERILE: Performed by: OBSTETRICS & GYNECOLOGY

## 2025-06-04 PROCEDURE — 999N000141 HC STATISTIC PRE-PROCEDURE NURSING ASSESSMENT: Performed by: OBSTETRICS & GYNECOLOGY

## 2025-06-04 PROCEDURE — 88108 CYTOPATH CONCENTRATE TECH: CPT | Mod: TC | Performed by: OBSTETRICS & GYNECOLOGY

## 2025-06-04 PROCEDURE — 258N000003 HC RX IP 258 OP 636: Performed by: NURSE PRACTITIONER

## 2025-06-04 PROCEDURE — 250N000011 HC RX IP 250 OP 636: Performed by: NURSE ANESTHETIST, CERTIFIED REGISTERED

## 2025-06-04 PROCEDURE — 88305 TISSUE EXAM BY PATHOLOGIST: CPT | Mod: 26 | Performed by: PATHOLOGY

## 2025-06-04 PROCEDURE — 250N000011 HC RX IP 250 OP 636: Performed by: OBSTETRICS & GYNECOLOGY

## 2025-06-04 PROCEDURE — 250N000025 HC SEVOFLURANE, PER MIN: Performed by: OBSTETRICS & GYNECOLOGY

## 2025-06-04 PROCEDURE — 250N000009 HC RX 250: Performed by: NURSE ANESTHETIST, CERTIFIED REGISTERED

## 2025-06-04 PROCEDURE — 710N000010 HC RECOVERY PHASE 1, LEVEL 2, PER MIN: Performed by: OBSTETRICS & GYNECOLOGY

## 2025-06-04 PROCEDURE — 88108 CYTOPATH CONCENTRATE TECH: CPT | Mod: 26 | Performed by: PATHOLOGY

## 2025-06-04 PROCEDURE — 710N000012 HC RECOVERY PHASE 2, PER MINUTE: Performed by: OBSTETRICS & GYNECOLOGY

## 2025-06-04 PROCEDURE — 370N000017 HC ANESTHESIA TECHNICAL FEE, PER MIN: Performed by: OBSTETRICS & GYNECOLOGY

## 2025-06-04 PROCEDURE — 250N000013 HC RX MED GY IP 250 OP 250 PS 637: Performed by: OBSTETRICS & GYNECOLOGY

## 2025-06-04 RX ORDER — ACETAMINOPHEN 325 MG/1
975 TABLET ORAL ONCE
Status: COMPLETED | OUTPATIENT
Start: 2025-06-04 | End: 2025-06-04

## 2025-06-04 RX ORDER — DEXAMETHASONE SODIUM PHOSPHATE 4 MG/ML
INJECTION, SOLUTION INTRA-ARTICULAR; INTRALESIONAL; INTRAMUSCULAR; INTRAVENOUS; SOFT TISSUE PRN
Status: DISCONTINUED | OUTPATIENT
Start: 2025-06-04 | End: 2025-06-04

## 2025-06-04 RX ORDER — CEFAZOLIN SODIUM/WATER 2 G/20 ML
2 SYRINGE (ML) INTRAVENOUS
Status: DISCONTINUED | OUTPATIENT
Start: 2025-06-04 | End: 2025-06-04 | Stop reason: HOSPADM

## 2025-06-04 RX ORDER — OXYCODONE HYDROCHLORIDE 5 MG/1
5 TABLET ORAL
Status: COMPLETED | OUTPATIENT
Start: 2025-06-04 | End: 2025-06-04

## 2025-06-04 RX ORDER — BUPIVACAINE HYDROCHLORIDE 2.5 MG/ML
INJECTION, SOLUTION EPIDURAL; INFILTRATION; INTRACAUDAL; PERINEURAL
Status: DISCONTINUED
Start: 2025-06-04 | End: 2025-06-04 | Stop reason: HOSPADM

## 2025-06-04 RX ORDER — ONDANSETRON 4 MG/1
4 TABLET, ORALLY DISINTEGRATING ORAL EVERY 30 MIN PRN
Status: DISCONTINUED | OUTPATIENT
Start: 2025-06-04 | End: 2025-06-04 | Stop reason: HOSPADM

## 2025-06-04 RX ORDER — AMOXICILLIN 250 MG
1-2 CAPSULE ORAL 2 TIMES DAILY
Qty: 30 TABLET | Refills: 0 | Status: SHIPPED | OUTPATIENT
Start: 2025-06-04

## 2025-06-04 RX ORDER — DIAZEPAM 10 MG/2ML
2.5 INJECTION, SOLUTION INTRAMUSCULAR; INTRAVENOUS
Status: DISCONTINUED | OUTPATIENT
Start: 2025-06-04 | End: 2025-06-04 | Stop reason: HOSPADM

## 2025-06-04 RX ORDER — LIDOCAINE HYDROCHLORIDE 20 MG/ML
INJECTION, SOLUTION INFILTRATION; PERINEURAL PRN
Status: DISCONTINUED | OUTPATIENT
Start: 2025-06-04 | End: 2025-06-04

## 2025-06-04 RX ORDER — CEFAZOLIN SODIUM/WATER 2 G/20 ML
2 SYRINGE (ML) INTRAVENOUS SEE ADMIN INSTRUCTIONS
Status: DISCONTINUED | OUTPATIENT
Start: 2025-06-04 | End: 2025-06-04 | Stop reason: HOSPADM

## 2025-06-04 RX ORDER — HYDROMORPHONE HYDROCHLORIDE 1 MG/ML
0.5 INJECTION, SOLUTION INTRAMUSCULAR; INTRAVENOUS; SUBCUTANEOUS EVERY 5 MIN PRN
Status: DISCONTINUED | OUTPATIENT
Start: 2025-06-04 | End: 2025-06-04 | Stop reason: HOSPADM

## 2025-06-04 RX ORDER — SODIUM CHLORIDE, SODIUM LACTATE, POTASSIUM CHLORIDE, CALCIUM CHLORIDE 600; 310; 30; 20 MG/100ML; MG/100ML; MG/100ML; MG/100ML
INJECTION, SOLUTION INTRAVENOUS CONTINUOUS
Status: DISCONTINUED | OUTPATIENT
Start: 2025-06-04 | End: 2025-06-04 | Stop reason: HOSPADM

## 2025-06-04 RX ORDER — OXYCODONE HYDROCHLORIDE 5 MG/1
5-10 TABLET ORAL EVERY 6 HOURS PRN
Qty: 26 TABLET | Refills: 0 | Status: SHIPPED | OUTPATIENT
Start: 2025-06-04 | End: 2025-06-05 | Stop reason: ALTCHOICE

## 2025-06-04 RX ORDER — FENTANYL CITRATE 50 UG/ML
25 INJECTION, SOLUTION INTRAMUSCULAR; INTRAVENOUS EVERY 5 MIN PRN
Status: DISCONTINUED | OUTPATIENT
Start: 2025-06-04 | End: 2025-06-04 | Stop reason: HOSPADM

## 2025-06-04 RX ORDER — ONDANSETRON 4 MG/1
4 TABLET, ORALLY DISINTEGRATING ORAL EVERY 8 HOURS PRN
Qty: 4 TABLET | Refills: 0 | Status: SHIPPED | OUTPATIENT
Start: 2025-06-04

## 2025-06-04 RX ORDER — BUPIVACAINE HYDROCHLORIDE 2.5 MG/ML
INJECTION, SOLUTION EPIDURAL; INFILTRATION; INTRACAUDAL; PERINEURAL
Status: COMPLETED | OUTPATIENT
Start: 2025-06-04 | End: 2025-06-04

## 2025-06-04 RX ORDER — HALOPERIDOL 5 MG/ML
2 INJECTION INTRAMUSCULAR
Status: DISCONTINUED | OUTPATIENT
Start: 2025-06-04 | End: 2025-06-04 | Stop reason: HOSPADM

## 2025-06-04 RX ORDER — ALBUTEROL SULFATE 0.83 MG/ML
2.5 SOLUTION RESPIRATORY (INHALATION) EVERY 4 HOURS PRN
Status: DISCONTINUED | OUTPATIENT
Start: 2025-06-04 | End: 2025-06-04 | Stop reason: HOSPADM

## 2025-06-04 RX ORDER — IBUPROFEN 800 MG/1
800 TABLET, FILM COATED ORAL EVERY 8 HOURS PRN
Qty: 40 TABLET | Refills: 1 | Status: SHIPPED | OUTPATIENT
Start: 2025-06-04

## 2025-06-04 RX ORDER — LIDOCAINE 40 MG/G
CREAM TOPICAL
Status: DISCONTINUED | OUTPATIENT
Start: 2025-06-04 | End: 2025-06-04 | Stop reason: HOSPADM

## 2025-06-04 RX ORDER — KETAMINE HYDROCHLORIDE 10 MG/ML
INJECTION INTRAMUSCULAR; INTRAVENOUS PRN
Status: DISCONTINUED | OUTPATIENT
Start: 2025-06-04 | End: 2025-06-04

## 2025-06-04 RX ORDER — DEXAMETHASONE SODIUM PHOSPHATE 10 MG/ML
4 INJECTION, SOLUTION INTRAMUSCULAR; INTRAVENOUS
Status: DISCONTINUED | OUTPATIENT
Start: 2025-06-04 | End: 2025-06-04 | Stop reason: HOSPADM

## 2025-06-04 RX ORDER — FENTANYL CITRATE 50 UG/ML
50 INJECTION, SOLUTION INTRAMUSCULAR; INTRAVENOUS EVERY 5 MIN PRN
Status: DISCONTINUED | OUTPATIENT
Start: 2025-06-04 | End: 2025-06-04 | Stop reason: HOSPADM

## 2025-06-04 RX ORDER — NALOXONE HYDROCHLORIDE 0.4 MG/ML
0.1 INJECTION, SOLUTION INTRAMUSCULAR; INTRAVENOUS; SUBCUTANEOUS
Status: DISCONTINUED | OUTPATIENT
Start: 2025-06-04 | End: 2025-06-04 | Stop reason: HOSPADM

## 2025-06-04 RX ORDER — HYDRALAZINE HYDROCHLORIDE 20 MG/ML
2.5-5 INJECTION INTRAMUSCULAR; INTRAVENOUS EVERY 10 MIN PRN
Status: DISCONTINUED | OUTPATIENT
Start: 2025-06-04 | End: 2025-06-04 | Stop reason: HOSPADM

## 2025-06-04 RX ORDER — KETOROLAC TROMETHAMINE 30 MG/ML
INJECTION, SOLUTION INTRAMUSCULAR; INTRAVENOUS PRN
Status: DISCONTINUED | OUTPATIENT
Start: 2025-06-04 | End: 2025-06-04

## 2025-06-04 RX ORDER — ONDANSETRON 2 MG/ML
INJECTION INTRAMUSCULAR; INTRAVENOUS PRN
Status: DISCONTINUED | OUTPATIENT
Start: 2025-06-04 | End: 2025-06-04

## 2025-06-04 RX ORDER — BUPIVACAINE HYDROCHLORIDE 2.5 MG/ML
INJECTION, SOLUTION INFILTRATION; PERINEURAL PRN
Status: DISCONTINUED | OUTPATIENT
Start: 2025-06-04 | End: 2025-06-04 | Stop reason: HOSPADM

## 2025-06-04 RX ORDER — PROPOFOL 10 MG/ML
INJECTION, EMULSION INTRAVENOUS PRN
Status: DISCONTINUED | OUTPATIENT
Start: 2025-06-04 | End: 2025-06-04

## 2025-06-04 RX ORDER — FENTANYL CITRATE 50 UG/ML
INJECTION, SOLUTION INTRAMUSCULAR; INTRAVENOUS PRN
Status: DISCONTINUED | OUTPATIENT
Start: 2025-06-04 | End: 2025-06-04

## 2025-06-04 RX ORDER — ONDANSETRON 4 MG/1
4 TABLET, ORALLY DISINTEGRATING ORAL
Status: DISCONTINUED | OUTPATIENT
Start: 2025-06-04 | End: 2025-06-04 | Stop reason: HOSPADM

## 2025-06-04 RX ORDER — ONDANSETRON 2 MG/ML
4 INJECTION INTRAMUSCULAR; INTRAVENOUS EVERY 30 MIN PRN
Status: DISCONTINUED | OUTPATIENT
Start: 2025-06-04 | End: 2025-06-04 | Stop reason: HOSPADM

## 2025-06-04 RX ORDER — HYDROMORPHONE HYDROCHLORIDE 1 MG/ML
0.25 INJECTION, SOLUTION INTRAMUSCULAR; INTRAVENOUS; SUBCUTANEOUS EVERY 5 MIN PRN
Status: DISCONTINUED | OUTPATIENT
Start: 2025-06-04 | End: 2025-06-04 | Stop reason: HOSPADM

## 2025-06-04 RX ADMIN — FENTANYL CITRATE 50 MCG: 50 INJECTION INTRAMUSCULAR; INTRAVENOUS at 12:58

## 2025-06-04 RX ADMIN — PROPOFOL 200 MCG/KG/MIN: 10 INJECTION, EMULSION INTRAVENOUS at 12:25

## 2025-06-04 RX ADMIN — OXYCODONE HYDROCHLORIDE 5 MG: 5 TABLET ORAL at 15:25

## 2025-06-04 RX ADMIN — BUPIVACAINE 20 ML: 13.3 INJECTION, SUSPENSION, LIPOSOMAL INFILTRATION at 12:36

## 2025-06-04 RX ADMIN — FENTANYL CITRATE 25 MCG: 50 INJECTION INTRAMUSCULAR; INTRAVENOUS at 12:42

## 2025-06-04 RX ADMIN — DEXMEDETOMIDINE HYDROCHLORIDE 12 MCG: 100 INJECTION, SOLUTION INTRAVENOUS at 13:01

## 2025-06-04 RX ADMIN — SODIUM CHLORIDE, SODIUM LACTATE, POTASSIUM CHLORIDE, AND CALCIUM CHLORIDE: .6; .31; .03; .02 INJECTION, SOLUTION INTRAVENOUS at 12:46

## 2025-06-04 RX ADMIN — Medication 200 MG: at 13:33

## 2025-06-04 RX ADMIN — PROPOFOL 140 MG: 10 INJECTION, EMULSION INTRAVENOUS at 12:29

## 2025-06-04 RX ADMIN — FENTANYL CITRATE 25 MCG: 50 INJECTION INTRAMUSCULAR; INTRAVENOUS at 14:14

## 2025-06-04 RX ADMIN — Medication 2 G: at 12:21

## 2025-06-04 RX ADMIN — Medication 30 MG: at 12:45

## 2025-06-04 RX ADMIN — PROPOFOL 60 MG: 10 INJECTION, EMULSION INTRAVENOUS at 12:58

## 2025-06-04 RX ADMIN — FENTANYL CITRATE 25 MCG: 50 INJECTION INTRAMUSCULAR; INTRAVENOUS at 12:29

## 2025-06-04 RX ADMIN — ROCURONIUM BROMIDE 10 MG: 10 INJECTION INTRAVENOUS at 12:45

## 2025-06-04 RX ADMIN — KETOROLAC TROMETHAMINE 15 MG: 30 INJECTION, SOLUTION INTRAMUSCULAR at 13:24

## 2025-06-04 RX ADMIN — BUPIVACAINE HYDROCHLORIDE 30 ML: 2.5 INJECTION, SOLUTION EPIDURAL; INFILTRATION; INTRACAUDAL at 12:36

## 2025-06-04 RX ADMIN — ONDANSETRON 4 MG: 2 INJECTION INTRAMUSCULAR; INTRAVENOUS at 13:16

## 2025-06-04 RX ADMIN — ROCURONIUM BROMIDE 40 MG: 10 INJECTION INTRAVENOUS at 12:29

## 2025-06-04 RX ADMIN — HYDROMORPHONE HYDROCHLORIDE 0.25 MG: 1 INJECTION, SOLUTION INTRAMUSCULAR; INTRAVENOUS; SUBCUTANEOUS at 14:44

## 2025-06-04 RX ADMIN — DEXAMETHASONE SODIUM PHOSPHATE 10 MG: 4 INJECTION, SOLUTION INTRA-ARTICULAR; INTRALESIONAL; INTRAMUSCULAR; INTRAVENOUS; SOFT TISSUE at 12:49

## 2025-06-04 RX ADMIN — Medication 20 MG: at 12:29

## 2025-06-04 RX ADMIN — SODIUM CHLORIDE, SODIUM LACTATE, POTASSIUM CHLORIDE, AND CALCIUM CHLORIDE: .6; .31; .03; .02 INJECTION, SOLUTION INTRAVENOUS at 10:30

## 2025-06-04 RX ADMIN — LIDOCAINE HYDROCHLORIDE 60 MG: 20 INJECTION, SOLUTION INFILTRATION; PERINEURAL at 12:29

## 2025-06-04 RX ADMIN — MIDAZOLAM 2 MG: 1 INJECTION INTRAMUSCULAR; INTRAVENOUS at 12:29

## 2025-06-04 RX ADMIN — FENTANYL CITRATE 50 MCG: 50 INJECTION INTRAMUSCULAR; INTRAVENOUS at 14:22

## 2025-06-04 RX ADMIN — HYDROMORPHONE HYDROCHLORIDE 0.5 MG: 1 INJECTION, SOLUTION INTRAMUSCULAR; INTRAVENOUS; SUBCUTANEOUS at 14:33

## 2025-06-04 ASSESSMENT — ACTIVITIES OF DAILY LIVING (ADL)
ADLS_ACUITY_SCORE: 30

## 2025-06-04 NOTE — ANESTHESIA PROCEDURE NOTES
Airway       Patient location during procedure: OR       Procedure Start/Stop Times: 6/4/2025 12:34 PM and 6/4/2025 12:36 PM  Staff -        CRNA: Ariadna Costa APRN CRNA       Performed By: CRNA  Consent for Airway        Urgency: elective  Indications and Patient Condition       Indications for airway management: lisa-procedural       Induction type:intravenous       Mask difficulty assessment: 1 - vent by mask    Final Airway Details       Final airway type: endotracheal airway       Successful airway: ETT - single  Endotracheal Airway Details        ETT size (mm): 7.0       Cuffed: yes       Cuff volume (mL): 3       Successful intubation technique: direct laryngoscopy       DL Blade Type: Gamez 2       Grade View of Cords: 1       Adjucts: stylet       Position: Right       Measured from: lips       Secured at (cm): 22       Bite block used: None    Post intubation assessment        Placement verified by: capnometry, equal breath sounds and chest rise        Number of attempts at approach: 1       Secured with: tape       Ease of procedure: easy       Dentition: Unchanged    Medication(s) Administered   Medication Administration Time: 6/4/2025 12:34 PM

## 2025-06-04 NOTE — ANESTHESIA POSTPROCEDURE EVALUATION
Patient: Sarah Ugalde    Procedure: Procedure(s):  Bilateral Laparoscopic Oophorectomy       Anesthesia Type:  General    Note:  Disposition: Outpatient   Postop Pain Control: Uneventful            Sign Out: Well controlled pain   PONV: No   Neuro/Psych: Uneventful            Sign Out: Acceptable/Baseline neuro status   Airway/Respiratory: Uneventful            Sign Out: Acceptable/Baseline resp. status   CV/Hemodynamics: Uneventful            Sign Out: Acceptable CV status; No obvious hypovolemia; No obvious fluid overload   Other NRE: NONE   DID A NON-ROUTINE EVENT OCCUR?        Last vitals:  Vitals Value Taken Time   /116 06/04/25 15:05   Temp 97.2  F (36.2  C) 06/04/25 15:05   Pulse 58 06/04/25 15:07   Resp 9 06/04/25 15:08   SpO2 100 % 06/04/25 15:08   Vitals shown include unfiled device data.    Electronically Signed By: EVERARDO Martines CRNA  June 4, 2025  5:42 PM

## 2025-06-04 NOTE — ANESTHESIA PROCEDURE NOTES
"TAP Procedure Note    Pre-Procedure   Staff -        CRNA: Ariadna Costa APRN CRNA       Performed By: CRNA       Location: OR       Procedure Start/Stop Times: 6/4/2025 12:36 PM and 6/4/2025 12:41 PM       Pre-Anesthestic Checklist: patient identified, IV checked, site marked, risks and benefits discussed, informed consent, monitors and equipment checked, pre-op evaluation, at physician/surgeon's request and post-op pain management  Timeout:       Correct Patient: Yes        Correct Procedure: Yes        Correct Site: Yes        Correct Position: Yes        Correct Laterality: Yes        Site Marked: Yes  Procedure Documentation  Procedure: TAP         Laterality: bilateral       Patient Position: supine       Skin prep: Chloraprep       Needle Type: insulated       Needle Gauge: 20.        Needle Length (Inches): 4        1. Ultrasound was used to identify targeted nerve, plexus, vascular marker, or fascial plane and place a needle adjacent to it in real-time.       2. Ultrasound was used to visualize the spread of anesthetic in close proximity to the above referenced structure.       3. A permanent image is entered into the patient's record.       4. The visualized anatomic structures appeared normal.       5. There were no apparent abnormal pathologic findings.    Assessment/Narrative         The placement was negative for: blood aspirated    Medication(s) Administered   Bupivacaine 0.25% PF (Infiltration) - Infiltration, Abdominal Tissue   30 mL - 6/4/2025 12:36:00 PM  Bupivacaine liposome (Exparel) 1.3% LA inj susp (Infiltration) - Infiltration   20 mL - 6/4/2025 12:36:00 PM  Medication Administration Time: 6/4/2025 12:36 PM      FOR [facility] ([facility]) ONLY:   Pain Team Contact information: please page the Pain Team Via [employer]. Search \"Pain\". During daytime hours, please page the attending first. At night please page the resident first.      "

## 2025-06-04 NOTE — ANESTHESIA CARE TRANSFER NOTE
Patient: Sarah Ugalde    Procedure: Procedure(s):  Bilateral Laparoscopic Oophorectomy       Diagnosis: Chronic pelvic pain in female [R10.2, G89.29]  Hx of ovarian cyst [Z87.42]  Diagnosis Additional Information: No value filed.    Anesthesia Type:   General     Note:    Oropharynx: oropharynx clear of all foreign objects  Level of Consciousness: drowsy  Oxygen Supplementation: face mask  Level of Supplemental Oxygen (L/min / FiO2): 6  Independent Airway: airway patency satisfactory and stable  Dentition: dentition unchanged  Vital Signs Stable: post-procedure vital signs reviewed and stable  Report to RN Given: handoff report given  Patient transferred to: PACU    Handoff Report: Identifed the Patient, Identified the Reponsible Provider, Reviewed the pertinent medical history, Discussed the surgical course, Reviewed Intra-OP anesthesia mangement and issues during anesthesia, Set expectations for post-procedure period and Allowed opportunity for questions and acknowledgement of understanding    Vitals:  Vitals Value Taken Time   /91 06/04/25 13:47   Temp     Pulse 61 06/04/25 13:51   Resp 13 06/04/25 13:51   SpO2 96 % 06/04/25 13:51   Vitals shown include unfiled device data.    Electronically Signed By: EVERARDO Grey CRNA  June 4, 2025  1:53 PM

## 2025-06-04 NOTE — DISCHARGE INSTRUCTIONS
Call MD prior to DC.  No driving today or while on pain medications  May shower starting day after surgery.  No swim, bath soak for 2 weeks.  Pelvic rest for 2 weeks  Call Dr. Grimaldo [phone] as necessary if problems  Schedule PO check Dr. Grimaldo 2 weeks.   No heavy lifting, vigorous activity,  exercise for 2 week

## 2025-06-04 NOTE — OP NOTE
[facility]  Operative Note    Pre-operative diagnosis: Chronic pelvic pain in female [R10.2, G89.29]  Hx of recurrent ovarian cysts [  Family history of ovarian cancer   Post-operative diagnosis Same, Pathology pending.  Abdominal adhesions.    Procedure: Procedure(s):  Bilateral Laparoscopic Oophorectomy  Lysis of adhesions   Surgeon:  Assistant: MD Kristin Quijano NP (assistance required for retraction, exposure, instrument handling, and wound closure)      Anesthesia: General.  TAP block.  0.25% local marcaine.     Estimated blood loss: 20 ml   Blood transfusion: No transfusion was given during surgery   Drains: None       Specimens: Ovaries, bilateral.  Pelvic washings, cytology.    Findings: Uterus/tubes surgical absent.  Normal appearing ovaries.   Midline omental adhesions.   Otherwise normal pelvic and abdominal anatomy.    Complications: None   Condition: Stable       OPERATIVE NARRATION: The patient was brought to the Operating Room and uneventfully placed under general anesthesia. She was prepped and draped in the dorsal lithotomy position and her bladder drained. A sponge stick was placed in vagina. We then changed gloves and proceeded to the abdominal portion of the case. A 5 mm infraumbilical skin incision was performed with a scalpel. A Veress needle was introduced without difficulty and a water drop test performed. The abdomen was insufflated with several liters of carbon dioxide. A 5 mm trocar and a laparoscope were introduced. Visualization of the pelvis was obscured by midline omental adhesions.   Findings were as described above. Next, an 11 mm suprapubic and a 5 mm  lleft lower quadrant ports were placed under direct visualization.  Pelvic/abdominal exploration was performed and pelvic washings obtained for cytology.   The LigaSure bipolar cutting cautery device was used to transect the midline omental adhesions from anterior abdominal wall to aid in exposure to  pelvis.  Then the Left IP ligament was transected 2 cm proximal the ovary with the Ligasure and away from the ureter and pelvic sidewall structures.   The same procedure was repeated on the right.  The ovaries were then placed in an endobag and removed through suprapubic port.  The pelvis was irrigated and checked for hemostasis. A Damian-Eugene needle was used to close the suprapubic fascia with interrupted 0 Vicryl sutures. The suprapubic fascia and skin were injected with marcaine.  At this point there was excellent hemostasis and we proceeded to closure. The remaining trocars were removed and excess carbon dioxide expressed from the abdomen. Subcutaneous spaces were irrigated and checked for hemostasis. The skin incisions were closed with 3.0 subcuticular Monocryl and surgical glue. There were no complications. The vaginal sponge stick was removed.  The patient was transferred to the Recovery Room in excellent and stable condition.     ZANE ESPANA MD

## 2025-06-04 NOTE — OR NURSING
Patient and responsible adult given discharge instructions with no questions regarding instructions. Pablo score 19/20. Pain level 3/10.  Discharged from unit via wheelchair. Patient discharged to home with family.

## 2025-06-05 ENCOUNTER — MYC MEDICAL ADVICE (OUTPATIENT)
Dept: OBGYN | Facility: OTHER | Age: 40
End: 2025-06-05

## 2025-06-05 DIAGNOSIS — Z98.890 POST-OPERATIVE STATE: Primary | ICD-10-CM

## 2025-06-05 RX ORDER — HYDROMORPHONE HYDROCHLORIDE 2 MG/1
2 TABLET ORAL EVERY 4 HOURS PRN
Qty: 18 TABLET | Refills: 0 | Status: SHIPPED | OUTPATIENT
Start: 2025-06-05 | End: 2025-06-08

## 2025-06-05 NOTE — TELEPHONE ENCOUNTER
Yes they did the tap block.  You may be referring to abdominal binder which we do use for larger incisions such as CS/Hysterectomy but that has nothing to do with the Tap block.  Are you using Tylenol/heat  as well?  Is the pain from the lower incision? We could get you an abdominal binder.  May be helpful or not. We could also try Gabapentin(Neurontin)   which works good as a pain adjunct but can have  have potential side effects.  Hydroxyzine  which u have also can work as pain adjunct.

## 2025-06-05 NOTE — TELEPHONE ENCOUNTER
Dilaudid can be given orally (not Demorol).  If that has worked better for you in the past we could try that and you would take that instead of oxycodone.  Let me know.

## 2025-06-09 ENCOUNTER — RESULTS FOLLOW-UP (OUTPATIENT)
Dept: OBGYN | Facility: CLINIC | Age: 40
End: 2025-06-09

## 2025-06-09 LAB
PATH REPORT.COMMENTS IMP SPEC: NORMAL
PATH REPORT.FINAL DX SPEC: NORMAL
PATH REPORT.FINAL DX SPEC: NORMAL
PATH REPORT.GROSS SPEC: NORMAL
PATH REPORT.GROSS SPEC: NORMAL
PATH REPORT.MICROSCOPIC SPEC OTHER STN: NORMAL
PATH REPORT.RELEVANT HX SPEC: NORMAL
PHOTO IMAGE: NORMAL

## 2025-06-10 ENCOUNTER — OFFICE VISIT (OUTPATIENT)
Dept: OBGYN | Facility: OTHER | Age: 40
End: 2025-06-10
Attending: OBSTETRICS & GYNECOLOGY
Payer: COMMERCIAL

## 2025-06-10 VITALS
TEMPERATURE: 98.6 F | HEART RATE: 87 BPM | RESPIRATION RATE: 16 BRPM | BODY MASS INDEX: 28.84 KG/M2 | DIASTOLIC BLOOD PRESSURE: 105 MMHG | OXYGEN SATURATION: 99 % | WEIGHT: 142.8 LBS | SYSTOLIC BLOOD PRESSURE: 141 MMHG

## 2025-06-10 DIAGNOSIS — Z98.890 POST-OPERATIVE STATE: Primary | ICD-10-CM

## 2025-06-10 DIAGNOSIS — T14.8XXA DRAINAGE FROM WOUND: ICD-10-CM

## 2025-06-10 PROCEDURE — 87070 CULTURE OTHR SPECIMN AEROBIC: CPT | Mod: ZL | Performed by: OBSTETRICS & GYNECOLOGY

## 2025-06-10 RX ORDER — CEPHALEXIN 500 MG/1
500 CAPSULE ORAL 3 TIMES DAILY
Qty: 21 CAPSULE | Refills: 0 | Status: SHIPPED | OUTPATIENT
Start: 2025-06-10 | End: 2025-06-17

## 2025-06-10 RX ORDER — PREDNISONE 20 MG/1
40 TABLET ORAL DAILY
Qty: 10 TABLET | Refills: 0 | Status: SHIPPED | OUTPATIENT
Start: 2025-06-10 | End: 2025-06-15

## 2025-06-10 ASSESSMENT — PAIN SCALES - GENERAL: PAINLEVEL_OUTOF10: MODERATE PAIN (5)

## 2025-06-11 NOTE — PROGRESS NOTES
MIMI Ugalde is a 40 year old female presents for post operative wound  check. She is  1  week(s) status post laparoscopic BSO.  She reports doing well and denies significant pain or bleeding.  + constipation. Bladder function is satisfactory. She notes drainage(serosanguinous arount suprapubic incision and rash along her other trocar incisions. Denies fever, N/V.  Significant findings Benign.     O.  Blood pressure (!) 141/105, pulse 87, temperature 98.6  F (37  C), temperature source Tympanic, resp. rate 16, weight 64.8 kg (142 lb 12.8 oz), SpO2 99%, not currently breastfeeding.    Abd: soft, non-tender, non-distended. Trocar incisions clear, dry, and intact without evidence of infection.+ maculopapular rash around each.   Suprapubic incision moist with serosanguinous drainage.  Intact, no erythema/purulence.     A. /P. Post-operative wound complication.  Likely allergic suture reaction.  Prednisone rx x 5d.  Keflex rx for infection prophylaxis.  Wound culture performed.  Recommend gen surg/wound care referral with Dr. Cruz who has seen her for similar problems in the past.      F/up at schedule PO check and continue surgical restrictions.    Scout Grimaldo MD

## 2025-06-12 LAB
BACTERIA WND CULT: ABNORMAL
GRAM STAIN RESULT: ABNORMAL

## 2025-06-13 ENCOUNTER — RESULTS FOLLOW-UP (OUTPATIENT)
Dept: OBGYN | Facility: CLINIC | Age: 40
End: 2025-06-13

## 2025-06-13 DIAGNOSIS — T14.8XXA DRAINAGE FROM WOUND: Primary | ICD-10-CM

## 2025-06-13 RX ORDER — SULFAMETHOXAZOLE AND TRIMETHOPRIM 800; 160 MG/1; MG/1
1 TABLET ORAL 2 TIMES DAILY
Qty: 14 TABLET | Refills: 0 | Status: SHIPPED | OUTPATIENT
Start: 2025-06-13 | End: 2025-06-17

## 2025-06-17 ENCOUNTER — PREP FOR PROCEDURE (OUTPATIENT)
Dept: SURGERY | Facility: OTHER | Age: 40
End: 2025-06-17

## 2025-06-17 ENCOUNTER — OFFICE VISIT (OUTPATIENT)
Dept: SURGERY | Facility: OTHER | Age: 40
End: 2025-06-17
Attending: SURGERY
Payer: COMMERCIAL

## 2025-06-17 VITALS
SYSTOLIC BLOOD PRESSURE: 100 MMHG | HEART RATE: 62 BPM | DIASTOLIC BLOOD PRESSURE: 68 MMHG | OXYGEN SATURATION: 100 % | RESPIRATION RATE: 16 BRPM | TEMPERATURE: 97.3 F

## 2025-06-17 DIAGNOSIS — T81.30XA WOUND DEHISCENCE: Primary | ICD-10-CM

## 2025-06-17 DIAGNOSIS — Z98.890 POST-OPERATIVE STATE: Primary | ICD-10-CM

## 2025-06-17 PROCEDURE — G0463 HOSPITAL OUTPT CLINIC VISIT: HCPCS

## 2025-06-17 ASSESSMENT — PAIN SCALES - GENERAL: PAINLEVEL_OUTOF10: MODERATE PAIN (6)

## 2025-06-17 NOTE — PROGRESS NOTES
CLINIC NOTE - CONSULT  2025    Patient:Sarah ATKINSON [facility]  Referring Physician: Scout Grimaldo    This is a 40 year old female who is well-known to me, who has had problems with absorbing absorbable stitches in the past.  This has necessitated removing the stitches and closing with running nylon.  More recently she had OB/GYN surgery with Doctor Dillan Mcintosh at which time he closed both with Vicryl and with Monocryl.  She presented to him with a complaint of opening bleeding wounds that are nonhealing and is referred to me.  No fever.  No chills.  Wounds continue to be nonhealing.     Past Medical History:  Past Medical History:   Diagnosis Date    Detached retina, bilateral 2015    secondary to severe preeclampsia with HELLP syndroma and acute renal failure    PARDEEP (generalized anxiety disorder) 2023    HELLP (hemolytic anemia/elev liver enzymes/low platelets in pregnancy) 2015    Severe preeclampsia with HELLP syndrome and acute renal failure    History of blood transfusion 2015    HUS (hemolytic uremic syndrome) (H) 2015    Postpartum Thrombotic thrombocytopenic purpura (TTP) and 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    MS (multiple sclerosis) (H) 2015    Rh negative state in antepartum period 2023    TTP (thrombotic thrombocytopenic purpura) (H) 2015    Postpartum Thrombotic thrombocytopenic purpura (TTP) and 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    Uncomplicated asthma     Exercise enduced    WPW (Delaney-Parkinson-White syndrome) 2015    She has been evaluated for ablation, however, based on location this was not deemed safe by cardiology at the time       Past Surgical History:  Past Surgical History:   Procedure Laterality Date     SECTION  2015    Severe preeclampsia with HELLP syndrome and acute renal failure then developed postpartum Thrombotic thrombocytopenic purpura (TTP) and Hemolytic  "uremic syndrome (HUS)    COMBINED  SECTION, SALPINGECTOMY BILATERAL Bilateral 2023    Procedure:  SECTION, WITH BILATERAL SALPINGECTOMY, Viable baby girl born at 1851;  Surgeon: Naseem Cruz MD;  [facility]    DILATION AND CURETTAGE, OPERATIVE HYSTEROSCOPY, COMBINED N/A 10/25/2023    Procedure: HYSTEROSCOPY, WITH DILATION AND CURETTAGE OF UTERUS;  Surgeon: Scout Grimaldo MD;  [facility]: [facility]    ENDOSCOPY UPPER, COLONOSCOPY, COMBINED N/A 2025    Procedure: COLONOSCOPY WITH BIOPSY AND UPPER GASTROINTESTINAL TRACT ENDOSCOPY WITH BIOPSY;  Surgeon: Terrance Cruz MD;  [facility]    GENITOURINARY SURGERY      Tubes removed    HYSTERECTOMY SUPRACERVICAL N/A 2024    Procedure: HYSTERECTOMY, SUPRACERVICAL, ABDOMINAL;  Surgeon: Scout Grimaldo MD;  [facility]    INCISION AND DRAINAGE ABDOMEN WASHOUT, COMBINED N/A 2024    Procedure: Abdominal Wound Exploration;  Surgeon: Terrance Cruz MD;  [facility]    INCISION AND DRAINAGE ABDOMEN WASHOUT, COMBINED N/A 2024    Procedure: Abdominal  Wound exploration;  Surgeon: Terrance Cruz MD;  [facility]    IR TRIGGER POINT INJ 1 OR 2 MUSCLES  2024    LAPAROSCOPIC OOPHORECTOMY Bilateral 2025    Procedure: Bilateral Laparoscopic Oophorectomy;  Surgeon: Scout Grimaldo MD;  [facility]    TUBAL LIGATION         Family History History:  Family History   Problem Relation Age of Onset    Hypertension Mother     Hypertension Father     Allergies Maternal Grandmother         dust pollen    Cancer - colorectal Paternal Grandmother 59    Allergies Maternal Aunt         \"    Cardiovascular Maternal Aunt     Breast Cancer Other 60        3 great aunts on moms side       History of Tobacco Use:  History   Smoking Status    Never   Smokeless Tobacco    Never       Current Medications:  Current Outpatient Medications   Medication Sig Dispense Refill    cloNIDine (CATAPRES) 0.1 MG tablet " Take 1 tablet (0.1 mg) by mouth at bedtime. 90 tablet 3    furosemide (LASIX) 20 MG tablet Take 1 tablet (20 mg) by mouth daily. 90 tablet 3    hydrOXYzine HCl (ATARAX) 50 MG tablet Take 1 tablet (50 mg) by mouth every 8 hours as needed for other or anxiety (adjuvant pain). 40 tablet 1    ibuprofen (ADVIL/MOTRIN) 800 MG tablet Take 1 tablet (800 mg) by mouth every 8 hours as needed for moderate pain. 40 tablet 1    labetalol (NORMODYNE) 100 MG tablet Take 1 tablet (100 mg) by mouth 2 times daily. 60 tablet 2    omeprazole (PRILOSEC) 40 MG DR capsule Take 1 capsule (40 mg) by mouth 2 times daily. 60 capsule 3    traZODone (DESYREL) 50 MG tablet Take 2 tablets (100 mg) by mouth at bedtime. 180 tablet 1       Allergies:  Allergies   Allergen Reactions    Zestril [Lisinopril] Anaphylaxis and Fatigue    Amlodipine Swelling    Cymbalta [Duloxetine Hcl]     Hydrochlorothiazide     Magnesium Other (See Comments)     Altered mental status after IV dosing    Other Environmental Allergy     Prozac [Fluoxetine]     Losartan Difficulty breathing, Hives, Itching, Rash, Swelling and Visual Disturbance       ROS:  Pertinent items are noted in HPI.  All other systems are negative.    PHYSICAL EXAM:     Vital signs: /68 (BP Location: Left arm, Cuff Size: Adult Regular)   Pulse 62   Temp 97.3  F (36.3  C) (Tympanic)   Resp 16   SpO2 100%    Weight: [unfilled]   BMI: There is no height or weight on file to calculate BMI.   General: Normal, healthy, cooperative, in no acute distress, alert   Skin: no jaundice   HEENT: PERRLA and EOMI   Neck: supple   Lungs: clear to auscultation   CV: Regular rate and rhythm without murmer   Abdominal: abdomen is soft without significant tenderness, masses, organomegaly or guarding.  She does have 3 incisions.  One of the umbilicus, 1 at her suprapubic region, and 1 in the left lower quadrant.  All 3 are slightly opened and nonhealing.  No erythema.   Extremities: No cyanosis, clubbing or edema  noted bilaterally in Upper and Lower Extremities   Neurological: without deficit    ASSESSMENT:  40 year old female with a history of wound complications after subcutaneous and deep sutures.  More recently has surgery and now has wound complications secondary to those sutures.    PLAN: Will take the patient to the operating room for wound exploration's and removing of the sutures.

## 2025-06-17 NOTE — PATIENT INSTRUCTIONS
Thank you for allowing Dr. Cruz and our surgical team to participate in your care. Please call our health unit coordinator at [phone] with scheduling questions or the nurse at [phone] with any other questions or concerns.    You have been scheduled for: Wound exploration x 3 with  on 6/30/25.   Please see handout for additional instruction.  You will not need a pre-operative appointment with your primary care provider.  You may call [phone] or [phone] with any questions.

## 2025-06-23 ENCOUNTER — ANESTHESIA EVENT (OUTPATIENT)
Dept: SURGERY | Facility: HOSPITAL | Age: 40
End: 2025-06-23
Payer: COMMERCIAL

## 2025-06-23 ASSESSMENT — ENCOUNTER SYMPTOMS: DYSRHYTHMIAS: 1

## 2025-06-23 NOTE — ANESTHESIA PREPROCEDURE EVALUATION
Anesthesia Pre-Procedure Evaluation    Patient: Sarah ATKINSON [facility]   MRN: [id number] : 1985          Procedure : Procedure(s):  Wound exploration times three, abdominal trocar sites         Past Medical History:   Diagnosis Date    Detached retina, bilateral 2015    secondary to severe preeclampsia with HELLP syndroma and acute renal failure    PARDEEP (generalized anxiety disorder) 2023    HELLP (hemolytic anemia/elev liver enzymes/low platelets in pregnancy) 2015    Severe preeclampsia with HELLP syndrome and acute renal failure    History of blood transfusion 2015    HUS (hemolytic uremic syndrome) (H) 2015    Postpartum Thrombotic thrombocytopenic purpura (TTP) and 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    MS (multiple sclerosis) (H) 2015    Rh negative state in antepartum period 2023    TTP (thrombotic thrombocytopenic purpura) (H) 2015    Postpartum Thrombotic thrombocytopenic purpura (TTP) and 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    Uncomplicated asthma     Exercise enduced    WPW (Delaney-Parkinson-White syndrome) 2015    She has been evaluated for ablation, however, based on location this was not deemed safe by cardiology at the time      Past Surgical History:   Procedure Laterality Date     SECTION  2015    Severe preeclampsia with HELLP syndrome and acute renal failure then developed postpartum Thrombotic thrombocytopenic purpura (TTP) and Hemolytic uremic syndrome (HUS)    COMBINED  SECTION, SALPINGECTOMY BILATERAL Bilateral 2023    Procedure:  SECTION, WITH BILATERAL SALPINGECTOMY, Viable baby girl born at 1851;  Surgeon: Naseem Cruz MD;  [facility]    DILATION AND CURETTAGE, OPERATIVE HYSTEROSCOPY, COMBINED N/A 10/25/2023    Procedure: HYSTEROSCOPY, WITH DILATION AND CURETTAGE OF UTERUS;  Surgeon: Scout Grimaldo MD;  [facility]    ENDOSCOPY UPPER,  COLONOSCOPY, COMBINED N/A 02/17/2025    Procedure: COLONOSCOPY WITH BIOPSY AND UPPER GASTROINTESTINAL TRACT ENDOSCOPY WITH BIOPSY;  Surgeon: Terrance Cruz MD;  [facility]    GENITOURINARY SURGERY  2023    Tubes removed    HYSTERECTOMY SUPRACERVICAL N/A 04/18/2024    Procedure: HYSTERECTOMY, SUPRACERVICAL, ABDOMINAL;  Surgeon: Scout Grimaldo MD;  [facility]    INCISION AND DRAINAGE ABDOMEN WASHOUT, COMBINED N/A 09/19/2024    Procedure: Abdominal Wound Exploration;  Surgeon: Terrance Cruz MD;  [facility]    INCISION AND DRAINAGE ABDOMEN WASHOUT, COMBINED N/A 11/11/2024    Procedure: Abdominal  Wound exploration;  Surgeon: Terrance Cruz MD;  [facility]    IR TRIGGER POINT INJ 1 OR 2 MUSCLES  07/02/2024    LAPAROSCOPIC OOPHORECTOMY Bilateral 6/4/2025    Procedure: Bilateral Laparoscopic Oophorectomy;  Surgeon: Scout Grimaldo MD;  [facility]    TUBAL LIGATION        Allergies   Allergen Reactions    Zestril [Lisinopril] Anaphylaxis and Fatigue    Amlodipine Swelling    Cymbalta [Duloxetine Hcl]     Hydrochlorothiazide     Magnesium Other (See Comments)     Altered mental status after IV dosing    Other Environmental Allergy     Prozac [Fluoxetine]     Losartan Difficulty breathing, Hives, Itching, Rash, Swelling and Visual Disturbance      Social History     Tobacco Use    Smoking status: Never     Passive exposure: Never    Smokeless tobacco: Never   Substance Use Topics    Alcohol use: Not Currently     Comment: 1-2 beers a week if that per pt      Wt Readings from Last 1 Encounters:   06/10/25 64.8 kg (142 lb 12.8 oz)        Anesthesia Evaluation   Pt has had prior anesthetic. Type: Regional, General and MAC.    No history of anesthetic complications       ROS/MED HX  ENT/Pulmonary: Comment: Exercise induced asthma     (+)     SEFERINO risk factors,  hypertension,               asthma (no current meds)  Treatment: Inhaler prn,                 Neurologic:     (+)                    Multiple Sclerosis, limitations: stable. Has not tolerated any medications used to help with MS in the past. Follows with neurology.  (5/13/25).            Cardiovascular: Comment: WPW    4/2023 Ulisses - underlying rhythm was sinus    1/9/24 cardiology note - normal echo and zio patch did not have any concerns.  She does have some symptoms of palpitations with history of short runs of PSVT - she will be following up for repeat EP study - to check for WPW - no current symptoms; not yet scheduled; specialist in [place] told pt that he believes it is probably not WPW--wants to do EP study once other issues are taken care of/no urgency to having work-up     Per 9/11/24 HP:  She does have a history of WPW. HR has been controlled. She recently has had several surgeries without complications. Denies chest pain, shortness of breath, dizziness, syncope, or palpitations. She has had a syncopal episode in the past, but cardiology felt it was r/t orthostatism and hydration.     9/25/24 Cardiology f/up - recommend re-try of ACE inhibitor as Bp's still elevated              (+)  hypertension-range: lasix, labetolol (100/68 on 6/17/25)/ -   -  - -                        dysrhythmias, WPW,        Previous cardiac testing   Echo: Date: 5/31/24 Results:  Global and regional left ventricular function is normal with an EF of 60-65%.  Left ventricular wall thickness is normal. Left ventricular size is normal.  Left ventricular diastolic function is normal. No regional wall motion  abnormalities are seen.  Right ventricular function, chamber size, wall motion, and thickness are  normal.  Pulmonary artery systolic pressure is normal.  The inferior vena cava is normal.  No pericardial effusion is present.  There is no prior study for direct comparison.    Stress Test:  Date: Results:    ECG Reviewed:  Date: 5/13/25 Results:  HR 60  Sinus rhythm with short MT  Otherwise normal ECG  When compared with ECG of 11-Sep-2024  08:36,  No significant change was found    Cath:  Date: Results:      METS/Exercise Tolerance: >4 METS    Hematologic: Comments: HELLP with pregnancy  thrombotic thrombocytopenic purpura    (+)       history of blood transfusion (after pregnancy), no previous transfusion reaction,        Musculoskeletal:  - neg musculoskeletal ROS     GI/Hepatic:     (+) GERD (omeprazole), Asymptomatic on medication,                  Renal/Genitourinary: Comment: AUB dysmenorrhea  Chronic pelvic pain    (+) renal disease (stage 2), type: CRI, Pt does not require dialysis,           Endo:  - neg endo ROS     Psychiatric/Substance Use:     (+) psychiatric history anxiety       Infectious Disease: Comment: 6/10/25 post op f/up with Dillan - Post-operative wound complication.  Likely allergic suture reaction.  Prednisone rx x 5d.  Keflex rx for infection prophylaxis. (Hx of this in the past)  6/13/25 culture showed staph - changed to bactrim x 7 days      Malignancy:  - neg malignancy ROS     Other: Comment: S/p bilateral oophorectomy 6/4/25     (+)  LMP: s/p hysterectomy, ,           Physical Exam  Airway  Mallampati: I  TM distance: >3 FB  Neck ROM: full  Mouth opening: >= 4 cm    Cardiovascular - normal exam  Rhythm: regular  Rate: normal rate   Comments: WPW    4/2023 Ziopatch - underlying rhythm was sinus    1/9/24 cardiology note - normal echo and zio patch did not have any concerns.  She does have some symptoms of palpitations with history of short runs of PSVT - she will be following up for repeat EP study - to check for WPW - no current symptoms; not yet scheduled; specialist in [place] told pt that he believes it is probably not WPW--wants to do EP study once other issues are taken care of/no urgency to having work-up     Per 9/11/24 HP:  She does have a history of WPW. HR has been controlled. She recently has had several surgeries without complications. Denies chest pain, shortness of breath, dizziness, syncope, or  palpitations. She has had a syncopal episode in the past, but cardiology felt it was r/t orthostatism and hydration.     9/25/24 Cardiology f/up - recommend re-try of ACE inhibitor as Bp's still elevated            Dental   (+) Minor Abnormalities - some fillings, tiny chips      Pulmonary - normal examBreath sounds clear to auscultation        Neurological   She appears awake, alert and oriented x3.    Other Findings Patient reports trochar sites pain at 5-6/10 uses ibuprofen at home      OUTSIDE LABS:  CBC:   Lab Results   Component Value Date    WBC 5.6 05/13/2025    WBC 7.4 10/15/2024    HGB 13.0 05/13/2025    HGB 13.6 10/15/2024    HCT 39.3 05/13/2025    HCT 41.6 10/15/2024     05/13/2025     10/15/2024     BMP:   Lab Results   Component Value Date     05/13/2025     10/15/2024    POTASSIUM 3.9 05/13/2025    POTASSIUM 4.0 10/15/2024    CHLORIDE 104 05/13/2025    CHLORIDE 102 10/15/2024    CO2 22 05/13/2025    CO2 21 (L) 10/15/2024    BUN 16.5 05/13/2025    BUN 16.1 10/15/2024    CR 1.08 (H) 05/13/2025    CR 1.03 (H) 10/15/2024    GLC 85 05/13/2025    GLC 89 10/15/2024     COAGS:   Lab Results   Component Value Date    PTT 28 07/23/2023    INR 0.89 07/23/2023     POC:   Lab Results   Component Value Date    HCG Negative 12/05/2022     HEPATIC:   Lab Results   Component Value Date    ALBUMIN 4.7 04/12/2024    PROTTOTAL 7.6 04/12/2024    ALT 19 04/12/2024    AST 24 04/12/2024    ALKPHOS 96 04/12/2024    BILITOTAL 0.5 04/12/2024     OTHER:   Lab Results   Component Value Date    LACT 4.5 (H) 01/10/2016    MARVIN 9.6 05/13/2025    MAG 3.2 (H) 07/29/2023    TSH 2.40 09/11/2024    T4 1.00 09/11/2024    CRP <2.9 10/29/2018    SED 6 07/29/2024       Anesthesia Plan    ASA Status:  3      NPO Status: NPO Appropriate   Anesthesia Type: General.  Maintenance: Balanced.   Techniques and Equipment:       - Monitoring Plan: standard ASA monitoring     Consents    Anesthesia Plan(s) and associated risks,  "benefits, and realistic alternatives discussed. Questions answered and patient/representative(s) expressed understanding.     - Discussed: CRNA     - Discussed with:  Patient        - Pt is DNR/DNI Status: no DNR          Postoperative Care    Pain management: peripheral nerve block, multimodal analgesia.     Comments:    Other Comments: Reviewed chart and 6/17 Cruz visit hl    Patient requesting TAP block, risks and benefits discussed.    Discussed risks and benefits with patient for general anesthesia including sore throat, nausea, vomiting, aspiration, dental damage, loss of airway, CV complications, stroke, MI, death. Pt wishes to proceed.                EVERARDO Mercer CNP    I have reviewed the pertinent notes and labs in the chart from the past 30 days and (re)examined the patient.  Any updates or changes from those notes are reflected in this note.    Clinically Significant Risk Factors Present on Admission                   # Hypertension: Noted on problem list           # Overweight: Estimated body mass index is 28.84 kg/m  as calculated from the following:    Height as of 6/4/25: 1.499 m (4' 11\").    Weight as of 6/10/25: 64.8 kg (142 lb 12.8 oz).                    "

## 2025-06-24 ENCOUNTER — TRANSCRIBE ORDERS (OUTPATIENT)
Dept: OTHER | Age: 40
End: 2025-06-24

## 2025-06-24 ENCOUNTER — MYC REFILL (OUTPATIENT)
Dept: FAMILY MEDICINE | Facility: OTHER | Age: 40
End: 2025-06-24

## 2025-06-24 ENCOUNTER — TELEPHONE (OUTPATIENT)
Dept: OBGYN | Facility: OTHER | Age: 40
End: 2025-06-24

## 2025-06-24 ENCOUNTER — OFFICE VISIT (OUTPATIENT)
Dept: OBGYN | Facility: OTHER | Age: 40
End: 2025-06-24
Attending: OBSTETRICS & GYNECOLOGY
Payer: COMMERCIAL

## 2025-06-24 ENCOUNTER — PATIENT OUTREACH (OUTPATIENT)
Dept: ONCOLOGY | Facility: CLINIC | Age: 40
End: 2025-06-24
Payer: COMMERCIAL

## 2025-06-24 ENCOUNTER — MYC MEDICAL ADVICE (OUTPATIENT)
Dept: OBGYN | Facility: OTHER | Age: 40
End: 2025-06-24

## 2025-06-24 VITALS
HEART RATE: 64 BPM | TEMPERATURE: 97.4 F | SYSTOLIC BLOOD PRESSURE: 132 MMHG | DIASTOLIC BLOOD PRESSURE: 82 MMHG | OXYGEN SATURATION: 99 %

## 2025-06-24 DIAGNOSIS — G89.18 ACUTE POST-OPERATIVE PAIN: ICD-10-CM

## 2025-06-24 DIAGNOSIS — Z98.890 POSTOPERATIVE STATE: Primary | ICD-10-CM

## 2025-06-24 DIAGNOSIS — Z80.41 FAMILY HISTORY OF OVARIAN CANCER: Primary | ICD-10-CM

## 2025-06-24 PROCEDURE — G0463 HOSPITAL OUTPT CLINIC VISIT: HCPCS

## 2025-06-24 RX ORDER — HYDROXYZINE HYDROCHLORIDE 50 MG/1
50 TABLET, FILM COATED ORAL EVERY 8 HOURS PRN
Qty: 40 TABLET | Refills: 1 | Status: SHIPPED | OUTPATIENT
Start: 2025-06-24

## 2025-06-24 NOTE — PROGRESS NOTES
Writer received referral to Cancer Risk Management/Genetic Counseling.    Referred for:    Family history of ovarian CA/BRCA2    Referred by:  referral re transcribed from [place]/ for Family history of ovarian CA/BRCA2 from ZANE ESPANA    Referral reviewed for appropriate plan, and sent to New Patient Scheduling ([phone]) for completion.    Patricia Andrade, RN, BSN  Oncology New Patient Nurse Navigator   [facility]

## 2025-06-24 NOTE — TELEPHONE ENCOUNTER
Writer called [facility] Olamide PADGETT of [facility] referral line to check on status of risk management/genetic counseling referral that was sent electronically to medical/[place] for family hx of ovarian ca.  Referral worker sees referral and it was placed correctly, however did not get auto routed or Q'd to final destination.  Requested referral fax number.  We will have to fax all referrals to [facility] along with electronic referral order. Will update staff on added process.  Writer called patient and explained what happened.  Pt knows to expect a call to schedule genetic counseling appt in the next couple days or call us back.  BLAYNE LOPEZ RN

## 2025-06-24 NOTE — PROGRESS NOTES
MIMI Ugalde is a 40 year old female presents for post operative check. She is  2  week(s) status post Laparoscopic BSO.  She reports doing well and denies significant pain or bleeding.  Bowel and bladder function is satisfactory.  The LLQ incision is still slightly open with intermittent rash eruption, the umbilical incision drains slightly and lower incision healing well.  Significant findings Benign.  Vasomotor sx.  manageable.     O.  Blood pressure 132/82, pulse 64, temperature 97.4  F (36.3  C), temperature source Tympanic, SpO2 99%, not currently breastfeeding.    Abd: soft, non-tender, non-distended. Incision Umbilical and lower clean, dry, and intact without evidence of infection.  LLQ slightly open, faint rash surrounding, no erythema/purulence.     A. /P. Satisfactory post-op check.Released from restrictions.    Pt has appt with Dr. Cruz Gen Surg for wound care/exploration     F/u prn problems or at next annual examination.    Genetic counseling referral pending (h/o breast/ovarian/BRCA)    Scout Grimaldo MD

## 2025-06-26 ENCOUNTER — MYC REFILL (OUTPATIENT)
Dept: FAMILY MEDICINE | Facility: OTHER | Age: 40
End: 2025-06-26

## 2025-06-26 DIAGNOSIS — Z98.890 POST-OPERATIVE STATE: ICD-10-CM

## 2025-06-26 RX ORDER — IBUPROFEN 800 MG/1
800 TABLET, FILM COATED ORAL EVERY 8 HOURS PRN
Qty: 40 TABLET | Refills: 1 | Status: SHIPPED | OUTPATIENT
Start: 2025-06-26

## 2025-06-26 NOTE — TELEPHONE ENCOUNTER
ibuprofen (ADVIL/MOTRIN) 800 MG tablet       Last Written Prescription Date:  6/4/25  Last Fill Quantity: 40,   # refills: 1  Last Office Visit: 6/24/25  Future Office visit:       Routing refill request to provider for review/approval because:  Drug not on the [facility], [facility] or [facility] refill protocol or controlled substance

## 2025-06-30 ENCOUNTER — APPOINTMENT (OUTPATIENT)
Dept: GENERAL RADIOLOGY | Facility: HOSPITAL | Age: 40
End: 2025-06-30
Attending: HOSPITALIST
Payer: COMMERCIAL

## 2025-06-30 ENCOUNTER — APPOINTMENT (OUTPATIENT)
Dept: ULTRASOUND IMAGING | Facility: HOSPITAL | Age: 40
End: 2025-06-30
Attending: NURSE ANESTHETIST, CERTIFIED REGISTERED
Payer: COMMERCIAL

## 2025-06-30 ENCOUNTER — ANESTHESIA (OUTPATIENT)
Dept: SURGERY | Facility: HOSPITAL | Age: 40
End: 2025-06-30
Payer: COMMERCIAL

## 2025-06-30 ENCOUNTER — HOSPITAL ENCOUNTER (OUTPATIENT)
Facility: HOSPITAL | Age: 40
Setting detail: OBSERVATION
Discharge: HOME | End: 2025-07-02
Attending: SURGERY | Admitting: SURGERY
Payer: COMMERCIAL

## 2025-06-30 DIAGNOSIS — I45.6 WPW (WOLFF-PARKINSON-WHITE SYNDROME): ICD-10-CM

## 2025-06-30 DIAGNOSIS — Z90.710 S/P ABDOMINAL HYSTERECTOMY: Primary | ICD-10-CM

## 2025-06-30 DIAGNOSIS — R07.89 OTHER CHEST PAIN: ICD-10-CM

## 2025-06-30 PROBLEM — Z86.79 HISTORY OF WOLFF-PARKINSON-WHITE (WPW) SYNDROME: Status: ACTIVE | Noted: 2025-06-30

## 2025-06-30 PROBLEM — J98.4 PNEUMONITIS: Status: ACTIVE | Noted: 2025-06-30

## 2025-06-30 PROBLEM — F41.1 GAD (GENERALIZED ANXIETY DISORDER): Status: ACTIVE | Noted: 2023-07-23

## 2025-06-30 PROBLEM — R07.9 CHEST PAIN: Status: ACTIVE | Noted: 2025-06-30

## 2025-06-30 LAB
ANION GAP SERPL CALCULATED.3IONS-SCNC: 14 MMOL/L (ref 7–15)
BASOPHILS # BLD AUTO: 0.1 10E3/UL (ref 0–0.2)
BASOPHILS NFR BLD AUTO: 1 %
BUN SERPL-MCNC: 16.7 MG/DL (ref 6–20)
CALCIUM SERPL-MCNC: 9.2 MG/DL (ref 8.8–10.4)
CHLORIDE SERPL-SCNC: 107 MMOL/L (ref 98–107)
CREAT SERPL-MCNC: 0.99 MG/DL (ref 0.51–0.95)
CRP SERPL-MCNC: 21.35 MG/L
EGFRCR SERPLBLD CKD-EPI 2021: 74 ML/MIN/1.73M2
EOSINOPHIL # BLD AUTO: 0.6 10E3/UL (ref 0–0.7)
EOSINOPHIL NFR BLD AUTO: 10 %
ERYTHROCYTE [DISTWIDTH] IN BLOOD BY AUTOMATED COUNT: 12.9 % (ref 10–15)
GLUCOSE SERPL-MCNC: 94 MG/DL (ref 70–99)
HCO3 SERPL-SCNC: 20 MMOL/L (ref 22–29)
HCT VFR BLD AUTO: 38.6 % (ref 35–47)
HGB BLD-MCNC: 12.6 G/DL (ref 11.7–15.7)
HOLD SPECIMEN: NORMAL
IMM GRANULOCYTES # BLD: 0 10E3/UL
IMM GRANULOCYTES NFR BLD: 1 %
LYMPHOCYTES # BLD AUTO: 1.1 10E3/UL (ref 0.8–5.3)
LYMPHOCYTES NFR BLD AUTO: 20 %
MAGNESIUM SERPL-MCNC: 2 MG/DL (ref 1.7–2.3)
MCH RBC QN AUTO: 30.4 PG (ref 26.5–33)
MCHC RBC AUTO-ENTMCNC: 32.6 G/DL (ref 31.5–36.5)
MCV RBC AUTO: 93 FL (ref 78–100)
MONOCYTES # BLD AUTO: 0.2 10E3/UL (ref 0–1.3)
MONOCYTES NFR BLD AUTO: 3 %
NEUTROPHILS # BLD AUTO: 3.6 10E3/UL (ref 1.6–8.3)
NEUTROPHILS NFR BLD AUTO: 65 %
NRBC # BLD AUTO: 0 10E3/UL
NRBC BLD AUTO-RTO: 0 /100
PLATELET # BLD AUTO: 257 10E3/UL (ref 150–450)
POTASSIUM SERPL-SCNC: 4.2 MMOL/L (ref 3.4–5.3)
PROCALCITONIN SERPL IA-MCNC: 0.04 NG/ML
RBC # BLD AUTO: 4.14 10E6/UL (ref 3.8–5.2)
SODIUM SERPL-SCNC: 141 MMOL/L (ref 135–145)
T4 FREE SERPL-MCNC: 1.24 NG/DL (ref 0.9–1.7)
TROPONIN T SERPL HS-MCNC: <6 NG/L
TROPONIN T SERPL HS-MCNC: <6 NG/L
TSH SERPL DL<=0.005 MIU/L-ACNC: 4.35 UIU/ML (ref 0.3–4.2)
WBC # BLD AUTO: 5.6 10E3/UL (ref 4–11)

## 2025-06-30 PROCEDURE — 250N000009 HC RX 250: Performed by: NURSE ANESTHETIST, CERTIFIED REGISTERED

## 2025-06-30 PROCEDURE — 86140 C-REACTIVE PROTEIN: CPT | Performed by: HOSPITALIST

## 2025-06-30 PROCEDURE — 83735 ASSAY OF MAGNESIUM: CPT | Performed by: NURSE ANESTHETIST, CERTIFIED REGISTERED

## 2025-06-30 PROCEDURE — 84443 ASSAY THYROID STIM HORMONE: CPT | Performed by: HOSPITALIST

## 2025-06-30 PROCEDURE — 71045 X-RAY EXAM CHEST 1 VIEW: CPT | Mod: 26 | Performed by: RADIOLOGY

## 2025-06-30 PROCEDURE — 85025 COMPLETE CBC W/AUTO DIFF WBC: CPT | Performed by: HOSPITALIST

## 2025-06-30 PROCEDURE — 80048 BASIC METABOLIC PNL TOTAL CA: CPT | Performed by: NURSE ANESTHETIST, CERTIFIED REGISTERED

## 2025-06-30 PROCEDURE — 360N000075 HC SURGERY LEVEL 2, PER MIN: Performed by: SURGERY

## 2025-06-30 PROCEDURE — G0378 HOSPITAL OBSERVATION PER HR: HCPCS

## 2025-06-30 PROCEDURE — 250N000013 HC RX MED GY IP 250 OP 250 PS 637: Performed by: SURGERY

## 2025-06-30 PROCEDURE — 710N000010 HC RECOVERY PHASE 1, LEVEL 2, PER MIN: Performed by: SURGERY

## 2025-06-30 PROCEDURE — 370N000017 HC ANESTHESIA TECHNICAL FEE, PER MIN: Performed by: SURGERY

## 2025-06-30 PROCEDURE — 84484 ASSAY OF TROPONIN QUANT: CPT | Performed by: HOSPITALIST

## 2025-06-30 PROCEDURE — 999N000141 HC STATISTIC PRE-PROCEDURE NURSING ASSESSMENT: Performed by: SURGERY

## 2025-06-30 PROCEDURE — 999N000065 XR CHEST 1 VIEW

## 2025-06-30 PROCEDURE — 250N000011 HC RX IP 250 OP 636: Performed by: NURSE ANESTHETIST, CERTIFIED REGISTERED

## 2025-06-30 PROCEDURE — 36415 COLL VENOUS BLD VENIPUNCTURE: CPT | Performed by: NURSE ANESTHETIST, CERTIFIED REGISTERED

## 2025-06-30 PROCEDURE — 250N000011 HC RX IP 250 OP 636: Performed by: SURGERY

## 2025-06-30 PROCEDURE — 93010 ELECTROCARDIOGRAM REPORT: CPT | Performed by: INTERNAL MEDICINE

## 2025-06-30 PROCEDURE — 250N000013 HC RX MED GY IP 250 OP 250 PS 637: Performed by: HOSPITALIST

## 2025-06-30 PROCEDURE — 250N000011 HC RX IP 250 OP 636: Performed by: NURSE PRACTITIONER

## 2025-06-30 PROCEDURE — 258N000003 HC RX IP 258 OP 636: Performed by: NURSE PRACTITIONER

## 2025-06-30 PROCEDURE — 15851 REMOVAL SUTR/STAPLE REQ ANES: CPT | Performed by: SURGERY

## 2025-06-30 PROCEDURE — 84439 ASSAY OF FREE THYROXINE: CPT | Performed by: HOSPITALIST

## 2025-06-30 PROCEDURE — 250N000025 HC SEVOFLURANE, PER MIN: Performed by: SURGERY

## 2025-06-30 PROCEDURE — 99222 1ST HOSP IP/OBS MODERATE 55: CPT | Mod: 25 | Performed by: NURSE PRACTITIONER

## 2025-06-30 PROCEDURE — 84145 PROCALCITONIN (PCT): CPT | Performed by: HOSPITALIST

## 2025-06-30 PROCEDURE — 36415 COLL VENOUS BLD VENIPUNCTURE: CPT | Performed by: HOSPITALIST

## 2025-06-30 PROCEDURE — 99254 IP/OBS CNSLTJ NEW/EST MOD 60: CPT | Performed by: HOSPITALIST

## 2025-06-30 PROCEDURE — 93005 ELECTROCARDIOGRAM TRACING: CPT

## 2025-06-30 PROCEDURE — 84484 ASSAY OF TROPONIN QUANT: CPT | Performed by: NURSE ANESTHETIST, CERTIFIED REGISTERED

## 2025-06-30 PROCEDURE — 272N000001 HC OR GENERAL SUPPLY STERILE: Performed by: SURGERY

## 2025-06-30 PROCEDURE — 999N000157 HC STATISTIC RCP TIME EA 10 MIN

## 2025-06-30 RX ORDER — BUPIVACAINE HYDROCHLORIDE 2.5 MG/ML
INJECTION, SOLUTION EPIDURAL; INFILTRATION; INTRACAUDAL; PERINEURAL
Status: COMPLETED | OUTPATIENT
Start: 2025-06-30 | End: 2025-06-30

## 2025-06-30 RX ORDER — PROCHLORPERAZINE MALEATE 5 MG/1
10 TABLET ORAL EVERY 6 HOURS PRN
Status: DISCONTINUED | OUTPATIENT
Start: 2025-06-30 | End: 2025-07-02 | Stop reason: HOSPADM

## 2025-06-30 RX ORDER — FUROSEMIDE 20 MG/1
20 TABLET ORAL DAILY
Status: DISCONTINUED | OUTPATIENT
Start: 2025-07-01 | End: 2025-07-02 | Stop reason: HOSPADM

## 2025-06-30 RX ORDER — BUPIVACAINE HYDROCHLORIDE 2.5 MG/ML
INJECTION, SOLUTION EPIDURAL; INFILTRATION; INTRACAUDAL; PERINEURAL
Status: DISCONTINUED
Start: 2025-06-30 | End: 2025-06-30 | Stop reason: HOSPADM

## 2025-06-30 RX ORDER — OMEPRAZOLE 20 MG/1
40 CAPSULE, DELAYED RELEASE ORAL 2 TIMES DAILY PRN
Status: DISCONTINUED | OUTPATIENT
Start: 2025-06-30 | End: 2025-06-30

## 2025-06-30 RX ORDER — LIDOCAINE 40 MG/G
CREAM TOPICAL
Status: DISCONTINUED | OUTPATIENT
Start: 2025-06-30 | End: 2025-06-30 | Stop reason: HOSPADM

## 2025-06-30 RX ORDER — ONDANSETRON 2 MG/ML
4 INJECTION INTRAMUSCULAR; INTRAVENOUS EVERY 6 HOURS PRN
Status: DISCONTINUED | OUTPATIENT
Start: 2025-06-30 | End: 2025-07-02 | Stop reason: HOSPADM

## 2025-06-30 RX ORDER — ONDANSETRON 2 MG/ML
INJECTION INTRAMUSCULAR; INTRAVENOUS PRN
Status: DISCONTINUED | OUTPATIENT
Start: 2025-06-30 | End: 2025-06-30

## 2025-06-30 RX ORDER — BUPIVACAINE HYDROCHLORIDE 2.5 MG/ML
INJECTION, SOLUTION INFILTRATION; PERINEURAL PRN
Status: DISCONTINUED | OUTPATIENT
Start: 2025-06-30 | End: 2025-06-30 | Stop reason: HOSPADM

## 2025-06-30 RX ORDER — ALBUTEROL SULFATE 0.83 MG/ML
2.5 SOLUTION RESPIRATORY (INHALATION) EVERY 4 HOURS PRN
Status: DISCONTINUED | OUTPATIENT
Start: 2025-06-30 | End: 2025-06-30 | Stop reason: HOSPADM

## 2025-06-30 RX ORDER — DEXAMETHASONE SODIUM PHOSPHATE 4 MG/ML
INJECTION, SOLUTION INTRA-ARTICULAR; INTRALESIONAL; INTRAMUSCULAR; INTRAVENOUS; SOFT TISSUE PRN
Status: DISCONTINUED | OUTPATIENT
Start: 2025-06-30 | End: 2025-06-30

## 2025-06-30 RX ORDER — PROPOFOL 10 MG/ML
INJECTION, EMULSION INTRAVENOUS PRN
Status: DISCONTINUED | OUTPATIENT
Start: 2025-06-30 | End: 2025-06-30

## 2025-06-30 RX ORDER — DEXAMETHASONE SODIUM PHOSPHATE 4 MG/ML
4 INJECTION, SOLUTION INTRA-ARTICULAR; INTRALESIONAL; INTRAMUSCULAR; INTRAVENOUS; SOFT TISSUE
Status: DISCONTINUED | OUTPATIENT
Start: 2025-06-30 | End: 2025-06-30 | Stop reason: HOSPADM

## 2025-06-30 RX ORDER — TRAZODONE HYDROCHLORIDE 50 MG/1
100 TABLET ORAL AT BEDTIME
Status: DISCONTINUED | OUTPATIENT
Start: 2025-06-30 | End: 2025-07-02 | Stop reason: HOSPADM

## 2025-06-30 RX ORDER — ONDANSETRON 4 MG/1
4 TABLET, ORALLY DISINTEGRATING ORAL EVERY 6 HOURS PRN
Status: DISCONTINUED | OUTPATIENT
Start: 2025-06-30 | End: 2025-07-02 | Stop reason: HOSPADM

## 2025-06-30 RX ORDER — HYDROMORPHONE HYDROCHLORIDE 1 MG/ML
0.25 INJECTION, SOLUTION INTRAMUSCULAR; INTRAVENOUS; SUBCUTANEOUS EVERY 5 MIN PRN
Status: DISCONTINUED | OUTPATIENT
Start: 2025-06-30 | End: 2025-06-30 | Stop reason: HOSPADM

## 2025-06-30 RX ORDER — FENTANYL CITRATE 50 UG/ML
25 INJECTION, SOLUTION INTRAMUSCULAR; INTRAVENOUS EVERY 5 MIN PRN
Status: DISCONTINUED | OUTPATIENT
Start: 2025-06-30 | End: 2025-06-30 | Stop reason: HOSPADM

## 2025-06-30 RX ORDER — LIDOCAINE HYDROCHLORIDE 20 MG/ML
INJECTION, SOLUTION INFILTRATION; PERINEURAL PRN
Status: DISCONTINUED | OUTPATIENT
Start: 2025-06-30 | End: 2025-06-30

## 2025-06-30 RX ORDER — ACETAMINOPHEN 325 MG/1
975 TABLET ORAL ONCE
Status: COMPLETED | OUTPATIENT
Start: 2025-06-30 | End: 2025-06-30

## 2025-06-30 RX ORDER — SODIUM CHLORIDE, SODIUM LACTATE, POTASSIUM CHLORIDE, CALCIUM CHLORIDE 600; 310; 30; 20 MG/100ML; MG/100ML; MG/100ML; MG/100ML
INJECTION, SOLUTION INTRAVENOUS CONTINUOUS
Status: DISCONTINUED | OUTPATIENT
Start: 2025-06-30 | End: 2025-06-30 | Stop reason: HOSPADM

## 2025-06-30 RX ORDER — ONDANSETRON 2 MG/ML
4 INJECTION INTRAMUSCULAR; INTRAVENOUS EVERY 30 MIN PRN
Status: DISCONTINUED | OUTPATIENT
Start: 2025-06-30 | End: 2025-06-30 | Stop reason: HOSPADM

## 2025-06-30 RX ORDER — AMOXICILLIN 250 MG
2 CAPSULE ORAL 2 TIMES DAILY PRN
Status: DISCONTINUED | OUTPATIENT
Start: 2025-06-30 | End: 2025-07-02 | Stop reason: HOSPADM

## 2025-06-30 RX ORDER — OXYCODONE HYDROCHLORIDE 5 MG/1
10 TABLET ORAL EVERY 6 HOURS PRN
Refills: 0 | Status: DISCONTINUED | OUTPATIENT
Start: 2025-06-30 | End: 2025-07-02 | Stop reason: HOSPADM

## 2025-06-30 RX ORDER — CEFAZOLIN SODIUM/WATER 2 G/20 ML
2 SYRINGE (ML) INTRAVENOUS
Status: COMPLETED | OUTPATIENT
Start: 2025-06-30 | End: 2025-06-30

## 2025-06-30 RX ORDER — LABETALOL 100 MG/1
100 TABLET, FILM COATED ORAL 2 TIMES DAILY
Status: DISCONTINUED | OUTPATIENT
Start: 2025-06-30 | End: 2025-07-02 | Stop reason: HOSPADM

## 2025-06-30 RX ORDER — CLONIDINE HYDROCHLORIDE 0.1 MG/1
0.1 TABLET ORAL AT BEDTIME
Status: DISCONTINUED | OUTPATIENT
Start: 2025-06-30 | End: 2025-07-02 | Stop reason: HOSPADM

## 2025-06-30 RX ORDER — IBUPROFEN 200 MG
800 TABLET ORAL EVERY 8 HOURS PRN
Status: DISCONTINUED | OUTPATIENT
Start: 2025-06-30 | End: 2025-07-02 | Stop reason: HOSPADM

## 2025-06-30 RX ORDER — HYDROCODONE BITARTRATE AND ACETAMINOPHEN 5; 325 MG/1; MG/1
1 TABLET ORAL EVERY 6 HOURS PRN
Qty: 18 TABLET | Refills: 0 | Status: SHIPPED | OUTPATIENT
Start: 2025-06-30 | End: 2025-06-30

## 2025-06-30 RX ORDER — NALOXONE HYDROCHLORIDE 0.4 MG/ML
0.1 INJECTION, SOLUTION INTRAMUSCULAR; INTRAVENOUS; SUBCUTANEOUS
Status: DISCONTINUED | OUTPATIENT
Start: 2025-06-30 | End: 2025-06-30 | Stop reason: HOSPADM

## 2025-06-30 RX ORDER — OXYCODONE HYDROCHLORIDE 5 MG/1
5 TABLET ORAL EVERY 4 HOURS PRN
Refills: 0 | Status: COMPLETED | OUTPATIENT
Start: 2025-06-30 | End: 2025-06-30

## 2025-06-30 RX ORDER — FENTANYL CITRATE 50 UG/ML
INJECTION, SOLUTION INTRAMUSCULAR; INTRAVENOUS
Status: COMPLETED
Start: 2025-06-30 | End: 2025-06-30

## 2025-06-30 RX ORDER — DIAZEPAM 10 MG/2ML
2.5 INJECTION, SOLUTION INTRAMUSCULAR; INTRAVENOUS
Status: DISCONTINUED | OUTPATIENT
Start: 2025-06-30 | End: 2025-06-30 | Stop reason: HOSPADM

## 2025-06-30 RX ORDER — OMEPRAZOLE 40 MG/1
40 CAPSULE, DELAYED RELEASE ORAL 2 TIMES DAILY PRN
COMMUNITY

## 2025-06-30 RX ORDER — HYDROXYZINE HYDROCHLORIDE 25 MG/1
50 TABLET, FILM COATED ORAL EVERY 8 HOURS PRN
Status: DISCONTINUED | OUTPATIENT
Start: 2025-06-30 | End: 2025-07-02 | Stop reason: HOSPADM

## 2025-06-30 RX ORDER — HYDROCODONE BITARTRATE AND ACETAMINOPHEN 5; 325 MG/1; MG/1
1 TABLET ORAL EVERY 6 HOURS PRN
Qty: 18 TABLET | Refills: 0 | Status: SHIPPED | OUTPATIENT
Start: 2025-06-30 | End: 2025-07-02

## 2025-06-30 RX ORDER — FENTANYL CITRATE 0.05 MG/ML
INJECTION, SOLUTION INTRAMUSCULAR; INTRAVENOUS PRN
Status: DISCONTINUED | OUTPATIENT
Start: 2025-06-30 | End: 2025-06-30

## 2025-06-30 RX ORDER — AMOXICILLIN 250 MG
1 CAPSULE ORAL 2 TIMES DAILY PRN
Status: DISCONTINUED | OUTPATIENT
Start: 2025-06-30 | End: 2025-07-02 | Stop reason: HOSPADM

## 2025-06-30 RX ORDER — CEFAZOLIN SODIUM/WATER 2 G/20 ML
2 SYRINGE (ML) INTRAVENOUS SEE ADMIN INSTRUCTIONS
Status: DISCONTINUED | OUTPATIENT
Start: 2025-06-30 | End: 2025-06-30 | Stop reason: HOSPADM

## 2025-06-30 RX ORDER — NALOXONE HYDROCHLORIDE 0.4 MG/ML
0.4 INJECTION, SOLUTION INTRAMUSCULAR; INTRAVENOUS; SUBCUTANEOUS
Status: DISCONTINUED | OUTPATIENT
Start: 2025-06-30 | End: 2025-07-02 | Stop reason: HOSPADM

## 2025-06-30 RX ORDER — HALOPERIDOL 5 MG/ML
2 INJECTION INTRAMUSCULAR
Status: DISCONTINUED | OUTPATIENT
Start: 2025-06-30 | End: 2025-06-30 | Stop reason: HOSPADM

## 2025-06-30 RX ORDER — LIDOCAINE 4 G/G
1 PATCH TOPICAL
Status: DISCONTINUED | OUTPATIENT
Start: 2025-07-01 | End: 2025-07-02 | Stop reason: HOSPADM

## 2025-06-30 RX ORDER — BUPIVACAINE HYDROCHLORIDE 2.5 MG/ML
INJECTION, SOLUTION EPIDURAL; INFILTRATION; INTRACAUDAL; PERINEURAL
Status: COMPLETED
Start: 2025-06-30 | End: 2025-06-30

## 2025-06-30 RX ORDER — PANTOPRAZOLE SODIUM 40 MG/1
40 TABLET, DELAYED RELEASE ORAL 2 TIMES DAILY PRN
Status: DISCONTINUED | OUTPATIENT
Start: 2025-06-30 | End: 2025-07-02 | Stop reason: HOSPADM

## 2025-06-30 RX ORDER — HYDROMORPHONE HYDROCHLORIDE 1 MG/ML
0.5 INJECTION, SOLUTION INTRAMUSCULAR; INTRAVENOUS; SUBCUTANEOUS EVERY 5 MIN PRN
Status: DISCONTINUED | OUTPATIENT
Start: 2025-06-30 | End: 2025-06-30 | Stop reason: HOSPADM

## 2025-06-30 RX ORDER — NALOXONE HYDROCHLORIDE 0.4 MG/ML
0.2 INJECTION, SOLUTION INTRAMUSCULAR; INTRAVENOUS; SUBCUTANEOUS
Status: DISCONTINUED | OUTPATIENT
Start: 2025-06-30 | End: 2025-07-02 | Stop reason: HOSPADM

## 2025-06-30 RX ORDER — HYDRALAZINE HYDROCHLORIDE 20 MG/ML
2.5-5 INJECTION INTRAMUSCULAR; INTRAVENOUS EVERY 10 MIN PRN
Status: DISCONTINUED | OUTPATIENT
Start: 2025-06-30 | End: 2025-06-30 | Stop reason: HOSPADM

## 2025-06-30 RX ORDER — ONDANSETRON 4 MG/1
4 TABLET, ORALLY DISINTEGRATING ORAL EVERY 30 MIN PRN
Status: DISCONTINUED | OUTPATIENT
Start: 2025-06-30 | End: 2025-06-30 | Stop reason: HOSPADM

## 2025-06-30 RX ORDER — OXYCODONE HYDROCHLORIDE 5 MG/1
5 TABLET ORAL
Status: COMPLETED | OUTPATIENT
Start: 2025-06-30 | End: 2025-06-30

## 2025-06-30 RX ORDER — FENTANYL CITRATE 50 UG/ML
50 INJECTION, SOLUTION INTRAMUSCULAR; INTRAVENOUS EVERY 5 MIN PRN
Status: DISCONTINUED | OUTPATIENT
Start: 2025-06-30 | End: 2025-06-30 | Stop reason: HOSPADM

## 2025-06-30 RX ADMIN — SODIUM CHLORIDE, SODIUM LACTATE, POTASSIUM CHLORIDE, AND CALCIUM CHLORIDE: .6; .31; .03; .02 INJECTION, SOLUTION INTRAVENOUS at 09:02

## 2025-06-30 RX ADMIN — SENNOSIDES AND DOCUSATE SODIUM 2 TABLET: 50; 8.6 TABLET ORAL at 21:36

## 2025-06-30 RX ADMIN — IBUPROFEN 800 MG: 200 TABLET, FILM COATED ORAL at 16:25

## 2025-06-30 RX ADMIN — BUPIVACAINE 20 ML: 13.3 INJECTION, SUSPENSION, LIPOSOMAL INFILTRATION at 08:17

## 2025-06-30 RX ADMIN — Medication 2 G: at 08:42

## 2025-06-30 RX ADMIN — HYDROXYZINE HYDROCHLORIDE 50 MG: 25 TABLET, FILM COATED ORAL at 15:51

## 2025-06-30 RX ADMIN — ONDANSETRON 4 MG: 2 INJECTION INTRAMUSCULAR; INTRAVENOUS at 08:59

## 2025-06-30 RX ADMIN — FENTANYL CITRATE 50 MCG: 0.05 INJECTION, SOLUTION INTRAMUSCULAR; INTRAVENOUS at 08:52

## 2025-06-30 RX ADMIN — OXYCODONE HYDROCHLORIDE 10 MG: 5 TABLET ORAL at 21:35

## 2025-06-30 RX ADMIN — TRAZODONE HYDROCHLORIDE 100 MG: 50 TABLET ORAL at 21:36

## 2025-06-30 RX ADMIN — FENTANYL CITRATE 50 MCG: 50 INJECTION INTRAMUSCULAR; INTRAVENOUS at 09:59

## 2025-06-30 RX ADMIN — LIDOCAINE HYDROCHLORIDE 40 MG: 20 INJECTION, SOLUTION INFILTRATION; PERINEURAL at 08:52

## 2025-06-30 RX ADMIN — MIDAZOLAM 2 MG: 1 INJECTION INTRAMUSCULAR; INTRAVENOUS at 08:18

## 2025-06-30 RX ADMIN — OXYCODONE HYDROCHLORIDE 5 MG: 5 TABLET ORAL at 17:43

## 2025-06-30 RX ADMIN — MIDAZOLAM 2 MG: 1 INJECTION INTRAMUSCULAR; INTRAVENOUS at 08:52

## 2025-06-30 RX ADMIN — FENTANYL CITRATE 50 MCG: 50 INJECTION INTRAMUSCULAR; INTRAVENOUS at 09:42

## 2025-06-30 RX ADMIN — OXYCODONE HYDROCHLORIDE 5 MG: 5 TABLET ORAL at 12:48

## 2025-06-30 RX ADMIN — PROPOFOL 120 MG: 10 INJECTION, EMULSION INTRAVENOUS at 08:52

## 2025-06-30 RX ADMIN — BUPIVACAINE HYDROCHLORIDE 30 ML: 2.5 INJECTION, SOLUTION EPIDURAL; INFILTRATION; INTRACAUDAL at 08:24

## 2025-06-30 RX ADMIN — FENTANYL CITRATE 25 MCG: 50 INJECTION INTRAMUSCULAR; INTRAVENOUS at 11:06

## 2025-06-30 RX ADMIN — FENTANYL CITRATE 25 MCG: 0.05 INJECTION, SOLUTION INTRAMUSCULAR; INTRAVENOUS at 08:18

## 2025-06-30 RX ADMIN — FENTANYL CITRATE 75 MCG: 0.05 INJECTION, SOLUTION INTRAMUSCULAR; INTRAVENOUS at 08:22

## 2025-06-30 RX ADMIN — DEXAMETHASONE SODIUM PHOSPHATE 10 MG: 4 INJECTION, SOLUTION INTRA-ARTICULAR; INTRALESIONAL; INTRAMUSCULAR; INTRAVENOUS; SOFT TISSUE at 08:55

## 2025-06-30 RX ADMIN — FENTANYL CITRATE 50 MCG: 50 INJECTION INTRAMUSCULAR; INTRAVENOUS at 10:42

## 2025-06-30 ASSESSMENT — ACTIVITIES OF DAILY LIVING (ADL)
ADLS_ACUITY_SCORE: 30

## 2025-06-30 NOTE — ANESTHESIA CARE TRANSFER NOTE
Patient: Sarah Ugalde    Procedure: Procedure(s):  Wound exploration times three, abdominal trocar sites       Diagnosis: Wound dehiscence [T81.30XA]  Diagnosis Additional Information: No value filed.    Anesthesia Type:   General     Note:    Oropharynx: spontaneously breathing  Level of Consciousness: awake and drowsy  Oxygen Supplementation: nasal cannula    Independent Airway: airway patency satisfactory and stable  Dentition: dentition unchanged  Vital Signs Stable: post-procedure vital signs reviewed and stable  Report to RN Given: handoff report given  Patient transferred to: PACU    Handoff Report: Identifed the Patient, Identified the Reponsible Provider, Reviewed the pertinent medical history, Discussed the surgical course, Reviewed Intra-OP anesthesia mangement and issues during anesthesia, Set expectations for post-procedure period and Allowed opportunity for questions and acknowledgement of understanding  Vitals:  Vitals Value Taken Time   /86 06/30/25 11:45   Temp 96.8  F (36  C) 06/30/25 11:00   Pulse 53 06/30/25 11:49   Resp 8 06/30/25 11:49   SpO2 99 % 06/30/25 11:49   Vitals shown include unfiled device data.    Electronically Signed By: EVERARDO Leary CRNA  June 30, 2025  12:33 PM

## 2025-06-30 NOTE — PROGRESS NOTES
RN was able to give patient her incentive spirometer. I went to check in with the patient and she kindly stated that she knows how to use the IS properly and does not need follow up visits. She stated that she has used an IS several times recently. She demonstrated correct use.  I told the patient to inform the RN if she would like us to come back and re evaluate.      Nicolasa Peterson, RT

## 2025-06-30 NOTE — ANESTHESIA PROCEDURE NOTES
TAP Procedure Note    Pre-Procedure   Staff -        CRNA: Zarina Blanca APRN CRNA       Performed By: CRNA       Location: pre-op       Procedure Start/Stop Times: 6/30/2025 8:17 AM and 6/30/2025 8:25 AM       Pre-Anesthestic Checklist: patient identified, IV checked, site marked, risks and benefits discussed, informed consent, monitors and equipment checked, pre-op evaluation, at physician/surgeon's request and post-op pain management  Timeout:       Correct Patient: Yes        Correct Procedure: Yes        Correct Site: Yes        Correct Position: Yes        Correct Laterality: Yes        Site Marked: Yes  Procedure Documentation  Procedure: TAP         Laterality: bilateral       Patient Position: supine       Skin prep: Chloraprep       Needle Type: insulated       Needle Gauge: 20.        Needle Length (Inches): 4        Ultrasound guided       1. Ultrasound was used to identify targeted nerve, plexus, vascular marker, or fascial plane and place a needle adjacent to it in real-time.       2. Ultrasound was used to visualize the spread of anesthetic in close proximity to the above referenced structure.       3. A permanent image is entered into the patient's record.       4. The visualized anatomic structures appeared normal.       5. There were no apparent abnormal pathologic findings.    Assessment/Narrative         The placement was negative for: blood aspirated, painful injection and site bleeding       Paresthesias: No.       Bolus given via needle..        Secured via.        Insertion/Infusion Method: Single Shot       Complications: none       Injection made incrementally with aspirations every 5 mL.    Medication(s) Administered   Bupivacaine 0.25% PF (Infiltration) - Infiltration   30 mL - 6/30/2025 8:24:00 AM  Bupivacaine liposome (Exparel) 1.3% LA inj susp (Infiltration) - Infiltration   20 mL - 6/30/2025 8:17:00 AM  Medication Administration Time: 6/30/2025 8:17 AM     Comments:  15ml marcaine  "and 10ml exparel to each side. No complication      FOR [facility]) ONLY:   Pain Team Contact information: please page the Pain Team Via [facility]. Search \"Pain\". During daytime hours, please page the attending first. At night please page the resident first.      "

## 2025-06-30 NOTE — MEDICATION SCRIBE - ADMISSION MEDICATION HISTORY
Medication Scribe Admission Medication History    Admission medication history is complete. The information provided in this note is only as accurate as the sources available at the time of the update.    Information Source(s): Patient and CareEverywhere/SureScripts via in-person    Pertinent Information:   Patient manages her own medications. She reports the only medication taken today was her labetalol. She has been holding ibuprofen for 3 days and her lasix and clonidine since Saturday in anticipation of procedure.    Labetalol- fill hx does not reflect exact compliancy. Pt reports having a built up supply and takes as prescribed.     Changes made to PTA medication list:  Added: None  Deleted: None  Changed:   Prilosec from scheduled to PRN, she reports completing scheduled therapy for ulcer and now only taking when needed.    Allergies reviewed with patient and updates made in EHR: yes    Medication History Completed By: Isela Crook 6/30/2025 12:40 PM    PTA Med List   Medication Sig Last Dose/Taking    cloNIDine (CATAPRES) 0.1 MG tablet Take 1 tablet (0.1 mg) by mouth at bedtime. 6/28/2025 Bedtime    furosemide (LASIX) 20 MG tablet Take 1 tablet (20 mg) by mouth daily. 6/28/2025 Morning    hydrOXYzine HCl (ATARAX) 50 MG tablet Take 1 tablet (50 mg) by mouth every 8 hours as needed for other or anxiety (adjuvant pain). 6/29/2025 at  6:00 PM    ibuprofen (ADVIL/MOTRIN) 800 MG tablet Take 1 tablet (800 mg) by mouth every 8 hours as needed for moderate pain. Past Week    labetalol (NORMODYNE) 100 MG tablet Take 1 tablet (100 mg) by mouth 2 times daily. 6/30/2025 at  6:00 AM    omeprazole (PRILOSEC) 40 MG DR capsule Take 40 mg by mouth 2 times daily as needed (heartburn). Past Month    traZODone (DESYREL) 50 MG tablet Take 2 tablets (100 mg) by mouth at bedtime. 6/29/2025 at  9:00 PM

## 2025-06-30 NOTE — OP NOTE
REPORT OF OPERATION  DATE OF PROCEDURE: 6/30/2025    PATIENT: Sarah ATKINSON [facility]    SURGERY PERFORMED: Exploration and removal of stitches    PREOPERATIVE DIAGNOSIS: Open wounds    POSTOPERATIVE DIAGNOSIS: Same    SURGEON: Terrance Cruz MD    ASSISTANTS: Marlene Chinchilla CNP, Her assistance was required because of the technical aspects of the case    ANESTHESIA: General Endotracheal Anesthesia    COMPLICATIONS: None apparent    ESTIMATED BLOOD LOSS: 10 cc     TRANSFUSIONS: None    TISSUE TO PATHOLOGY: None    FINDINGS: No defects were noted.  Subcuticular Monocryl was noted.    INDICATIONS: This is a 40 year old female with history of intolerance to subcutaneous stitches.  Recently had a procedure at which time subcutaneous stitches were placed.  The patient has continued irritation from the subcutaneous stitches.  The patient was in the operating room for wound exploration and removal of stitches.    DESCRIPTIONS OF PROCEDURE IN DETAIL: After consent was obtained the patient was taken to the operative suite and lavinia in the supine position.  The patient was identified and the correct patient was confirmed.  General Endotracheal Anesthesia was administered by anesthesia.  The patient was sterilely prepped and draped in the usual fashion.  A time out was performed verifying the correct patient and the correct procedure.  The entire operative team was in agreement.  All necessary equipment and supplies were in the room.    There were 3 areas that were investigated.  one was at her Pfannenstiel incision, one was a trocar site in her left lower quadrant and one was a infraumbilical trocar site.  All wounds were explored in a similar fashion.  The wounds were opened and investigation yielded small stitches which were removed.  The wounds were thoroughly investigated.  Hemostasis was assured.  The Pfannenstiel incision was closed with interrupted 4-0 nylon's suture.  The left lower quadrant and infraumbilical  incisions were closed with glue.  Sterile dressings were applied.    All needle,  instrument and sponge counts were correct x2.  The patient was awakened in the operating room and taken to the recovery room in stable condition tolerated procedure well.

## 2025-06-30 NOTE — PROGRESS NOTES
Patient states that she is now having left chest tightness. States that she has not had this before. On call CRNA notified and james states she will send someone down.

## 2025-06-30 NOTE — PLAN OF CARE
"Goal Outcome Evaluation:         Reason for hospital stay:  pneumonitis   Living situation PTA: home with significant other     Goal outcome evaluation:     Plan of Care Reviewed With:  patient     Overall Patient Progress:  not progressing in pain     Significant events in the last 24 hours:  surgery       Neuro:  A&O x4  CV:  apical regular   Respiratory:  room air, lungs are very dim. Pt is doing incentive spirometer q1hr   GI:  WNL  :  WNL  Skin:  3 trochar ABD sites   Lines/Drains:  IV saline locked   Nutrition: regular diet   Mobility:independent     Safety:  call light in place, bed in low position with wheels locked, non slid socks on when OOB     Pain Management:  PRN pain medication     Individualized care needs:  none    Comments:  none    Most recent vitals: /63 (BP Location: Left arm, Patient Position: Semi-Jorgensen's, Cuff Size: Adult Regular)   Pulse 75   Temp 97  F (36.1  C) (Tympanic)   Resp 16   Ht 1.499 m (4' 11\")   Wt 64.4 kg (142 lb)   SpO2 98%   BMI 28.68 kg/m        Face to face report given with opportunity to observe patient.    Report given to Sabra Goff RN   6/30/2025  7:35 PM                             "

## 2025-06-30 NOTE — ANESTHESIA PROCEDURE NOTES
Airway         Procedure Start/Stop Times: 6/30/2025 8:53 AM  Staff -        CRNA: Slava Baldwin APRN CRNA       Other Anesthesia Staff: Malia Lamar       Performed By: CRNA and SRNAIndications and Patient Condition       Indications for airway management: lisa-procedural and airway protection       Induction type:intravenous       Mask difficulty assessment: 0 - not attempted    Final Airway Details       Final airway type: supraglottic airway    Supraglottic Airway Details        Type: LMA       Brand: I-Gel       LMA size: 4    Post intubation assessment        Placement verified by: capnometry and chest rise        Number of attempts at approach: 1       Ease of procedure: easy       Dentition: Intact and Unchanged    Medication(s) Administered   Medication Administration Time: 6/30/2025 8:53 AM

## 2025-06-30 NOTE — CONSULTS
[facility]  Consult Note - Hospitalist Service  Date of Admission:  6/30/2025  Consult Requested by: Dr Cruz  Reason for Consult: chest pain     Assessment & Plan   Sarah Ugalde is a 40 year old female admitted on 6/30/2025. She established diagnosis of Delaney-Parkinson-White syndrome, help syndrome, history of preeclampsia, postpartum endometria-itis, MS, GERD, generalized anxiety,.  Patient developed left-sided chest pain postoperatively in PACU after receiving 125 mcg of fentanyl for abdominal pain.  Patient was not hypoxic but was on 2 L of oxygen satting 100%, nontachycardic.  Patient did have extensive workup showing    Left thoracic opacity suggesting localized pneumonia/consolidation in t he superior segment of the left lower lobe.  Pulmonary embolism was not suspected EKG did not show any acute findings troponin was negative she was admitted for observation.  She is a full code.    Principal Problem:    Pneumonitis/atelectasis    Chest pain  Patient developed left sided chest pain in PACU. Described sharp reproducible worse with breathing. She was on 2 liters of oxygen but sats 100%, not tachy but on home labetalol. Patient was given 175mcg of fentanyl in PACU for abdominal pain. Trops X 2 negative. EKG sinus magali , no acute st elevations or depressions. Chest xray done showing  Left thoracic opacity suggesting localized pneumonia/consolidation in the superior segment of the left lower lobe. I did speak with Gato HALL and patient did not aspirate in OR. She did receive  fentanyl in PACU and did have depressed lung sounds thought to be 2nd from medication but not desating. She was not given narcan as post op. She was instructed to use IS and RT was consulted. If febrile will add antibiotics. Suspect possible mucous plug vs atelectasis.    -keep sats > 90%  -IS q 1 hrs while awake  -limit opioids and sedatives  -RT consulted, may benefit from flutter valve  -if fevers will need to start  "antibiotics  -monitor cpr and procal       WPW (Delaney-Parkinson-White syndrome), HX of PSVT  -follows with cardiology  -on labetalol  -on clonidine  -on lasix      PARDEEP (generalized anxiety disorder)  -home  trazodone       S/P abdominal hysterectomy  -noted        Clinically Significant Risk Factors Present on Admission                   # Hypertension: Noted on problem list           # Overweight: Estimated body mass index is 28.68 kg/m  as calculated from the following:    Height as of this encounter: 1.499 m (4' 11\").    Weight as of this encounter: 64.4 kg (142 lb).              Jamee Kenney DO  Hospitalist Service  Securely message with [employer] (more info)  Text page via [facility] Paging/Directory   ______________________________________________________________________    Chief Complaint   Chest pain     History is obtained from the patient, electronic health record, and emergency department physician    History of Present Illness   Sarah Ugalde is a 40 year old female who has established diagnosis of Delaney-Parkinson-White syndrome, help syndrome, history of preeclampsia, postpartum endometria-itis, MS, GERD, generalized anxiety,.  Patient has surgical history of hysterectomy, she recently underwent oophorectomy with Dr. White on June 4, 2025.  Patient underwent exploration and removal of stitches today with Dr. Cruz this was around her abdominal suture line.  Patient did receive Zofran Decadron fentanyl and Versed in the OR.  She postoperatively in the PACU developed left-sided chest pain that was rated 5 out of 10.  She did receive 175 mcg of fentanyl initially for abdominal pain that was 6 out of 10.  Patient did have EKG done showing sinus bradycardia, troponin was negative, her labs were at baseline CBC and BMP creatinine 0.99.  She was seen at bedside after hospital medicine's consult for the chest pain and the pain was reproducible on physical exam.  Chest x-ray was obtained showing " consolidation upper lobe of the left lower lobe lung on the left.  Patient was not febrile and did have shallow breaths.  She was admitted to the hospital for further monitoring observation.  She denied any cough but did stated that she little bit of sore throat.  I did call to anesthesia and spoke with Slava HALL and was reported patient did not have any aspiration event in the OR.  She is a full code.      Past Medical History    Past Medical History:   Diagnosis Date    Detached retina, bilateral 2015    secondary to severe preeclampsia with HELLP syndroma and acute renal failure    PARDEEP (generalized anxiety disorder) 2023    HELLP (hemolytic anemia/elev liver enzymes/low platelets in pregnancy) 2015    Severe preeclampsia with HELLP syndrome and acute renal failure    History of blood transfusion 2015    HUS (hemolytic uremic syndrome) (H) 2015    Postpartum Thrombotic thrombocytopenic purpura (TTP) and 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    MS (multiple sclerosis) (H) 2015    Rh negative state in antepartum period 2023    TTP (thrombotic thrombocytopenic purpura) (H) 2015    Postpartum Thrombotic thrombocytopenic purpura (TTP) and 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    Uncomplicated asthma     Exercise enduced    WPW (Delaney-Parkinson-White syndrome) 2015    She has been evaluated for ablation, however, based on location this was not deemed safe by cardiology at the time       Past Surgical History   Past Surgical History:   Procedure Laterality Date     SECTION  2015    Severe preeclampsia with HELLP syndrome and acute renal failure then developed postpartum Thrombotic thrombocytopenic purpura (TTP) and Hemolytic uremic syndrome (HUS)    COMBINED  SECTION, SALPINGECTOMY BILATERAL Bilateral 2023    Procedure:  SECTION, WITH BILATERAL SALPINGECTOMY, Viable baby girl born at  1851;  Surgeon: Naseem Cruz MD;  Location: [facility]    DILATION AND CURETTAGE, OPERATIVE HYSTEROSCOPY, COMBINED N/A 10/25/2023    Procedure: HYSTEROSCOPY, WITH DILATION AND CURETTAGE OF UTERUS;  Surgeon: Scout Grimaldo MD;  Location: [facility] OR    ENDOSCOPY UPPER, COLONOSCOPY, COMBINED N/A 02/17/2025    Procedure: COLONOSCOPY WITH BIOPSY AND UPPER GASTROINTESTINAL TRACT ENDOSCOPY WITH BIOPSY;  Surgeon: Terrance Cruz MD;  Location: [facility] OR    GENITOURINARY SURGERY  2023    Tubes removed    HYSTERECTOMY SUPRACERVICAL N/A 04/18/2024    Procedure: HYSTERECTOMY, SUPRACERVICAL, ABDOMINAL;  Surgeon: Scout Grimaldo MD;  Location: [facility]    INCISION AND DRAINAGE ABDOMEN WASHOUT, COMBINED N/A 09/19/2024    Procedure: Abdominal Wound Exploration;  Surgeon: Terrance Cruz MD;  Location: [facility]    INCISION AND DRAINAGE ABDOMEN WASHOUT, COMBINED N/A 11/11/2024    Procedure: Abdominal  Wound exploration;  Surgeon: Terrance Cruz MD;  Location: [facility]    IR TRIGGER POINT INJ 1 OR 2 MUSCLES  07/02/2024    LAPAROSCOPIC OOPHORECTOMY Bilateral 6/4/2025    Procedure: Bilateral Laparoscopic Oophorectomy;  Surgeon: Scout Grimaldo MD;  Location: [facility] OR    TUBAL LIGATION         Medications   I have reviewed this patient's current medications  Medications Prior to Admission   Medication Sig Dispense Refill Last Dose/Taking    cloNIDine (CATAPRES) 0.1 MG tablet Take 1 tablet (0.1 mg) by mouth at bedtime. 90 tablet 3 Past Week    furosemide (LASIX) 20 MG tablet Take 1 tablet (20 mg) by mouth daily. 90 tablet 3 Past Week    hydrOXYzine HCl (ATARAX) 50 MG tablet Take 1 tablet (50 mg) by mouth every 8 hours as needed for other or anxiety (adjuvant pain). 40 tablet 1 6/29/2025    ibuprofen (ADVIL/MOTRIN) 800 MG tablet Take 1 tablet (800 mg) by mouth every 8 hours as needed for moderate pain. 40 tablet 1 Past Week    labetalol (NORMODYNE) 100 MG tablet Take 1 tablet (100 mg) by mouth 2 times daily. 60 tablet 2 6/30/2025 Morning     "omeprazole (PRILOSEC) 40 MG DR capsule Take 1 capsule (40 mg) by mouth 2 times daily. 60 capsule 3 Past Week    traZODone (DESYREL) 50 MG tablet Take 2 tablets (100 mg) by mouth at bedtime. 180 tablet 1 6/29/2025          Review of Systems    The 10 point Review of Systems is negative other than noted in the HPI or here.      Social History   I have reviewed this patient's social history and updated it with pertinent information if needed.  Social History     Tobacco Use    Smoking status: Never     Passive exposure: Never    Smokeless tobacco: Never   Vaping Use    Vaping status: Never Used   Substance Use Topics    Alcohol use: Not Currently     Comment: 1-2 beers a week if that per pt    Drug use: No         Family History   I have reviewed this patient's family history and updated it with pertinent information if needed.  Family History   Problem Relation Age of Onset    Hypertension Mother     Hypertension Father     Allergies Maternal Grandmother         dust pollen    Cancer - colorectal Paternal Grandmother 59    Allergies Maternal Aunt         \"    Cardiovascular Maternal Aunt     Breast Cancer Other 60        3 great aunts on moms side         Allergies   Allergies   Allergen Reactions    Zestril [Lisinopril] Anaphylaxis and Fatigue    Amlodipine Swelling    Cymbalta [Duloxetine Hcl]     Hydrochlorothiazide     Magnesium Other (See Comments)     Altered mental status after IV dosing    Other Environmental Allergy     Prozac [Fluoxetine]     Losartan Difficulty breathing, Hives, Itching, Rash, Swelling and Visual Disturbance        Physical Exam   Vital Signs: Temp: 96.8  F (36  C) Temp src: Temporal BP: 130/89 Pulse: 55   Resp: 12 SpO2: 100 % O2 Device: Nasal cannula Oxygen Delivery: 2 LPM  Weight: 142 lbs 0 oz    Physical Exam  HENT:      Right Ear: External ear normal.      Left Ear: External ear normal.      Nose: Nose normal.      Mouth/Throat:      Pharynx: Oropharynx is clear.   Eyes:      " Conjunctiva/sclera: Conjunctivae normal.   Cardiovascular:      Rate and Rhythm: Regular rhythm. Bradycardia present.   Pulmonary:      Comments: Decreased breath sounds in both lungs   Abdominal:      General: Abdomen is flat.   Musculoskeletal:         General: No swelling.   Skin:     Coloration: Skin is not jaundiced.   Neurological:      Mental Status: She is alert. Mental status is at baseline.         Medical Decision Making       62 MINUTES SPENT BY ME on the date of service doing chart review, history, exam, documentation & further activities per the note.      Data     I have personally reviewed the following data over the past 24 hrs:    5.6  \   12.6   / 257     141 107 16.7 /  94   4.2 20 (L) 0.99 (H) \     Trop: <6 BNP: N/A     TSH: 4.35 (H) T4: 1.24 A1C: N/A     Procal: 0.04 CRP: 21.35 (H) Lactic Acid: N/A         Imaging results reviewed over the past 24 hrs:   Recent Results (from the past 24 hours)   XR Chest 1 View    Narrative    Procedure:XR CHEST 1 VIEW    Clinical history:Female, 40 years, chest pain    Technique: Single view was obtained.    Comparison: No relevant prior imaging.    Findings: The cardiac silhouette is within normal limits.    The lungs demonstrate increased density in the midportion of the left  lung suggesting possibility of a localized pneumonia in the superior  segment of the left lower lobe. Bony structures are unremarkable.      Impression    Impression:   Left thoracic opacity suggesting localized pneumonia/consolidation in  the superior segment of the left lower lobe.    ERMELINDA SEN MD         SYSTEM ID:  [id number]

## 2025-06-30 NOTE — PLAN OF CARE
Goal Outcome Evaluation:       [facility] Inpatient Admission Note:    Patient admitted to 3226/3226-1 at approximately 1156 via bed accompanied by other:staff from surgery . Report received from Abhay MARTINEZ  in SBAR format at 1156 via face to face in room. Patient transferred to bed via self.. Patient is alert and oriented X 3, denies pain; rates at 5 on 0-10 scale.  Patient oriented to room, unit, hourly rounding, and plan of care. Explained admission packet and patient handbook with patient bill of rights brochure. Will continue to monitor and document as needed.     Inpatient Nursing criteria listed below was met:    Health care directives status obtained and documented: Yes    Patient identifies a surrogate decision maker: No If yes, who:N/A Contact Information:N/A     If initial lactic acid greater than 2.0, repeat lactic acid drawn within one hour of arrival to unit: NA. If no, state reason: n/a    Clergy visit ordered if patient requests: N/A    Skin issues/needs documented: Yes    Isolation Patient: no Education given, correct sign in place and documentation row added to PCS:  No    Fall Prevention Yes: Care plan updated, education given and documented, sticker and magnet in place: Yes    Care Plan initiated: Yes    Education Documented (including assessment): Yes    Patient has discharge needs : Yes If yes, please explain:post op follow up

## 2025-06-30 NOTE — H&P
HISTORY AND PHYSICAL   2025    Patient : Sarah ATKINSON [facility]    Planned Procedures : Wound Exploration    This is a 40 year old female with a need of a wound exploration.  She has a history of complications after dissolvable suture placement and is having wound problems at this time.       Past Medical History:  Past Medical History:   Diagnosis Date    Detached retina, bilateral 2015    secondary to severe preeclampsia with HELLP syndroma and acute renal failure    PARDEEP (generalized anxiety disorder) 2023    HELLP (hemolytic anemia/elev liver enzymes/low platelets in pregnancy) 2015    Severe preeclampsia with HELLP syndrome and acute renal failure    History of blood transfusion 2015    HUS (hemolytic uremic syndrome) (H) 2015    Postpartum Thrombotic thrombocytopenic purpura (TTP) and 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    MS (multiple sclerosis) (H) 2015    Rh negative state in antepartum period 2023    TTP (thrombotic thrombocytopenic purpura) (H) 2015    Postpartum Thrombotic thrombocytopenic purpura (TTP) and 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    Uncomplicated asthma     Exercise enduced    WPW (Delaney-Parkinson-White syndrome) 2015    She has been evaluated for ablation, however, based on location this was not deemed safe by cardiology at the time       Past Surgical History:  Past Surgical History:   Procedure Laterality Date     SECTION  2015    Severe preeclampsia with HELLP syndrome and acute renal failure then developed postpartum Thrombotic thrombocytopenic purpura (TTP) and Hemolytic uremic syndrome (HUS)    COMBINED  SECTION, SALPINGECTOMY BILATERAL Bilateral 2023    Procedure:  SECTION, WITH BILATERAL SALPINGECTOMY, Viable baby girl born at 1851;  Surgeon: Naseem Cruz MD;  Location: [facility]    DILATION AND CURETTAGE, OPERATIVE HYSTEROSCOPY, COMBINED N/A  "10/25/2023    Procedure: HYSTEROSCOPY, WITH DILATION AND CURETTAGE OF UTERUS;  Surgeon: Scout Grimaldo MD;  Location: [facility]    ENDOSCOPY UPPER, COLONOSCOPY, COMBINED N/A 02/17/2025    Procedure: COLONOSCOPY WITH BIOPSY AND UPPER GASTROINTESTINAL TRACT ENDOSCOPY WITH BIOPSY;  Surgeon: Terrance Cruz MD;  Location: [facility]    GENITOURINARY SURGERY  2023    Tubes removed    HYSTERECTOMY SUPRACERVICAL N/A 04/18/2024    Procedure: HYSTERECTOMY, SUPRACERVICAL, ABDOMINAL;  Surgeon: Scout Grimaldo MD;  Location: [facility]    INCISION AND DRAINAGE ABDOMEN WASHOUT, COMBINED N/A 09/19/2024    Procedure: Abdominal Wound Exploration;  Surgeon: Terrance Cruz MD;  Location: [facility]    INCISION AND DRAINAGE ABDOMEN WASHOUT, COMBINED N/A 11/11/2024    Procedure: Abdominal  Wound exploration;  Surgeon: Terrance Cruz MD;  Location: [facility]    IR TRIGGER POINT INJ 1 OR 2 MUSCLES  07/02/2024    LAPAROSCOPIC OOPHORECTOMY Bilateral 6/4/2025    Procedure: Bilateral Laparoscopic Oophorectomy;  Surgeon: Scout Grimaldo MD;  Location: [facility]    TUBAL LIGATION         Family History History:  Family History   Problem Relation Age of Onset    Hypertension Mother     Hypertension Father     Allergies Maternal Grandmother         dust pollen    Cancer - colorectal Paternal Grandmother 59    Allergies Maternal Aunt         \"    Cardiovascular Maternal Aunt     Breast Cancer Other 60        3 great aunts on moms side       History of Tobacco Use:  History   Smoking Status    Never   Smokeless Tobacco    Never       Current Medications:  No current outpatient medications on file.       Allergies:  Allergies   Allergen Reactions    Zestril [Lisinopril] Anaphylaxis and Fatigue    Amlodipine Swelling    Cymbalta [Duloxetine Hcl]     Hydrochlorothiazide     Magnesium Other (See Comments)     Altered mental status after IV dosing    Other Environmental Allergy     Prozac [Fluoxetine]     Losartan Difficulty breathing, Hives, Itching, Rash, " Swelling and Visual Disturbance       ROS:  Constitutional: negative  Eyes: negative  Ears, nose, mouth, throat, and face: negative  Respiratory: negative  Cardiovascular: positive for history of WPW with ablation  Gastrointestinal: negative  Genitourinary:positive for recent oophorectomy with wound healing problems  Integument/breast: wound healing problems after recent oophorectomy  Hematologic/lymphatic: negative  Musculoskeletal: negative  Neurological: positive for MS  Behavioral/Psych: negative  Endocrine: negative  Allergic/Immunologic: negative    PHYSICAL EXAM:     Vital signs: There were no vitals taken for this visit.   BMI: There is no height or weight on file to calculate BMI.   General: Normal, healthy, cooperative, in no acute distress, alert   Skin: no rashes   Lungs: clear to auscultation   CV: Regular rate and rhythm    Abdominal: non-distended, soft   Extremities: No cyanosis, clubbing or edema noted bilaterally in Upper and Lower Extremities   Neurological: without deficit    Assessment:   40 year old female with need of wound exploration in the OR    Plan:   Will proceed with on doing a wound exploration in the OR

## 2025-07-01 ENCOUNTER — APPOINTMENT (OUTPATIENT)
Dept: CT IMAGING | Facility: HOSPITAL | Age: 40
End: 2025-07-01
Attending: HOSPITALIST
Payer: COMMERCIAL

## 2025-07-01 ENCOUNTER — APPOINTMENT (OUTPATIENT)
Dept: GENERAL RADIOLOGY | Facility: HOSPITAL | Age: 40
End: 2025-07-01
Attending: HOSPITALIST
Payer: COMMERCIAL

## 2025-07-01 LAB
ANION GAP SERPL CALCULATED.3IONS-SCNC: 8 MMOL/L (ref 7–15)
ATRIAL RATE - MUSE: 56 BPM
ATRIAL RATE - MUSE: 61 BPM
BASOPHILS # BLD AUTO: 0 10E3/UL (ref 0–0.2)
BASOPHILS NFR BLD AUTO: 0 %
BUN SERPL-MCNC: 18.8 MG/DL (ref 6–20)
CALCIUM SERPL-MCNC: 9 MG/DL (ref 8.8–10.4)
CHLORIDE SERPL-SCNC: 110 MMOL/L (ref 98–107)
CREAT SERPL-MCNC: 1.01 MG/DL (ref 0.51–0.95)
CRP SERPL-MCNC: 10.66 MG/L
DIASTOLIC BLOOD PRESSURE - MUSE: NORMAL MMHG
DIASTOLIC BLOOD PRESSURE - MUSE: NORMAL MMHG
EGFRCR SERPLBLD CKD-EPI 2021: 72 ML/MIN/1.73M2
EOSINOPHIL # BLD AUTO: 0 10E3/UL (ref 0–0.7)
EOSINOPHIL NFR BLD AUTO: 0 %
ERYTHROCYTE [DISTWIDTH] IN BLOOD BY AUTOMATED COUNT: 12.8 % (ref 10–15)
GLUCOSE SERPL-MCNC: 117 MG/DL (ref 70–99)
HCO3 SERPL-SCNC: 23 MMOL/L (ref 22–29)
HCT VFR BLD AUTO: 35.4 % (ref 35–47)
HGB BLD-MCNC: 11.6 G/DL (ref 11.7–15.7)
IMM GRANULOCYTES # BLD: 0 10E3/UL
IMM GRANULOCYTES NFR BLD: 0 %
INTERPRETATION ECG - MUSE: NORMAL
INTERPRETATION ECG - MUSE: NORMAL
LYMPHOCYTES # BLD AUTO: 1.6 10E3/UL (ref 0.8–5.3)
LYMPHOCYTES NFR BLD AUTO: 19 %
MCH RBC QN AUTO: 30.1 PG (ref 26.5–33)
MCHC RBC AUTO-ENTMCNC: 32.8 G/DL (ref 31.5–36.5)
MCV RBC AUTO: 92 FL (ref 78–100)
MCV RBC AUTO: 93 FL (ref 78–100)
MONOCYTES # BLD AUTO: 0.6 10E3/UL (ref 0–1.3)
MONOCYTES NFR BLD AUTO: 8 %
NEUTROPHILS # BLD AUTO: 5.9 10E3/UL (ref 1.6–8.3)
NEUTROPHILS NFR BLD AUTO: 72 %
NRBC # BLD AUTO: 0 10E3/UL
NRBC BLD AUTO-RTO: 0 /100
NT-PROBNP SERPL-MCNC: 688 PG/ML (ref 0–178)
P AXIS - MUSE: 53 DEGREES
P AXIS - MUSE: 56 DEGREES
PLATELET # BLD AUTO: 259 10E3/UL (ref 150–450)
POTASSIUM SERPL-SCNC: 4.4 MMOL/L (ref 3.4–5.3)
PR INTERVAL - MUSE: 110 MS
PR INTERVAL - MUSE: 116 MS
PROCALCITONIN SERPL IA-MCNC: 0.02 NG/ML
QRS DURATION - MUSE: 82 MS
QRS DURATION - MUSE: 84 MS
QT - MUSE: 410 MS
QT - MUSE: 454 MS
QTC - MUSE: 412 MS
QTC - MUSE: 438 MS
R AXIS - MUSE: 45 DEGREES
R AXIS - MUSE: 50 DEGREES
RBC # BLD AUTO: 3.86 10E6/UL (ref 3.8–5.2)
SODIUM SERPL-SCNC: 141 MMOL/L (ref 135–145)
SYSTOLIC BLOOD PRESSURE - MUSE: NORMAL MMHG
SYSTOLIC BLOOD PRESSURE - MUSE: NORMAL MMHG
T AXIS - MUSE: 4 DEGREES
T AXIS - MUSE: 5 DEGREES
TROPONIN T SERPL HS-MCNC: <6 NG/L
VENTRICULAR RATE- MUSE: 56 BPM
VENTRICULAR RATE- MUSE: 61 BPM
WBC # BLD AUTO: 8.1 10E3/UL (ref 4–11)
WBC # BLD AUTO: 8.1 10E3/UL (ref 4–11)

## 2025-07-01 PROCEDURE — 250N000013 HC RX MED GY IP 250 OP 250 PS 637: Performed by: HOSPITALIST

## 2025-07-01 PROCEDURE — 99232 SBSQ HOSP IP/OBS MODERATE 35: CPT | Performed by: HOSPITALIST

## 2025-07-01 PROCEDURE — 83880 ASSAY OF NATRIURETIC PEPTIDE: CPT | Performed by: HOSPITALIST

## 2025-07-01 PROCEDURE — 85025 COMPLETE CBC W/AUTO DIFF WBC: CPT | Performed by: HOSPITALIST

## 2025-07-01 PROCEDURE — G0378 HOSPITAL OBSERVATION PER HR: HCPCS

## 2025-07-01 PROCEDURE — 71275 CT ANGIOGRAPHY CHEST: CPT | Mod: 26 | Performed by: RADIOLOGY

## 2025-07-01 PROCEDURE — 250N000013 HC RX MED GY IP 250 OP 250 PS 637: Performed by: SURGERY

## 2025-07-01 PROCEDURE — 84484 ASSAY OF TROPONIN QUANT: CPT | Performed by: HOSPITALIST

## 2025-07-01 PROCEDURE — 250N000011 HC RX IP 250 OP 636: Performed by: RADIOLOGY

## 2025-07-01 PROCEDURE — 93005 ELECTROCARDIOGRAM TRACING: CPT

## 2025-07-01 PROCEDURE — 71045 X-RAY EXAM CHEST 1 VIEW: CPT | Mod: 26 | Performed by: RADIOLOGY

## 2025-07-01 PROCEDURE — 84145 PROCALCITONIN (PCT): CPT | Performed by: HOSPITALIST

## 2025-07-01 PROCEDURE — 99231 SBSQ HOSP IP/OBS SF/LOW 25: CPT | Performed by: SURGERY

## 2025-07-01 PROCEDURE — 80048 BASIC METABOLIC PNL TOTAL CA: CPT | Performed by: HOSPITALIST

## 2025-07-01 PROCEDURE — 86140 C-REACTIVE PROTEIN: CPT | Performed by: HOSPITALIST

## 2025-07-01 PROCEDURE — 93010 ELECTROCARDIOGRAM REPORT: CPT | Performed by: INTERNAL MEDICINE

## 2025-07-01 PROCEDURE — 71275 CT ANGIOGRAPHY CHEST: CPT

## 2025-07-01 PROCEDURE — 71045 X-RAY EXAM CHEST 1 VIEW: CPT

## 2025-07-01 PROCEDURE — 36415 COLL VENOUS BLD VENIPUNCTURE: CPT | Performed by: HOSPITALIST

## 2025-07-01 RX ORDER — IOPAMIDOL 755 MG/ML
54 INJECTION, SOLUTION INTRAVASCULAR ONCE
Status: COMPLETED | OUTPATIENT
Start: 2025-07-01 | End: 2025-07-01

## 2025-07-01 RX ADMIN — OXYCODONE HYDROCHLORIDE 10 MG: 5 TABLET ORAL at 06:06

## 2025-07-01 RX ADMIN — LABETALOL HYDROCHLORIDE 100 MG: 100 TABLET, FILM COATED ORAL at 06:50

## 2025-07-01 RX ADMIN — SENNOSIDES AND DOCUSATE SODIUM 1 TABLET: 50; 8.6 TABLET ORAL at 09:20

## 2025-07-01 RX ADMIN — LABETALOL HYDROCHLORIDE 100 MG: 100 TABLET, FILM COATED ORAL at 20:07

## 2025-07-01 RX ADMIN — HYDROXYZINE HYDROCHLORIDE 50 MG: 25 TABLET, FILM COATED ORAL at 02:32

## 2025-07-01 RX ADMIN — IBUPROFEN 800 MG: 200 TABLET, FILM COATED ORAL at 02:31

## 2025-07-01 RX ADMIN — LIDOCAINE 1 PATCH: 4 PATCH TOPICAL at 06:06

## 2025-07-01 RX ADMIN — IBUPROFEN 800 MG: 200 TABLET, FILM COATED ORAL at 21:27

## 2025-07-01 RX ADMIN — IOPAMIDOL 54 ML: 755 INJECTION, SOLUTION INTRAVENOUS at 09:39

## 2025-07-01 RX ADMIN — LIDOCAINE 1 PATCH: 4 PATCH TOPICAL at 20:06

## 2025-07-01 RX ADMIN — HYDROXYZINE HYDROCHLORIDE 50 MG: 25 TABLET, FILM COATED ORAL at 11:23

## 2025-07-01 RX ADMIN — OXYCODONE HYDROCHLORIDE 10 MG: 5 TABLET ORAL at 13:24

## 2025-07-01 RX ADMIN — IBUPROFEN 800 MG: 200 TABLET, FILM COATED ORAL at 11:23

## 2025-07-01 RX ADMIN — LIDOCAINE 1 PATCH: 4 PATCH TOPICAL at 09:53

## 2025-07-01 RX ADMIN — TRAZODONE HYDROCHLORIDE 100 MG: 50 TABLET ORAL at 21:27

## 2025-07-01 RX ADMIN — FUROSEMIDE 20 MG: 20 TABLET ORAL at 09:18

## 2025-07-01 RX ADMIN — OXYCODONE HYDROCHLORIDE 10 MG: 5 TABLET ORAL at 20:07

## 2025-07-01 RX ADMIN — HYDROXYZINE HYDROCHLORIDE 50 MG: 25 TABLET, FILM COATED ORAL at 21:27

## 2025-07-01 ASSESSMENT — ACTIVITIES OF DAILY LIVING (ADL)
ADLS_ACUITY_SCORE: 30

## 2025-07-01 NOTE — PLAN OF CARE
"Goal Outcome Evaluation:      Plan of Care Reviewed With: patient    Overall Patient Progress: improving       Reason for hospital stay:  observation post op chest pain  Living situation PTA: home    Significant events in the last 24 hours:  developed left sided chest discomfort in pacu      Neuro:  alert and oriented, makes needs known  CV:  tele in place, has hx of wpw does have occasional runs of svt. Denies any new chest discomfort.  Pt declined her labetalol dose tonight, she reports she only usually takes this once a day and can take it 2x as needed for sx which she denies at this time. Also, declined her catapress this evening- reports her primary care doctor told her to hold off until her health normalizes again.  Respiratory:  lungs clear, denies shortness of breath.   GI:  requests stool softener- medicated with 2 senna per pt request   :  up independently to the bathroom  Lines/Drains:  saline lock  Skin:  3 troch sites- all intact no drainage noted.    Nutrition: tolerating regular diet  Mobility:independent in room   Pain Management:  effective pain management to troch sites with ibuprofen and hydroxine approx every 6-8 hours, using oxy 10mg for break thru.    0615- developed chest discomfort, reports hard to take in a deep breath rates 7/10. Medicated with prn oxy, clarified with pharmacy ok to start lidocaine patch at this time.   Individualized care needs:  significant hx of MS, WPW sx, HELLP sx.  Surgery today for removal of dissolvable suture removal that were causing incision healing problems.   Most recent vitals: /80 (BP Location: Left arm, Patient Position: Semi-Jorgensen's, Cuff Size: Adult Regular)   Pulse 65   Temp 97.4  F (36.3  C) (Tympanic)   Resp 16   Ht 1.499 m (4' 11\")   Wt 64.4 kg (142 lb)   SpO2 96%   BMI 28.68 kg/m      0630-Dr. Cruz updated on pt's chest pain. New orders rec'd for EKG.   0650-EKG obtained for persistent 7/10 chest pain, pt feels difficulty taking deep " breath. Labatalol given early at 0700.  Sats remain 98% room air, VSS, no changes to telemetry.  Updated Dr. Kenney he stated will see patient shortly.     Face to face report given with opportunity to observe patient.    Report given to Lucinda Varghese RN   7/1/2025  7:47 AM

## 2025-07-01 NOTE — PROGRESS NOTES
[facility]    Medicine Progress Note - Hospitalist Service    Date of Admission:  6/30/2025    Assessment & Plan   Sarah Ugalde is a 40 year old female admitted on 6/30/2025. She established diagnosis of Delaney-Parkinson-White syndrome, help syndrome, history of preeclampsia, postpartum endometria-itis, MS, GERD, generalized anxiety,.  Patient developed left-sided chest pain postoperatively in PACU after receiving 125 mcg of fentanyl for abdominal pain.  Patient was not hypoxic but was on 2 L of oxygen satting 100%, nontachycardic.  Patient did have extensive workup showing    Left thoracic opacity suggesting localized pneumonia/consolidation in t he superior segment of the left lower lobe.  Pulmonary embolism was not suspected EKG did not show any acute findings troponin was negative she was admitted for observation.  She is a full code.    Principal Problem:    Pneumonitis/atelectasis    Chest pain left sided - somewhat reproducible   Patient developed left sided chest pain in PACU. Described sharp reproducible worse with breathing. She was on 2 liters of oxygen but sats 100%, not tachy but on home labetalol. Patient was given 175mcg of fentanyl in PACU for abdominal pain. Trops X 2 negative. EKG sinus magali , no acute st elevations or depressions. Chest xray done showing  Left thoracic opacity suggesting localized pneumonia/consolidation in the superior segment of the left lower lobe. I did speak with Gato HALL and patient did not aspirate in OR. She did receive  fentanyl in PACU and did have depressed lung sounds thought to be 2nd from medication but not desating. She was not given narcan as post op. She was instructed to use IS and RT was consulted. If febrile will add antibiotics. Suspect possible mucous plug vs atelectasis. PE rulled out.Trops negative. Mild BNP elevation     -keep sats > 90%  -IS q 1 hrs while awake  -limit opioids and sedatives  -RT consulted, may benefit from flutter  valve  -if fevers will need to start antibiotics  -monitor cpr and procal , have been normal  -Trops have been normal  -Her BNP mildly elevated ay 688, home lasix restarted on 7/1/25  -CTA chest was done no PE has some  Pulmonary parenchymal changes which could represent edema, atelectasis, reactive airway disease, viral illness, and/or developing bronchopneumonia. I discussed with Dr Goldman radiology and the area in question is likely inflammation just unclear what's its secondary from. Could be medication related from anesthesia.?  -I spoke with Dr Hilaria VELASCO at [facility] and no further recs as EKG is normal.   -I did reach out to Nadine Arguelles NP her cardiology team in [place] but no response, will have her see her out patient for any further medication adjustment.   -EKG repeat on 7/1 non specific  wave changes normal sinus rhythm  -telemetry has showed some PSVT at times up to 140's. She has had some palpations but remained stable.       WPW (Delaney-Parkinson-White syndrome), HX of PSVT  -follows with cardiology  -I spoke with Dr Hilaria Morales EP at [facility] no further recs. Will place out patient referral.   -on labetalol  -on clonidine  -on lasix      PARDEEP (generalized anxiety disorder)  -home  trazodone       S/P abdominal hysterectomy  -noted        Observation Goals: -diagnostic tests and consults completed and resulted, -vital signs normal or at patient baseline, -tolerating oral intake to maintain hydration, -dyspnea improved and O2 sats greater than 88% on room air or prior home oxygen levels, -safe disposition plan has been identified, Nurse to notify provider when observation goals have been met and patient is ready for discharge.  Diet: Regular Diet Adult    DVT Prophylaxis: Ambulate every shift  Friedman Catheter: Not present  Lines: None     Cardiac Monitoring: ACTIVE order. Indication: Bradycardias (48 hours)  Code Status: Full Code      Clinically Significant Risk Factors Present on Admission          #  "Hyperchloremia: Highest Cl = 110 mmol/L in last 2 days, will monitor as appropriate              # Hypertension: Noted on problem list           # Overweight: Estimated body mass index is 28.68 kg/m  as calculated from the following:    Height as of this encounter: 1.499 m (4' 11\").    Weight as of this encounter: 64.4 kg (142 lb).              Social Drivers of Health            Disposition Plan      Medically Ready for Discharge: Anticipated Tomorrow             Jamee Kenney DO  Hospitalist Service  [facility]  Securely message with [employer] (more info)  Text page via [facility] Paging/Directory   ______________________________________________________________________    Interval History   Patient was seen this morning on medical rounds.  Patient denied any shortness of breath but she did have some chest pressure and pain that she stated radiated to the left neck.  Patient did describe some palpitations overnight that were more than usual for her.  She has taken her labetalol but at times she skips depending on what her blood pressures are.  Patient also was restarted on her home Lasix today as she felt like she is retaining a little more fluid especially in her right leg and her BNP was slightly elevated.  I did discuss with her regarding concerns regarding pulmonary embolus we did get a CTA of her chest and ruled out pulmonary embolism but she she did have an area that showed some inflammation in the left lung that was questionable for any possible  edema, atelectasis, reactive airway disease, viral illness, and/or developing bronchopneumonia.  Patient did not have any fever chills sweating her CRP is downtrending her procalcitonin is normal her white blood cell count is normal her differential is normal she has no fevers.  She has no cough no sputum production.  I did discuss her care with Dr. Cruz and at this time I do not think we should give her any antibiotics because there is no signs of any " infection.  Also patient was discussed with I attempted to reach electrophysiology at the [facility] where she previously seen a provider in 2023 regarding outpatient follow-up and care.  Repeat EKG did not show any acute ST changes.    Physical Exam   Vital Signs: Temp: 97.4  F (36.3  C) Temp src: Tympanic BP: 124/71 Pulse: 58   Resp: 16 SpO2: 95 % O2 Device: None (Room air)    Weight: 142 lbs 0 oz    Physical Exam  HENT:      Right Ear: External ear normal.      Left Ear: External ear normal.      Nose: Nose normal.      Mouth/Throat:      Pharynx: Oropharynx is clear.   Eyes:      Conjunctiva/sclera: Conjunctivae normal.   Cardiovascular:      Rate and Rhythm: Normal rate.   Pulmonary:      Effort: Pulmonary effort is normal.   Abdominal:      General: Abdomen is flat.   Musculoskeletal:         General: No swelling.   Skin:     Coloration: Skin is not jaundiced.   Neurological:      Mental Status: She is alert. Mental status is at baseline.         Medical Decision Making       63 MINUTES SPENT BY ME on the date of service doing chart review, history, exam, documentation & further activities per the note.      Data     I have personally reviewed the following data over the past 24 hrs:    8.1; 8.1  \   11.6 (L)   / 259     141 110 (H) 18.8 /  117 (H)   4.4 23 1.01 (H) \     Trop: <6 BNP: 688 (H)     Procal: 0.02 CRP: 10.66 (H) Lactic Acid: N/A         Imaging results reviewed over the past 24 hrs:   Recent Results (from the past 24 hours)   XR Chest 1 View    Narrative    PROCEDURE:  XR CHEST 1 VIEW    HISTORY: follow up xray.    COMPARISON: Chest radiograph 6/30/2025; CT chest 7/1/2025    FINDINGS: Single view chest radiograph    Cardiomediastinal silhouette is within normal limits.  Increased prominence of patchy bilateral pulmonary opacities. Trace  pleural effusions. No pneumothorax.    No suspicious osseous lesion or subdiaphragmatic free air.      Impression    IMPRESSION:    Mildly increased  patchy bilateral pulmonary opacities, could represent  multifocal infection and/or edema.    ALICIA GUERIN MD         SYSTEM ID:  [id number]   CTA Chest with Contrast    Narrative    EXAM: CTA CHEST WITH CONTRAST, 7/1/2025 9:44 AM    HISTORY: RULE PE    COMPARISON: Chest radiograph 6/30/2025    TECHNIQUE:   Imaging protocol: Multiplanar CT images of the chest after  administration of intravenous contrast. Meds given: ISOVUE 370  54mL.  Acquisition: This CT exam was performed using one or more the  following dose reduction techniques: automated exposure control,  adjustment of the mA and/or kV according to patient size, and/or  iterative reconstruction technique.  Processing: 3D rendering on independent workstation using Maximum  Intensity Projection (MIP) was performed and archived to PACS. 3D  reconstructions are interpreted and reported by supervising  radiologist.    FINDINGS:     CHEST:    VESSELS AND HEART: There is adequate contrast bolus in the study.  Contrast bolus adequately opacifies the pulmonary arteries. No acute  pulmonary emboli to the subsegmental level. Aorta is nondilated and  demonstrates no acute abnormality. No cardiomegaly. No significant  pericardial effusion.    LUNGS: Central airways are clear. There is mild perihilar bronchial  wall thickening. Mosaic attenuation. Scattered patchy groundglass  opacities, most notable in the left suprahilar left upper lobe region.  No consolidation or mass.  PLEURA: Trace effusions. No pneumothorax.  LYMPH NODES: No enlarged lymph nodes.  THYROID: Within normal limits.    BONES AND SOFT TISSUES:  No suspicious osseous lesions. Degenerative periarticular changes and  spinal spondylosis.    UPPER ABDOMEN:  Limited evaluation of the upper abdomen demonstrates no acute  parenchymal abnormalities, nonobstructive bowel gas pattern, and no  free fluid or free air.      Impression    IMPRESSION:   No acute pulmonary emboli to the subsegmental level.    Pulmonary  parenchymal changes which could represent edema,  atelectasis, reactive airway disease, viral illness, and/or developing  bronchopneumonia.    ALICIA GUERIN MD         SYSTEM ID:  [id number]

## 2025-07-01 NOTE — PROGRESS NOTES
Medical record and Mark Anthony Score reviewed. Participated in interdisciplinary rounds.  No apparent needs noted at this time. Care Transitions will remain available if needs arise.

## 2025-07-01 NOTE — PLAN OF CARE
Goal Outcome Evaluation:      Plan of Care Reviewed With: patient    Overall Patient Progress: improving    Pt AOX4. Makes needs known. VS and assessments as charted. Reported chest pain, managed with PRN pain medications and lidocaine patch. Tele on. IV SL. PRN senna given per pt request. Up IND in room. 3 Troch sites CDI. Free of falls during shift. Call light in reach.     Face to face report given with opportunity to observe patient.    Report given to MICHELLE Montaño RN   7/1/2025

## 2025-07-02 VITALS
HEIGHT: 59 IN | WEIGHT: 142 LBS | TEMPERATURE: 97.6 F | OXYGEN SATURATION: 95 % | HEART RATE: 70 BPM | DIASTOLIC BLOOD PRESSURE: 82 MMHG | BODY MASS INDEX: 28.63 KG/M2 | SYSTOLIC BLOOD PRESSURE: 136 MMHG | RESPIRATION RATE: 16 BRPM

## 2025-07-02 DIAGNOSIS — I45.6 WPW (WOLFF-PARKINSON-WHITE SYNDROME): Primary | ICD-10-CM

## 2025-07-02 PROCEDURE — G0378 HOSPITAL OBSERVATION PER HR: HCPCS

## 2025-07-02 PROCEDURE — 99232 SBSQ HOSP IP/OBS MODERATE 35: CPT | Performed by: HOSPITALIST

## 2025-07-02 PROCEDURE — 250N000013 HC RX MED GY IP 250 OP 250 PS 637: Performed by: HOSPITALIST

## 2025-07-02 PROCEDURE — 250N000013 HC RX MED GY IP 250 OP 250 PS 637: Performed by: SURGERY

## 2025-07-02 PROCEDURE — 99238 HOSP IP/OBS DSCHRG MGMT 30/<: CPT | Performed by: SURGERY

## 2025-07-02 RX ORDER — OXYCODONE HYDROCHLORIDE 5 MG/1
2.5 TABLET ORAL EVERY 6 HOURS PRN
Qty: 12 TABLET | Refills: 0 | Status: SHIPPED | OUTPATIENT
Start: 2025-07-02

## 2025-07-02 RX ORDER — LIDOCAINE 50 MG/G
1 PATCH TOPICAL EVERY 24 HOURS
Qty: 10 PATCH | Refills: 1 | Status: SHIPPED | OUTPATIENT
Start: 2025-07-02

## 2025-07-02 RX ORDER — LIDOCAINE 4 G/G
1 PATCH TOPICAL
Qty: 10 PATCH | Refills: 0 | Status: SHIPPED | OUTPATIENT
Start: 2025-07-02 | End: 2025-07-12

## 2025-07-02 RX ORDER — OXYCODONE HYDROCHLORIDE 5 MG/1
5 TABLET ORAL EVERY 6 HOURS PRN
Qty: 12 TABLET | Refills: 0 | Status: SHIPPED | OUTPATIENT
Start: 2025-07-02 | End: 2025-07-05

## 2025-07-02 RX ADMIN — OXYCODONE HYDROCHLORIDE 10 MG: 5 TABLET ORAL at 05:34

## 2025-07-02 RX ADMIN — FUROSEMIDE 20 MG: 20 TABLET ORAL at 08:58

## 2025-07-02 RX ADMIN — LABETALOL HYDROCHLORIDE 100 MG: 100 TABLET, FILM COATED ORAL at 08:58

## 2025-07-02 RX ADMIN — IBUPROFEN 800 MG: 200 TABLET, FILM COATED ORAL at 08:58

## 2025-07-02 RX ADMIN — OXYCODONE HYDROCHLORIDE 10 MG: 5 TABLET ORAL at 12:30

## 2025-07-02 RX ADMIN — HYDROXYZINE HYDROCHLORIDE 50 MG: 25 TABLET, FILM COATED ORAL at 08:58

## 2025-07-02 ASSESSMENT — ACTIVITIES OF DAILY LIVING (ADL)
ADLS_ACUITY_SCORE: 30

## 2025-07-02 NOTE — PLAN OF CARE
"Goal Outcome Evaluation:      Plan of Care Reviewed With: patient    Overall Patient Progress: improvingOverall Patient Progress: improving    Reason for hospital stay:  pneumonitis  Living situation PTA: home  Neuro:  alert and oriented, makes needs known.  CV:  VSS, remains on tele per report magali 50's . Does report left chest discomfort but improved to 4/10.  Using lido patch to left neck she reports helps.   Respiratory:  denies shortness of breath, lung sounds diminished, no cough, on room air sats 98%  GI: on senna for constipation   :  voiding  Skin:  intact- troch sites x3- well approximated- sutures and glue  Lines/Drains:  left AC  Nutrition: no nausea, tolerating regular diet  Mobility:independent- up in room  Safety:  no falls or injuries this shift  Pain Management:  tolerable with Ibuprofen, atarax, oxy and lidocaine patches    Most recent vitals: /84   Pulse 68   Temp (!) 96.7  F (35.9  C) (Temporal)   Resp 16   Ht 1.499 m (4' 11\")   Wt 64.4 kg (142 lb)   SpO2 98%   BMI 28.68 kg/m      Face to face report given with opportunity to observe patient.    Report given to Lucinda Varghese RN   7/2/2025  7:13 AM                      "

## 2025-07-02 NOTE — DISCHARGE SUMMARY
"[facility]  Hospitalist Discharge Summary      Date of Admission:  6/30/2025  Date of Discharge:  7/2/2025  Discharging Provider: Jamee Kenney DO  Discharge Service: Hospitalist Service    Discharge Diagnoses   Pneumonitis/atelectasis    Chest pain left sided - somewhat reproducible     WPW (Delaney-Parkinson-White syndrome), HX of PSVT    PARDEEP (generalized anxiety disorder)    S/P abdominal hysterectomy  Clinically Significant Risk Factors     # Overweight: Estimated body mass index is 28.68 kg/m  as calculated from the following:    Height as of this encounter: 1.499 m (4' 11\").    Weight as of this encounter: 64.4 kg (142 lb).       Follow-ups Needed After Discharge   Follow-up Appointments       Follow-up and recommended labs and tests       Follow up with primary care provider, Trisha Fonseca, within 7 days to evaluate medication change, to evaluate treatment change, for hospital follow- up, and to follow up on results.  The following labs/tests are recommended: Follow up pcp and cardiology .                 Unresulted Labs Ordered in the Past 30 Days of this Admission       No orders found from 5/31/2025 to 7/1/2025.        These results will be followed up by Trisha Fonseca NP      Discharge Disposition   Discharged to home  Condition at discharge: Stable    Hospital Course   Patient  is a 40 year old female admitted on 6/30/2025. She established diagnosis of Delaney-Parkinson-White syndrome, help syndrome, history of preeclampsia, postpartum endometria-itis, MS, GERD, generalized anxiety,.  Patient developed left sided chest pain in PACU. Described sharp reproducible worse with breathing. She was on 2 liters of oxygen but sats 100%, not tachy but on home labetalol. Patient was given 175mcg of fentanyl in PACU for abdominal pain after that she developed the chest pain. Trops X 2 negative. EKG sinus magali , no acute st elevations or depressions. Chest xray done showing  Left thoracic opacity " suggesting localized pneumonia/consolidation in the superior segment of the left lower lobe. Her chest pain was somehwat reproducible with pressure.  I did speak with Art HALL and patient did not aspirate in OR. She did receive  fentanyl in PACU and did have depressed lung sounds thought to be 2nd from medication but not desating. She was not given narcan as post op. She was instructed to use IS and RT was consulted. She remained afebrile.  Suspected possible mucous plug vs atelectasis.on 7/1 she continued  to have chest pain with neck pain CTA chest was done,  PE rulled out. I discussed with Dr Goldman radiology and there was mentioned of parenchymal change in left lung is likely inflammation just unclear what's its secondary from. Could be medication related from anesthesia.?  Trops remained negative X2. Mild BNP elevation 688, home lasix was restarted. Her  wbc,    procal , have been normal, CRP down trending. Telemetry has showed some PSVT at times up to 140's lasting for couple beats, mostly at night. She has had some palpations at night but vitals remained stable. I spoke with Dr Hilaria Morales EP at [facility] and no further recs as EKG is normal. I did reach out to Dr Jorgensen   cardiology team in [place]  and plan on out patient follow up with further stress test and zio patch.  Patient was in agreement with plan and care.    Consultations This Hospital Stay   HOSPITALIST IP CONSULT  RESPIRATORY CARE IP CONSULT    Code Status   Full Code    Time Spent on this Encounter   I, Jamee Kenney DO, personally saw the patient today and spent greater than 30 minutes discharging this patient.       Jamee Kenney DO  [facility]  [address]  [facility] [address]  Phone: [phone]  Fax: [phone]  ______________________________________________________________________    Physical Exam   Vital Signs: Temp: 97.6  F (36.4  C) Temp src: Tympanic BP: 136/82 Pulse: 70   Resp: 16 SpO2: 95 % O2  Device: None (Room air)    Weight: 142 lbs 0 oz  HENT:      Right Ear: External ear normal.      Left Ear: External ear normal.      Nose: Nose normal.      Mouth/Throat:      Pharynx: Oropharynx is clear.   Eyes:      Conjunctiva/sclera: Conjunctivae normal.   Cardiovascular:      Rate and Rhythm: Normal rate.   Pulmonary:      Effort: Pulmonary effort is normal.   Abdominal:      General: Abdomen is flat.   Musculoskeletal:         General: No swelling.   Skin:     Coloration: Skin is not jaundiced.   Neurological:      Mental Status: She is alert. Mental status is at baseline.   Primary Care Physician   Trisha Fonseca    Discharge Orders      Adult Cardiology Eval  Referral      When to call - Contact Surgeon Team    You may experience symptoms that require follow-up before your scheduled appointment. Contact your Surgeon Team if you are concerned about pain control, large amount of bleeding, blood clots, constipation, or if you experience signs of infection (fever, growing tenderness at the surgery site, a large amount of drainage, severe pain, foul-smelling drainage, redness or swelling.     When to call - Reach out to Urgent Care    If you are experiencing uncontrolled Nausea and Vomiting, uncontrolled pain, inability to urinate and uncomfortable, and in need of immediate care, and you are NOT able to reach your Surgeon Team, go to an Urgent Care clinic. Do NOT go to the Emergency Room unless you have shortness of breath, chest pain, or other signs of a medical emergency.     When to call - Reasons to Call 911    Call 911 immediately if you experience sudden-onset chest pain, arm weakness/numbness, slurred speech, or shortness of breath     Symptoms - Fever Management    A low grade fever can be expected after surgery. Your Provider many have prescribed an Opioid pain medication that also contains acetaminophen (TYLENOL) that may help with Fever management.  Do NOT take additional acetaminophen  (TYLENOL) in combination with an Opioid/acetaminophen (TYLENOL) product. Read the labels on your Over The Counter (OTC) medications with care.     Symptoms - Reduced Urine Output    If it has been greater than 8 hours since you have urinated despite drinking plenty of water, call your Surgeon Team.     No driving or operating machinery    Do NOT drive any vehicle or operate mechanical equipment for 24 hours following the end of your surgery.  Even though you may feel normal, your reactions may be affected by Anesthesia medication you received.     No Alcohol    Do NOT drink alcoholic beverages for 24 hours following your surgery and while taking pain medications.     Diet Instructions    Follow your surgeon's orders for any diet restrictions.  If you did not receive any diet restrictions, you may drink clear liquids (apple juice, ginger ale, 7-up, broth, etc.), and progress to your regular diet as you feel able. It is important to stay well-hydrated after surgery and drink plenty of water.     Shower/Bathing - No restrictions, may shower after 24 hours    Shower/Bathing - No restrictions, may shower after 24 hours.     NO Dressing / Wound Care - Wound     Incision Care    Keep dressing clean and dry, change as instructed by Provider or RN. Wash your hands with soap and warm water before you touch the site of surgery. If you have skin tapes (steri-strips) on your surgical site, leave them on the surgical site until they fall off on their own (typically 7-10 days). If you have stitches under the skin, they will dissolve, or your doctor will give you instructions on when to return to their office for removal. Unless directed otherwise, remove dressing, if present, the day after surgery and leave open to air. If Dermabond (a type of skin glue) is present, leave in place until it wears/flakes off (2-3 weeks).     Discharge Instructions - Comfort and Pain Management    Pain after surgery is normal and expected. You will  have some amount of pain after surgery. Your pain will improve with time. There are several things you can do to help reduce your pain including: rest, ice, and using pain medications as needed. Use pain interventions and don't wait until pain level is out of control. Contact your Surgeon Team if you have pain that persists or worsens after surgery despite rest, ice, and taking your medication(s) as prescribed. You may have a dry mouth, a sore throat, muscles aches or trouble sleeping, and these symptoms should go away after 24 hours.     Discharge Instructions - Rest    Rest and relax for the next 24 hours. Make arrangements to have someone stay with you overnight, and avoid hazardous and strenuous activities.  Do NOT make any important decisions for the next 24 hours.     Discharge Instructions - Follow-up Appointment (Weeks)    Patient to follow up with appointment with Marlene Chinchilla in 17-21 days     No lifting    No lifting over 10 pounds and no strenuous physical activity for 6 weeks.     May return to work (WITH restrictions)    May return to work in 2 week(s) with the following restrictions: No lifting for 2 weeks     Reason for your hospital stay    Patient  is a 40 year old female admitted on 6/30/2025. She established diagnosis of Delaney-Parkinson-White syndrome, help syndrome, history of preeclampsia, postpartum endometria-itis, MS, GERD, generalized anxiety,.  Patient developed left sided chest pain in PACU. Described sharp reproducible worse with breathing. She was on 2 liters of oxygen but sats 100%, not tachy but on home labetalol. Patient was given 175mcg of fentanyl in PACU for abdominal pain after that she developed the chest pain. Trops X 2 negative. EKG sinus magali , no acute st elevations or depressions. Chest xray done showing  Left thoracic opacity suggesting localized pneumonia/consolidation in the superior segment of the left lower lobe. Her chest pain was somehwat reproducible with  pressure.  I did speak with Art HALL and patient did not aspirate in OR. She did receive  fentanyl in PACU and did have depressed lung sounds thought to be 2nd from medication but not desating. She was not given narcan as post op. She was instructed to use IS and RT was consulted. She remained afebrile.  Suspected possible mucous plug vs atelectasis.on 7/1 she continued  to have chest pain with neck pain CTA chest was done,  PE rulled out. I discussed with Dr Goldman radiology and there was mentioned of parenchymal change in left lung is likely inflammation just unclear what's its secondary from. Could be medication related from anesthesia.?  Trops remained negative X2. Mild BNP elevation 688, home lasix was restarted. Her  wbc,    procal , have been normal, CRP down trending. Telemetry has showed some PSVT at times up to 140's lasting for couple beats, mostly at night. She has had some palpations at night but vitals remained stable. I spoke with Dr Hilaria Morales EP at [facility] and no further recs as EKG is normal. I did reach out to Dr Jorgensen   cardiology team in [place]  and plan on out patient follow up with further stress test and zio patch.  Patient was in agreement with plan and care.     Follow-up and recommended labs and tests     Follow up with primary care provider, Trisha Fonseca, within 7 days to evaluate medication change, to evaluate treatment change, for hospital follow- up, and to follow up on results.  The following labs/tests are recommended: Follow up pcp and cardiology .     Activity    Your activity upon discharge: activity as tolerated     Diet    Follow this diet upon discharge: Current Diet:Orders Placed This Encounter      Regular Diet Adult       Significant Results and Procedures   Most Recent 3 CBC's:  Recent Labs   Lab Test 07/01/25  0556 06/30/25  1037 05/13/25  1104   WBC 8.1  8.1 5.6 5.6   HGB 11.6* 12.6 13.0   MCV 93  92 93 92    257 282     Most Recent 3 BMP's:  Recent Labs    Lab Test 07/01/25  0556 06/30/25  1037 05/13/25  1104    141 137   POTASSIUM 4.4 4.2 3.9   CHLORIDE 110* 107 104   CO2 23 20* 22   BUN 18.8 16.7 16.5   CR 1.01* 0.99* 1.08*   ANIONGAP 8 14 11   MARVIN 9.0 9.2 9.6   * 94 85   ,   Results for orders placed or performed during the hospital encounter of 06/30/25   XR Chest 1 View    Narrative    Procedure:XR CHEST 1 VIEW    Clinical history:Female, 40 years, chest pain    Technique: Single view was obtained.    Comparison: No relevant prior imaging.    Findings: The cardiac silhouette is within normal limits.    The lungs demonstrate increased density in the midportion of the left  lung suggesting possibility of a localized pneumonia in the superior  segment of the left lower lobe. Bony structures are unremarkable.      Impression    Impression:   Left thoracic opacity suggesting localized pneumonia/consolidation in  the superior segment of the left lower lobe.    ERMELINDA SEN MD         SYSTEM ID:  [id number]   XR Chest 1 View    Narrative    PROCEDURE:  XR CHEST 1 VIEW    HISTORY: follow up xray.    COMPARISON: Chest radiograph 6/30/2025; CT chest 7/1/2025    FINDINGS: Single view chest radiograph    Cardiomediastinal silhouette is within normal limits.  Increased prominence of patchy bilateral pulmonary opacities. Trace  pleural effusions. No pneumothorax.    No suspicious osseous lesion or subdiaphragmatic free air.      Impression    IMPRESSION:    Mildly increased patchy bilateral pulmonary opacities, could represent  multifocal infection and/or edema.    ALICIA GUERIN MD         SYSTEM ID:  [id number]   CTA Chest with Contrast    Narrative    EXAM: CTA CHEST WITH CONTRAST, 7/1/2025 9:44 AM    HISTORY: RULE PE    COMPARISON: Chest radiograph 6/30/2025    TECHNIQUE:   Imaging protocol: Multiplanar CT images of the chest after  administration of intravenous contrast. Meds given: ISOVUE 370  54mL.  Acquisition: This CT exam was performed using one or more  the  following dose reduction techniques: automated exposure control,  adjustment of the mA and/or kV according to patient size, and/or  iterative reconstruction technique.  Processing: 3D rendering on independent workstation using Maximum  Intensity Projection (MIP) was performed and archived to PACS. 3D  reconstructions are interpreted and reported by supervising  radiologist.    FINDINGS:     CHEST:    VESSELS AND HEART: There is adequate contrast bolus in the study.  Contrast bolus adequately opacifies the pulmonary arteries. No acute  pulmonary emboli to the subsegmental level. Aorta is nondilated and  demonstrates no acute abnormality. No cardiomegaly. No significant  pericardial effusion.    LUNGS: Central airways are clear. There is mild perihilar bronchial  wall thickening. Mosaic attenuation. Scattered patchy groundglass  opacities, most notable in the left suprahilar left upper lobe region.  No consolidation or mass.  PLEURA: Trace effusions. No pneumothorax.  LYMPH NODES: No enlarged lymph nodes.  THYROID: Within normal limits.    BONES AND SOFT TISSUES:  No suspicious osseous lesions. Degenerative periarticular changes and  spinal spondylosis.    UPPER ABDOMEN:  Limited evaluation of the upper abdomen demonstrates no acute  parenchymal abnormalities, nonobstructive bowel gas pattern, and no  free fluid or free air.      Impression    IMPRESSION:   No acute pulmonary emboli to the subsegmental level.    Pulmonary parenchymal changes which could represent edema,  atelectasis, reactive airway disease, viral illness, and/or developing  bronchopneumonia.    ALICIA GUERIN MD         SYSTEM ID:  [id number]       Discharge Medications      Review of your medicines        START taking        Dose / Directions   HYDROcodone-acetaminophen 5-325 MG tablet  Commonly known as: NORCO  Used for: S/P abdominal hysterectomy      Dose: 1 tablet  Take 1 tablet by mouth every 6 hours as needed for pain.  Quantity: 18  tablet  Refills: 0     Lidocaine 4 % Patch  Commonly known as: LIDOCARE  Used for: S/P abdominal hysterectomy      Dose: 1 patch  Place 1 patch over 12 hours onto the skin nightly as needed. To prevent lidocaine toxicity, patient should be patch free for 12 hrs daily.  Quantity: 10 patch  Refills: 0     oxyCODONE 5 MG tablet  Commonly known as: ROXICODONE  Used for: S/P abdominal hysterectomy      Dose: 2.5 mg  Take 0.5 tablets (2.5 mg) by mouth every 6 hours as needed for pain.  Quantity: 12 tablet  Refills: 0            CONTINUE these medicines which have NOT CHANGED        Dose / Directions   cloNIDine 0.1 MG tablet  Commonly known as: CATAPRES  Used for: Essential hypertension      Dose: 0.1 mg  Take 1 tablet (0.1 mg) by mouth at bedtime.  Quantity: 90 tablet  Refills: 3     furosemide 20 MG tablet  Commonly known as: LASIX  Used for: Essential hypertension      Dose: 20 mg  Take 1 tablet (20 mg) by mouth daily.  Quantity: 90 tablet  Refills: 3     hydrOXYzine HCl 50 MG tablet  Commonly known as: ATARAX  Used for: Acute post-operative pain      Dose: 50 mg  Take 1 tablet (50 mg) by mouth every 8 hours as needed for other or anxiety (adjuvant pain).  Quantity: 40 tablet  Refills: 1     ibuprofen 800 MG tablet  Commonly known as: ADVIL/MOTRIN  Used for: Post-operative state      Dose: 800 mg  Take 1 tablet (800 mg) by mouth every 8 hours as needed for moderate pain.  Quantity: 40 tablet  Refills: 1     labetalol 100 MG tablet  Commonly known as: NORMODYNE  Used for: Essential hypertension      Dose: 100 mg  Take 1 tablet (100 mg) by mouth 2 times daily.  Quantity: 60 tablet  Refills: 2     omeprazole 40 MG DR capsule  Commonly known as: PriLOSEC      Dose: 40 mg  Take 40 mg by mouth 2 times daily as needed (heartburn).  Refills: 0     traZODone 50 MG tablet  Commonly known as: DESYREL  Used for: Insomnia, unspecified type      Dose: 100 mg  Take 2 tablets (100 mg) by mouth at bedtime.  Quantity: 180  tablet  Refills: 1               Where to get your medicines        These medications were sent to [facility] [place] - [address]  [address] [place] [address]      Phone: [phone]   HYDROcodone-acetaminophen 5-325 MG tablet  Lidocaine 4 % Patch  oxyCODONE 5 MG tablet       Allergies   Allergies   Allergen Reactions    Zestril [Lisinopril] Anaphylaxis and Fatigue    Amlodipine Swelling    Cymbalta [Duloxetine Hcl]     Hydrochlorothiazide     Magnesium Other (See Comments)     Altered mental status after IV dosing    Other Environmental Allergy     Prozac [Fluoxetine]     Losartan Difficulty breathing, Hives, Itching, Rash, Swelling and Visual Disturbance

## 2025-07-02 NOTE — PLAN OF CARE
Goal Outcome Evaluation:      Plan of Care Reviewed With: patient    Overall Patient Progress: improving      Patient discharged at approximately 1250 via ambulation accompanied by staff. Prescriptions sent to patients preferred pharmacy. All belongings sent with patient.     Discharge instructions reviewed with pt. Listed belongings gathered and returned to patient. Pt educated following up with primary Trisha Fonseca NP. Pt also educated on Cardiology follow up set up as well. Pt verbalized understanding.     Patient discharged to home.     Surgical Patient   Surgical Procedures during stay: Yes, wound exploration times 3 from previous surgery  Did patient receive discharge instruction on wound care and recognition of infection symptoms? Yes    MISC  Follow up appointment made:  Yes  Home medications returned to patient: N/A  Patient reports pain was well managed at discharge: Yes Reported 4/10 chest pain, managed well with PRN pain medications. Lidocaine patch removed per orders. Pt educated on lidocaine patches, how long to have on and off. Pt verbalized understanding.

## 2025-07-02 NOTE — DISCHARGE SUMMARY
INPATIENT DISCHARGE SUMMARY  7/2/2025    Patient'S Name: Sarah Ugalde    Admitting Physician of Record: Terrance Cruz MD    Discharging Physician: Terrance Cruz MD    Date of admission: 6/30/2025     Date of discharge: 7/2/2025    Admitting diagnosis: Wound dehiscence [T81.30XA], postoperative chest pain    Discharge diagnosis: Same    Procedures: Procedure(s):  Wound exploration times three, abdominal trocar sites    Consultants: Medicine    Hospital course: The patient was admitted to the hospital and taken to the operating room and underwent an Procedure(s):  Wound exploration times three, abdominal trocar sites.  The patient tolerated the procedure(s) well.  In recovery the patient started complaining of chest pain.  Medicine was consulted and recommended admission and observation.  The patient was admitted and observed and a workup for cardiac and pulmonary causes of her chest pain was undertaken.  The only abnormality found was pulmonary parenchymal changes in the left lung which could represent edema, atelectasis, reactive airway disease, viral illness, and/or developing bronchopneumonia.  She also did have some runs of tachycardia and given her history of WPW medicine discussed the case with electrophysiology at the [facility].  At this point medicine feels the patient is stable to be discharged home and the patient will be discharged home.    Discharge instructions include:    Patient will be discharged to Home   Diet: Current Diet:  Active Diet and Nourishment Order   Procedures    Regular Diet Adult    Diet         Activity : no lifting restrictions   Follow-up:     The patient will follow up with her primary care provider in 1 weeks.      The patient will follow up with me in 2 weeks.   Medications include:    All prior medications    Lidocaine patch

## 2025-07-02 NOTE — PROGRESS NOTES
INPATIENT ROUNDING NOTE  7/2/2025    Patient: Sarah ATKINSON [facility]    Physician of Record: Terrance Cruz MD    Admitting diagnosis: Wound dehiscence [T81.30XA]    Procedure(s):  Wound exploration times three, abdominal trocar sites     POD: 2 Days Post-Op    Current Diet: Regular    CURRENT MEDICATIONS:  Continuous Medications:  Current Facility-Administered Medications   Medication Dose Route Frequency Provider Last Rate Last Admin    cloNIDine (CATAPRES) tablet 0.1 mg  0.1 mg Oral At Bedtime Yezlizabeth, Jamee Carlgenivich, DO        furosemide (LASIX) tablet 20 mg  20 mg Oral Daily Yezhikevin, Jamee Moodyivich, DO   20 mg at 07/01/25 0918    hydrOXYzine HCl (ATARAX) tablet 50 mg  50 mg Oral Q8H PRN Pablito, Jamee Carlgenivich, DO   50 mg at 07/01/25 2127    ibuprofen (ADVIL/MOTRIN) tablet 800 mg  800 mg Oral Q8H PRN Pablito, Jamee Moodyivich, DO   800 mg at 07/01/25 2127    labetalol (NORMODYNE) tablet 100 mg  100 mg Oral BID Isaaczhikevin, Maryijoe Carlgenivich, DO   100 mg at 07/01/25 2007    Lidocaine (LIDOCARE) 4 % Patch 1 patch  1 patch Transdermal At Bedtime PRN Pablito, Jamee Moodyivich, DO   1 patch at 07/01/25 2006    naloxone (NARCAN) injection 0.2 mg  0.2 mg Intravenous Q2 Min PRN Terrance Cruz MD        Or    naloxone (NARCAN) injection 0.4 mg  0.4 mg Intravenous Q2 Min PRTerrance Jim MD        Or    naloxone (NARCAN) injection 0.2 mg  0.2 mg Intramuscular Q2 Min PRTerrance Jim MD        Or    naloxone (NARCAN) injection 0.4 mg  0.4 mg Intramuscular Q2 Min PRTerrance Jim MD        ondansetron (ZOFRAN ODT) ODT tab 4 mg  4 mg Oral Q6H PRN Jamee Kenney, DO        Or    ondansetron (ZOFRAN) injection 4 mg  4 mg Intravenous Q6H PRN Jamee Kenney DO        oxyCODONE (ROXICODONE) tablet 10 mg  10 mg Oral Q6H PRN Terrance Cruz MD   10 mg at 07/02/25 0534    pantoprazole (PROTONIX) EC tablet 40 mg  40 mg Oral BID  Terrance Tucker MD        prochlorperazine (COMPAZINE) injection 10 mg  10 mg Intravenous Q6H PRN Pablito, Jamee Barrios, DO        Or    prochlorperazine (COMPAZINE) tablet 10 mg  10 mg Oral Q6H PRN Pablito, Jamee Barrios, DO        senna-docusate (SENOKOT-S/PERICOLACE) 8.6-50 MG per tablet 1 tablet  1 tablet Oral BID PRN Pablito, Jamee Barrios, DO   1 tablet at 07/01/25 0920    Or    senna-docusate (SENOKOT-S/PERICOLACE) 8.6-50 MG per tablet 2 tablet  2 tablet Oral BID PRN Jamee Kenney, DO   2 tablet at 06/30/25 2136    traZODone (DESYREL) tablet 100 mg  100 mg Oral At Bedtime Jamee Kenney, DO   100 mg at 07/01/25 2127       Scheduled Medications:  Current Facility-Administered Medications   Medication Dose Route Frequency Provider Last Rate Last Admin    cloNIDine (CATAPRES) tablet 0.1 mg  0.1 mg Oral At Bedtime Jamee Kenney, DO        furosemide (LASIX) tablet 20 mg  20 mg Oral Daily Jamee Kenney, DO   20 mg at 07/01/25 0918    hydrOXYzine HCl (ATARAX) tablet 50 mg  50 mg Oral Q8H PRN Jamee Kenney, DO   50 mg at 07/01/25 2127    ibuprofen (ADVIL/MOTRIN) tablet 800 mg  800 mg Oral Q8H PRN Jamee Kenneyich, DO   800 mg at 07/01/25 2127    labetalol (NORMODYNE) tablet 100 mg  100 mg Oral BID Jamee Kenney, DO   100 mg at 07/01/25 2007    Lidocaine (LIDOCARE) 4 % Patch 1 patch  1 patch Transdermal At Bedtime PRN Jamee Kenney, DO   1 patch at 07/01/25 2006    naloxone (NARCAN) injection 0.2 mg  0.2 mg Intravenous Q2 Min PRTerrance Jim MD        Or    naloxone (NARCAN) injection 0.4 mg  0.4 mg Intravenous Q2 Min PRTerrance Jim MD        Or    naloxone (NARCAN) injection 0.2 mg  0.2 mg Intramuscular Q2 Min PRTerrance Jim MD        Or    naloxone (NARCAN) injection 0.4 mg  0.4 mg Intramuscular Q2 Min PIETER Cruz  Terrance More MD        ondansetron (ZOFRAN ODT) ODT tab 4 mg  4 mg Oral Q6H PRN Jamee Kenney DO        Or    ondansetron (ZOFRAN) injection 4 mg  4 mg Intravenous Q6H PRN Jamee Kenney,         oxyCODONE (ROXICODONE) tablet 10 mg  10 mg Oral Q6H PRN Terrance Cruz MD   10 mg at 07/02/25 0534    pantoprazole (PROTONIX) EC tablet 40 mg  40 mg Oral BID PRN Terrance Cruz MD        prochlorperazine (COMPAZINE) injection 10 mg  10 mg Intravenous Q6H PRN Jamee Kenney DO        Or    prochlorperazine (COMPAZINE) tablet 10 mg  10 mg Oral Q6H PRN Jamee Kenney,         senna-docusate (SENOKOT-S/PERICOLACE) 8.6-50 MG per tablet 1 tablet  1 tablet Oral BID PRN Jamee Kenney, DO   1 tablet at 07/01/25 0920    Or    senna-docusate (SENOKOT-S/PERICOLACE) 8.6-50 MG per tablet 2 tablet  2 tablet Oral BID PRN Jamee Kenney, DO   2 tablet at 06/30/25 2136    traZODone (DESYREL) tablet 100 mg  100 mg Oral At Bedtime Jamee Kenney DO   100 mg at 07/01/25 2127       PRN Medications:  Current Facility-Administered Medications   Medication Dose Route Frequency Provider Last Rate Last Admin    cloNIDine (CATAPRES) tablet 0.1 mg  0.1 mg Oral At Bedtime Jamee Kenney DO        furosemide (LASIX) tablet 20 mg  20 mg Oral Daily Jamee Kenney,    20 mg at 07/01/25 0918    hydrOXYzine HCl (ATARAX) tablet 50 mg  50 mg Oral Q8H PRN Jamee Kenney, DO   50 mg at 07/01/25 2127    ibuprofen (ADVIL/MOTRIN) tablet 800 mg  800 mg Oral Q8H PRN Jamee Kenney, DO   800 mg at 07/01/25 2127    labetalol (NORMODYNE) tablet 100 mg  100 mg Oral BID Jamee Kenney, DO   100 mg at 07/01/25 2007    Lidocaine (LIDOCARE) 4 % Patch 1 patch  1 patch Transdermal At Bedtime PRN Jamee Kenney DO   1 patch at 07/01/25 2006     "naloxone (NARCAN) injection 0.2 mg  0.2 mg Intravenous Q2 Min PRTerrance Jim MD        Or    naloxone (NARCAN) injection 0.4 mg  0.4 mg Intravenous Q2 Min PRTerrance Jim MD        Or    naloxone (NARCAN) injection 0.2 mg  0.2 mg Intramuscular Q2 Min PRTerrance Jim MD        Or    naloxone (NARCAN) injection 0.4 mg  0.4 mg Intramuscular Q2 Min PRTerrance Jim MD        ondansetron (ZOFRAN ODT) ODT tab 4 mg  4 mg Oral Q6H PRN Jamee Kenney,         Or    ondansetron (ZOFRAN) injection 4 mg  4 mg Intravenous Q6H PRN Jamee Kenney, DO        oxyCODONE (ROXICODONE) tablet 10 mg  10 mg Oral Q6H PRN Terrance Cruz MD   10 mg at 07/02/25 0534    pantoprazole (PROTONIX) EC tablet 40 mg  40 mg Oral BID PRN Terrance Cruz MD        prochlorperazine (COMPAZINE) injection 10 mg  10 mg Intravenous Q6H PRN Jamee Kenney,         Or    prochlorperazine (COMPAZINE) tablet 10 mg  10 mg Oral Q6H PRN Jamee Kenney, DO        senna-docusate (SENOKOT-S/PERICOLACE) 8.6-50 MG per tablet 1 tablet  1 tablet Oral BID PRN Jamee Kenney, DO   1 tablet at 07/01/25 0920    Or    senna-docusate (SENOKOT-S/PERICOLACE) 8.6-50 MG per tablet 2 tablet  2 tablet Oral BID PRN Pablito, Jamee Barrios, DO   2 tablet at 06/30/25 2136    traZODone (DESYREL) tablet 100 mg  100 mg Oral At Bedtime Jamee Kenney, DO   100 mg at 07/01/25 2127       SUBJECTIVE:   Nausea: No. Vomiting: No. Fever: No. Chills: No. Tolerating current diet: Yes.      PHYSICAL EXAM:   Vital signs: /82 (BP Location: Right arm, Patient Position: Semi-Jorgensen's, Cuff Size: Adult Regular)   Pulse 70   Temp 97.6  F (36.4  C)   Resp 16   Ht 1.499 m (4' 11\")   Wt 64.4 kg (142 lb)   SpO2 95%   BMI 28.68 kg/m     BMI: Body mass index is 28.68 kg/m .   General: Normal, healthy, cooperative, in no acute distress, " alert   Lungs: respirations are non-labored   Abdominal: non-distended   Wound: clean, dry, intact   Extremities: No cyanosis, clubbing or edema noted bilaterally in Upper and Lower Extremities   Neurological: without deficit    INPUT/OUTPUT:      Intake/Output Summary (Last 24 hours) at 7/2/2025 0850  Last data filed at 7/1/2025 1315  Gross per 24 hour   Intake 240 ml   Output --   Net 240 ml       I/O last 3 completed shifts:  In: 240 [P.O.:240]  Out: -     LABS:    Last CBC Rrsults:   Recent Labs   Lab Test 07/01/25  0556 06/30/25  1037 05/13/25  1104   WBC 8.1  8.1 5.6 5.6   RBC 3.86 4.14 4.26   HGB 11.6* 12.6 13.0   HCT 35.4 38.6 39.3   MCV 93  92 93 92   MCH 30.1 30.4 30.5   MCHC 32.8 32.6 33.1   RDW 12.8 12.9 13.5    257 282       Last Comprehensive Metabolic panel:  Recent Labs   Lab Test 07/01/25  0556 06/30/25  1037 05/13/25  1104 04/18/24  1035 04/12/24  1122 08/04/23  1613 08/03/23  1506    141 137   < > 140 140 142   POTASSIUM 4.4 4.2 3.9   < > 3.8 3.8 3.6   CHLORIDE 110* 107 104   < > 102 104 103   CO2 23 20* 22   < > 23 22 23   ANIONGAP 8 14 11   < > 15 14 16*   * 94 85   < > 97 108* 94   BUN 18.8 16.7 16.5   < > 13.3 13.6 13.4   CR 1.01* 0.99* 1.08*   < > 1.08* 0.94 0.91   GFRESTIMATED 72 74 66   < > 67 79 82   MARVIN 9.0 9.2 9.6   < > 9.3 9.7 9.5   BILITOTAL  --   --   --   --  0.5 0.2 <0.2   ALKPHOS  --   --   --   --  96 132* 137*   ALT  --   --   --   --  19 25 27   AST  --   --   --   --  24 23 22    < > = values in this interval not displayed.       Recent Labs   Lab Test 06/30/25  1037 04/12/24  1122 08/04/23  1613 08/03/23  1506 07/29/23  0604 07/28/23  1556   MAG 2.0  --   --   --  3.2* 7.2*   ALBUMIN  --  4.7 3.7 3.8  --   --        ASSESSMENT:    2 Days Post-Op from Procedure(s):  Wound exploration times three, abdominal trocar sites.      PLAN:   Discharge home later today

## 2025-07-02 NOTE — PROGRESS NOTES
Assessment & Plan     (Z09) Hospital discharge follow-up  (primary encounter diagnosis)  (T81.30XA) Wound dehiscence  Comment: wound feeling better  Plan: Will f/up with surgeon as scheduled.     (I45.6) WPW (Delaney-Parkinson-White syndrome)  Comment: declined EKG today  Plan: Has follow-up with cardiology scheduled. Zio-patch has already been ordered.     Agrees to go to the ER with any new or worsening symptoms.     (R07.9) Chest pain, unspecified type  (R06.00) Dyspnea, unspecified type  Comment: GRIFFIN had worsened since being in the hospital  Plan: CBC with platelets and differential,         Comprehensive metabolic panel (BMP + Alb, Alk         Phos, ALT, AST, Total. Bili, TP),         Echocardiogram Complete, NT-proBNP, CTA Chest         with Contrast, CANCELED: CTA Chest with         Contrast, CANCELED: EKG 12-lead complete w/read        - (Clinic Performed)        Labs and repeat chest CTA ordered. No PE seen. Blood work, including BNP, unremarkable. Echo ordered. Will f/up with cardiology as scheduled.     Agrees to go to the ER with new or worsening symptoms.     (R07.89) Chest wall pain  Comment: she does have pain when left chest wall is palpated, possible MSK component  Plan: cyclobenzaprine (FLEXERIL) 5 MG tablet,         oxyCODONE-acetaminophen (PERCOCET) 5-325 MG         tablet        Flexeril and a small amount of percocet were ordered.     If symptoms worsen, she agrees to go to the ER.     (N18.2) CKD (chronic kidney disease) stage 2, GFR 60-89 ml/min  Plan: Kidney function a bit decreased today. Encouraged to push fluids.     MED REC REQUIRED  Post Medication Reconciliation Status:  Discharge medications reconciled, continue medications without change    The longitudinal plan of care for the diagnosis(es)/condition(s) as documented were addressed during this visit. Due to the added complexity in care, I will continue to support Sarah in the subsequent management and with ongoing continuity of  "care.    Paty Smith is a 40 year old, presenting for the following health issues:  Hospital F/U    [facility]        Hospital Follow-up Visit:    Hospital/Nursing Home/IP Rehab Facility: [facility]  Most Recent Admission Date: 6/30/2025   Most Recent Admission Diagnosis: Wound dehiscence - T81.30XA     Most Recent Discharge Date: 7/2/2025   Most Recent Discharge Diagnosis: S/P abdominal hysterectomy - Z90.710  WPW (Delaney-Parkinson-White syndrome) - I45.6  Other chest pain - R07.89   Do you have any other stressors you would like to discuss with your provider? No    Problems taking medications regularly:  None  Medication changes since discharge: None  Problems adhering to non-medication therapy:  None    Summary of hospitalization:  [facility] discharge summary reviewed    Per Discharge Note,    \"Hospital course: The patient was admitted to the hospital and taken to the operating room and underwent an Procedure(s):  Wound exploration times three, abdominal trocar sites.  The patient tolerated the procedure(s) well.  In recovery the patient started complaining of chest pain.  Medicine was consulted and recommended admission and observation.  The patient was admitted and observed and a workup for cardiac and pulmonary causes of her chest pain was undertaken.  The only abnormality found was pulmonary parenchymal changes in the left lung which could represent edema, atelectasis, reactive airway disease, viral illness, and/or developing bronchopneumonia.  She also did have some runs of tachycardia and given her history of WPW medicine discussed the case with electrophysiology at the [facility].  At this point medicine feels the patient is stable to be discharged home and the patient will be discharged home.\"      Diagnostic Tests/Treatments reviewed.  Follow up needed: none  Other Healthcare Providers Involved in Patient s Care:         cardiology and surgery  Update since discharge: Today " "she notes that she is feeling better. Abdominal wound is healing. Denies any pain over her abdomen. No fevers. Drinking well. Poor appetite. Lower leg swelling controlled with lasix.     Continues to have intermittent chest pain with tachycardia. Rates it at a 7/10. Also has associated shortness of breath. Feels she can't take a deep breath. Has a follow-up with cardiology scheduled. Stress test and Zio-patch ordered.     Recent chest CT, \"Pulmonary parenchymal changes which could represent edema, atelectasis, reactive airway disease, viral illness, and/or developing bronchopneumonia.\" No PE seen. Feeling well without cough or cold like symptoms.      Plan of care communicated with patient         Review of Systems  Constitutional, HEENT, cardiovascular, pulmonary, gi and gu systems are negative, except as otherwise noted.      Objective    /82 (BP Location: Left arm, Patient Position: Sitting, Cuff Size: Adult Regular)   Pulse 82   Temp 97.8  F (36.6  C) (Tympanic)   Resp 16   Wt 64.8 kg (142 lb 14.4 oz)   SpO2 99%   BMI 28.86 kg/m    Body mass index is 28.86 kg/m .  Physical Exam   GENERAL: alert and no distress  EYES: Eyes grossly normal to inspection, PERRL and conjunctivae and sclerae normal  HENT: ear canals and TM's normal, nose and mouth without ulcers or lesions  NECK: no adenopathy, no asymmetry, masses, or scars  RESP: lungs clear to auscultation - no rales, rhonchi or wheezes, diminished throughout all bases  CV: regular rate and rhythm, no murmur, click or rub, no peripheral edema     I am able to reproduce the pain when her left chest and upper trapezius muscle is palpated.   ABDOMEN: soft, nontender, no hepatosplenomegaly, no masses and bowel sounds normal  MS: no gross musculoskeletal defects noted, no edema  NEURO: Normal strength and tone, mentation intact and speech normal  PSYCH: mentation appears normal, affect normal/bright    Results for orders placed or performed during the " hospital encounter of 07/08/25   CTA Chest with Contrast     Status: None    Narrative    Exam:  CTA CHEST WITH CONTRAST    Exam reason:  r/o PE, worsening shortness of breath; Chest pain,  unspecified type; Dyspnea, unspecified type    Technique:  Contrast enhanced helical CT pulmonary angiography was  performed. Sagittal and coronal reformats as well as coronal MIP  reformats were obtained. This CT was performed using one or more of  the following dose reduction techniques: automated exposure control,  adjustment of the mA and/or kV according to patient size, and/or use  of iterative reconstruction technique.    Meds/Contrast: ISOVUE 370  54mL    Comparison:  7/1/2025, 6/30/2025    FINDINGS:    Technical quality: Diagnostic.    Cardiovascular:   -There are no filling defects in the pulmonary arteries that would  indicate pulmonary embolism.  -No aortic aneurysm.  -The main pulmonary artery is normal caliber.  Lungs/Airways: There are a couple scattered groundglass opacities in  the lower lobes and the left upper lobe (for example series 6 image  74), improved since 7/1/2025 and likely due to an infectious or  inflammatory process. No mass or concerning pulmonary nodules.  Rosie/Mediastinum: No adenopathy or mass.  Pleura: No pleural effusion or pneumothorax.  Chest Wall/Axilla: Unremarkable.    Visualized Upper Abdomen: No acute findings in the visualized upper  abdomen.  Musculoskeletal: No acute osseous abnormalities.      Impression    IMPRESSION:    No pulmonary embolism.    Interval improvement in the scattered opacities in the lungs, with  minimal residual scattered groundglass opacities in the lower lobes  and the left upper lobe, likely infectious or inflammatory.    CHARLENE GILBERT MD         SYSTEM ID:  [id number]   Results for orders placed or performed in visit on 07/08/25   Comprehensive metabolic panel (BMP + Alb, Alk Phos, ALT, AST, Total. Bili, TP)     Status: Abnormal   Result Value Ref Range     Sodium 141 135 - 145 mmol/L    Potassium 4.4 3.4 - 5.3 mmol/L    Carbon Dioxide (CO2) 24 22 - 29 mmol/L    Anion Gap 11 7 - 15 mmol/L    Urea Nitrogen 19.0 6.0 - 20.0 mg/dL    Creatinine 1.30 (H) 0.51 - 0.95 mg/dL    GFR Estimate 53 (L) >60 mL/min/1.73m2    Calcium 9.8 8.8 - 10.4 mg/dL    Chloride 106 98 - 107 mmol/L    Glucose 98 70 - 99 mg/dL    Alkaline Phosphatase 92 40 - 150 U/L    AST 42 0 - 45 U/L    ALT 47 0 - 50 U/L    Protein Total 7.0 6.4 - 8.3 g/dL    Albumin 4.3 3.5 - 5.2 g/dL    Bilirubin Total 0.2 <=1.2 mg/dL   CBC with platelets and differential     Status: None   Result Value Ref Range    WBC Count 5.5 4.0 - 11.0 10e3/uL    RBC Count 4.49 3.80 - 5.20 10e6/uL    Hemoglobin 13.8 11.7 - 15.7 g/dL    Hematocrit 42.0 35.0 - 47.0 %    MCV 94 78 - 100 fL    MCH 30.7 26.5 - 33.0 pg    MCHC 32.9 31.5 - 36.5 g/dL    RDW 12.7 10.0 - 15.0 %    Platelet Count 243 150 - 450 10e3/uL    % Neutrophils 44 %    % Lymphocytes 36 %    % Monocytes 8 %    % Eosinophils 11 %    % Basophils 1 %    % Immature Granulocytes 0 %    NRBCs per 100 WBC 0 <1 /100    Absolute Neutrophils 2.4 1.6 - 8.3 10e3/uL    Absolute Lymphocytes 2.0 0.8 - 5.3 10e3/uL    Absolute Monocytes 0.4 0.0 - 1.3 10e3/uL    Absolute Eosinophils 0.6 0.0 - 0.7 10e3/uL    Absolute Basophils 0.1 0.0 - 0.2 10e3/uL    Absolute Immature Granulocytes 0.0 <=0.4 10e3/uL    Absolute NRBCs 0.0 10e3/uL   CBC with platelets and differential     Status: None    Narrative    The following orders were created for panel order CBC with platelets and differential.  Procedure                               Abnormality         Status                     ---------                               -----------         ------                     CBC with platelets and ...[[id number]]                      Final result                 Please view results for these tests on the individual orders.         Signed Electronically by: Trisha Fonseca NP

## 2025-07-07 ENCOUNTER — TELEPHONE (OUTPATIENT)
Dept: CARDIOLOGY | Facility: OTHER | Age: 40
End: 2025-07-07
Payer: COMMERCIAL

## 2025-07-08 ENCOUNTER — OFFICE VISIT (OUTPATIENT)
Dept: FAMILY MEDICINE | Facility: OTHER | Age: 40
End: 2025-07-08
Attending: NURSE PRACTITIONER
Payer: COMMERCIAL

## 2025-07-08 ENCOUNTER — HOSPITAL ENCOUNTER (OUTPATIENT)
Dept: CT IMAGING | Facility: HOSPITAL | Age: 40
Discharge: HOME | End: 2025-07-08
Attending: NURSE PRACTITIONER
Payer: COMMERCIAL

## 2025-07-08 VITALS
RESPIRATION RATE: 16 BRPM | SYSTOLIC BLOOD PRESSURE: 121 MMHG | HEART RATE: 82 BPM | BODY MASS INDEX: 28.86 KG/M2 | TEMPERATURE: 97.8 F | OXYGEN SATURATION: 99 % | DIASTOLIC BLOOD PRESSURE: 82 MMHG | WEIGHT: 142.9 LBS

## 2025-07-08 DIAGNOSIS — R06.00 DYSPNEA, UNSPECIFIED TYPE: ICD-10-CM

## 2025-07-08 DIAGNOSIS — I45.6 WPW (WOLFF-PARKINSON-WHITE SYNDROME): ICD-10-CM

## 2025-07-08 DIAGNOSIS — R07.89 CHEST WALL PAIN: ICD-10-CM

## 2025-07-08 DIAGNOSIS — T81.30XA WOUND DEHISCENCE: ICD-10-CM

## 2025-07-08 DIAGNOSIS — R07.9 CHEST PAIN, UNSPECIFIED TYPE: ICD-10-CM

## 2025-07-08 DIAGNOSIS — Z09 HOSPITAL DISCHARGE FOLLOW-UP: Primary | ICD-10-CM

## 2025-07-08 DIAGNOSIS — N18.2 CKD (CHRONIC KIDNEY DISEASE) STAGE 2, GFR 60-89 ML/MIN: ICD-10-CM

## 2025-07-08 LAB
ALBUMIN SERPL BCG-MCNC: 4.3 G/DL (ref 3.5–5.2)
ALP SERPL-CCNC: 92 U/L (ref 40–150)
ALT SERPL W P-5'-P-CCNC: 47 U/L (ref 0–50)
ANION GAP SERPL CALCULATED.3IONS-SCNC: 11 MMOL/L (ref 7–15)
AST SERPL W P-5'-P-CCNC: 42 U/L (ref 0–45)
BASOPHILS # BLD AUTO: 0.1 10E3/UL (ref 0–0.2)
BASOPHILS NFR BLD AUTO: 1 %
BILIRUB SERPL-MCNC: 0.2 MG/DL
BUN SERPL-MCNC: 19 MG/DL (ref 6–20)
CALCIUM SERPL-MCNC: 9.8 MG/DL (ref 8.8–10.4)
CHLORIDE SERPL-SCNC: 106 MMOL/L (ref 98–107)
CREAT SERPL-MCNC: 1.3 MG/DL (ref 0.51–0.95)
EGFRCR SERPLBLD CKD-EPI 2021: 53 ML/MIN/1.73M2
EOSINOPHIL # BLD AUTO: 0.6 10E3/UL (ref 0–0.7)
EOSINOPHIL NFR BLD AUTO: 11 %
ERYTHROCYTE [DISTWIDTH] IN BLOOD BY AUTOMATED COUNT: 12.7 % (ref 10–15)
GLUCOSE SERPL-MCNC: 98 MG/DL (ref 70–99)
HCO3 SERPL-SCNC: 24 MMOL/L (ref 22–29)
HCT VFR BLD AUTO: 42 % (ref 35–47)
HGB BLD-MCNC: 13.8 G/DL (ref 11.7–15.7)
IMM GRANULOCYTES # BLD: 0 10E3/UL
IMM GRANULOCYTES NFR BLD: 0 %
LYMPHOCYTES # BLD AUTO: 2 10E3/UL (ref 0.8–5.3)
LYMPHOCYTES NFR BLD AUTO: 36 %
MCH RBC QN AUTO: 30.7 PG (ref 26.5–33)
MCHC RBC AUTO-ENTMCNC: 32.9 G/DL (ref 31.5–36.5)
MCV RBC AUTO: 94 FL (ref 78–100)
MONOCYTES # BLD AUTO: 0.4 10E3/UL (ref 0–1.3)
MONOCYTES NFR BLD AUTO: 8 %
NEUTROPHILS # BLD AUTO: 2.4 10E3/UL (ref 1.6–8.3)
NEUTROPHILS NFR BLD AUTO: 44 %
NRBC # BLD AUTO: 0 10E3/UL
NRBC BLD AUTO-RTO: 0 /100
NT-PROBNP SERPL-MCNC: <36 PG/ML (ref 0–178)
PLATELET # BLD AUTO: 243 10E3/UL (ref 150–450)
POTASSIUM SERPL-SCNC: 4.4 MMOL/L (ref 3.4–5.3)
PROT SERPL-MCNC: 7 G/DL (ref 6.4–8.3)
RBC # BLD AUTO: 4.49 10E6/UL (ref 3.8–5.2)
SODIUM SERPL-SCNC: 141 MMOL/L (ref 135–145)
WBC # BLD AUTO: 5.5 10E3/UL (ref 4–11)

## 2025-07-08 PROCEDURE — 85004 AUTOMATED DIFF WBC COUNT: CPT | Mod: ZL | Performed by: NURSE PRACTITIONER

## 2025-07-08 PROCEDURE — 71275 CT ANGIOGRAPHY CHEST: CPT

## 2025-07-08 PROCEDURE — 99496 TRANSJ CARE MGMT HIGH F2F 7D: CPT | Performed by: NURSE PRACTITIONER

## 2025-07-08 PROCEDURE — 80053 COMPREHEN METABOLIC PANEL: CPT | Mod: ZL | Performed by: NURSE PRACTITIONER

## 2025-07-08 PROCEDURE — 250N000011 HC RX IP 250 OP 636: Performed by: RADIOLOGY

## 2025-07-08 PROCEDURE — G0463 HOSPITAL OUTPT CLINIC VISIT: HCPCS

## 2025-07-08 PROCEDURE — 83880 ASSAY OF NATRIURETIC PEPTIDE: CPT | Mod: ZL | Performed by: NURSE PRACTITIONER

## 2025-07-08 PROCEDURE — 36415 COLL VENOUS BLD VENIPUNCTURE: CPT | Mod: ZL | Performed by: NURSE PRACTITIONER

## 2025-07-08 PROCEDURE — 71275 CT ANGIOGRAPHY CHEST: CPT | Mod: 26 | Performed by: STUDENT IN AN ORGANIZED HEALTH CARE EDUCATION/TRAINING PROGRAM

## 2025-07-08 RX ORDER — IOPAMIDOL 755 MG/ML
54 INJECTION, SOLUTION INTRAVASCULAR ONCE
Status: COMPLETED | OUTPATIENT
Start: 2025-07-08 | End: 2025-07-08

## 2025-07-08 RX ORDER — CYCLOBENZAPRINE HCL 5 MG
5 TABLET ORAL 3 TIMES DAILY PRN
Qty: 20 TABLET | Refills: 0 | Status: SHIPPED | OUTPATIENT
Start: 2025-07-08

## 2025-07-08 RX ORDER — OXYCODONE AND ACETAMINOPHEN 5; 325 MG/1; MG/1
1 TABLET ORAL EVERY 6 HOURS PRN
Qty: 10 TABLET | Refills: 0 | Status: SHIPPED | OUTPATIENT
Start: 2025-07-08 | End: 2025-07-11

## 2025-07-08 RX ADMIN — IOPAMIDOL 54 ML: 755 INJECTION, SOLUTION INTRAVENOUS at 11:19

## 2025-07-08 ASSESSMENT — PAIN SCALES - GENERAL: PAINLEVEL_OUTOF10: SEVERE PAIN (7)

## 2025-07-11 ENCOUNTER — TELEPHONE (OUTPATIENT)
Dept: FAMILY MEDICINE | Facility: OTHER | Age: 40
End: 2025-07-11

## 2025-07-19 ENCOUNTER — MYC REFILL (OUTPATIENT)
Dept: FAMILY MEDICINE | Facility: OTHER | Age: 40
End: 2025-07-19

## 2025-07-19 DIAGNOSIS — G89.18 ACUTE POST-OPERATIVE PAIN: ICD-10-CM

## 2025-07-19 DIAGNOSIS — R07.89 CHEST WALL PAIN: ICD-10-CM

## 2025-07-19 DIAGNOSIS — Z98.890 POST-OPERATIVE STATE: ICD-10-CM

## 2025-07-21 ENCOUNTER — OFFICE VISIT (OUTPATIENT)
Dept: CARDIOLOGY | Facility: OTHER | Age: 40
End: 2025-07-21
Attending: INTERNAL MEDICINE
Payer: COMMERCIAL

## 2025-07-21 VITALS
HEART RATE: 85 BPM | WEIGHT: 142.8 LBS | HEIGHT: 59 IN | SYSTOLIC BLOOD PRESSURE: 142 MMHG | RESPIRATION RATE: 16 BRPM | OXYGEN SATURATION: 100 % | DIASTOLIC BLOOD PRESSURE: 83 MMHG | BODY MASS INDEX: 28.79 KG/M2

## 2025-07-21 DIAGNOSIS — I10 ESSENTIAL HYPERTENSION: ICD-10-CM

## 2025-07-21 DIAGNOSIS — I45.6 WPW (WOLFF-PARKINSON-WHITE SYNDROME): ICD-10-CM

## 2025-07-21 DIAGNOSIS — R07.9 CHEST PAIN, UNSPECIFIED TYPE: Primary | ICD-10-CM

## 2025-07-21 DIAGNOSIS — Z86.79 HISTORY OF WOLFF-PARKINSON-WHITE (WPW) SYNDROME: ICD-10-CM

## 2025-07-21 PROCEDURE — G0463 HOSPITAL OUTPT CLINIC VISIT: HCPCS

## 2025-07-21 PROCEDURE — 93005 ELECTROCARDIOGRAM TRACING: CPT | Performed by: INTERNAL MEDICINE

## 2025-07-21 PROCEDURE — G0463 HOSPITAL OUTPT CLINIC VISIT: HCPCS | Mod: 25

## 2025-07-21 RX ORDER — CYCLOBENZAPRINE HCL 5 MG
5 TABLET ORAL 3 TIMES DAILY PRN
Qty: 20 TABLET | Refills: 0 | Status: SHIPPED | OUTPATIENT
Start: 2025-07-21

## 2025-07-21 RX ORDER — METOPROLOL TARTRATE 50 MG
50 TABLET ORAL 2 TIMES DAILY
Qty: 2 TABLET | Refills: 1 | Status: SHIPPED | OUTPATIENT
Start: 2025-07-21

## 2025-07-21 RX ORDER — FUROSEMIDE 20 MG/1
20 TABLET ORAL DAILY PRN
Qty: 45 TABLET | Refills: 3 | Status: SHIPPED | OUTPATIENT
Start: 2025-07-21

## 2025-07-21 RX ORDER — LABETALOL 200 MG/1
200 TABLET, FILM COATED ORAL 2 TIMES DAILY
Qty: 180 TABLET | Refills: 3 | Status: SHIPPED | OUTPATIENT
Start: 2025-07-21

## 2025-07-21 RX ORDER — HYDROXYZINE HYDROCHLORIDE 50 MG/1
50 TABLET, FILM COATED ORAL EVERY 8 HOURS PRN
Qty: 40 TABLET | Refills: 1 | Status: SHIPPED | OUTPATIENT
Start: 2025-07-21

## 2025-07-21 ASSESSMENT — PAIN SCALES - GENERAL: PAINLEVEL_OUTOF10: MILD PAIN (3)

## 2025-07-21 NOTE — PATIENT INSTRUCTIONS
Patient states he does not have any family members to notify at admission Thank you for allowing Dr ELIAZAR Jorgensen and our  team to participate in your care. Please call our office at [phone] with scheduling questions or if you need to cancel or change your appointment. With any other questions or concerns you may call cardiology nurse at  [phone].       If you experience chest pain, chest pressure, chest tightness, shortness of breath, fainting, lightheadedness, nausea, vomiting, or other concerning symptoms, please report to the Emergency Department or call 911. These symptoms may be emergent, and best treated in the Emergency Department.

## 2025-07-21 NOTE — PROGRESS NOTES
[facility] HEART CARE   CARDIOLOGY PROGRESS NOTE     Chief Complaint   Patient presents with    Consult For     NP - WPW (Delaney-Parkinson-White syndrome)          Diagnosis:  1.  WPW.   - EP in 6/2/2023.  To follow-up in 6 months.   - Past EP study in [place] in approximately 2017.  2.  Hypertension-controlled.  3.  Syncope.   - Vasovagal/orthostatic.  4.  MS.        Assessment/Plan:    1.  WPW.  She was originally diagnosed while in [place], [place] in approximately 2017.  I believe she underwent EP study but describes the pathway involving the anterior septum and no ablation was performed.  More recently, she has had increasing palpitations or fluttering.  She has been dizzy, lightheaded and even has some chest discomfort.  She is not sure how much this is attributed to WPW versus stress with 2 children aged 2 and 10 years old.  She is concerned that she has had increasing symptoms that this is related to WPW.  She did see Dr. Leigh previously on 6/2/2023.  Patient reports that they had offered an EP study with possible ablation.  I believe this was declined because of pregnancy at 6 months.  This was to be revisited in the future.  She was supposed to be seen in 6 months.  I did order a Zio patch and exercise stress test prior to this visit, not completed.  She states she was at [place] and may not have had a reception for the phone calls.  These have not been completed.  I encouraged her to complete these test prior to EP evaluation.  Also, I would like her to see Dr. Leigh in follow-up for consideration of EP study with possible ablation.  EKG today does not show WPW pattern.  She could have a concealed pathway.  She has been dizzy and lightheaded but has not had syncope or near syncope.  Will plan for an exercise stress test and Zio patch.  She will see Dr. Berg EP for consideration of EP study/ablation.  We did review her Zio patch from 4/3/2023 showing episodes of SVT up to 12 seconds.  2.   Hypertension.  Pressure has been variable.  She describes blood pressures in the morning at 128/78.  In the evening, she describes a blood pressure of 161/110.  She does wait 10 minutes with feet flat on the floor and arm elevated at her heart prior to checking her blood pressure.  Currently, she is on clonidine 0.1 mg at bedtime, Lasix 20 mg daily, and labetalol 100 mg twice a day.  She has had some kidney dysfunction.  She describes peripheral edema of which I did not see any today.  I suggested discontinuing the scheduled furosemide because of kidney dysfunction.  She has an allergy to hydrochlorothiazide which she describes as a diffuse rash.  As a result, I changed furosemide to 20 mg as needed.  Will increase labetalol from 100 mg twice a day to 200 mg twice a day with a heart rate of 85 today.  She has not tolerated ACE, ARB, amlodipine or hydrochlorothiazide in the past.  Continue clonidine 0.1 mg at bedtime.  She does not think she would be able to take 0.1 mg in the morning because of fatigue.  3.  Chest discomfort: Atypical.  Patient is concerned as there is some family history of heart disease.  Will plan for CTA of coronaries with metoprolol tartrate the night before and the morning of the CT scan.  Patient states she is not allergic to contrast dye or seafood.  4.  Follow-up will be contingent on evaluation by Dr. Polanoc/ROD.  Patient will need Zio patch, exercise stress test, and CTA of coronaries.      Interval history:  See above.    HPI:    Sarah Ugalde was seen by cardiology consult regarding Delaney-Parkinson-White syndrome. She has no other significant cardiac history.  She has a non-cardiac history including prior pregnancy complicated by pre-eclampsia.      Ms Ugalde endorses symptoms of SVT since she was younger but symptoms always attributed to something else-- low blood sugar, heat stroke, etc. In her twenties, she was told she was having panic attacks. She had an episode of  "fainting while sitting one day at work. She was sent to the ED and arrhythmia noted so she was instructed to follow-up with cardiology. She was told she had Delaney-Parkinson-White Syndrome. She tells me she had a cardiac MR, EP study over 8 years ago in [place]. She was told that her WPW was not in an ideal location during EP study. She continues to get symptoms of racing, hard beating, pounding, sensation of not being able to catch her breath. Prior to pregnancy she did not pay attention to symptoms as it became normal. Since pregnancy she is getting symptoms multiple times per day. Symptoms last seconds usually but up to 2-5 minutes. Symptoms can present at rest or during activity. She has had three syncopal episodes while being pregnant and had subsequent zio patch. One episode she was standing and baking bread in the kitchen- she started to feel lightheaded and nauseated so went to the bathroom thinking she had to throw up. She took a few steps and went down. She estimates being out for only a few seconds. When she came to she felt \"out of sorts.\" Another episode occurred while standing. The third episode she was sitting in the recliner and felt a little lightheaded but no nausea and passed out.         She tells me that her blood pressure has always been normal until she had her son who is soon to be 8 years old. She developed preeclampsia, TTP and was hospitalized for about 2 months post-partum. She thinks she was on Bystolic and another blood pressure medication. She has not been on anti-hypertensive for about 6 years.       Relevant testing:          ICD-10-CM    1. Chest pain, unspecified type  R07.9 CTA Angiogram coronary artery     metoprolol tartrate (LOPRESSOR) 50 MG tablet      2. WPW (Delaney-Parkinson-White syndrome)  I45.6 Adult Cardiology Eval  Referral     EKG 12-lead complete w/read - (Clinic Performed)      3. History of Delaney-Parkinson-White (WPW) syndrome  Z86.79       4. Essential " hypertension  I10 furosemide (LASIX) 20 MG tablet     labetalol (NORMODYNE) 200 MG tablet          Past Medical History:   Diagnosis Date    Detached retina, bilateral 2015    secondary to severe preeclampsia with HELLP syndroma and acute renal failure    PARDEEP (generalized anxiety disorder) 2023    HELLP (hemolytic anemia/elev liver enzymes/low platelets in pregnancy) 2015    Severe preeclampsia with HELLP syndrome and acute renal failure    History of blood transfusion 2015    HUS (hemolytic uremic syndrome) (H) 2015    Postpartum Thrombotic thrombocytopenic purpura (TTP) and 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    MS (multiple sclerosis) (H) 2015    Rh negative state in antepartum period 2023    TTP (thrombotic thrombocytopenic purpura) (H) 2015    Postpartum Thrombotic thrombocytopenic purpura (TTP) and 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    Uncomplicated asthma     Exercise enduced    WPW (Delaney-Parkinson-White syndrome) 2015    She has been evaluated for ablation, however, based on location this was not deemed safe by cardiology at the time       Past Surgical History:   Procedure Laterality Date     SECTION  2015    Severe preeclampsia with HELLP syndrome and acute renal failure then developed postpartum Thrombotic thrombocytopenic purpura (TTP) and Hemolytic uremic syndrome (HUS)    COMBINED  SECTION, SALPINGECTOMY BILATERAL Bilateral 2023    Procedure:  SECTION, WITH BILATERAL SALPINGECTOMY, Viable baby girl born at 1851;  Surgeon: Naseem Cruz MD;  Location: [facility] OR    DILATION AND CURETTAGE, OPERATIVE HYSTEROSCOPY, COMBINED N/A 10/25/2023    Procedure: HYSTEROSCOPY, WITH DILATION AND CURETTAGE OF UTERUS;  Surgeon: Scout Grimaldo MD;  Location: [facility] OR    ENDOSCOPY UPPER, COLONOSCOPY, COMBINED N/A 2025    Procedure: COLONOSCOPY WITH BIOPSY AND UPPER GASTROINTESTINAL  TRACT ENDOSCOPY WITH BIOPSY;  Surgeon: Terrance Cruz MD;  Location: [facility] OR    GENITOURINARY SURGERY  2023    Tubes removed    HYSTERECTOMY SUPRACERVICAL N/A 04/18/2024    Procedure: HYSTERECTOMY, SUPRACERVICAL, ABDOMINAL;  Surgeon: Scout Grimaldo MD;  Location: [facility]    INCISION AND DRAINAGE ABDOMEN WASHOUT, COMBINED N/A 09/19/2024    Procedure: Abdominal Wound Exploration;  Surgeon: Terrance Cruz MD;  Location: [facility]    INCISION AND DRAINAGE ABDOMEN WASHOUT, COMBINED N/A 11/11/2024    Procedure: Abdominal  Wound exploration;  Surgeon: Terrance Cruz MD;  Location: [facility]    INCISION AND DRAINAGE ABDOMEN WASHOUT, COMBINED N/A 6/30/2025    Procedure: Wound exploration times three, abdominal trocar sites;  Surgeon: Terrance Cruz MD;  Location: [facility] OR    IR TRIGGER POINT INJ 1 OR 2 MUSCLES  07/02/2024    LAPAROSCOPIC OOPHORECTOMY Bilateral 6/4/2025    Procedure: Bilateral Laparoscopic Oophorectomy;  Surgeon: Scout Grimaldo MD;  Location: [facility] OR    TUBAL LIGATION         Allergies   Allergen Reactions    Zestril [Lisinopril] Anaphylaxis and Fatigue    Amlodipine Swelling    Cymbalta [Duloxetine Hcl]     Hydrochlorothiazide     Magnesium Other (See Comments)     Altered mental status after IV dosing    Other Environmental Allergy     Prozac [Fluoxetine]     Losartan Difficulty breathing, Hives, Itching, Rash, Swelling and Visual Disturbance       Current Outpatient Medications   Medication Sig Dispense Refill    cloNIDine (CATAPRES) 0.1 MG tablet Take 1 tablet (0.1 mg) by mouth at bedtime. 90 tablet 3    cyclobenzaprine (FLEXERIL) 5 MG tablet Take 1 tablet (5 mg) by mouth 3 times daily as needed for muscle spasms. 20 tablet 0    furosemide (LASIX) 20 MG tablet Take 1 tablet (20 mg) by mouth daily as needed (swelling). 45 tablet 3    hydrOXYzine HCl (ATARAX) 50 MG tablet Take 1 tablet (50 mg) by mouth every 8 hours as needed for other or anxiety (adjuvant pain). 40 tablet 1    ibuprofen  (ADVIL/MOTRIN) 800 MG tablet Take 1 tablet (800 mg) by mouth every 8 hours as needed for moderate pain. 40 tablet 1    labetalol (NORMODYNE) 200 MG tablet Take 1 tablet (200 mg) by mouth 2 times daily. 180 tablet 3    metoprolol tartrate (LOPRESSOR) 50 MG tablet Take 1 tablet (50 mg) by mouth 2 times daily. 2 tablet 1    omeprazole (PRILOSEC) 40 MG DR capsule Take 40 mg by mouth 2 times daily as needed (heartburn).      traZODone (DESYREL) 50 MG tablet Take 2 tablets (100 mg) by mouth at bedtime. 180 tablet 1       Social History     Socioeconomic History    Marital status: Single     Spouse name: Not on file    Number of children: 0    Years of education: 16    Highest education level: Not on file   Occupational History    Occupation:     Occupation: retail sales     Employer: [employer]   Tobacco Use    Smoking status: Never     Passive exposure: Never    Smokeless tobacco: Never   Vaping Use    Vaping status: Never Used   Substance and Sexual Activity    Alcohol use: Not Currently     Comment: 1-2 beers a week if that per pt    Drug use: No    Sexual activity: Yes     Partners: Male     Birth control/protection: Female Surgical   Other Topics Concern    Parent/sibling w/ CABG, MI or angioplasty before 65F 55M? Yes     Comment: Maternal aunt   Social History Narrative    10/3/2023: on maternity leave, also runs [employer], has 8 year old and 2 month old     Social Drivers of Health     Financial Resource Strain: Low Risk  (6/30/2025)    Financial Resource Strain     Within the past 12 months, have you or your family members you live with been unable to get utilities (heat, electricity) when it was really needed?: No   Food Insecurity: Low Risk  (6/30/2025)    Food Insecurity     Within the past 12 months, did you worry that your food would run out before you got money to buy more?: No     Within the past 12 months, did the food you bought just not last and you didn t have money to get more?: No    Transportation Needs: Low Risk  (6/30/2025)    Transportation Needs     Within the past 12 months, has lack of transportation kept you from medical appointments, getting your medicines, non-medical meetings or appointments, work, or from getting things that you need?: No   Physical Activity: Not on file   Stress: Not on file   Social Connections: Not on file   Interpersonal Safety: Low Risk  (6/30/2025)    Interpersonal Safety     Do you feel physically and emotionally safe where you currently live?: Yes     Within the past 12 months, have you been hit, slapped, kicked or otherwise physically hurt by someone?: No     Within the past 12 months, have you been humiliated or emotionally abused in other ways by your partner or ex-partner?: No   Housing Stability: Low Risk  (6/30/2025)    Housing Stability     Do you have housing? : Yes     Are you worried about losing your housing?: No       LAB RESULTS:   Office Visit on 07/08/2025   Component Date Value Ref Range Status    Sodium 07/08/2025 141  135 - 145 mmol/L Final    Potassium 07/08/2025 4.4  3.4 - 5.3 mmol/L Final    Carbon Dioxide (CO2) 07/08/2025 24  22 - 29 mmol/L Final    Anion Gap 07/08/2025 11  7 - 15 mmol/L Final    Urea Nitrogen 07/08/2025 19.0  6.0 - 20.0 mg/dL Final    Creatinine 07/08/2025 1.30 (H)  0.51 - 0.95 mg/dL Final    GFR Estimate 07/08/2025 53 (L)  >60 mL/min/1.73m2 Final    Calcium 07/08/2025 9.8  8.8 - 10.4 mg/dL Final    Chloride 07/08/2025 106  98 - 107 mmol/L Final    Glucose 07/08/2025 98  70 - 99 mg/dL Final    Alkaline Phosphatase 07/08/2025 92  40 - 150 U/L Final    AST 07/08/2025 42  0 - 45 U/L Final    ALT 07/08/2025 47  0 - 50 U/L Final    Protein Total 07/08/2025 7.0  6.4 - 8.3 g/dL Final    Albumin 07/08/2025 4.3  3.5 - 5.2 g/dL Final    Bilirubin Total 07/08/2025 0.2  <=1.2 mg/dL Final    NT-proBNP 07/08/2025 <36  0 - 178 pg/mL Final    WBC Count 07/08/2025 5.5  4.0 - 11.0 10e3/uL Final    RBC Count 07/08/2025 4.49  3.80 - 5.20  "10e6/uL Final    Hemoglobin 07/08/2025 13.8  11.7 - 15.7 g/dL Final    Hematocrit 07/08/2025 42.0  35.0 - 47.0 % Final    MCV 07/08/2025 94  78 - 100 fL Final    MCH 07/08/2025 30.7  26.5 - 33.0 pg Final    MCHC 07/08/2025 32.9  31.5 - 36.5 g/dL Final    RDW 07/08/2025 12.7  10.0 - 15.0 % Final    Platelet Count 07/08/2025 243  150 - 450 10e3/uL Final    % Neutrophils 07/08/2025 44  % Final    % Lymphocytes 07/08/2025 36  % Final    % Monocytes 07/08/2025 8  % Final    % Eosinophils 07/08/2025 11  % Final    % Basophils 07/08/2025 1  % Final    % Immature Granulocytes 07/08/2025 0  % Final    NRBCs per 100 WBC 07/08/2025 0  <1 /100 Final    Absolute Neutrophils 07/08/2025 2.4  1.6 - 8.3 10e3/uL Final    Absolute Lymphocytes 07/08/2025 2.0  0.8 - 5.3 10e3/uL Final    Absolute Monocytes 07/08/2025 0.4  0.0 - 1.3 10e3/uL Final    Absolute Eosinophils 07/08/2025 0.6  0.0 - 0.7 10e3/uL Final    Absolute Basophils 07/08/2025 0.1  0.0 - 0.2 10e3/uL Final    Absolute Immature Granulocytes 07/08/2025 0.0  <=0.4 10e3/uL Final    Absolute NRBCs 07/08/2025 0.0  10e3/uL Final   Office Visit on 06/10/2025   Component Date Value Ref Range Status    Culture 06/10/2025 Normal [place] with   Final    Culture 06/10/2025 1+ Staphylococcus pseudintermedius/intermedius (A)   Final    Gram Stain Result 06/10/2025 1+ Squamous epithelial cells/low power field (A)   Final    Gram Stain Result 06/10/2025 No white blood cells seen (A)   Final    Gram Stain Result 06/10/2025 2+ Gram positive cocci in pairs (A)   Final        Review of systems: Negative except that which was noted in the HPI.    Physical examination:  BP (!) 142/83 (BP Location: Right arm, Patient Position: Sitting, Cuff Size: Adult Regular)   Pulse 85   Resp 16   Ht 1.499 m (4' 11\")   Wt 64.8 kg (142 lb 12.8 oz)   SpO2 100%   BMI 28.84 kg/m      GENERAL APPEARANCE: healthy, alert and no distress  CHEST: lungs clear to auscultation - no rales, rhonchi or wheezes, no use of " accessory muscles, no retractions, respirations are unlabored, normal respiratory rate  CARDIOVASCULAR: regular rhythm, normal S1 with physiologic split S2, no S3 or S4 and no murmur, click or rub  EXTREMITIES: no clubbing, cyanosis or edema            Thank you for allowing me to participate in the care of your patient. Please do not hesitate to contact me if you have any questions.     Galen Jorgensen, DO

## 2025-07-21 NOTE — TELEPHONE ENCOUNTER
cyclobenzaprine (FLEXERIL) 5 MG tablet       Last Written Prescription Date:  7/8/25  Last Fill Quantity: 20,   # refills: 0  Last Office Visit: 7/8/25  Future Office visit:       Routing refill request to provider for review/approval because:  Drug not on the [facility], [facility] or [facility] refill protocol or controlled substance      hydrOXYzine HCl (ATARAX) 50 MG tablet       Last Written Prescription Date:  6/24/25  Last Fill Quantity: 40,   # refills: 1  Last Office Visit: 7/8/25  Future Office visit:       Routing refill request to provider for review/approval because:  Drug not on the [facility], [facility] or [facility] refill protocol or controlled substance

## 2025-07-21 NOTE — TELEPHONE ENCOUNTER
ibuprofen (ADVIL/MOTRIN) 800 MG tablet       Last Written Prescription Date:  6/26/25  Last Fill Quantity: 40,   # refills: 1  Last Office Visit: 7/8/25  Future Office visit:       Routing refill request to provider for review/approval because:  NSAID Medications Failed      Always Fail Criteria - Chart Review Required    Validate Diagnosis. If the medication is requested for an acute flare of a chronic pain associated with a musculoskeletal or rheumatologic condition; okay to authorize if all other criteria are met. If not, then forward to provider for review.    Normal GFR on file in past 12 months          Recent Labs   Lab Test 07/08/25  1107 12/20/22  1006 10/29/18  1513   GFRESTIMATED 53*   < > 63   GFRESTBLACK  --   --  76    < > = values in this interval not displayed.

## 2025-07-22 ENCOUNTER — OFFICE VISIT (OUTPATIENT)
Dept: SURGERY | Facility: OTHER | Age: 40
End: 2025-07-22
Attending: SURGERY
Payer: COMMERCIAL

## 2025-07-22 VITALS
HEIGHT: 59 IN | BODY MASS INDEX: 28.8 KG/M2 | TEMPERATURE: 97.4 F | WEIGHT: 142.86 LBS | RESPIRATION RATE: 18 BRPM | DIASTOLIC BLOOD PRESSURE: 79 MMHG | SYSTOLIC BLOOD PRESSURE: 118 MMHG | OXYGEN SATURATION: 97 % | HEART RATE: 67 BPM

## 2025-07-22 DIAGNOSIS — T81.30XA WOUND DEHISCENCE: Primary | ICD-10-CM

## 2025-07-22 DIAGNOSIS — T14.8XXA OPEN WOUND: ICD-10-CM

## 2025-07-22 LAB
ATRIAL RATE - MUSE: 76 BPM
DIASTOLIC BLOOD PRESSURE - MUSE: NORMAL MMHG
INTERPRETATION ECG - MUSE: NORMAL
P AXIS - MUSE: 70 DEGREES
PR INTERVAL - MUSE: 122 MS
QRS DURATION - MUSE: 78 MS
QT - MUSE: 396 MS
QTC - MUSE: 445 MS
R AXIS - MUSE: 55 DEGREES
SYSTOLIC BLOOD PRESSURE - MUSE: NORMAL MMHG
T AXIS - MUSE: 3 DEGREES
VENTRICULAR RATE- MUSE: 76 BPM

## 2025-07-22 PROCEDURE — G0463 HOSPITAL OUTPT CLINIC VISIT: HCPCS

## 2025-07-22 RX ORDER — IBUPROFEN 800 MG/1
800 TABLET, FILM COATED ORAL EVERY 8 HOURS PRN
Qty: 40 TABLET | Refills: 1 | Status: SHIPPED | OUTPATIENT
Start: 2025-07-22

## 2025-07-22 ASSESSMENT — PAIN SCALES - GENERAL: PAINLEVEL_OUTOF10: MODERATE PAIN (4)

## 2025-07-22 NOTE — PROGRESS NOTES
"CLINIC NOTE - POST-OP SURGERY  7/22/2025    Patient:Sarah Ugalde    Procedure: Wound exploration    This is a 40 year old female who is 2 weeks s/p wound exploration.  The patient has no complaints today.     Current Medications:  Current Outpatient Medications   Medication Sig Dispense Refill    cloNIDine (CATAPRES) 0.1 MG tablet Take 1 tablet (0.1 mg) by mouth at bedtime. 90 tablet 3    cyclobenzaprine (FLEXERIL) 5 MG tablet Take 1 tablet (5 mg) by mouth 3 times daily as needed for muscle spasms. 20 tablet 0    furosemide (LASIX) 20 MG tablet Take 1 tablet (20 mg) by mouth daily as needed (swelling). 45 tablet 3    hydrOXYzine HCl (ATARAX) 50 MG tablet Take 1 tablet (50 mg) by mouth every 8 hours as needed for other or anxiety (adjuvant pain). 40 tablet 1    ibuprofen (ADVIL/MOTRIN) 800 MG tablet Take 1 tablet (800 mg) by mouth every 8 hours as needed for moderate pain. 40 tablet 1    labetalol (NORMODYNE) 200 MG tablet Take 1 tablet (200 mg) by mouth 2 times daily. 180 tablet 3    metoprolol tartrate (LOPRESSOR) 50 MG tablet Take 1 tablet (50 mg) by mouth 2 times daily. 2 tablet 1    omeprazole (PRILOSEC) 40 MG DR capsule Take 40 mg by mouth 2 times daily as needed (heartburn).      traZODone (DESYREL) 50 MG tablet Take 2 tablets (100 mg) by mouth at bedtime. 180 tablet 1       Allergies:  Allergies   Allergen Reactions    Zestril [Lisinopril] Anaphylaxis and Fatigue    Amlodipine Swelling    Cymbalta [Duloxetine Hcl]     Hydrochlorothiazide     Magnesium Other (See Comments)     Altered mental status after IV dosing    Other Environmental Allergy     Prozac [Fluoxetine]     Losartan Difficulty breathing, Hives, Itching, Rash, Swelling and Visual Disturbance       PHYSICAL EXAM:   Vital signs: /79 (BP Location: Right arm, Patient Position: Sitting, Cuff Size: Adult Regular)   Pulse 67   Temp 97.4  F (36.3  C) (Tympanic)   Resp 18   Ht 1.499 m (4' 11.02\")   Wt 64.8 kg (142 lb 13.7 oz)   SpO2 " 97%   BMI 28.84 kg/m     Weight: [unfilled]   BMI: Body mass index is 28.84 kg/m .   General: Normal, healthy, cooperative, in no acute distress, alert   Abdominal: abdomen is soft without significant tenderness, masses, organomegaly or guarding   Wounds:  Well healed surgical scars consistent with her operation.     PATHOLOGY:  Case Report   Date Value Ref Range Status   06/04/2025   Final    Surgical Pathology Report                         Case: [id number]                                  Authorizing Provider:  Scout Grimaldo MD          Collected:           06/04/2025 01:11 PM          Ordering Location:     [facility]     Received:            06/05/2025 12:52 PM          Pathologist:           Simon Cherry DO                                                         Specimens:   A) - Ovary, Right                                                                                   B) - Ovary, Left                                                                            Final Diagnosis   Date Value Ref Range Status   06/04/2025   Final    A.  Ovary, right, oophorectomy:  - Cystic follicles and involuting corpora lutea.    B.  Ovary, left, oophorectomy:  - Cystic follicles and involuting corpora lutea, including a hemorrhagic corpus luteum.          ASSESSMENT:    40 year old female who is 2 weeks s/p wound exploration.  Doing well.     PLAN:   Will go ahead and remove the sutures today which was done uneventfully.    Follow-up the patient does have a lesion on her arm that she would like to have removed.  We schedule her for minor procedure      Restrictions : None

## 2025-07-29 ENCOUNTER — TELEPHONE (OUTPATIENT)
Dept: SURGERY | Facility: OTHER | Age: 40
End: 2025-07-29

## 2025-07-29 ENCOUNTER — OFFICE VISIT (OUTPATIENT)
Dept: SURGERY | Facility: OTHER | Age: 40
End: 2025-07-29
Attending: SURGERY
Payer: COMMERCIAL

## 2025-07-29 VITALS
WEIGHT: 142.86 LBS | RESPIRATION RATE: 16 BRPM | TEMPERATURE: 97.3 F | OXYGEN SATURATION: 98 % | DIASTOLIC BLOOD PRESSURE: 79 MMHG | SYSTOLIC BLOOD PRESSURE: 111 MMHG | HEIGHT: 59 IN | BODY MASS INDEX: 28.8 KG/M2 | HEART RATE: 65 BPM

## 2025-07-29 DIAGNOSIS — L98.9 SKIN LESION OF RIGHT ARM: Primary | ICD-10-CM

## 2025-07-29 DIAGNOSIS — L98.9 SKIN LESION OF LEFT LEG: ICD-10-CM

## 2025-07-29 PROCEDURE — G0463 HOSPITAL OUTPT CLINIC VISIT: HCPCS | Performed by: SURGERY

## 2025-07-29 PROCEDURE — 88305 TISSUE EXAM BY PATHOLOGIST: CPT | Mod: TC | Performed by: SURGERY

## 2025-07-29 PROCEDURE — 88305 TISSUE EXAM BY PATHOLOGIST: CPT | Mod: 26 | Performed by: PATHOLOGY

## 2025-07-29 ASSESSMENT — PAIN SCALES - GENERAL: PAINLEVEL_OUTOF10: MODERATE PAIN (4)

## 2025-07-29 NOTE — CONFIDENTIAL NOTE
July 29, 2025    Specimen brought to clinic lab from Dr Cruz.    Krsitan Ordoñez on 7/29/2025 at 11:11 AM

## 2025-07-29 NOTE — PROGRESS NOTES
CLINIC NOTE - CONSULT  2025    Patient:Sarah [facility]    Referring Physician: Self Referred    Reason for Referral: Lesion on right arm and left leg    This is a 40 year old female with a lesion on the right arm and left leg.      They have noticed if for several months  The lesion is getting larger.    The lesion is not changing color.    The patient does not have a history of Melanoma.    The patient does desire excision.     Past Medical History:  Past Medical History:   Diagnosis Date    Detached retina, bilateral 2015    secondary to severe preeclampsia with HELLP syndroma and acute renal failure    PARDEEP (generalized anxiety disorder) 2023    HELLP (hemolytic anemia/elev liver enzymes/low platelets in pregnancy) 2015    Severe preeclampsia with HELLP syndrome and acute renal failure    History of blood transfusion 2015    HUS (hemolytic uremic syndrome) (H) 2015    Postpartum Thrombotic thrombocytopenic purpura (TTP) and 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    MS (multiple sclerosis) (H) 2015    Rh negative state in antepartum period 2023    TTP (thrombotic thrombocytopenic purpura) (H) 2015    Postpartum Thrombotic thrombocytopenic purpura (TTP) and 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    Uncomplicated asthma     Exercise enduced    WPW (Delaney-Parkinson-White syndrome) 2015    She has been evaluated for ablation, however, based on location this was not deemed safe by cardiology at the time       Past Surgical History:  Past Surgical History:   Procedure Laterality Date     SECTION  2015    Severe preeclampsia with HELLP syndrome and acute renal failure then developed postpartum Thrombotic thrombocytopenic purpura (TTP) and Hemolytic uremic syndrome (HUS)    COMBINED  SECTION, SALPINGECTOMY BILATERAL Bilateral 2023    Procedure:  SECTION, WITH BILATERAL  "SALPINGECTOMY, Viable baby girl born at 1851;  Surgeon: Naseem Cruz MD;  Location: [facility]    DILATION AND CURETTAGE, OPERATIVE HYSTEROSCOPY, COMBINED N/A 10/25/2023    Procedure: HYSTEROSCOPY, WITH DILATION AND CURETTAGE OF UTERUS;  Surgeon: Scout Grimaldo MD;  Location: [facility]    ENDOSCOPY UPPER, COLONOSCOPY, COMBINED N/A 02/17/2025    Procedure: COLONOSCOPY WITH BIOPSY AND UPPER GASTROINTESTINAL TRACT ENDOSCOPY WITH BIOPSY;  Surgeon: Terrance Cruz MD;  Location: [facility]    GENITOURINARY SURGERY  2023    Tubes removed    HYSTERECTOMY SUPRACERVICAL N/A 04/18/2024    Procedure: HYSTERECTOMY, SUPRACERVICAL, ABDOMINAL;  Surgeon: Scout Grimaldo MD;  Location: [facility]    INCISION AND DRAINAGE ABDOMEN WASHOUT, COMBINED N/A 09/19/2024    Procedure: Abdominal Wound Exploration;  Surgeon: Terrance Cruz MD;  Location: [facility]    INCISION AND DRAINAGE ABDOMEN WASHOUT, COMBINED N/A 11/11/2024    Procedure: Abdominal  Wound exploration;  Surgeon: Terrance Cruz MD;  Location: [facility]    INCISION AND DRAINAGE ABDOMEN WASHOUT, COMBINED N/A 6/30/2025    Procedure: Wound exploration times three, abdominal trocar sites;  Surgeon: Terrance Cruz MD;  Location: [facility]    IR TRIGGER POINT INJ 1 OR 2 MUSCLES  07/02/2024    LAPAROSCOPIC OOPHORECTOMY Bilateral 6/4/2025    Procedure: Bilateral Laparoscopic Oophorectomy;  Surgeon: Scout Grimaldo MD;  Location: [facility]    TUBAL LIGATION         Family History History:  Family History   Problem Relation Age of Onset    Hypertension Mother     Hypertension Father     Allergies Maternal Grandmother         dust pollen    Cancer - colorectal Paternal Grandmother 59    Allergies Maternal Aunt         \"    Cardiovascular Maternal Aunt     Breast Cancer Other 60        3 great aunts on moms side       History of Tobacco Use:  History   Smoking Status    Never   Smokeless Tobacco    Never       Current Medications:  Current Outpatient Medications   Medication Sig Dispense " "Refill    cloNIDine (CATAPRES) 0.1 MG tablet Take 1 tablet (0.1 mg) by mouth at bedtime. 90 tablet 3    cyclobenzaprine (FLEXERIL) 5 MG tablet Take 1 tablet (5 mg) by mouth 3 times daily as needed for muscle spasms. 20 tablet 0    furosemide (LASIX) 20 MG tablet Take 1 tablet (20 mg) by mouth daily as needed (swelling). 45 tablet 3    hydrOXYzine HCl (ATARAX) 50 MG tablet Take 1 tablet (50 mg) by mouth every 8 hours as needed for other or anxiety (adjuvant pain). 40 tablet 1    ibuprofen (ADVIL/MOTRIN) 800 MG tablet Take 1 tablet (800 mg) by mouth every 8 hours as needed for moderate pain. 40 tablet 1    labetalol (NORMODYNE) 200 MG tablet Take 1 tablet (200 mg) by mouth 2 times daily. 180 tablet 3    metoprolol tartrate (LOPRESSOR) 50 MG tablet Take 1 tablet (50 mg) by mouth 2 times daily. 2 tablet 1    omeprazole (PRILOSEC) 40 MG DR capsule Take 40 mg by mouth 2 times daily as needed (heartburn).      traZODone (DESYREL) 50 MG tablet Take 2 tablets (100 mg) by mouth at bedtime. 180 tablet 1       Allergies:  Allergies   Allergen Reactions    Zestril [Lisinopril] Anaphylaxis and Fatigue    Amlodipine Swelling    Cymbalta [Duloxetine Hcl]     Hydrochlorothiazide     Magnesium Other (See Comments)     Altered mental status after IV dosing    Other Environmental Allergy     Prozac [Fluoxetine]     Losartan Difficulty breathing, Hives, Itching, Rash, Swelling and Visual Disturbance       ROS:  Pertinent items are noted in HPI.  All other systems are negative.    PHYSICAL EXAM:     Vital signs: /79 (BP Location: Left arm, Patient Position: Sitting, Cuff Size: Adult Regular)   Pulse 65   Temp 97.3  F (36.3  C) (Tympanic)   Resp 16   Ht 1.499 m (4' 11.02\")   Wt 64.8 kg (142 lb 13.7 oz)   SpO2 98%   BMI 28.84 kg/m     Weight: [unfilled]   BMI: Body mass index is 28.84 kg/m .   General: Normal, healthy, cooperative, in no acute distress, alert   Skin: no jaundice   HEENT: PERRLA and EOMI   Neck: supple   Lungs: " clear to auscultation   CV: Regular rate and rhythm without murmer   Abdominal: abdomen is soft without significant tenderness, masses, organomegaly or guarding   Extremities: No cyanosis, clubbing or edema noted bilaterally in Upper and Lower Extremities   Neurological: without deficit     On the right arm and the left leg are 2 small lesions approximately 4 mm in size.  No surrounding erythema.  No pigment changes.    ASSESSMENT: 40 year old female with a lesion on the right arm and left leg which the patient desires to have removed..    PLAN: Discussed options with the patient.  Will plan on taking the patient to the [facility] for surgical excision.       The risks, benefits, and alternatives to the planned procedure were fully discussed with the patient and/or the patient's representative(s). The risks of bleeding, infection, death, missing pathology, the need for additional procedures intra-operatively, the possible need for intra-operative consults, the possible need for transfusion therapy, cardiopulmonary compromise, the possible need for additional surgery for a complication were discussed with the patient and/or the patient's representative(s). The patient's and/or patient's representative(s) questions were addressed and answered. Informed consent was obtained from the patient and/or the patient's representative(s). The patient and/or the patient's representative(s) consent to proceed.    The patient was taken to the minor procedure room and positioned in the supine position.  The lesion lesions were identified and the areas were sterilely prepped and draped in the usual fashion.  The areas were anesthestized with local infiltrative anesthesia.  Both lesions removed and closed in the same fashion.  They removed using a 6 mm punch biopsy.  Hemostasis was assured.  The wounds were then closed with a horizontal mattress of 3-0 nylon sutures.  Both lesions were approximately 4 mm in size.  Sterile  dressings were applied.      Follow up: 10 days for suture removal.

## 2025-07-30 ENCOUNTER — MYC MEDICAL ADVICE (OUTPATIENT)
Dept: SURGERY | Facility: OTHER | Age: 40
End: 2025-07-30

## 2025-07-30 ENCOUNTER — TELEPHONE (OUTPATIENT)
Dept: SURGERY | Facility: OTHER | Age: 40
End: 2025-07-30

## 2025-07-30 DIAGNOSIS — L90.5 SCAR OF ABDOMEN: Primary | ICD-10-CM

## 2025-07-31 ENCOUNTER — PREP FOR PROCEDURE (OUTPATIENT)
Dept: SURGERY | Facility: OTHER | Age: 40
End: 2025-07-31

## 2025-07-31 DIAGNOSIS — L90.5 SCAR OF ABDOMINAL WALL: Primary | ICD-10-CM

## 2025-08-18 ENCOUNTER — TELEPHONE (OUTPATIENT)
Dept: INTERVENTIONAL RADIOLOGY/VASCULAR | Facility: HOSPITAL | Age: 40
End: 2025-08-18

## 2025-08-18 RX ORDER — ONDANSETRON 2 MG/ML
4 INJECTION INTRAMUSCULAR; INTRAVENOUS
Status: CANCELLED | OUTPATIENT
Start: 2025-08-18

## 2025-08-18 RX ORDER — NITROGLYCERIN 0.4 MG/1
0.4 TABLET SUBLINGUAL
Status: CANCELLED | OUTPATIENT
Start: 2025-08-18

## 2025-08-19 ENCOUNTER — HOSPITAL ENCOUNTER (OUTPATIENT)
Dept: CT IMAGING | Facility: HOSPITAL | Age: 40
Discharge: HOME | End: 2025-08-19
Attending: INTERNAL MEDICINE
Payer: COMMERCIAL

## 2025-08-19 ENCOUNTER — MYC REFILL (OUTPATIENT)
Dept: FAMILY MEDICINE | Facility: OTHER | Age: 40
End: 2025-08-19

## 2025-08-19 ENCOUNTER — HOSPITAL ENCOUNTER (OUTPATIENT)
Facility: HOSPITAL | Age: 40
Discharge: HOME | End: 2025-08-19
Attending: RADIOLOGY | Admitting: RADIOLOGY
Payer: COMMERCIAL

## 2025-08-19 VITALS — OXYGEN SATURATION: 96 % | DIASTOLIC BLOOD PRESSURE: 83 MMHG | HEART RATE: 70 BPM | SYSTOLIC BLOOD PRESSURE: 117 MMHG

## 2025-08-19 DIAGNOSIS — G89.18 ACUTE POST-OPERATIVE PAIN: ICD-10-CM

## 2025-08-19 DIAGNOSIS — R07.89 CHEST WALL PAIN: ICD-10-CM

## 2025-08-19 DIAGNOSIS — R07.9 CHEST PAIN, UNSPECIFIED TYPE: ICD-10-CM

## 2025-08-19 PROCEDURE — 250N000013 HC RX MED GY IP 250 OP 250 PS 637: Performed by: RADIOLOGY

## 2025-08-19 PROCEDURE — 250N000011 HC RX IP 250 OP 636: Performed by: RADIOLOGY

## 2025-08-19 PROCEDURE — 75574 CT ANGIO HRT W/3D IMAGE: CPT

## 2025-08-19 PROCEDURE — 75574 CT ANGIO HRT W/3D IMAGE: CPT | Mod: 26 | Performed by: STUDENT IN AN ORGANIZED HEALTH CARE EDUCATION/TRAINING PROGRAM

## 2025-08-19 RX ORDER — ONDANSETRON 2 MG/ML
4 INJECTION INTRAMUSCULAR; INTRAVENOUS
Status: DISCONTINUED | OUTPATIENT
Start: 2025-08-19 | End: 2025-08-20 | Stop reason: HOSPADM

## 2025-08-19 RX ORDER — IOPAMIDOL 755 MG/ML
99 INJECTION, SOLUTION INTRAVASCULAR ONCE
Status: COMPLETED | OUTPATIENT
Start: 2025-08-19 | End: 2025-08-19

## 2025-08-19 RX ORDER — NITROGLYCERIN 0.4 MG/1
0.4 TABLET SUBLINGUAL
Status: DISCONTINUED | OUTPATIENT
Start: 2025-08-19 | End: 2025-08-20 | Stop reason: HOSPADM

## 2025-08-19 RX ADMIN — IOPAMIDOL 99 ML: 755 INJECTION, SOLUTION INTRAVENOUS at 09:07

## 2025-08-19 RX ADMIN — NITROGLYCERIN 0.4 MG: 0.4 TABLET SUBLINGUAL at 09:03

## 2025-08-19 ASSESSMENT — ACTIVITIES OF DAILY LIVING (ADL)
ADLS_ACUITY_SCORE: 56
ADLS_ACUITY_SCORE: 56

## 2025-08-20 RX ORDER — HYDROXYZINE HYDROCHLORIDE 50 MG/1
50 TABLET, FILM COATED ORAL EVERY 8 HOURS PRN
Qty: 40 TABLET | Refills: 1 | Status: SHIPPED | OUTPATIENT
Start: 2025-08-20

## 2025-08-20 RX ORDER — CYCLOBENZAPRINE HCL 5 MG
5 TABLET ORAL 3 TIMES DAILY PRN
Qty: 20 TABLET | Refills: 0 | Status: SHIPPED | OUTPATIENT
Start: 2025-08-20

## 2025-08-25 ENCOUNTER — ANCILLARY PROCEDURE (OUTPATIENT)
Dept: MAMMOGRAPHY | Facility: OTHER | Age: 40
End: 2025-08-25
Attending: NURSE PRACTITIONER
Payer: COMMERCIAL

## 2025-08-25 DIAGNOSIS — Z12.31 ENCOUNTER FOR SCREENING MAMMOGRAM FOR BREAST CANCER: ICD-10-CM

## 2025-08-25 PROCEDURE — 77063 BREAST TOMOSYNTHESIS BI: CPT | Mod: 26 | Performed by: RADIOLOGY

## 2025-08-25 PROCEDURE — 77067 SCR MAMMO BI INCL CAD: CPT | Mod: 26 | Performed by: RADIOLOGY

## 2025-08-25 PROCEDURE — 77063 BREAST TOMOSYNTHESIS BI: CPT | Mod: TC

## 2025-08-27 ENCOUNTER — MYC REFILL (OUTPATIENT)
Dept: FAMILY MEDICINE | Facility: OTHER | Age: 40
End: 2025-08-27

## 2025-08-27 DIAGNOSIS — Z98.890 POST-OPERATIVE STATE: ICD-10-CM

## 2025-08-28 RX ORDER — IBUPROFEN 800 MG/1
800 TABLET, FILM COATED ORAL EVERY 8 HOURS PRN
Qty: 40 TABLET | Refills: 1 | Status: SHIPPED | OUTPATIENT
Start: 2025-08-28

## 2025-09-03 ENCOUNTER — OFFICE VISIT (OUTPATIENT)
Dept: FAMILY MEDICINE | Facility: OTHER | Age: 40
End: 2025-09-03
Attending: NURSE PRACTITIONER
Payer: COMMERCIAL

## 2025-09-03 VITALS
SYSTOLIC BLOOD PRESSURE: 118 MMHG | HEART RATE: 75 BPM | BODY MASS INDEX: 29.07 KG/M2 | OXYGEN SATURATION: 99 % | RESPIRATION RATE: 16 BRPM | DIASTOLIC BLOOD PRESSURE: 72 MMHG | HEIGHT: 59 IN | WEIGHT: 144.2 LBS | TEMPERATURE: 98.3 F

## 2025-09-03 DIAGNOSIS — I45.6 WPW (WOLFF-PARKINSON-WHITE SYNDROME): ICD-10-CM

## 2025-09-03 DIAGNOSIS — Z01.818 PREOP GENERAL PHYSICAL EXAM: Primary | ICD-10-CM

## 2025-09-03 DIAGNOSIS — I10 ESSENTIAL HYPERTENSION: ICD-10-CM

## 2025-09-03 DIAGNOSIS — N18.2 CKD (CHRONIC KIDNEY DISEASE) STAGE 2, GFR 60-89 ML/MIN: ICD-10-CM

## 2025-09-03 DIAGNOSIS — L90.5 SCAR CONDITION AND FIBROSIS OF SKIN: ICD-10-CM

## 2025-09-03 DIAGNOSIS — F41.1 GAD (GENERALIZED ANXIETY DISORDER): ICD-10-CM

## 2025-09-03 DIAGNOSIS — G35 MS (MULTIPLE SCLEROSIS) (H): ICD-10-CM

## 2025-09-03 DIAGNOSIS — K21.00 GASTROESOPHAGEAL REFLUX DISEASE WITH ESOPHAGITIS WITHOUT HEMORRHAGE: ICD-10-CM

## 2025-09-03 LAB
ANION GAP SERPL CALCULATED.3IONS-SCNC: 11 MMOL/L (ref 7–15)
BASOPHILS # BLD AUTO: 0.06 10E3/UL (ref 0–0.2)
BASOPHILS NFR BLD AUTO: 1.3 %
BUN SERPL-MCNC: 15.2 MG/DL (ref 6–20)
CALCIUM SERPL-MCNC: 9 MG/DL (ref 8.8–10.4)
CHLORIDE SERPL-SCNC: 108 MMOL/L (ref 98–107)
CREAT SERPL-MCNC: 1 MG/DL (ref 0.51–0.95)
EGFRCR SERPLBLD CKD-EPI 2021: 73 ML/MIN/1.73M2
EOSINOPHIL # BLD AUTO: 0.27 10E3/UL (ref 0–0.7)
EOSINOPHIL NFR BLD AUTO: 5.7 %
ERYTHROCYTE [DISTWIDTH] IN BLOOD BY AUTOMATED COUNT: 14.2 % (ref 10–15)
GLUCOSE SERPL-MCNC: 97 MG/DL (ref 70–99)
HCO3 SERPL-SCNC: 23 MMOL/L (ref 22–29)
HCT VFR BLD AUTO: 36.4 % (ref 35–47)
HGB BLD-MCNC: 12 G/DL (ref 11.7–15.7)
HOLD SPECIMEN: NORMAL
HOLD SPECIMEN: NORMAL
IMM GRANULOCYTES # BLD: <0.03 10E3/UL
IMM GRANULOCYTES NFR BLD: 0.4 %
LYMPHOCYTES # BLD AUTO: 1.59 10E3/UL (ref 0.8–5.3)
LYMPHOCYTES NFR BLD AUTO: 33.7 %
MCH RBC QN AUTO: 30.3 PG (ref 26.5–33)
MCHC RBC AUTO-ENTMCNC: 33 G/DL (ref 31.5–36.5)
MCV RBC AUTO: 91.9 FL (ref 78–100)
MONOCYTES # BLD AUTO: 0.37 10E3/UL (ref 0–1.3)
MONOCYTES NFR BLD AUTO: 7.8 %
NEUTROPHILS # BLD AUTO: 2.41 10E3/UL (ref 1.6–8.3)
NEUTROPHILS NFR BLD AUTO: 51.1 %
NRBC # BLD AUTO: <0.03 10E3/UL
NRBC BLD AUTO-RTO: 0 /100
PLATELET # BLD AUTO: 272 10E3/UL (ref 150–450)
POTASSIUM SERPL-SCNC: 4 MMOL/L (ref 3.4–5.3)
RBC # BLD AUTO: 3.96 10E6/UL (ref 3.8–5.2)
SODIUM SERPL-SCNC: 142 MMOL/L (ref 135–145)
WBC # BLD AUTO: 4.72 10E3/UL (ref 4–11)

## 2025-09-03 PROCEDURE — 36415 COLL VENOUS BLD VENIPUNCTURE: CPT | Mod: ZL | Performed by: NURSE PRACTITIONER

## 2025-09-03 PROCEDURE — 85018 HEMOGLOBIN: CPT | Mod: ZL | Performed by: NURSE PRACTITIONER

## 2025-09-03 PROCEDURE — 82374 ASSAY BLOOD CARBON DIOXIDE: CPT | Mod: ZL | Performed by: NURSE PRACTITIONER

## 2025-09-03 PROCEDURE — G0463 HOSPITAL OUTPT CLINIC VISIT: HCPCS

## 2025-09-03 ASSESSMENT — PAIN SCALES - GENERAL: PAINLEVEL_OUTOF10: MODERATE PAIN (5)

## (undated) DEVICE — GLV 7.5 BIOGEL LATEX 30475-01

## (undated) DEVICE — SU VICRYL 0 UR-6 27" J603H

## (undated) DEVICE — ENDO TROCAR SLEEVE KII ADV FIXATION 05X100MM CFS02

## (undated) DEVICE — SYSTEM CLEARIFY VISUALIZATION 21-345

## (undated) DEVICE — ESU ELEC NDL 6" COATED/INSULATED E1551-6

## (undated) DEVICE — Device

## (undated) DEVICE — ENDO TROCAR SHIELDED BLADED KII ADV FIX 05X100MM CFB03

## (undated) DEVICE — SOL NACL 0.9% INJ 1000ML BAG 2B1324X

## (undated) DEVICE — BIN-UROLOGY / CYSTO BN47

## (undated) DEVICE — GLOVE 8.5 PROTEXIS PI CLSC PF BD CUF STRL LF 12IN 2D72PL85X

## (undated) DEVICE — SOL WATER IRRIG 1000ML BOTTLE 2F7114

## (undated) DEVICE — COVER LT HANDLE 2/PK 5160-2FG

## (undated) DEVICE — GOWN SURG XL LVL 3 REINFORCED 9541

## (undated) DEVICE — PRESSURE INFUSOR DISP. 3000CC 233000

## (undated) DEVICE — PACK LAPAROTOMY CUSTOM SBA32LPMBG

## (undated) DEVICE — LABEL STERILE PREPRINTED FOR OR FRRH01-2M

## (undated) DEVICE — PACK GYN CYSTO CUSTOM SMA32GCMBF

## (undated) DEVICE — ENDO POUCH UNIV RETRIEVAL SYSTEM INZII 10MM CD001

## (undated) DEVICE — TRAY PREP DRY SKIN SCRUB 067

## (undated) DEVICE — SOL NACL 0.9% IRRIG 1000ML BOTTLE 2F7124

## (undated) DEVICE — ENDO TROCAR OPTICAL ACCESS KII Z-THRD 08X100MM C0Q19

## (undated) DEVICE — PREP CHLORAPREP 26ML TINTED HI-LITE ORANGE 930815

## (undated) DEVICE — CANISTER SUCTION MEDI-VAC GUARDIAN 2000ML 90D 65651-220

## (undated) DEVICE — ENDO SEAL SCOPE SELF SEAL 1.2MM 26153EAU

## (undated) DEVICE — FLUID TRAP FOR VIROSAFE FILTERS VSFT10-10

## (undated) DEVICE — KIT PATIENT POSITIONING PIGAZZI LATEX FREE 40580

## (undated) DEVICE — PACK BASIN SET UP SUTCNBSBBA

## (undated) DEVICE — TRAY-SKIN PREP POVIDONE/IODINE

## (undated) DEVICE — GLV 8.5 BIOGEL LATEX 30485-01

## (undated) DEVICE — SU PDS II 0 TP-1 60" Z991G

## (undated) DEVICE — SLEEVE SCD EXPRESS KNEE LENGTH MED 9529

## (undated) DEVICE — PACK LAP LAVH CUSTOM SMA32LPMBG

## (undated) DEVICE — CATH TRAY 16FR METER W/STATLOCK LATEX] A902916

## (undated) DEVICE — ESU PENCIL W/SMOKE EVAC CVPLP2000

## (undated) DEVICE — SU VICRYL 0 CT 36" J358H

## (undated) DEVICE — SU VICRYL 0 CT-1 CR 8X18" J740D

## (undated) DEVICE — SU VICRYL 3-0 SH 27" UND J416H

## (undated) DEVICE — CAUTERY-EXTENDED 6.5" BLADE

## (undated) DEVICE — NDL INSUFFLATION 13GA 120MM C2201

## (undated) DEVICE — ESU LIGASURE XP LAPAROSCOPIC MARYLAND 37CMX5MM LXMJ37S

## (undated) DEVICE — CONNECTOR ERBEFLO 2 PORT 20325-215

## (undated) DEVICE — SU DERMABOND ADVANCED .7ML DNX12

## (undated) DEVICE — EVAC SYSTEM CLEAR FLOW SC082500

## (undated) DEVICE — ESU LIGASURE IMPACT OPEN SEALER/DVDR CVD LG JAW LF4418

## (undated) DEVICE — ESU GROUND PAD ADULT W/CORD E7507

## (undated) DEVICE — DRSG NON ADHERING 3 X 8 TELFA 1238

## (undated) DEVICE — TUBING SUCTION 20FT N620A

## (undated) DEVICE — CAUTERY TIP CLEANER 300-2SS

## (undated) DEVICE — SOL NACL 0.9% IRRIG 3000ML BAG 2B7477

## (undated) DEVICE — GLOVE PROTEXIS POWDER FREE 7.0 ORTHOPEDIC 2D73ET70

## (undated) DEVICE — DRSG SPONGE STERILE 8 X 4 2259

## (undated) DEVICE — TUBING INSUFFLATION INSUFFLATOR FILTER HIGH FLOW 031322-01

## (undated) DEVICE — BLADE CLIPPER SGL USE 9680

## (undated) DEVICE — SU VICRYL 0 TIE 12X18" J906G

## (undated) DEVICE — TUBING INFLOW HYSTEROPUMP 4170.223

## (undated) DEVICE — DRSG MEPILEX AG 4X4" 287100

## (undated) DEVICE — PUNCTURE CLOSURE DEVICE PMITCSG

## (undated) DEVICE — ESU CORD MONOPOLAR 10'  E0510

## (undated) DEVICE — WATER STERILE 1000ML STERILE UROMATIC 2B-71-14

## (undated) DEVICE — TAPE MEDIPORE 4"X2YD 2864

## (undated) DEVICE — DRSG STERI STRIP 1/2X4" R1547

## (undated) DEVICE — SET TUBING OUTFLOW FLUID MANAGER STRL DISP 4170.901

## (undated) DEVICE — SUCTION STRYKERFLOW II 250-070-500

## (undated) DEVICE — BLADE CLIPPER DISP 4406

## (undated) DEVICE — FORCEPS BIOPSY RADIAL JAW 4 LARGE W/NEEDLE 240CM M00513332

## (undated) DEVICE — APPLICATOR BNZN TNCT RYN SWBSTK NWVN SNGL APLS1106

## (undated) DEVICE — PACK C-SECTION CUSTOM SMA32CSMBU

## (undated) DEVICE — CATH SELF CATH MALE 14FR 414

## (undated) DEVICE — GLOVE 8.5 PROTEXIS PI CLASSIC 2D72PL85X

## (undated) DEVICE — SPONGE RAY-TEC 4X4" 7317

## (undated) DEVICE — PAD PERI INDIV WRAP 11" 2022A

## (undated) DEVICE — SUCTION MANIFOLD NEPTUNE 2 SYS 1 PORT 702-025-000

## (undated) DEVICE — SPONGE LAP 18X18" X8435

## (undated) DEVICE — COVER LT HANDLE 2/PK 15160-2FG

## (undated) DEVICE — DRAPE STERI TOWEL LG 1010

## (undated) DEVICE — SU CHROMIC 1 CTX 36" 905H

## (undated) DEVICE — FORCEP COLON BIOPSY STD W/NEEDLE 160CM M00513390

## (undated) RX ORDER — PROPOFOL 10 MG/ML
INJECTION, EMULSION INTRAVENOUS
Status: DISPENSED
Start: 2024-04-18

## (undated) RX ORDER — FENTANYL CITRATE 50 UG/ML
INJECTION, SOLUTION INTRAMUSCULAR; INTRAVENOUS
Status: DISPENSED
Start: 2024-09-19

## (undated) RX ORDER — PROPOFOL 10 MG/ML
INJECTION, EMULSION INTRAVENOUS
Status: DISPENSED
Start: 2025-02-17

## (undated) RX ORDER — EPHEDRINE SULFATE 50 MG/ML
INJECTION, SOLUTION INTRAMUSCULAR; INTRAVENOUS; SUBCUTANEOUS
Status: DISPENSED
Start: 2024-04-18

## (undated) RX ORDER — ONDANSETRON 2 MG/ML
INJECTION INTRAMUSCULAR; INTRAVENOUS
Status: DISPENSED
Start: 2024-04-18

## (undated) RX ORDER — PROPOFOL 10 MG/ML
INJECTION, EMULSION INTRAVENOUS
Status: DISPENSED
Start: 2023-10-25

## (undated) RX ORDER — PROPOFOL 10 MG/ML
INJECTION, EMULSION INTRAVENOUS
Status: DISPENSED
Start: 2024-09-19

## (undated) RX ORDER — DEXAMETHASONE SODIUM PHOSPHATE 10 MG/ML
INJECTION, SOLUTION INTRAMUSCULAR; INTRAVENOUS
Status: DISPENSED
Start: 2025-06-30

## (undated) RX ORDER — FENTANYL CITRATE 50 UG/ML
INJECTION, SOLUTION INTRAMUSCULAR; INTRAVENOUS
Status: DISPENSED
Start: 2024-11-11

## (undated) RX ORDER — DEXMEDETOMIDINE HYDROCHLORIDE 100 UG/ML
INJECTION, SOLUTION INTRAVENOUS
Status: DISPENSED
Start: 2023-07-23

## (undated) RX ORDER — DEXAMETHASONE SODIUM PHOSPHATE 10 MG/ML
INJECTION, SOLUTION INTRAMUSCULAR; INTRAVENOUS
Status: DISPENSED
Start: 2024-09-19

## (undated) RX ORDER — ONDANSETRON 2 MG/ML
INJECTION INTRAMUSCULAR; INTRAVENOUS
Status: DISPENSED
Start: 2025-06-30

## (undated) RX ORDER — BUPIVACAINE HYDROCHLORIDE 5 MG/ML
INJECTION, SOLUTION EPIDURAL; INTRACAUDAL
Status: DISPENSED
Start: 2024-06-06

## (undated) RX ORDER — ONDANSETRON 2 MG/ML
INJECTION INTRAMUSCULAR; INTRAVENOUS
Status: DISPENSED
Start: 2024-09-19

## (undated) RX ORDER — DEXAMETHASONE SODIUM PHOSPHATE 10 MG/ML
INJECTION, SOLUTION INTRAMUSCULAR; INTRAVENOUS
Status: DISPENSED
Start: 2023-07-23

## (undated) RX ORDER — ONDANSETRON 2 MG/ML
INJECTION INTRAMUSCULAR; INTRAVENOUS
Status: DISPENSED
Start: 2025-06-04

## (undated) RX ORDER — FENTANYL CITRATE 50 UG/ML
INJECTION, SOLUTION INTRAMUSCULAR; INTRAVENOUS
Status: DISPENSED
Start: 2025-06-30

## (undated) RX ORDER — KETAMINE HYDROCHLORIDE 10 MG/ML
INJECTION, SOLUTION INTRAMUSCULAR; INTRAVENOUS
Status: DISPENSED
Start: 2025-06-04

## (undated) RX ORDER — FENTANYL CITRATE 50 UG/ML
INJECTION, SOLUTION INTRAMUSCULAR; INTRAVENOUS
Status: DISPENSED
Start: 2025-06-04

## (undated) RX ORDER — ONDANSETRON 2 MG/ML
INJECTION INTRAMUSCULAR; INTRAVENOUS
Status: DISPENSED
Start: 2023-07-23

## (undated) RX ORDER — EPHEDRINE SULFATE 50 MG/ML
INJECTION, SOLUTION INTRAMUSCULAR; INTRAVENOUS; SUBCUTANEOUS
Status: DISPENSED
Start: 2023-07-23

## (undated) RX ORDER — FENTANYL CITRATE 50 UG/ML
INJECTION, SOLUTION INTRAMUSCULAR; INTRAVENOUS
Status: DISPENSED
Start: 2024-04-18

## (undated) RX ORDER — DEXAMETHASONE SODIUM PHOSPHATE 10 MG/ML
INJECTION, SOLUTION INTRAMUSCULAR; INTRAVENOUS
Status: DISPENSED
Start: 2025-06-04

## (undated) RX ORDER — PROPOFOL 10 MG/ML
INJECTION, EMULSION INTRAVENOUS
Status: DISPENSED
Start: 2025-06-04

## (undated) RX ORDER — DEXMEDETOMIDINE HYDROCHLORIDE 4 UG/ML
INJECTION, SOLUTION INTRAVENOUS
Status: DISPENSED
Start: 2025-06-04

## (undated) RX ORDER — BETAMETHASONE SODIUM PHOSPHATE AND BETAMETHASONE ACETATE 3; 3 MG/ML; MG/ML
INJECTION, SUSPENSION INTRA-ARTICULAR; INTRALESIONAL; INTRAMUSCULAR; SOFT TISSUE
Status: DISPENSED
Start: 2024-06-06

## (undated) RX ORDER — FENTANYL CITRATE 50 UG/ML
INJECTION, SOLUTION INTRAMUSCULAR; INTRAVENOUS
Status: DISPENSED
Start: 2023-10-25

## (undated) RX ORDER — KETOROLAC TROMETHAMINE 30 MG/ML
INJECTION, SOLUTION INTRAMUSCULAR; INTRAVENOUS
Status: DISPENSED
Start: 2025-06-04

## (undated) RX ORDER — LIDOCAINE HYDROCHLORIDE AND EPINEPHRINE 10; 10 MG/ML; UG/ML
INJECTION, SOLUTION INFILTRATION; PERINEURAL
Status: DISPENSED
Start: 2025-07-29

## (undated) RX ORDER — HYDROMORPHONE HYDROCHLORIDE 1 MG/ML
INJECTION, SOLUTION INTRAMUSCULAR; INTRAVENOUS; SUBCUTANEOUS
Status: DISPENSED
Start: 2024-04-18

## (undated) RX ORDER — PROPOFOL 10 MG/ML
INJECTION, EMULSION INTRAVENOUS
Status: DISPENSED
Start: 2025-06-30

## (undated) RX ORDER — DEXAMETHASONE SODIUM PHOSPHATE 10 MG/ML
INJECTION, SOLUTION INTRAMUSCULAR; INTRAVENOUS
Status: DISPENSED
Start: 2024-04-18

## (undated) RX ORDER — FENTANYL CITRATE-0.9 % NACL/PF 10 MCG/ML
PLASTIC BAG, INJECTION (ML) INTRAVENOUS
Status: DISPENSED
Start: 2023-07-23

## (undated) RX ORDER — BUPIVACAINE HYDROCHLORIDE 2.5 MG/ML
INJECTION, SOLUTION EPIDURAL; INFILTRATION; INTRACAUDAL
Status: DISPENSED
Start: 2024-04-19

## (undated) RX ORDER — KETOROLAC TROMETHAMINE 30 MG/ML
INJECTION, SOLUTION INTRAMUSCULAR; INTRAVENOUS
Status: DISPENSED
Start: 2023-07-23

## (undated) RX ORDER — OXYTOCIN/0.9 % SODIUM CHLORIDE 30/500 ML
PLASTIC BAG, INJECTION (ML) INTRAVENOUS
Status: DISPENSED
Start: 2023-07-23

## (undated) RX ORDER — BUPIVACAINE HYDROCHLORIDE 2.5 MG/ML
INJECTION, SOLUTION EPIDURAL; INFILTRATION; INTRACAUDAL
Status: DISPENSED
Start: 2024-04-18